# Patient Record
Sex: FEMALE | Race: BLACK OR AFRICAN AMERICAN | NOT HISPANIC OR LATINO | Employment: OTHER | RURAL
[De-identification: names, ages, dates, MRNs, and addresses within clinical notes are randomized per-mention and may not be internally consistent; named-entity substitution may affect disease eponyms.]

---

## 2018-07-12 ENCOUNTER — HISTORICAL (OUTPATIENT)
Dept: ADMINISTRATIVE | Facility: HOSPITAL | Age: 73
End: 2018-07-12

## 2018-07-16 LAB
LAB AP COMMENTS: NORMAL
LAB AP GENERAL CAT - HISTORICAL: NORMAL
LAB AP INTERPRETATION/RESULT - HISTORICAL: NEGATIVE
LAB AP SPECIMEN ADEQUACY - HISTORICAL: NORMAL
LAB AP SPECIMEN SUBMITTED - HISTORICAL: NORMAL

## 2018-12-04 ENCOUNTER — HISTORICAL (OUTPATIENT)
Dept: ADMINISTRATIVE | Facility: HOSPITAL | Age: 73
End: 2018-12-04

## 2018-12-04 LAB — TEST RESULTS: NORMAL

## 2018-12-05 LAB
LAB AP CLINICAL INFORMATION: NORMAL
LAB AP DIAGNOSIS - HISTORICAL: NORMAL
LAB AP GROSS PATHOLOGY - HISTORICAL: NORMAL
LAB AP SPECIMEN SUBMITTED - HISTORICAL: NORMAL

## 2018-12-25 ENCOUNTER — HISTORICAL (OUTPATIENT)
Dept: ADMINISTRATIVE | Facility: HOSPITAL | Age: 73
End: 2018-12-25

## 2018-12-28 LAB
LAB AP CLINICAL INFORMATION: NORMAL
LAB AP COMMENTS: NORMAL
LAB AP DIAGNOSIS - HISTORICAL: NORMAL
LAB AP GENERAL CAT - HISTORICAL: NEGATIVE
LAB AP PREPARATIONS - HISTORICAL: NORMAL
LAB AP SPECIMEN SUBMITTED - HISTORICAL: NORMAL

## 2020-06-24 ENCOUNTER — HISTORICAL (OUTPATIENT)
Dept: ADMINISTRATIVE | Facility: HOSPITAL | Age: 75
End: 2020-06-24

## 2020-11-16 ENCOUNTER — HISTORICAL (OUTPATIENT)
Dept: ADMINISTRATIVE | Facility: HOSPITAL | Age: 75
End: 2020-11-16

## 2020-12-14 ENCOUNTER — HISTORICAL (OUTPATIENT)
Dept: ADMINISTRATIVE | Facility: HOSPITAL | Age: 75
End: 2020-12-14

## 2021-04-05 VITALS — BODY MASS INDEX: 30.92 KG/M2 | HEIGHT: 70 IN | WEIGHT: 216 LBS

## 2021-04-05 RX ORDER — OMEPRAZOLE 20 MG/1
20 CAPSULE, DELAYED RELEASE ORAL DAILY
COMMUNITY
End: 2021-10-06 | Stop reason: SDUPTHER

## 2021-04-05 RX ORDER — HYDRALAZINE HYDROCHLORIDE 100 MG/1
100 TABLET, FILM COATED ORAL 2 TIMES DAILY
COMMUNITY
End: 2021-10-06 | Stop reason: SDUPTHER

## 2021-04-05 RX ORDER — COLCHICINE 0.6 MG/1
0.6 TABLET ORAL DAILY
COMMUNITY
End: 2021-10-06 | Stop reason: SDUPTHER

## 2021-04-05 RX ORDER — FUROSEMIDE 40 MG/1
40 TABLET ORAL DAILY
COMMUNITY
End: 2021-07-26

## 2021-04-05 RX ORDER — ALENDRONATE SODIUM 70 MG/75ML
70 SOLUTION ORAL
COMMUNITY
End: 2021-10-06 | Stop reason: SDUPTHER

## 2021-04-05 RX ORDER — METOPROLOL SUCCINATE 50 MG/1
50 TABLET, EXTENDED RELEASE ORAL DAILY
COMMUNITY
End: 2021-10-06 | Stop reason: SDUPTHER

## 2021-04-05 RX ORDER — AMLODIPINE BESYLATE 10 MG/1
10 TABLET ORAL DAILY
COMMUNITY
End: 2021-09-20

## 2021-04-05 RX ORDER — ISOSORBIDE MONONITRATE 30 MG/1
30 TABLET, EXTENDED RELEASE ORAL DAILY
COMMUNITY
End: 2021-10-06 | Stop reason: SDUPTHER

## 2021-04-05 RX ORDER — ACETAMINOPHEN 500 MG
1 TABLET ORAL DAILY
COMMUNITY

## 2021-04-05 RX ORDER — SEMAGLUTIDE 1.34 MG/ML
0.5 INJECTION, SOLUTION SUBCUTANEOUS
COMMUNITY
End: 2021-04-06 | Stop reason: SDUPTHER

## 2021-04-05 RX ORDER — FENOFIBRATE 145 MG/1
145 TABLET, FILM COATED ORAL DAILY
COMMUNITY
End: 2021-07-26

## 2021-04-05 RX ORDER — LOVASTATIN 10 MG/1
10 TABLET ORAL NIGHTLY
COMMUNITY
End: 2021-10-06 | Stop reason: SDUPTHER

## 2021-04-05 RX ORDER — SPIRONOLACTONE 25 MG/1
25 TABLET ORAL DAILY
COMMUNITY
End: 2021-07-26

## 2021-04-05 RX ORDER — GLIMEPIRIDE 4 MG/1
4 TABLET ORAL
COMMUNITY
End: 2021-10-06 | Stop reason: SDUPTHER

## 2021-04-05 RX ORDER — POTASSIUM CHLORIDE 750 MG/1
10 CAPSULE, EXTENDED RELEASE ORAL DAILY
COMMUNITY
End: 2021-10-06 | Stop reason: SDUPTHER

## 2021-04-05 RX ORDER — ASPIRIN 81 MG/1
81 TABLET ORAL DAILY
COMMUNITY

## 2021-04-06 ENCOUNTER — TELEPHONE (OUTPATIENT)
Dept: DIABETES SERVICES | Facility: CLINIC | Age: 76
End: 2021-04-06

## 2021-04-06 ENCOUNTER — OFFICE VISIT (OUTPATIENT)
Dept: PRIMARY CARE CLINIC | Facility: CLINIC | Age: 76
End: 2021-04-06
Payer: MEDICARE

## 2021-04-06 ENCOUNTER — OFFICE VISIT (OUTPATIENT)
Dept: DIABETES SERVICES | Facility: CLINIC | Age: 76
End: 2021-04-06
Payer: MEDICARE

## 2021-04-06 VITALS
HEIGHT: 70 IN | WEIGHT: 211 LBS | SYSTOLIC BLOOD PRESSURE: 118 MMHG | TEMPERATURE: 98 F | BODY MASS INDEX: 30.21 KG/M2 | RESPIRATION RATE: 16 BRPM | DIASTOLIC BLOOD PRESSURE: 60 MMHG | OXYGEN SATURATION: 96 % | HEART RATE: 94 BPM

## 2021-04-06 VITALS
SYSTOLIC BLOOD PRESSURE: 120 MMHG | HEART RATE: 90 BPM | HEIGHT: 70 IN | RESPIRATION RATE: 16 BRPM | BODY MASS INDEX: 30.21 KG/M2 | WEIGHT: 211 LBS | OXYGEN SATURATION: 99 % | DIASTOLIC BLOOD PRESSURE: 70 MMHG

## 2021-04-06 DIAGNOSIS — E11.9 TYPE 2 DIABETES MELLITUS WITHOUT COMPLICATION, WITHOUT LONG-TERM CURRENT USE OF INSULIN: Primary | ICD-10-CM

## 2021-04-06 DIAGNOSIS — Z86.73 HISTORY OF CVA (CEREBROVASCULAR ACCIDENT): ICD-10-CM

## 2021-04-06 DIAGNOSIS — I12.9 BENIGN HYPERTENSIVE CKD, STAGE 1-4 OR UNSPECIFIED CHRONIC KIDNEY DISEASE: ICD-10-CM

## 2021-04-06 DIAGNOSIS — I25.10 ATHEROSCLEROSIS OF CORONARY ARTERY, ANGINA PRESENCE UNSPECIFIED, UNSPECIFIED VESSEL OR LESION TYPE, UNSPECIFIED WHETHER NATIVE OR TRANSPLANTED HEART: ICD-10-CM

## 2021-04-06 DIAGNOSIS — E78.5 HYPERLIPIDEMIA, UNSPECIFIED HYPERLIPIDEMIA TYPE: ICD-10-CM

## 2021-04-06 DIAGNOSIS — Z78.0 OSTEOPENIA AFTER MENOPAUSE: Primary | ICD-10-CM

## 2021-04-06 DIAGNOSIS — I10 HYPERTENSION, UNSPECIFIED TYPE: ICD-10-CM

## 2021-04-06 DIAGNOSIS — M85.80 OSTEOPENIA, UNSPECIFIED LOCATION: Chronic | ICD-10-CM

## 2021-04-06 DIAGNOSIS — M85.80 OSTEOPENIA AFTER MENOPAUSE: Primary | ICD-10-CM

## 2021-04-06 LAB
GLUCOSE SERPL-MCNC: 126 MG/DL (ref 70–110)
HBA1C MFR BLD: 6.3 %

## 2021-04-06 PROCEDURE — 83036 HEMOGLOBIN GLYCOSYLATED A1C: CPT | Mod: PBBFAC | Performed by: NURSE PRACTITIONER

## 2021-04-06 PROCEDURE — 99214 OFFICE O/P EST MOD 30 MIN: CPT | Mod: S$PBB,,, | Performed by: NURSE PRACTITIONER

## 2021-04-06 PROCEDURE — 82962 GLUCOSE BLOOD TEST: CPT | Mod: PBBFAC | Performed by: NURSE PRACTITIONER

## 2021-04-06 PROCEDURE — 99213 OFFICE O/P EST LOW 20 MIN: CPT | Mod: ,,, | Performed by: NURSE PRACTITIONER

## 2021-04-06 PROCEDURE — 99214 PR OFFICE/OUTPT VISIT, EST, LEVL IV, 30-39 MIN: ICD-10-PCS | Mod: S$PBB,,, | Performed by: NURSE PRACTITIONER

## 2021-04-06 PROCEDURE — 99999 PR PBB SHADOW E&M-EST. PATIENT-LVL V: ICD-10-PCS | Mod: PBBFAC,,, | Performed by: NURSE PRACTITIONER

## 2021-04-06 PROCEDURE — 99215 OFFICE O/P EST HI 40 MIN: CPT | Mod: PBBFAC | Performed by: NURSE PRACTITIONER

## 2021-04-06 PROCEDURE — 99999 PR PBB SHADOW E&M-EST. PATIENT-LVL V: CPT | Mod: PBBFAC,,, | Performed by: NURSE PRACTITIONER

## 2021-04-06 PROCEDURE — 99213 PR OFFICE/OUTPT VISIT, EST, LEVL III, 20-29 MIN: ICD-10-PCS | Mod: ,,, | Performed by: NURSE PRACTITIONER

## 2021-04-06 RX ORDER — SEMAGLUTIDE 1.34 MG/ML
0.5 INJECTION, SOLUTION SUBCUTANEOUS
Qty: 9 ML | Refills: 1 | Status: SHIPPED | OUTPATIENT
Start: 2021-04-06 | End: 2021-10-06 | Stop reason: SDUPTHER

## 2021-06-30 ENCOUNTER — HOSPITAL ENCOUNTER (OUTPATIENT)
Dept: RADIOLOGY | Facility: HOSPITAL | Age: 76
Discharge: HOME | End: 2021-06-30
Attending: NURSE PRACTITIONER
Payer: MEDICARE

## 2021-06-30 VITALS — BODY MASS INDEX: 32.93 KG/M2 | WEIGHT: 230 LBS | HEIGHT: 70 IN

## 2021-06-30 DIAGNOSIS — Z12.31 BREAST CANCER SCREENING BY MAMMOGRAM: ICD-10-CM

## 2021-06-30 PROCEDURE — 77067 SCR MAMMO BI INCL CAD: CPT | Mod: TC

## 2021-09-20 RX ORDER — AMLODIPINE BESYLATE 10 MG/1
TABLET ORAL
Qty: 90 TABLET | Refills: 3 | Status: SHIPPED | OUTPATIENT
Start: 2021-09-20 | End: 2021-10-06 | Stop reason: SDUPTHER

## 2021-10-06 ENCOUNTER — OFFICE VISIT (OUTPATIENT)
Dept: PRIMARY CARE CLINIC | Facility: CLINIC | Age: 76
End: 2021-10-06
Payer: MEDICARE

## 2021-10-06 VITALS
SYSTOLIC BLOOD PRESSURE: 138 MMHG | HEIGHT: 70 IN | OXYGEN SATURATION: 98 % | BODY MASS INDEX: 29.78 KG/M2 | HEART RATE: 92 BPM | WEIGHT: 208 LBS | RESPIRATION RATE: 16 BRPM | TEMPERATURE: 98 F | DIASTOLIC BLOOD PRESSURE: 70 MMHG

## 2021-10-06 DIAGNOSIS — I25.10 ATHEROSCLEROSIS OF CORONARY ARTERY, UNSPECIFIED VESSEL OR LESION TYPE, UNSPECIFIED WHETHER ANGINA PRESENT, UNSPECIFIED WHETHER NATIVE OR TRANSPLANTED HEART: ICD-10-CM

## 2021-10-06 DIAGNOSIS — K21.00 GASTROESOPHAGEAL REFLUX DISEASE WITH ESOPHAGITIS WITHOUT HEMORRHAGE: ICD-10-CM

## 2021-10-06 DIAGNOSIS — Z86.73 HISTORY OF CVA (CEREBROVASCULAR ACCIDENT): ICD-10-CM

## 2021-10-06 DIAGNOSIS — M85.80 OSTEOPENIA, UNSPECIFIED LOCATION: ICD-10-CM

## 2021-10-06 DIAGNOSIS — Z23 IMMUNIZATION DUE: ICD-10-CM

## 2021-10-06 DIAGNOSIS — I12.9 BENIGN HYPERTENSIVE CKD, STAGE 1-4 OR UNSPECIFIED CHRONIC KIDNEY DISEASE: ICD-10-CM

## 2021-10-06 DIAGNOSIS — I10 HYPERTENSION, UNSPECIFIED TYPE: ICD-10-CM

## 2021-10-06 DIAGNOSIS — E11.9 DIABETES MELLITUS WITHOUT COMPLICATION: Primary | ICD-10-CM

## 2021-10-06 DIAGNOSIS — E78.5 HYPERLIPIDEMIA, UNSPECIFIED HYPERLIPIDEMIA TYPE: ICD-10-CM

## 2021-10-06 DIAGNOSIS — E87.6 HYPOKALEMIA: ICD-10-CM

## 2021-10-06 PROCEDURE — 90662 IIV NO PRSV INCREASED AG IM: CPT | Mod: ,,, | Performed by: NURSE PRACTITIONER

## 2021-10-06 PROCEDURE — 90662 FLU VACCINE - QUADRIVALENT - HIGH DOSE (65+) PRESERVATIVE FREE IM: ICD-10-PCS | Mod: ,,, | Performed by: NURSE PRACTITIONER

## 2021-10-06 PROCEDURE — 99214 PR OFFICE/OUTPT VISIT, EST, LEVL IV, 30-39 MIN: ICD-10-PCS | Mod: ,,, | Performed by: NURSE PRACTITIONER

## 2021-10-06 PROCEDURE — 99214 OFFICE O/P EST MOD 30 MIN: CPT | Mod: ,,, | Performed by: NURSE PRACTITIONER

## 2021-10-06 PROCEDURE — G0008 FLU VACCINE - QUADRIVALENT - HIGH DOSE (65+) PRESERVATIVE FREE IM: ICD-10-PCS | Mod: ,,, | Performed by: NURSE PRACTITIONER

## 2021-10-06 PROCEDURE — G0008 ADMIN INFLUENZA VIRUS VAC: HCPCS | Mod: ,,, | Performed by: NURSE PRACTITIONER

## 2021-10-06 RX ORDER — COLCHICINE 0.6 MG/1
0.6 TABLET ORAL DAILY
Qty: 90 TABLET | Refills: 3 | Status: SHIPPED | OUTPATIENT
Start: 2021-10-06 | End: 2023-03-02

## 2021-10-06 RX ORDER — ISOSORBIDE MONONITRATE 30 MG/1
30 TABLET, EXTENDED RELEASE ORAL DAILY
Qty: 90 TABLET | Refills: 3 | Status: SHIPPED | OUTPATIENT
Start: 2021-10-06 | End: 2023-07-05 | Stop reason: SDUPTHER

## 2021-10-06 RX ORDER — HYDRALAZINE HYDROCHLORIDE 100 MG/1
100 TABLET, FILM COATED ORAL 2 TIMES DAILY
Qty: 180 TABLET | Refills: 3 | Status: SHIPPED | OUTPATIENT
Start: 2021-10-06 | End: 2021-10-06 | Stop reason: DRUGHIGH

## 2021-10-06 RX ORDER — AMLODIPINE BESYLATE 10 MG/1
10 TABLET ORAL DAILY
Qty: 90 TABLET | Refills: 3 | Status: SHIPPED | OUTPATIENT
Start: 2021-10-06 | End: 2023-07-05 | Stop reason: SDUPTHER

## 2021-10-06 RX ORDER — SPIRONOLACTONE 25 MG/1
25 TABLET ORAL DAILY
Qty: 90 TABLET | Refills: 3 | Status: SHIPPED | OUTPATIENT
Start: 2021-10-06 | End: 2023-07-05 | Stop reason: SDUPTHER

## 2021-10-06 RX ORDER — ALENDRONATE SODIUM 70 MG/75ML
70 SOLUTION ORAL
Qty: 75 ML | Refills: 5 | Status: SHIPPED | OUTPATIENT
Start: 2021-10-06 | End: 2022-08-09

## 2021-10-06 RX ORDER — FENOFIBRATE 145 MG/1
145 TABLET, FILM COATED ORAL DAILY
Qty: 90 TABLET | Refills: 3 | Status: SHIPPED | OUTPATIENT
Start: 2021-10-06 | End: 2021-10-06

## 2021-10-06 RX ORDER — POTASSIUM CHLORIDE 750 MG/1
10 CAPSULE, EXTENDED RELEASE ORAL DAILY
Qty: 90 CAPSULE | Refills: 3 | Status: SHIPPED | OUTPATIENT
Start: 2021-10-06 | End: 2023-07-05 | Stop reason: SDUPTHER

## 2021-10-06 RX ORDER — METOPROLOL SUCCINATE 50 MG/1
50 TABLET, EXTENDED RELEASE ORAL DAILY
Qty: 90 TABLET | Refills: 3 | Status: SHIPPED | OUTPATIENT
Start: 2021-10-06 | End: 2021-10-06

## 2021-10-06 RX ORDER — SEMAGLUTIDE 1.34 MG/ML
0.5 INJECTION, SOLUTION SUBCUTANEOUS
Qty: 3 PEN | Refills: 3 | Status: SHIPPED | OUTPATIENT
Start: 2021-10-06 | End: 2023-03-23

## 2021-10-06 RX ORDER — GLIMEPIRIDE 4 MG/1
4 TABLET ORAL
Qty: 90 TABLET | Refills: 3 | Status: SHIPPED | OUTPATIENT
Start: 2021-10-06 | End: 2023-03-02

## 2021-10-06 RX ORDER — LOVASTATIN 10 MG/1
10 TABLET ORAL NIGHTLY
Qty: 90 TABLET | Refills: 3 | Status: SHIPPED | OUTPATIENT
Start: 2021-10-06 | End: 2022-06-14

## 2021-10-06 RX ORDER — FUROSEMIDE 40 MG/1
40 TABLET ORAL DAILY
Qty: 90 TABLET | Refills: 3 | Status: SHIPPED | OUTPATIENT
Start: 2021-10-06 | End: 2023-07-05 | Stop reason: SDUPTHER

## 2021-10-06 RX ORDER — OMEPRAZOLE 20 MG/1
20 CAPSULE, DELAYED RELEASE ORAL DAILY
Qty: 90 CAPSULE | Refills: 3 | Status: SHIPPED | OUTPATIENT
Start: 2021-10-06 | End: 2023-03-02

## 2021-10-11 RX ORDER — METOPROLOL TARTRATE 50 MG/1
50 TABLET ORAL 2 TIMES DAILY
Qty: 180 TABLET | Refills: 3 | Status: SHIPPED | OUTPATIENT
Start: 2021-10-11 | End: 2023-07-05 | Stop reason: SDUPTHER

## 2021-10-11 RX ORDER — HYDRALAZINE HYDROCHLORIDE 50 MG/1
50 TABLET, FILM COATED ORAL EVERY 12 HOURS
Qty: 180 TABLET | Refills: 3 | Status: SHIPPED | OUTPATIENT
Start: 2021-10-11 | End: 2023-07-05 | Stop reason: SDUPTHER

## 2021-10-19 ENCOUNTER — PATIENT OUTREACH (OUTPATIENT)
Dept: PRIMARY CARE CLINIC | Facility: CLINIC | Age: 76
End: 2021-10-19

## 2021-12-15 RX ORDER — LOVASTATIN 20 MG/1
TABLET ORAL
Qty: 90 TABLET | Refills: 3 | Status: SHIPPED | OUTPATIENT
Start: 2021-12-15 | End: 2023-07-05 | Stop reason: SDUPTHER

## 2022-03-11 DIAGNOSIS — Z71.89 COMPLEX CARE COORDINATION: ICD-10-CM

## 2022-04-20 LAB — THIN PREP: NORMAL

## 2022-04-30 ENCOUNTER — EXTERNAL CHRONIC CARE MANAGEMENT (OUTPATIENT)
Dept: FAMILY MEDICINE | Facility: CLINIC | Age: 77
End: 2022-04-30
Payer: MEDICARE

## 2022-04-30 PROCEDURE — G0511 CCM/BHI BY RHC/FQHC 20MIN MO: HCPCS | Mod: ,,, | Performed by: NURSE PRACTITIONER

## 2022-04-30 PROCEDURE — G0511 PR CHRONIC CARE MGMT, RHC OR FQHC ONLY, 20 MINS OR MORE: ICD-10-PCS | Mod: ,,, | Performed by: NURSE PRACTITIONER

## 2022-05-09 ENCOUNTER — OFFICE VISIT (OUTPATIENT)
Dept: OBSTETRICS AND GYNECOLOGY | Facility: CLINIC | Age: 77
End: 2022-05-09
Payer: MEDICARE

## 2022-05-09 VITALS
WEIGHT: 211.25 LBS | HEIGHT: 70 IN | BODY MASS INDEX: 30.24 KG/M2 | SYSTOLIC BLOOD PRESSURE: 122 MMHG | DIASTOLIC BLOOD PRESSURE: 89 MMHG

## 2022-05-09 DIAGNOSIS — Z01.419 WOMEN'S ANNUAL ROUTINE GYNECOLOGICAL EXAMINATION: Primary | ICD-10-CM

## 2022-05-09 DIAGNOSIS — Z12.4 ENCOUNTER FOR SCREENING FOR MALIGNANT NEOPLASM OF CERVIX: ICD-10-CM

## 2022-05-09 PROCEDURE — G0124 SCREEN C/V THIN LAYER BY MD: HCPCS | Mod: ,,, | Performed by: PATHOLOGY

## 2022-05-09 PROCEDURE — G0101 PR CA SCREEN;PELVIC/BREAST EXAM: ICD-10-PCS | Mod: S$PBB,,, | Performed by: OBSTETRICS & GYNECOLOGY

## 2022-05-09 PROCEDURE — G0124 THINPREP PAP TEST: ICD-10-PCS | Mod: ,,, | Performed by: PATHOLOGY

## 2022-05-09 PROCEDURE — G0101 CA SCREEN;PELVIC/BREAST EXAM: HCPCS | Mod: S$PBB,,, | Performed by: OBSTETRICS & GYNECOLOGY

## 2022-05-09 PROCEDURE — 99214 OFFICE O/P EST MOD 30 MIN: CPT | Mod: PBBFAC | Performed by: OBSTETRICS & GYNECOLOGY

## 2022-05-09 PROCEDURE — G0123 SCREEN CERV/VAG THIN LAYER: HCPCS | Mod: GCY | Performed by: OBSTETRICS & GYNECOLOGY

## 2022-05-10 NOTE — PROGRESS NOTES
Pam Ortega female  for   Chief Complaint   Patient presents with    Annual Exam     CU/Pap smear, no complaints.      OB History        5    Para        Term                AB   2    Living           SAB        IAB        Ectopic        Multiple        Live Births                      Past Medical History:   Diagnosis Date    Anemia of chronic disease     Atrial fibrillation     Benign hypertensive CKD, stage 1-4 or unspecified chronic kidney disease     Carotid artery occlusion     CKD (chronic kidney disease)     Coronary atherosclerosis     Diabetes mellitus, type 2     DJD (degenerative joint disease)     History of CVA (cerebrovascular accident)     HTN (hypertension)     Hyperlipidemia     Nonischemic dilated cardiomyopathy     Obesity     Osteopenia     Paroxysmal atrial fibrillation     Presence of cardiac pacemaker     PVD (peripheral vascular disease)     Vitamin D deficiency       Past Surgical History:   Procedure Laterality Date    BREAST BIOPSY      COLONOSCOPY  2018    repeat in 5 years      Review of patient's allergies indicates:   Allergen Reactions    Ace inhibitors Edema    Losartan Swelling             Physical exam:     General Appearance: healthy    Chest:chest clear, no wheezing, rales, normal symmetric air entry, Heart exam - S1, S2 normal, no murmur, no gallop, rate regular    Breast:  normal appearance, no masses or tenderness    Abdomen:Normal, benign.    Pelvic: Pelvic exam: normal external genitalia, vulva, vagina, cervix, uterus and adnexa, VULVA: normal appearing vulva with no masses, tenderness or lesions, CERVIX: normal appearing cervix without discharge or lesions, she does have uterine prolapse with the cervix almost to the introitus,, UTERUS: uterus is normal size, shape, consistency and nontender, ADNEXA: normal adnexa in size, nontender and no masses, RECTAL: normal rectal, no masses, guaiac negative stool obtained.      Extremity:normal    Skin: normal exam        Assessment:   Problem List Items Addressed This Visit    None     Visit Diagnoses     Women's annual routine gynecological examination    -  Primary    Encounter for screening for malignant neoplasm of cervix        Relevant Orders    ThinPrep Pap Test           Plan:  A mammogram has been scheduled for July.  She had a colonoscopy in 2018.  Pap smear was obtained today.

## 2022-05-13 LAB
GH SERPL-MCNC: ABNORMAL NG/ML
INSULIN SERPL-ACNC: ABNORMAL U[IU]/ML
LAB AP CLINICAL INFORMATION: ABNORMAL
LAB AP GYN INTERPRETATION: ABNORMAL
LAB AP PAP DISCLAIMER COMMENTS: ABNORMAL
RENIN PLAS-CCNC: ABNORMAL NG/ML/H

## 2022-05-17 DIAGNOSIS — R87.610 PAPANICOLAOU SMEAR OF CERVIX WITH ATYPICAL SQUAMOUS CELLS OF UNDETERMINED SIGNIFICANCE (ASC-US): Primary | ICD-10-CM

## 2022-05-17 RX ORDER — METRONIDAZOLE 7.5 MG/G
1 GEL VAGINAL NIGHTLY
Qty: 70 G | Refills: 0 | Status: SHIPPED | OUTPATIENT
Start: 2022-05-17 | End: 2022-05-22

## 2022-05-17 NOTE — TELEPHONE ENCOUNTER
Called patient this am to give results of pap smear, patient verbalized understanding. I told her that Dr. Villafuerte recommended that she use metrogel cream at night for 5 nights and repeat pap smear 2 weeks after finishing the medicine, patient verbalized understanding and asked for medicine to be called in at [facility]. She stated that she would call back and get appointment after using the medicine.

## 2022-05-31 ENCOUNTER — EXTERNAL CHRONIC CARE MANAGEMENT (OUTPATIENT)
Dept: PRIMARY CARE CLINIC | Facility: CLINIC | Age: 77
End: 2022-05-31
Payer: MEDICARE

## 2022-05-31 PROCEDURE — G0511 PR CHRONIC CARE MGMT, RHC OR FQHC ONLY, 20 MINS OR MORE: ICD-10-PCS | Mod: ,,, | Performed by: NURSE PRACTITIONER

## 2022-05-31 PROCEDURE — G0511 CCM/BHI BY RHC/FQHC 20MIN MO: HCPCS | Mod: ,,, | Performed by: NURSE PRACTITIONER

## 2022-06-14 ENCOUNTER — OFFICE VISIT (OUTPATIENT)
Dept: OBSTETRICS AND GYNECOLOGY | Facility: CLINIC | Age: 77
End: 2022-06-14
Payer: MEDICARE

## 2022-06-14 ENCOUNTER — OFFICE VISIT (OUTPATIENT)
Dept: PRIMARY CARE CLINIC | Facility: CLINIC | Age: 77
End: 2022-06-14
Payer: MEDICARE

## 2022-06-14 VITALS
RESPIRATION RATE: 18 BRPM | HEART RATE: 70 BPM | WEIGHT: 209 LBS | OXYGEN SATURATION: 98 % | TEMPERATURE: 98 F | BODY MASS INDEX: 29.92 KG/M2 | DIASTOLIC BLOOD PRESSURE: 74 MMHG | SYSTOLIC BLOOD PRESSURE: 136 MMHG | HEIGHT: 70 IN

## 2022-06-14 VITALS
WEIGHT: 206.38 LBS | HEIGHT: 70 IN | SYSTOLIC BLOOD PRESSURE: 123 MMHG | BODY MASS INDEX: 29.54 KG/M2 | DIASTOLIC BLOOD PRESSURE: 88 MMHG

## 2022-06-14 DIAGNOSIS — R87.610 PAPANICOLAOU SMEAR OF CERVIX WITH ATYPICAL SQUAMOUS CELLS OF UNDETERMINED SIGNIFICANCE (ASC-US): Primary | ICD-10-CM

## 2022-06-14 DIAGNOSIS — I10 HYPERTENSION, UNSPECIFIED TYPE: ICD-10-CM

## 2022-06-14 DIAGNOSIS — E11.9 DIABETES MELLITUS WITHOUT COMPLICATION: Primary | ICD-10-CM

## 2022-06-14 DIAGNOSIS — Z11.59 NEED FOR HEPATITIS C SCREENING TEST: ICD-10-CM

## 2022-06-14 DIAGNOSIS — I48.20 CHRONIC A-FIB: ICD-10-CM

## 2022-06-14 DIAGNOSIS — Z86.73 HISTORY OF CVA (CEREBROVASCULAR ACCIDENT): ICD-10-CM

## 2022-06-14 DIAGNOSIS — Z87.42 HISTORY OF ABNORMAL CERVICAL PAP SMEAR: ICD-10-CM

## 2022-06-14 DIAGNOSIS — E78.5 HYPERLIPIDEMIA, UNSPECIFIED HYPERLIPIDEMIA TYPE: ICD-10-CM

## 2022-06-14 PROBLEM — E66.9 OBESE: Status: ACTIVE | Noted: 2022-06-14

## 2022-06-14 PROBLEM — E11.69 TYPE 2 DIABETES MELLITUS WITH OTHER SPECIFIED COMPLICATION: Status: ACTIVE | Noted: 2022-06-14

## 2022-06-14 PROBLEM — G47.31 PRIMARY CENTRAL SLEEP APNEA: Status: ACTIVE | Noted: 2022-06-14

## 2022-06-14 PROBLEM — M10.9 POLYARTICULAR GOUT: Status: ACTIVE | Noted: 2022-06-14

## 2022-06-14 PROBLEM — G47.30 SLEEP APNEA: Status: ACTIVE | Noted: 2022-06-14

## 2022-06-14 PROBLEM — I42.9 CARDIOMYOPATHY: Status: ACTIVE | Noted: 2022-06-14

## 2022-06-14 PROCEDURE — 99203 OFFICE O/P NEW LOW 30 MIN: CPT | Mod: S$PBB,,, | Performed by: OBSTETRICS & GYNECOLOGY

## 2022-06-14 PROCEDURE — 99213 OFFICE O/P EST LOW 20 MIN: CPT | Mod: ,,, | Performed by: NURSE PRACTITIONER

## 2022-06-14 PROCEDURE — 99214 OFFICE O/P EST MOD 30 MIN: CPT | Mod: PBBFAC | Performed by: OBSTETRICS & GYNECOLOGY

## 2022-06-14 PROCEDURE — 99213 PR OFFICE/OUTPT VISIT, EST, LEVL III, 20-29 MIN: ICD-10-PCS | Mod: ,,, | Performed by: NURSE PRACTITIONER

## 2022-06-14 PROCEDURE — 99203 PR OFFICE/OUTPT VISIT, NEW, LEVL III, 30-44 MIN: ICD-10-PCS | Mod: S$PBB,,, | Performed by: OBSTETRICS & GYNECOLOGY

## 2022-06-14 PROCEDURE — G0123 SCREEN CERV/VAG THIN LAYER: HCPCS | Mod: GCY | Performed by: OBSTETRICS & GYNECOLOGY

## 2022-06-14 NOTE — PROGRESS NOTES
Subjective:       Patient ID: Pam Ortega is a 77 y.o. female.    Chief Complaint: 6 month routine check    Pt presents for a 6 month follow-up. Protective Sensation (w/ 10 gram monofilament):  Right: Intact  Left: Intact    Visual Inspection:  Normal -  Bilateral    Pedal Pulses:   Right: Present  Left: Present    Posterior tibialis:   Right:Present  Left: Present    Review of Systems   Constitutional: Negative for activity change, appetite change, chills, fatigue and fever.   HENT: Negative for nasal congestion, ear discharge, nosebleeds, postnasal drip, rhinorrhea, sinus pressure/congestion, sneezing, sore throat and tinnitus.    Eyes: Negative for pain, discharge, redness and itching.   Respiratory: Negative for cough, choking, chest tightness, shortness of breath and wheezing.    Cardiovascular: Negative for chest pain.   Gastrointestinal: Negative for abdominal distention, abdominal pain, blood in stool, change in bowel habit, constipation, diarrhea, nausea, vomiting and change in bowel habit.   Genitourinary: Negative for decreased urine volume, dysuria, flank pain and frequency.   Musculoskeletal: Negative for back pain and gait problem.   Integumentary:  Negative for wound, breast mass and breast discharge.   Allergic/Immunologic: Negative for immunocompromised state.   Neurological: Negative for dizziness, light-headedness and headaches.   Psychiatric/Behavioral: Negative for agitation, behavioral problems and hallucinations.   Breast: Negative for mass        Objective:      Physical Exam  Vitals and nursing note reviewed.   Constitutional:       Appearance: Normal appearance.   Cardiovascular:      Rate and Rhythm: Normal rate and regular rhythm.      Heart sounds: Normal heart sounds.   Pulmonary:      Effort: Pulmonary effort is normal.      Breath sounds: Normal breath sounds.   Musculoskeletal:         General: Normal range of motion.   Neurological:      Mental Status: She is alert and  oriented to person, place, and time.   Psychiatric:         Mood and Affect: Mood normal.         Behavior: Behavior normal.         Assessment:       Problem List Items Addressed This Visit        Neuro    History of CVA (cerebrovascular accident)       Cardiac/Vascular    Hypertension    Hyperlipidemia    Chronic a-fib       Endocrine    Diabetes mellitus without complication - Primary    Relevant Orders    CBC Auto Differential    Comprehensive Metabolic Panel    Lipid Panel    TSH    Hemoglobin A1C    Microalbumin/Creatinine Ratio, Urine      Other Visit Diagnoses     Need for hepatitis C screening test        Relevant Orders    Hepatitis C Antibody          Plan:       Will call pt with lab results. She will make an eye appointment.

## 2022-06-16 LAB
GH SERPL-MCNC: NORMAL NG/ML
INSULIN SERPL-ACNC: NORMAL U[IU]/ML
LAB AP CLINICAL INFORMATION: NORMAL
LAB AP GYN INTERPRETATION: NEGATIVE
LAB AP PAP DISCLAIMER COMMENTS: NORMAL
RENIN PLAS-CCNC: NORMAL NG/ML/H

## 2022-06-30 ENCOUNTER — EXTERNAL CHRONIC CARE MANAGEMENT (OUTPATIENT)
Dept: PRIMARY CARE CLINIC | Facility: CLINIC | Age: 77
End: 2022-06-30
Payer: MEDICARE

## 2022-06-30 PROCEDURE — G0511 PR CHRONIC CARE MGMT, RHC OR FQHC ONLY, 20 MINS OR MORE: ICD-10-PCS | Mod: ,,, | Performed by: NURSE PRACTITIONER

## 2022-06-30 PROCEDURE — G0511 CCM/BHI BY RHC/FQHC 20MIN MO: HCPCS | Mod: ,,, | Performed by: NURSE PRACTITIONER

## 2022-07-04 NOTE — PROGRESS NOTES
Pam Ortega female  for   Chief Complaint   Patient presents with    Abnormal Pap Smear     2 week repeat pap after using metrogel.      OB History        5    Para        Term                AB   2    Living           SAB        IAB        Ectopic        Multiple        Live Births                      Past Medical History:   Diagnosis Date    Anemia of chronic disease     Atrial fibrillation     Benign hypertensive CKD, stage 1-4 or unspecified chronic kidney disease     Carotid artery occlusion     CKD (chronic kidney disease)     Coronary atherosclerosis     Diabetes mellitus, type 2     DJD (degenerative joint disease)     History of CVA (cerebrovascular accident)     HTN (hypertension)     Hyperlipidemia     Nonischemic dilated cardiomyopathy     Obesity     Osteopenia     Paroxysmal atrial fibrillation     Presence of cardiac pacemaker     PVD (peripheral vascular disease)     Vitamin D deficiency       Past Surgical History:   Procedure Laterality Date    BREAST BIOPSY      COLONOSCOPY  2018    repeat in 5 years      Review of patient's allergies indicates:   Allergen Reactions    Ace inhibitors Edema    Losartan Swelling             Physical exam:     General Appearance: healthy     Abdomen:Normal, benign.    Pelvic: Pelvic exam: normal external genitalia, vulva, vagina, cervix, uterus and adnexa, VULVA: normal appearing vulva with no masses, tenderness or lesions, CERVIX: normal appearing cervix without discharge or lesions, UTERUS: uterus is normal size, shape, consistency and nontender, ADNEXA: normal adnexa in size, nontender and no masses, RECTAL: rectal exam not indicated.     Extremity:normal    Skin: normal exam        Assessment:   Problem List Items Addressed This Visit    None     Visit Diagnoses     Papanicolaou smear of cervix with atypical squamous cells of undetermined significance (ASC-US)    -  Primary    Relevant Orders    ThinPrep Pap  Test (Completed)    History of abnormal cervical Pap smear               Plan:  Her Pap smear was repeated today.  The patient was instructed to take Motrin for any pelvic discomfort.

## 2022-07-22 ENCOUNTER — HOSPITAL ENCOUNTER (OUTPATIENT)
Dept: RADIOLOGY | Facility: HOSPITAL | Age: 77
Discharge: HOME | End: 2022-07-22
Attending: NURSE PRACTITIONER
Payer: MEDICARE

## 2022-07-22 DIAGNOSIS — Z12.31 ENCOUNTER FOR SCREENING MAMMOGRAM FOR MALIGNANT NEOPLASM OF BREAST: ICD-10-CM

## 2022-07-22 PROCEDURE — 77067 SCR MAMMO BI INCL CAD: CPT | Mod: TC

## 2022-07-31 ENCOUNTER — EXTERNAL CHRONIC CARE MANAGEMENT (OUTPATIENT)
Dept: PRIMARY CARE CLINIC | Facility: CLINIC | Age: 77
End: 2022-07-31
Payer: MEDICARE

## 2022-07-31 PROCEDURE — G0511 PR CHRONIC CARE MGMT, RHC OR FQHC ONLY, 20 MINS OR MORE: ICD-10-PCS | Mod: ,,, | Performed by: NURSE PRACTITIONER

## 2022-07-31 PROCEDURE — G0511 CCM/BHI BY RHC/FQHC 20MIN MO: HCPCS | Mod: ,,, | Performed by: NURSE PRACTITIONER

## 2022-08-31 ENCOUNTER — EXTERNAL CHRONIC CARE MANAGEMENT (OUTPATIENT)
Dept: PRIMARY CARE CLINIC | Facility: CLINIC | Age: 77
End: 2022-08-31
Payer: MEDICARE

## 2022-08-31 PROCEDURE — G0511 CCM/BHI BY RHC/FQHC 20MIN MO: HCPCS | Mod: ,,, | Performed by: NURSE PRACTITIONER

## 2022-08-31 PROCEDURE — G0511 PR CHRONIC CARE MGMT, RHC OR FQHC ONLY, 20 MINS OR MORE: ICD-10-PCS | Mod: ,,, | Performed by: NURSE PRACTITIONER

## 2022-09-08 ENCOUNTER — OFFICE VISIT (OUTPATIENT)
Dept: PRIMARY CARE CLINIC | Facility: CLINIC | Age: 77
End: 2022-09-08
Payer: MEDICARE

## 2022-09-08 VITALS
TEMPERATURE: 100 F | HEIGHT: 70 IN | SYSTOLIC BLOOD PRESSURE: 136 MMHG | HEART RATE: 80 BPM | WEIGHT: 208 LBS | DIASTOLIC BLOOD PRESSURE: 78 MMHG | OXYGEN SATURATION: 97 % | BODY MASS INDEX: 29.78 KG/M2

## 2022-09-08 DIAGNOSIS — J32.9 SINUSITIS, UNSPECIFIED CHRONICITY, UNSPECIFIED LOCATION: Primary | ICD-10-CM

## 2022-09-08 PROBLEM — I48.20 CHRONIC ATRIAL FIBRILLATION: Status: ACTIVE | Noted: 2022-09-08

## 2022-09-08 PROCEDURE — 99213 OFFICE O/P EST LOW 20 MIN: CPT | Mod: ,,, | Performed by: NURSE PRACTITIONER

## 2022-09-08 PROCEDURE — 99213 PR OFFICE/OUTPT VISIT, EST, LEVL III, 20-29 MIN: ICD-10-PCS | Mod: ,,, | Performed by: NURSE PRACTITIONER

## 2022-09-08 RX ORDER — AMOXICILLIN AND CLAVULANATE POTASSIUM 875; 125 MG/1; MG/1
1 TABLET, FILM COATED ORAL 2 TIMES DAILY
Qty: 20 TABLET | Refills: 0 | Status: SHIPPED | OUTPATIENT
Start: 2022-09-08 | End: 2022-09-18

## 2022-09-08 NOTE — PROGRESS NOTES
Subjective:       Patient ID: Pam Ortega is a 77 y.o. female.    Chief Complaint: Cough, Nasal Congestion, Headache, and Sinus Problem (Negative Covid test/)    Pt presents with sinus symptoms x several days.     Review of Systems   Constitutional:  Negative for activity change, appetite change, chills, fatigue and fever.   HENT:  Positive for nasal congestion, postnasal drip and sinus pressure/congestion. Negative for ear discharge, nosebleeds, rhinorrhea, sneezing, sore throat and tinnitus.    Eyes:  Negative for pain, discharge, redness and itching.   Respiratory:  Negative for cough, choking, chest tightness, shortness of breath and wheezing.    Cardiovascular:  Negative for chest pain.   Gastrointestinal:  Negative for abdominal distention, abdominal pain, blood in stool, change in bowel habit, constipation, diarrhea, nausea, vomiting and change in bowel habit.   Genitourinary:  Negative for decreased urine volume, dysuria, flank pain and frequency.   Musculoskeletal:  Negative for back pain and gait problem.   Integumentary:  Negative for wound, breast mass and breast discharge.   Allergic/Immunologic: Negative for immunocompromised state.   Neurological:  Negative for dizziness, light-headedness and headaches.   Psychiatric/Behavioral:  Negative for agitation, behavioral problems and hallucinations.    Breast: Negative for mass      Objective:      Physical Exam  Vitals and nursing note reviewed.   Constitutional:       Appearance: Normal appearance.   HENT:      Head: Normocephalic.      Right Ear: Tympanic membrane normal.      Left Ear: Tympanic membrane normal.      Nose: Congestion present.      Mouth/Throat:      Mouth: Mucous membranes are moist.   Eyes:      Conjunctiva/sclera: Conjunctivae normal.      Pupils: Pupils are equal, round, and reactive to light.   Cardiovascular:      Rate and Rhythm: Normal rate and regular rhythm.      Heart sounds: Normal heart sounds.   Pulmonary:       Effort: Pulmonary effort is normal.      Breath sounds: Normal breath sounds.   Musculoskeletal:         General: Normal range of motion.   Neurological:      Mental Status: She is alert and oriented to person, place, and time.   Psychiatric:         Mood and Affect: Mood normal.         Behavior: Behavior normal.       Assessment:       Problem List Items Addressed This Visit    None  Visit Diagnoses       Sinusitis, unspecified chronicity, unspecified location    -  Primary    Relevant Medications    amoxicillin-clavulanate 875-125mg (AUGMENTIN) 875-125 mg per tablet            Plan:       Notify clinic if symptoms worsen or persist.

## 2022-09-30 ENCOUNTER — EXTERNAL CHRONIC CARE MANAGEMENT (OUTPATIENT)
Dept: PRIMARY CARE CLINIC | Facility: CLINIC | Age: 77
End: 2022-09-30
Payer: MEDICARE

## 2022-09-30 PROCEDURE — G0511 CCM/BHI BY RHC/FQHC 20MIN MO: HCPCS | Mod: ,,, | Performed by: NURSE PRACTITIONER

## 2022-09-30 PROCEDURE — G0511 PR CHRONIC CARE MGMT, RHC OR FQHC ONLY, 20 MINS OR MORE: ICD-10-PCS | Mod: ,,, | Performed by: NURSE PRACTITIONER

## 2022-10-12 DIAGNOSIS — E11.9 DIABETES MELLITUS WITHOUT COMPLICATION: Primary | ICD-10-CM

## 2022-10-12 RX ORDER — LANCETS
1 EACH MISCELLANEOUS DAILY
Qty: 200 EACH | Refills: 3 | Status: SHIPPED | OUTPATIENT
Start: 2022-10-12 | End: 2023-07-05

## 2022-10-12 RX ORDER — DEXTROSE 4 G
1 TABLET,CHEWABLE ORAL DAILY
Qty: 1 EACH | Refills: 0 | Status: SHIPPED | OUTPATIENT
Start: 2022-10-12 | End: 2023-07-05

## 2022-10-25 ENCOUNTER — OFFICE VISIT (OUTPATIENT)
Dept: OBSTETRICS AND GYNECOLOGY | Facility: CLINIC | Age: 77
End: 2022-10-25
Payer: MEDICARE

## 2022-10-25 VITALS
SYSTOLIC BLOOD PRESSURE: 149 MMHG | HEIGHT: 70 IN | WEIGHT: 204 LBS | BODY MASS INDEX: 29.2 KG/M2 | DIASTOLIC BLOOD PRESSURE: 83 MMHG

## 2022-10-25 DIAGNOSIS — Z87.42 HISTORY OF ABNORMAL CERVICAL PAP SMEAR: Primary | ICD-10-CM

## 2022-10-25 PROCEDURE — 99213 OFFICE O/P EST LOW 20 MIN: CPT | Mod: S$PBB,,, | Performed by: OBSTETRICS & GYNECOLOGY

## 2022-10-25 PROCEDURE — 99213 PR OFFICE/OUTPT VISIT, EST, LEVL III, 20-29 MIN: ICD-10-PCS | Mod: S$PBB,,, | Performed by: OBSTETRICS & GYNECOLOGY

## 2022-10-25 PROCEDURE — 99213 OFFICE O/P EST LOW 20 MIN: CPT | Mod: PBBFAC | Performed by: OBSTETRICS & GYNECOLOGY

## 2022-10-25 PROCEDURE — G0123 SCREEN CERV/VAG THIN LAYER: HCPCS | Mod: GCY | Performed by: OBSTETRICS & GYNECOLOGY

## 2022-10-26 NOTE — PROGRESS NOTES
Pam Ortega female  for   Chief Complaint   Patient presents with    Abnormal Pap Smear     4 MTH REPEAT PAP      OB History          7    Para   5    Term   5            AB   2    Living             SAB        IAB        Ectopic        Multiple        Live Births                      Past Medical History:   Diagnosis Date    Anemia of chronic disease     Atrial fibrillation     Benign hypertensive CKD, stage 1-4 or unspecified chronic kidney disease     Carotid artery occlusion     CKD (chronic kidney disease)     Coronary atherosclerosis     Diabetes mellitus, type 2     DJD (degenerative joint disease)     History of CVA (cerebrovascular accident)     HTN (hypertension)     Hyperlipidemia     Nonischemic dilated cardiomyopathy     Obesity     Osteopenia     Paroxysmal atrial fibrillation     Presence of cardiac pacemaker     PVD (peripheral vascular disease)     Vitamin D deficiency       Past Surgical History:   Procedure Laterality Date    BREAST BIOPSY      COLONOSCOPY  2018    repeat in 5 years      Review of patient's allergies indicates:   Allergen Reactions    Ace inhibitors Edema    Losartan Swelling             Physical exam:     General Appearance: healthy    Abdomen:Normal, benign.    Pelvic: Pelvic exam: normal external genitalia, vulva, vagina, cervix, uterus and adnexa, VULVA: normal appearing vulva with no masses, tenderness or lesions, CERVIX: normal appearing cervix without discharge or lesions, UTERUS: uterus is normal size, shape, consistency and nontender, ADNEXA: normal adnexa in size, nontender and no masses, RECTAL: rectal exam not indicated.     Extremity:normal    Skin: normal exam        Assessment:   Problem List Items Addressed This Visit    None  Visit Diagnoses       History of abnormal cervical Pap smear    -  Primary    Relevant Orders    ThinPrep Pap Test             Plan:  The patient's Pap smear was repeated today.  If it is normal, we will repeated  in 1 year.

## 2022-10-31 ENCOUNTER — EXTERNAL CHRONIC CARE MANAGEMENT (OUTPATIENT)
Dept: PRIMARY CARE CLINIC | Facility: CLINIC | Age: 77
End: 2022-10-31
Payer: MEDICARE

## 2022-10-31 PROCEDURE — G0511 CCM/BHI BY RHC/FQHC 20MIN MO: HCPCS | Mod: ,,, | Performed by: NURSE PRACTITIONER

## 2022-10-31 PROCEDURE — G0511 PR CHRONIC CARE MGMT, RHC OR FQHC ONLY, 20 MINS OR MORE: ICD-10-PCS | Mod: ,,, | Performed by: NURSE PRACTITIONER

## 2022-10-31 NOTE — PROGRESS NOTES
[facility]     PATIENT NAME: Pam Ortega   : 1945    AGE: 77 y.o. DATE: 2022   MRN: [id number]        Reason for Visit / Chief Complaint: Medicare AWV (Subsequent Medicare AWV  )        Pam Ortega presents for a Subsequent Medicare AWV today.     The following components were reviewed and updated:    Medical/Social/Family History:  Past Medical History:   Diagnosis Date    Anemia of chronic disease     Atrial fibrillation     Benign hypertensive CKD, stage 1-4 or unspecified chronic kidney disease     Carotid artery occlusion     CKD (chronic kidney disease)     Coronary atherosclerosis     Diabetes mellitus, type 2     DJD (degenerative joint disease)     History of CVA (cerebrovascular accident)     HTN (hypertension)     Hyperlipidemia     Nonischemic dilated cardiomyopathy     Obesity     Osteopenia     Paroxysmal atrial fibrillation     Presence of cardiac pacemaker     PVD (peripheral vascular disease)     Vitamin D deficiency         Family History   Problem Relation Age of Onset    Hypertension Mother     Alzheimer's disease Maternal Grandmother     Breast cancer Maternal Grandmother     Hypertension Maternal Grandmother     Breast cancer Daughter         Past Surgical History:   Procedure Laterality Date    BREAST BIOPSY      COLONOSCOPY  2018    repeat in 5 years       Social History     Tobacco Use   Smoking Status Never   Smokeless Tobacco Never       Social History     Substance and Sexual Activity   Alcohol Use Not Currently         Allergies and Current Medications     Review of patient's allergies indicates:   Allergen Reactions    Ace inhibitors Edema    Losartan Swelling       Current Outpatient Medications:     alendronate (FOSAMAX) 70 MG tablet, TAKE 1 TABLET BY MOUTH ONCE EVERY 7 DAYS, Disp: 4 tablet, Rfl: 5    amLODIPine (NORVASC) 10 MG tablet, Take 1 tablet (10 mg total) by mouth once daily., Disp: 90 tablet, Rfl: 3    aspirin  (ECOTRIN) 81 MG EC tablet, Take 81 mg by mouth once daily., Disp: , Rfl:     blood sugar diagnostic Strp, 1 each by Misc.(Non-Drug; Combo Route) route once daily., Disp: 200 each, Rfl: 3    blood-glucose meter Misc, 1 application by Misc.(Non-Drug; Combo Route) route once daily., Disp: 1 each, Rfl: 0    cholecalciferol, vitamin D3, (VITAMIN D3) 50 mcg (2,000 unit) Cap capsule, Take 1 capsule by mouth once daily., Disp: , Rfl:     colchicine (COLCRYS) 0.6 mg tablet, Take 1 tablet (0.6 mg total) by mouth once daily., Disp: 90 tablet, Rfl: 3    furosemide (LASIX) 40 MG tablet, Take 1 tablet (40 mg total) by mouth once daily., Disp: 90 tablet, Rfl: 3    glimepiride (AMARYL) 4 MG tablet, Take 1 tablet (4 mg total) by mouth before breakfast., Disp: 90 tablet, Rfl: 3    isosorbide mononitrate (IMDUR) 30 MG 24 hr tablet, Take 1 tablet (30 mg total) by mouth once daily., Disp: 90 tablet, Rfl: 3    lancets (ONETOUCH ULTRASOFT LANCETS) Misc, 1 application by Misc.(Non-Drug; Combo Route) route once daily., Disp: 200 each, Rfl: 3    lovastatin (MEVACOR) 20 MG tablet, TAKE 1 TABLET BY MOUTH EVERY NIGHT AT BEDTIME, Disp: 90 tablet, Rfl: 3    omeprazole (PRILOSEC) 20 MG capsule, Take 1 capsule (20 mg total) by mouth once daily., Disp: 90 capsule, Rfl: 3    potassium chloride (MICRO-K) 10 MEQ CpSR, Take 1 capsule (10 mEq total) by mouth once daily., Disp: 90 capsule, Rfl: 3    semaglutide (OZEMPIC) 0.25 mg or 0.5 mg(2 mg/1.5 mL) pen injector, Inject 0.5 mg into the skin every 7 days., Disp: 3 pen, Rfl: 3    spironolactone (ALDACTONE) 25 MG tablet, Take 1 tablet (25 mg total) by mouth once daily., Disp: 90 tablet, Rfl: 3    XARELTO 15 mg Tab, TAKE ONE (1) TABLET (15 MG TOTAL) BY MOUTH ONCE DAILY., Disp: 90 tablet, Rfl: 1    hydrALAZINE (APRESOLINE) 50 MG tablet, Take 1 tablet (50 mg total) by mouth every 12 (twelve) hours., Disp: 180 tablet, Rfl: 3    metoprolol tartrate (LOPRESSOR) 50 MG tablet, Take 1 tablet (50 mg total) by mouth  2 (two) times daily., Disp: 180 tablet, Rfl: 3      Health Risk Assessment   Fall Risk:  no   Obesity: BMI Body mass index is 29.56 kg/m².   Advance Directive:  Does not have an advanced directive.  Verbal information given and written information provided on today's AVS.   Depression: PHQ9- 0   HTN: DASH diet, exercise, weight management, med compliance, home BP monitoring, and follow-up discussed.   T2DM: diabetic diet, glucose monitoring, activity level, weight management, med compliance, and follow-up discussed.  STI: not at risk   Statin Use: yes      Health Maintenance   Last eye exam: 01/19/2022 with Dr. Trivedi   Last CV screen with lipids: 06/14/2022   Diabetes screening with fasting glucose or A1c: 06/14/2022   Last pap/pelvic: 06/14/2022   Last Mammogram: 07/22/2022  DEXA: 11/16/2020              Colonoscopy: 12/04/2018  Repeat in 5 years   Flu Vaccine: given at today's visit   Pneumonia vaccines: 13- 12/14/2015 23- 11/17/2016    COVID vaccine: 03/23/2021 04/20/2021 04/29/2022   Hep B vaccine: NA  AAA screening: NA   HIV Screening: not high risk  Hepatitis C Screen: 06/14/2022  Low Dose CT Scan: NA    Health Maintenance Topics with due status: Not Due       Topic Last Completion Date    TETANUS VACCINE 03/06/2013    Colorectal Cancer Screening 12/04/2018    DEXA Scan 11/16/2020    Eye Exam 01/19/2022    Diabetes Urine Screening 06/14/2022    Foot Exam 06/14/2022    Lipid Panel 06/14/2022    Hemoglobin A1c 06/14/2022    Pap Smear 10/25/2022     Health Maintenance Due   Topic Date Due    High Dose Statin  Never done    Shingles Vaccine (1 of 2) Never done         Lab results available in Epic or see dates from [facility] above:   Lab Results   Component Value Date    CHOL 113 06/14/2022    CHOL 96 10/06/2021     Lab Results   Component Value Date    HDL 25 (L) 06/14/2022    HDL 22 (L) 10/06/2021     Lab Results   Component Value Date    LDLCALC 66 06/14/2022    LDLCALC 53 10/06/2021     Lab Results   Component  Value Date    TRIG 111 06/14/2022    TRIG 103 10/06/2021     Lab Results   Component Value Date    CHOLHDL 4.5 06/14/2022    CHOLHDL 4.4 10/06/2021       Lab Results   Component Value Date    HGBA1C 6.7 (H) 06/14/2022       Sodium   Date Value Ref Range Status   06/14/2022 138 136 - 145 mmol/L Final     Potassium   Date Value Ref Range Status   06/14/2022 4.6 3.5 - 5.1 mmol/L Final     Chloride   Date Value Ref Range Status   06/14/2022 106 98 - 107 mmol/L Final     CO2   Date Value Ref Range Status   06/14/2022 28 21 - 32 mmol/L Final     Glucose   Date Value Ref Range Status   06/14/2022 135 (H) 74 - 106 mg/dL Final     BUN   Date Value Ref Range Status   06/14/2022 30 (H) 7 - 18 mg/dL Final     Creatinine   Date Value Ref Range Status   06/14/2022 1.70 (H) 0.55 - 1.02 mg/dL Final     Calcium   Date Value Ref Range Status   06/14/2022 9.4 8.5 - 10.1 mg/dL Final     Total Protein   Date Value Ref Range Status   06/14/2022 7.7 6.4 - 8.2 g/dL Final     Albumin   Date Value Ref Range Status   06/14/2022 3.7 3.5 - 5.0 g/dL Final     Bilirubin, Total   Date Value Ref Range Status   06/14/2022 1.1 0.0 - 1.2 mg/dL Final     Alk Phos   Date Value Ref Range Status   06/14/2022 98 55 - 142 U/L Final     AST   Date Value Ref Range Status   06/14/2022 26 15 - 37 U/L Final     ALT   Date Value Ref Range Status   06/14/2022 20 13 - 56 U/L Final     Anion Gap   Date Value Ref Range Status   06/14/2022 9 7 - 16 mmol/L Final     eGFR    Date Value Ref Range Status   10/06/2021 33 (L) >=60 mL/min/1.73m² Final     eGFR   Date Value Ref Range Status   06/14/2022 31 (L) >=60 mL/min/1.73m² Final         Incontinence  Bowel: no  Bladder: no      Care Team:  Dr. Loo  Cardiology        Dr. Trivedi  Ophthalmology            **See Completed Assessments for Annual Wellness visit within the encounter summary    The following assessments were completed & reviewed:  Depression Screening  Cognitive function Screening  Timed Get  "Up Test  Whisper Test  Vision Screen  Health Risk Assessment  Checklist of ADLs and IADLs  Opioid Risk Assessment        Objective  Vitals:    11/01/22 0940   BP: 122/70   Pulse: 67   Resp: 13   Temp: 97.9 °F (36.6 °C)   TempSrc: Oral   SpO2: 99%   Weight: 93.4 kg (206 lb)   Height: 5' 10" (1.778 m)   PainSc: 0-No pain      Body mass index is 29.56 kg/m².  Ideal body weight: 68.5 kg (151 lb 0.2 oz)       Physical Exam      Assessment:     1. Encounter for subsequent annual wellness visit (AWV) in Medicare patient    2. Diabetes mellitus without complication    3. Hyperlipidemia, unspecified hyperlipidemia type    4. Chronic a-fib    5. Essential hypertension    6. Need for vaccination  - Influenza (FLUAD) - Quadrivalent (Adjuvanted) *Preferred* (65+) (PF)    7. BMI 29.0-29.9,adult       Problem List Items Addressed This Visit          Cardiac/Vascular    Hyperlipidemia    Chronic a-fib    Essential hypertension       Endocrine    Diabetes mellitus without complication     Other Visit Diagnoses       Encounter for subsequent annual wellness visit (AWV) in Medicare patient    -  Primary    Need for vaccination        BMI 29.0-29.9,adult                  Plan:    Referrals:   none     Advised to call office if does not hear from anyone with referral appt within 2-3 weeks to check on status of referral. Voiced understanding.      Discussed and provided with a screening schedule and personal prevention plan in accordance with USPSTF age appropriate recommendations and Medicare screening guidelines.   Education, counseling, and referrals were provided as needed.  After Visit Summary printed and given to patient which includes written education and a list of any referrals if indicated.     Education including diet, exercise, falls and advanced directives discussed with patient and patient verbalized understanding.      F/u plan for yearly AWV.    Signature: CAROL Delgado      "

## 2022-11-01 ENCOUNTER — OFFICE VISIT (OUTPATIENT)
Dept: PRIMARY CARE CLINIC | Facility: CLINIC | Age: 77
End: 2022-11-01
Payer: MEDICARE

## 2022-11-01 VITALS
RESPIRATION RATE: 13 BRPM | HEIGHT: 70 IN | TEMPERATURE: 98 F | WEIGHT: 206 LBS | SYSTOLIC BLOOD PRESSURE: 122 MMHG | HEART RATE: 67 BPM | DIASTOLIC BLOOD PRESSURE: 70 MMHG | BODY MASS INDEX: 29.49 KG/M2 | OXYGEN SATURATION: 99 %

## 2022-11-01 DIAGNOSIS — Z23 NEED FOR VACCINATION: ICD-10-CM

## 2022-11-01 DIAGNOSIS — I10 ESSENTIAL HYPERTENSION: ICD-10-CM

## 2022-11-01 DIAGNOSIS — E78.5 HYPERLIPIDEMIA, UNSPECIFIED HYPERLIPIDEMIA TYPE: ICD-10-CM

## 2022-11-01 DIAGNOSIS — E11.9 DIABETES MELLITUS WITHOUT COMPLICATION: ICD-10-CM

## 2022-11-01 DIAGNOSIS — Z00.00 ENCOUNTER FOR SUBSEQUENT ANNUAL WELLNESS VISIT (AWV) IN MEDICARE PATIENT: Primary | ICD-10-CM

## 2022-11-01 DIAGNOSIS — I48.20 CHRONIC A-FIB: ICD-10-CM

## 2022-11-01 PROCEDURE — G0008 FLU VACCINE - QUADRIVALENT - ADJUVANTED: ICD-10-PCS | Mod: ,,, | Performed by: NURSE PRACTITIONER

## 2022-11-01 PROCEDURE — G0439 PR MEDICARE ANNUAL WELLNESS SUBSEQUENT VISIT: ICD-10-PCS | Mod: ,,, | Performed by: NURSE PRACTITIONER

## 2022-11-01 PROCEDURE — G0439 PPPS, SUBSEQ VISIT: HCPCS | Mod: ,,, | Performed by: NURSE PRACTITIONER

## 2022-11-01 PROCEDURE — 90694 VACC AIIV4 NO PRSRV 0.5ML IM: CPT | Mod: ,,, | Performed by: NURSE PRACTITIONER

## 2022-11-01 PROCEDURE — G0008 ADMIN INFLUENZA VIRUS VAC: HCPCS | Mod: ,,, | Performed by: NURSE PRACTITIONER

## 2022-11-01 PROCEDURE — 90694 FLU VACCINE - QUADRIVALENT - ADJUVANTED: ICD-10-PCS | Mod: ,,, | Performed by: NURSE PRACTITIONER

## 2022-11-01 NOTE — PATIENT INSTRUCTIONS
Counseling and Referral of Other Preventative  (Italic type indicates deductible and co-insurance are waived)    Patient Name: Pam Otrega  Today's Date: 11/1/2022    Health Maintenance         Date Due Completion Date    High Dose Statin Never done ---    Shingles Vaccine (1 of 2) Never done ---    Hemoglobin A1c 12/14/2022 6/14/2022    Eye Exam 01/19/2023 1/19/2022    TETANUS VACCINE 03/06/2023 3/6/2013    Diabetes Urine Screening 06/14/2023 6/14/2022    Foot Exam 06/14/2023 6/14/2022    Override on 6/14/2022: Done    Lipid Panel 06/14/2023 6/14/2022    DEXA Scan 11/16/2023 11/16/2020    Colorectal Cancer Screening 12/04/2023 12/4/2018    Pap Smear 10/25/2025 10/25/2022          Orders Placed This Encounter   Procedures    Influenza (FLUAD) - Quadrivalent (Adjuvanted) *Preferred* (65+) (PF)

## 2022-11-30 ENCOUNTER — EXTERNAL CHRONIC CARE MANAGEMENT (OUTPATIENT)
Dept: PRIMARY CARE CLINIC | Facility: CLINIC | Age: 77
End: 2022-11-30
Payer: MEDICARE

## 2022-11-30 PROCEDURE — G0511 PR CHRONIC CARE MGMT, RHC OR FQHC ONLY, 20 MINS OR MORE: ICD-10-PCS | Mod: ,,, | Performed by: NURSE PRACTITIONER

## 2022-11-30 PROCEDURE — G0511 CCM/BHI BY RHC/FQHC 20MIN MO: HCPCS | Mod: ,,, | Performed by: NURSE PRACTITIONER

## 2022-12-21 DIAGNOSIS — E11.9 DIABETES MELLITUS WITHOUT COMPLICATION: Primary | ICD-10-CM

## 2022-12-31 ENCOUNTER — EXTERNAL CHRONIC CARE MANAGEMENT (OUTPATIENT)
Dept: PRIMARY CARE CLINIC | Facility: CLINIC | Age: 77
End: 2022-12-31
Payer: MEDICARE

## 2022-12-31 PROCEDURE — G0511 PR CHRONIC CARE MGMT, RHC OR FQHC ONLY, 20 MINS OR MORE: ICD-10-PCS | Mod: ,,, | Performed by: NURSE PRACTITIONER

## 2022-12-31 PROCEDURE — G0511 CCM/BHI BY RHC/FQHC 20MIN MO: HCPCS | Mod: ,,, | Performed by: NURSE PRACTITIONER

## 2023-01-05 ENCOUNTER — OFFICE VISIT (OUTPATIENT)
Dept: PRIMARY CARE CLINIC | Facility: CLINIC | Age: 78
End: 2023-01-05
Payer: MEDICARE

## 2023-01-05 VITALS
RESPIRATION RATE: 18 BRPM | DIASTOLIC BLOOD PRESSURE: 72 MMHG | TEMPERATURE: 98 F | OXYGEN SATURATION: 97 % | BODY MASS INDEX: 29.2 KG/M2 | SYSTOLIC BLOOD PRESSURE: 126 MMHG | WEIGHT: 204 LBS | HEIGHT: 70 IN | HEART RATE: 64 BPM

## 2023-01-05 DIAGNOSIS — E11.9 DIABETES MELLITUS WITHOUT COMPLICATION: ICD-10-CM

## 2023-01-05 DIAGNOSIS — E78.5 HYPERLIPIDEMIA, UNSPECIFIED HYPERLIPIDEMIA TYPE: ICD-10-CM

## 2023-01-05 DIAGNOSIS — I10 ESSENTIAL HYPERTENSION: Primary | ICD-10-CM

## 2023-01-05 PROCEDURE — 99213 OFFICE O/P EST LOW 20 MIN: CPT | Mod: ,,, | Performed by: NURSE PRACTITIONER

## 2023-01-05 PROCEDURE — 99213 PR OFFICE/OUTPT VISIT, EST, LEVL III, 20-29 MIN: ICD-10-PCS | Mod: ,,, | Performed by: NURSE PRACTITIONER

## 2023-01-05 NOTE — PROGRESS NOTES
Subjective:       Patient ID: Pam Ortega is a 77 y.o. female.    Chief Complaint: 6 month check    Pt presents for a 6 month follow-up with labs.     Review of Systems   Constitutional:  Negative for activity change, appetite change, chills, fatigue and fever.   HENT:  Negative for nasal congestion, ear discharge, nosebleeds, postnasal drip, rhinorrhea, sinus pressure/congestion, sneezing, sore throat and tinnitus.    Eyes:  Negative for pain, discharge, redness and itching.   Respiratory:  Negative for cough, choking, chest tightness, shortness of breath and wheezing.    Cardiovascular:  Negative for chest pain.   Gastrointestinal:  Negative for abdominal distention, abdominal pain, blood in stool, change in bowel habit, constipation, diarrhea, nausea, vomiting and change in bowel habit.   Genitourinary:  Negative for decreased urine volume, dysuria, flank pain and frequency.   Musculoskeletal:  Negative for back pain and gait problem.   Integumentary:  Negative for wound, breast mass and breast discharge.   Allergic/Immunologic: Negative for immunocompromised state.   Neurological:  Negative for dizziness, light-headedness and headaches.   Psychiatric/Behavioral:  Negative for agitation, behavioral problems and hallucinations.    Breast: Negative for mass      Objective:      Physical Exam  Vitals and nursing note reviewed.   Constitutional:       Appearance: Normal appearance.   Cardiovascular:      Rate and Rhythm: Normal rate and regular rhythm.      Heart sounds: Normal heart sounds.   Pulmonary:      Effort: Pulmonary effort is normal.      Breath sounds: Normal breath sounds.   Musculoskeletal:         General: Normal range of motion.   Neurological:      Mental Status: She is alert and oriented to person, place, and time.   Psychiatric:         Mood and Affect: Mood normal.         Behavior: Behavior normal.       Assessment:       Problem List Items Addressed This Visit          Cardiac/Vascular     Hyperlipidemia    Essential hypertension - Primary    Relevant Orders    CBC Auto Differential    Comprehensive Metabolic Panel    Lipid Panel    TSH       Endocrine    Diabetes mellitus without complication     Stable, Yoselyn Benson Np follows         Relevant Orders    Hemoglobin A1C       Plan:       Will call pt with lab results. She will bring a copy of her last eye exam.

## 2023-01-31 ENCOUNTER — EXTERNAL CHRONIC CARE MANAGEMENT (OUTPATIENT)
Dept: PRIMARY CARE CLINIC | Facility: CLINIC | Age: 78
End: 2023-01-31
Payer: MEDICARE

## 2023-01-31 PROCEDURE — G0511 CCM/BHI BY RHC/FQHC 20MIN MO: HCPCS | Mod: ,,, | Performed by: NURSE PRACTITIONER

## 2023-01-31 PROCEDURE — G0511 PR CHRONIC CARE MGMT, RHC OR FQHC ONLY, 20 MINS OR MORE: ICD-10-PCS | Mod: ,,, | Performed by: NURSE PRACTITIONER

## 2023-02-28 ENCOUNTER — EXTERNAL CHRONIC CARE MANAGEMENT (OUTPATIENT)
Dept: PRIMARY CARE CLINIC | Facility: CLINIC | Age: 78
End: 2023-02-28
Payer: MEDICARE

## 2023-02-28 PROCEDURE — G0511 PR CHRONIC CARE MGMT, RHC OR FQHC ONLY, 20 MINS OR MORE: ICD-10-PCS | Mod: ,,, | Performed by: NURSE PRACTITIONER

## 2023-02-28 PROCEDURE — G0511 CCM/BHI BY RHC/FQHC 20MIN MO: HCPCS | Mod: ,,, | Performed by: NURSE PRACTITIONER

## 2023-03-31 ENCOUNTER — EXTERNAL CHRONIC CARE MANAGEMENT (OUTPATIENT)
Dept: PRIMARY CARE CLINIC | Facility: CLINIC | Age: 78
End: 2023-03-31
Payer: MEDICARE

## 2023-03-31 PROCEDURE — G0511 CCM/BHI BY RHC/FQHC 20MIN MO: HCPCS | Mod: ,,, | Performed by: NURSE PRACTITIONER

## 2023-03-31 PROCEDURE — G0511 PR CHRONIC CARE MGMT, RHC OR FQHC ONLY, 20 MINS OR MORE: ICD-10-PCS | Mod: ,,, | Performed by: NURSE PRACTITIONER

## 2023-04-30 ENCOUNTER — EXTERNAL CHRONIC CARE MANAGEMENT (OUTPATIENT)
Dept: PRIMARY CARE CLINIC | Facility: CLINIC | Age: 78
End: 2023-04-30
Payer: MEDICARE

## 2023-04-30 PROCEDURE — G0511 PR CHRONIC CARE MGMT, RHC OR FQHC ONLY, 20 MINS OR MORE: ICD-10-PCS | Mod: ,,, | Performed by: NURSE PRACTITIONER

## 2023-04-30 PROCEDURE — G0511 CCM/BHI BY RHC/FQHC 20MIN MO: HCPCS | Mod: ,,, | Performed by: NURSE PRACTITIONER

## 2023-05-31 ENCOUNTER — EXTERNAL CHRONIC CARE MANAGEMENT (OUTPATIENT)
Dept: PRIMARY CARE CLINIC | Facility: CLINIC | Age: 78
End: 2023-05-31
Payer: MEDICARE

## 2023-05-31 PROCEDURE — G0511 PR CHRONIC CARE MGMT, RHC OR FQHC ONLY, 20 MINS OR MORE: ICD-10-PCS | Mod: ,,, | Performed by: NURSE PRACTITIONER

## 2023-05-31 PROCEDURE — G0511 CCM/BHI BY RHC/FQHC 20MIN MO: HCPCS | Mod: ,,, | Performed by: NURSE PRACTITIONER

## 2023-06-30 ENCOUNTER — EXTERNAL CHRONIC CARE MANAGEMENT (OUTPATIENT)
Dept: PRIMARY CARE CLINIC | Facility: CLINIC | Age: 78
End: 2023-06-30
Payer: MEDICARE

## 2023-06-30 PROCEDURE — G0511 CCM/BHI BY RHC/FQHC 20MIN MO: HCPCS | Mod: ,,, | Performed by: NURSE PRACTITIONER

## 2023-06-30 PROCEDURE — G0511 PR CHRONIC CARE MGMT, RHC OR FQHC ONLY, 20 MINS OR MORE: ICD-10-PCS | Mod: ,,, | Performed by: NURSE PRACTITIONER

## 2023-07-05 ENCOUNTER — OFFICE VISIT (OUTPATIENT)
Dept: PRIMARY CARE CLINIC | Facility: CLINIC | Age: 78
End: 2023-07-05
Payer: MEDICARE

## 2023-07-05 VITALS
TEMPERATURE: 98 F | DIASTOLIC BLOOD PRESSURE: 80 MMHG | OXYGEN SATURATION: 97 % | HEIGHT: 70 IN | BODY MASS INDEX: 28.92 KG/M2 | WEIGHT: 202 LBS | HEART RATE: 69 BPM | SYSTOLIC BLOOD PRESSURE: 132 MMHG

## 2023-07-05 DIAGNOSIS — N18.32 STAGE 3B CHRONIC KIDNEY DISEASE: ICD-10-CM

## 2023-07-05 DIAGNOSIS — I42.9 CARDIOMYOPATHY, UNSPECIFIED TYPE: ICD-10-CM

## 2023-07-05 DIAGNOSIS — E78.5 HYPERLIPIDEMIA, UNSPECIFIED HYPERLIPIDEMIA TYPE: ICD-10-CM

## 2023-07-05 DIAGNOSIS — I12.9 BENIGN HYPERTENSIVE CKD, STAGE 1-4 OR UNSPECIFIED CHRONIC KIDNEY DISEASE: ICD-10-CM

## 2023-07-05 DIAGNOSIS — I10 ESSENTIAL HYPERTENSION: Primary | ICD-10-CM

## 2023-07-05 DIAGNOSIS — I10 HYPERTENSION, UNSPECIFIED TYPE: ICD-10-CM

## 2023-07-05 DIAGNOSIS — Z86.73 HISTORY OF CVA (CEREBROVASCULAR ACCIDENT): ICD-10-CM

## 2023-07-05 DIAGNOSIS — K21.00 GASTROESOPHAGEAL REFLUX DISEASE WITH ESOPHAGITIS WITHOUT HEMORRHAGE: ICD-10-CM

## 2023-07-05 DIAGNOSIS — E11.9 DIABETES MELLITUS WITHOUT COMPLICATION: ICD-10-CM

## 2023-07-05 DIAGNOSIS — E87.6 HYPOKALEMIA: ICD-10-CM

## 2023-07-05 DIAGNOSIS — M85.80 OSTEOPENIA, UNSPECIFIED LOCATION: ICD-10-CM

## 2023-07-05 PROCEDURE — 99214 OFFICE O/P EST MOD 30 MIN: CPT | Mod: ,,, | Performed by: NURSE PRACTITIONER

## 2023-07-05 PROCEDURE — 99214 PR OFFICE/OUTPT VISIT, EST, LEVL IV, 30-39 MIN: ICD-10-PCS | Mod: ,,, | Performed by: NURSE PRACTITIONER

## 2023-07-05 RX ORDER — AMLODIPINE BESYLATE 10 MG/1
10 TABLET ORAL DAILY
Qty: 90 TABLET | Refills: 3 | Status: SHIPPED | OUTPATIENT
Start: 2023-07-05

## 2023-07-05 RX ORDER — POTASSIUM CHLORIDE 750 MG/1
10 CAPSULE, EXTENDED RELEASE ORAL DAILY
Qty: 90 CAPSULE | Refills: 3 | Status: SHIPPED | OUTPATIENT
Start: 2023-07-05

## 2023-07-05 RX ORDER — SEMAGLUTIDE 1.34 MG/ML
0.5 INJECTION, SOLUTION SUBCUTANEOUS
Qty: 9 ML | Refills: 3 | Status: SHIPPED | OUTPATIENT
Start: 2023-07-05 | End: 2024-02-07 | Stop reason: SDUPTHER

## 2023-07-05 RX ORDER — FUROSEMIDE 40 MG/1
40 TABLET ORAL DAILY
Qty: 90 TABLET | Refills: 3 | Status: SHIPPED | OUTPATIENT
Start: 2023-07-05

## 2023-07-05 RX ORDER — SPIRONOLACTONE 25 MG/1
25 TABLET ORAL DAILY
Qty: 90 TABLET | Refills: 3 | Status: SHIPPED | OUTPATIENT
Start: 2023-07-05

## 2023-07-05 RX ORDER — COLCHICINE 0.6 MG/1
0.6 TABLET ORAL DAILY
Qty: 90 TABLET | Refills: 3 | Status: SHIPPED | OUTPATIENT
Start: 2023-07-05

## 2023-07-05 RX ORDER — ALENDRONATE SODIUM 70 MG/1
70 TABLET ORAL
Qty: 12 TABLET | Refills: 3 | Status: SHIPPED | OUTPATIENT
Start: 2023-07-05

## 2023-07-05 RX ORDER — HYDRALAZINE HYDROCHLORIDE 50 MG/1
50 TABLET, FILM COATED ORAL EVERY 12 HOURS
Qty: 180 TABLET | Refills: 3 | Status: SHIPPED | OUTPATIENT
Start: 2023-07-05

## 2023-07-05 RX ORDER — ISOSORBIDE MONONITRATE 30 MG/1
30 TABLET, EXTENDED RELEASE ORAL DAILY
Qty: 90 TABLET | Refills: 3 | Status: SHIPPED | OUTPATIENT
Start: 2023-07-05

## 2023-07-05 RX ORDER — OMEPRAZOLE 20 MG/1
20 CAPSULE, DELAYED RELEASE ORAL DAILY
Qty: 90 CAPSULE | Refills: 3 | Status: SHIPPED | OUTPATIENT
Start: 2023-07-05

## 2023-07-05 RX ORDER — METOPROLOL TARTRATE 50 MG/1
50 TABLET ORAL 2 TIMES DAILY
Qty: 180 TABLET | Refills: 3 | Status: SHIPPED | OUTPATIENT
Start: 2023-07-05

## 2023-07-05 RX ORDER — GLIMEPIRIDE 4 MG/1
4 TABLET ORAL
Qty: 90 TABLET | Refills: 3 | Status: SHIPPED | OUTPATIENT
Start: 2023-07-05

## 2023-07-05 RX ORDER — LOVASTATIN 20 MG/1
20 TABLET ORAL NIGHTLY
Qty: 90 TABLET | Refills: 3 | Status: SHIPPED | OUTPATIENT
Start: 2023-07-05

## 2023-07-05 NOTE — PROGRESS NOTES
Subjective     Patient ID: Pam Ortega is a 78 y.o. female.    Chief Complaint: 6th Month fu (6th Month fu for essential hypertension)    Pt presents for a 6 month follow-up for hypertension and labs.     Review of Systems   Constitutional:  Negative for activity change, appetite change, chills, fatigue and fever.   HENT:  Negative for nasal congestion, ear discharge, nosebleeds, postnasal drip, rhinorrhea, sinus pressure/congestion, sneezing, sore throat and tinnitus.    Eyes:  Negative for pain, discharge, redness and itching.   Respiratory:  Negative for cough, choking, chest tightness, shortness of breath and wheezing.    Cardiovascular:  Negative for chest pain.   Gastrointestinal:  Negative for abdominal distention, abdominal pain, blood in stool, change in bowel habit, constipation, diarrhea, nausea, vomiting and change in bowel habit.   Genitourinary:  Negative for decreased urine volume, dysuria, flank pain and frequency.   Musculoskeletal:  Negative for back pain and gait problem.   Integumentary:  Negative for wound, breast mass and breast discharge.   Allergic/Immunologic: Negative for immunocompromised state.   Neurological:  Negative for dizziness, light-headedness and headaches.   Psychiatric/Behavioral:  Negative for agitation, behavioral problems and hallucinations.    Breast: Negative for mass       Objective     Physical Exam  Vitals and nursing note reviewed.   Constitutional:       Appearance: Normal appearance.   Cardiovascular:      Rate and Rhythm: Normal rate and regular rhythm.      Heart sounds: Normal heart sounds.   Pulmonary:      Effort: Pulmonary effort is normal.      Breath sounds: Normal breath sounds.   Musculoskeletal:         General: Normal range of motion.   Neurological:      Mental Status: She is alert and oriented to person, place, and time.   Psychiatric:         Mood and Affect: Mood normal.         Behavior: Behavior normal.          Assessment and Plan     1.  Essential hypertension    2. Stage 3b chronic kidney disease    3. Cardiomyopathy, unspecified type    4. Diabetes mellitus without complication  -     Hemoglobin A1C; Future; Expected date: 07/05/2023  -     Microalbumin/Creatinine Ratio, Urine; Future; Expected date: 07/05/2023  -     CBC Auto Differential; Future; Expected date: 07/05/2023  -     Comprehensive Metabolic Panel; Future; Expected date: 07/05/2023  -     Lipid Panel; Future; Expected date: 07/05/2023  -     TSH; Future; Expected date: 07/05/2023  -     glimepiride (AMARYL) 4 MG tablet; Take 1 tablet (4 mg total) by mouth daily with breakfast.  Dispense: 90 tablet; Refill: 3  -     semaglutide (OZEMPIC) 0.25 mg or 0.5 mg(2 mg/1.5 mL) pen injector; Inject 0.5 mg into the skin every 7 days.  Dispense: 9 mL; Refill: 3    5. Hyperlipidemia, unspecified hyperlipidemia type  -     lovastatin (MEVACOR) 20 MG tablet; Take 1 tablet (20 mg total) by mouth every evening.  Dispense: 90 tablet; Refill: 3    6. Gastroesophageal reflux disease with esophagitis without hemorrhage  -     omeprazole (PRILOSEC) 20 MG capsule; Take 1 capsule (20 mg total) by mouth once daily.  Dispense: 90 capsule; Refill: 3    7. Osteopenia, unspecified location  Overview:  Currently taking vit d, c and fosamax started by Dr. Silverio previously.      Orders:  -     alendronate (FOSAMAX) 70 MG tablet; Take 1 tablet (70 mg total) by mouth every 7 days.  Dispense: 12 tablet; Refill: 3    8. Hypertension, unspecified type  Overview:  Controlled.  Patient has begun walking, lost 5 pds.    Orders:  -     amLODIPine (NORVASC) 10 MG tablet; Take 1 tablet (10 mg total) by mouth once daily.  Dispense: 90 tablet; Refill: 3  -     hydrALAZINE (APRESOLINE) 50 MG tablet; Take 1 tablet (50 mg total) by mouth every 12 (twelve) hours.  Dispense: 180 tablet; Refill: 3  -     metoprolol tartrate (LOPRESSOR) 50 MG tablet; Take 1 tablet (50 mg total) by mouth 2 (two) times daily.  Dispense: 180 tablet;  Refill: 3    9. Benign hypertensive CKD, stage 1-4 or unspecified chronic kidney disease  Overview:  Patient denies complaints today.  States she is tolerating her statin well.  Well-controlled last visit.    Orders:  -     furosemide (LASIX) 40 MG tablet; Take 1 tablet (40 mg total) by mouth once daily.  Dispense: 90 tablet; Refill: 3  -     isosorbide mononitrate (IMDUR) 30 MG 24 hr tablet; Take 1 tablet (30 mg total) by mouth once daily.  Dispense: 90 tablet; Refill: 3  -     spironolactone (ALDACTONE) 25 MG tablet; Take 1 tablet (25 mg total) by mouth once daily.  Dispense: 90 tablet; Refill: 3    10. Hypokalemia  -     potassium chloride (MICRO-K) 10 MEQ CpSR; Take 1 capsule (10 mEq total) by mouth once daily.  Dispense: 90 capsule; Refill: 3    11. History of CVA (cerebrovascular accident)  -     rivaroxaban (XARELTO) 15 mg Tab; Take 1 tablet (15 mg total) by mouth once daily.  Dispense: 90 tablet; Refill: 3    Other orders  -     colchicine (COLCRYS) 0.6 mg tablet; Take 1 tablet (0.6 mg total) by mouth once daily. FOR GOUT  Dispense: 90 tablet; Refill: 3        Will call pt with lab results.          Follow up in about 6 months (around 1/5/2024).

## 2023-07-26 ENCOUNTER — HOSPITAL ENCOUNTER (OUTPATIENT)
Dept: RADIOLOGY | Facility: HOSPITAL | Age: 78
Discharge: HOME | End: 2023-07-26
Attending: NURSE PRACTITIONER
Payer: MEDICARE

## 2023-07-26 VITALS — BODY MASS INDEX: 30.1 KG/M2 | WEIGHT: 215 LBS | HEIGHT: 71 IN

## 2023-07-26 DIAGNOSIS — Z12.31 OTHER SCREENING MAMMOGRAM: ICD-10-CM

## 2023-07-26 PROCEDURE — 77067 SCR MAMMO BI INCL CAD: CPT | Mod: TC

## 2023-07-31 ENCOUNTER — EXTERNAL CHRONIC CARE MANAGEMENT (OUTPATIENT)
Dept: PRIMARY CARE CLINIC | Facility: CLINIC | Age: 78
End: 2023-07-31
Payer: MEDICARE

## 2023-07-31 PROCEDURE — G0511 PR CHRONIC CARE MGMT, RHC OR FQHC ONLY, 20 MINS OR MORE: ICD-10-PCS | Mod: ,,, | Performed by: NURSE PRACTITIONER

## 2023-07-31 PROCEDURE — G0511 CCM/BHI BY RHC/FQHC 20MIN MO: HCPCS | Mod: ,,, | Performed by: NURSE PRACTITIONER

## 2023-08-31 ENCOUNTER — EXTERNAL CHRONIC CARE MANAGEMENT (OUTPATIENT)
Dept: PRIMARY CARE CLINIC | Facility: CLINIC | Age: 78
End: 2023-08-31
Payer: MEDICARE

## 2023-08-31 PROCEDURE — G0511 CCM/BHI BY RHC/FQHC 20MIN MO: HCPCS | Mod: ,,, | Performed by: NURSE PRACTITIONER

## 2023-08-31 PROCEDURE — G0511 PR CHRONIC CARE MGMT, RHC OR FQHC ONLY, 20 MINS OR MORE: ICD-10-PCS | Mod: ,,, | Performed by: NURSE PRACTITIONER

## 2023-09-30 ENCOUNTER — EXTERNAL CHRONIC CARE MANAGEMENT (OUTPATIENT)
Dept: PRIMARY CARE CLINIC | Facility: CLINIC | Age: 78
End: 2023-09-30
Payer: MEDICARE

## 2023-09-30 PROCEDURE — G0511 CCM/BHI BY RHC/FQHC 20MIN MO: HCPCS | Mod: ,,, | Performed by: NURSE PRACTITIONER

## 2023-09-30 PROCEDURE — G0511 PR CHRONIC CARE MGMT, RHC OR FQHC ONLY, 20 MINS OR MORE: ICD-10-PCS | Mod: ,,, | Performed by: NURSE PRACTITIONER

## 2023-10-31 ENCOUNTER — EXTERNAL CHRONIC CARE MANAGEMENT (OUTPATIENT)
Dept: PRIMARY CARE CLINIC | Facility: CLINIC | Age: 78
End: 2023-10-31
Payer: MEDICARE

## 2023-10-31 PROCEDURE — G0511 PR CHRONIC CARE MGMT, RHC OR FQHC ONLY, 20 MINS OR MORE: ICD-10-PCS | Mod: ,,, | Performed by: NURSE PRACTITIONER

## 2023-10-31 PROCEDURE — G0511 CCM/BHI BY RHC/FQHC 20MIN MO: HCPCS | Mod: ,,, | Performed by: NURSE PRACTITIONER

## 2023-11-30 ENCOUNTER — EXTERNAL CHRONIC CARE MANAGEMENT (OUTPATIENT)
Dept: PRIMARY CARE CLINIC | Facility: CLINIC | Age: 78
End: 2023-11-30
Payer: MEDICARE

## 2023-11-30 PROCEDURE — G0511 PR CHRONIC CARE MGMT, RHC OR FQHC ONLY, 20 MINS OR MORE: ICD-10-PCS | Mod: ,,, | Performed by: NURSE PRACTITIONER

## 2023-11-30 PROCEDURE — G0511 CCM/BHI BY RHC/FQHC 20MIN MO: HCPCS | Mod: ,,, | Performed by: NURSE PRACTITIONER

## 2023-12-04 ENCOUNTER — CLINICAL SUPPORT (OUTPATIENT)
Dept: PRIMARY CARE CLINIC | Facility: CLINIC | Age: 78
End: 2023-12-04
Payer: MEDICARE

## 2023-12-04 DIAGNOSIS — Z23 NEED FOR VACCINATION: Primary | ICD-10-CM

## 2023-12-04 PROCEDURE — G0008 ADMIN INFLUENZA VIRUS VAC: HCPCS | Mod: ,,, | Performed by: NURSE PRACTITIONER

## 2023-12-04 PROCEDURE — 90662 FLU VACCINE - HIGH DOSE (65+) PRESERVATIVE FREE IM: ICD-10-PCS | Mod: ,,, | Performed by: NURSE PRACTITIONER

## 2023-12-04 PROCEDURE — G0008 FLU VACCINE - HIGH DOSE (65+) PRESERVATIVE FREE IM: ICD-10-PCS | Mod: ,,, | Performed by: NURSE PRACTITIONER

## 2023-12-04 PROCEDURE — 90662 IIV NO PRSV INCREASED AG IM: CPT | Mod: ,,, | Performed by: NURSE PRACTITIONER

## 2023-12-22 ENCOUNTER — OFFICE VISIT (OUTPATIENT)
Dept: PRIMARY CARE CLINIC | Facility: CLINIC | Age: 78
End: 2023-12-22
Payer: MEDICARE

## 2023-12-22 ENCOUNTER — HOSPITAL ENCOUNTER (OUTPATIENT)
Dept: RADIOLOGY | Facility: HOSPITAL | Age: 78
Discharge: HOME | End: 2023-12-22
Attending: NURSE PRACTITIONER
Payer: MEDICARE

## 2023-12-22 DIAGNOSIS — J06.9 UPPER RESPIRATORY TRACT INFECTION, UNSPECIFIED TYPE: ICD-10-CM

## 2023-12-22 DIAGNOSIS — J40 BRONCHITIS: ICD-10-CM

## 2023-12-22 DIAGNOSIS — R05.1 ACUTE COUGH: Primary | ICD-10-CM

## 2023-12-22 DIAGNOSIS — R05.1 ACUTE COUGH: ICD-10-CM

## 2023-12-22 PROCEDURE — 71046 XR CHEST PA AND LATERAL: ICD-10-PCS | Mod: 26,,, | Performed by: RADIOLOGY

## 2023-12-22 PROCEDURE — 99213 OFFICE O/P EST LOW 20 MIN: CPT | Mod: 25,,, | Performed by: NURSE PRACTITIONER

## 2023-12-22 PROCEDURE — 99213 PR OFFICE/OUTPT VISIT, EST, LEVL III, 20-29 MIN: ICD-10-PCS | Mod: 25,,, | Performed by: NURSE PRACTITIONER

## 2023-12-22 PROCEDURE — 71046 X-RAY EXAM CHEST 2 VIEWS: CPT | Mod: 26,,, | Performed by: RADIOLOGY

## 2023-12-22 PROCEDURE — 96372 THER/PROPH/DIAG INJ SC/IM: CPT | Mod: ,,, | Performed by: NURSE PRACTITIONER

## 2023-12-22 PROCEDURE — 71046 X-RAY EXAM CHEST 2 VIEWS: CPT | Mod: TC

## 2023-12-22 PROCEDURE — 96372 PR INJECTION,THERAP/PROPH/DIAG2ST, IM OR SUBCUT: ICD-10-PCS | Mod: ,,, | Performed by: NURSE PRACTITIONER

## 2023-12-22 RX ORDER — DEXAMETHASONE SODIUM PHOSPHATE 4 MG/ML
4 INJECTION, SOLUTION INTRA-ARTICULAR; INTRALESIONAL; INTRAMUSCULAR; INTRAVENOUS; SOFT TISSUE
Status: COMPLETED | OUTPATIENT
Start: 2023-12-22 | End: 2023-12-22

## 2023-12-22 RX ORDER — LIDOCAINE HYDROCHLORIDE 10 MG/ML
1 INJECTION INFILTRATION; PERINEURAL ONCE
Status: COMPLETED | OUTPATIENT
Start: 2023-12-22 | End: 2023-12-22

## 2023-12-22 RX ORDER — BENZONATATE 100 MG/1
100 CAPSULE ORAL 3 TIMES DAILY PRN
Qty: 30 CAPSULE | Refills: 0 | Status: SHIPPED | OUTPATIENT
Start: 2023-12-22 | End: 2024-01-01

## 2023-12-22 RX ORDER — AZITHROMYCIN 250 MG/1
TABLET, FILM COATED ORAL
Qty: 6 TABLET | Refills: 0 | Status: SHIPPED | OUTPATIENT
Start: 2023-12-22 | End: 2023-12-27

## 2023-12-22 RX ORDER — METHYLPREDNISOLONE ACETATE 80 MG/ML
80 INJECTION, SUSPENSION INTRA-ARTICULAR; INTRALESIONAL; INTRAMUSCULAR; SOFT TISSUE
Status: COMPLETED | OUTPATIENT
Start: 2023-12-22 | End: 2023-12-22

## 2023-12-22 RX ORDER — PROMETHAZINE HYDROCHLORIDE AND DEXTROMETHORPHAN HYDROBROMIDE 6.25; 15 MG/5ML; MG/5ML
5 SYRUP ORAL NIGHTLY PRN
Qty: 120 ML | Refills: 0 | Status: SHIPPED | OUTPATIENT
Start: 2023-12-22 | End: 2024-01-03

## 2023-12-22 RX ORDER — CEFTRIAXONE 1 G/1
1 INJECTION, POWDER, FOR SOLUTION INTRAMUSCULAR; INTRAVENOUS ONCE
Status: COMPLETED | OUTPATIENT
Start: 2023-12-22 | End: 2023-12-22

## 2023-12-22 RX ADMIN — METHYLPREDNISOLONE ACETATE 400 MG: 80 INJECTION, SUSPENSION INTRA-ARTICULAR; INTRALESIONAL; INTRAMUSCULAR; SOFT TISSUE at 09:12

## 2023-12-22 RX ADMIN — CEFTRIAXONE 1 G: 1 INJECTION, POWDER, FOR SOLUTION INTRAMUSCULAR; INTRAVENOUS at 09:12

## 2023-12-22 RX ADMIN — DEXAMETHASONE SODIUM PHOSPHATE 4 MG: 4 INJECTION, SOLUTION INTRA-ARTICULAR; INTRALESIONAL; INTRAMUSCULAR; INTRAVENOUS; SOFT TISSUE at 09:12

## 2023-12-22 RX ADMIN — LIDOCAINE HYDROCHLORIDE 20 ML: 10 INJECTION INFILTRATION; PERINEURAL at 09:12

## 2023-12-22 NOTE — PROGRESS NOTES
Subjective     Patient ID: Pam Ortega is a 78 y.o. female.    Chief Complaint: Cough (Patient presents today c/o coughing  and nasal and chest congestion.)    77 y/o bf presents with complaints of cough and rattling in her chest. She denies sore throat, fever or ear pain. She has nasal congestion also.       Review of Systems   HENT:  Positive for postnasal drip.    Respiratory:  Positive for cough and wheezing.    All other systems reviewed and are negative.         Objective     Physical Exam  Vitals and nursing note reviewed.   Constitutional:       Appearance: Normal appearance.   HENT:      Head: Normocephalic.      Nose: Congestion present.      Mouth/Throat:      Pharynx: Posterior oropharyngeal erythema present.   Eyes:      Pupils: Pupils are equal, round, and reactive to light.   Cardiovascular:      Rate and Rhythm: Normal rate and regular rhythm.      Heart sounds: Normal heart sounds.   Pulmonary:      Effort: Pulmonary effort is normal.      Breath sounds: Wheezing and rhonchi present.   Musculoskeletal:         General: Normal range of motion.      Cervical back: Normal range of motion.   Skin:     General: Skin is warm and dry.   Neurological:      General: No focal deficit present.      Mental Status: She is alert and oriented to person, place, and time.   Psychiatric:         Attention and Perception: Attention and perception normal.         Mood and Affect: Mood normal.         Speech: Speech normal.         Behavior: Behavior normal. Behavior is cooperative.         Cognition and Memory: Cognition normal.         Judgment: Judgment normal.          Assessment and Plan     1. Acute cough  -     X-Ray Chest PA And Lateral; Future; Expected date: 12/22/2023    2. Upper respiratory tract infection, unspecified type  -     dexAMETHasone injection 4 mg  -     methylPREDNISolone acetate injection 80 mg  -     cefTRIAXone injection 1 g  -     LIDOcaine HCL 10 mg/ml (1%) injection 1 mL    3.  Bronchitis    Other orders  -     azithromycin (Z-KESHAV) 250 MG tablet; Take 2 tablets by mouth on day 1; Take 1 tablet by mouth on days 2-5  Dispense: 6 tablet; Refill: 0  -     benzonatate (TESSALON) 100 MG capsule; Take 1 capsule (100 mg total) by mouth 3 (three) times daily as needed for Cough.  Dispense: 30 capsule; Refill: 0  -     promethazine-dextromethorphan (PROMETHAZINE-DM) 6.25-15 mg/5 mL Syrp; Take 5 mLs by mouth nightly as needed (cough).  Dispense: 120 mL; Refill: 0        Take meds as prescribed. RTN to [facility].          No follow-ups on file.

## 2023-12-31 ENCOUNTER — EXTERNAL CHRONIC CARE MANAGEMENT (OUTPATIENT)
Dept: PRIMARY CARE CLINIC | Facility: CLINIC | Age: 78
End: 2023-12-31
Payer: MEDICARE

## 2023-12-31 PROCEDURE — G0511 CCM/BHI BY RHC/FQHC 20MIN MO: HCPCS | Mod: ,,, | Performed by: NURSE PRACTITIONER

## 2024-01-03 ENCOUNTER — OFFICE VISIT (OUTPATIENT)
Dept: PRIMARY CARE CLINIC | Facility: CLINIC | Age: 79
End: 2024-01-03
Payer: MEDICARE

## 2024-01-03 VITALS
DIASTOLIC BLOOD PRESSURE: 72 MMHG | WEIGHT: 211 LBS | HEART RATE: 70 BPM | BODY MASS INDEX: 29.54 KG/M2 | TEMPERATURE: 98 F | RESPIRATION RATE: 18 BRPM | OXYGEN SATURATION: 98 % | HEIGHT: 71 IN | SYSTOLIC BLOOD PRESSURE: 134 MMHG

## 2024-01-03 DIAGNOSIS — R20.9 BILATERAL COLD FEET: Primary | ICD-10-CM

## 2024-01-03 DIAGNOSIS — I99.9 CIRCULATION DISORDER OF LOWER EXTREMITY: ICD-10-CM

## 2024-01-03 DIAGNOSIS — I42.9 CARDIOMYOPATHY, UNSPECIFIED TYPE: ICD-10-CM

## 2024-01-03 DIAGNOSIS — M79.671 PAIN IN RIGHT FOOT: ICD-10-CM

## 2024-01-03 DIAGNOSIS — I48.20 CHRONIC ATRIAL FIBRILLATION: ICD-10-CM

## 2024-01-03 DIAGNOSIS — N18.32 STAGE 3B CHRONIC KIDNEY DISEASE: ICD-10-CM

## 2024-01-03 DIAGNOSIS — E11.69 TYPE 2 DIABETES MELLITUS WITH OTHER SPECIFIED COMPLICATION, WITHOUT LONG-TERM CURRENT USE OF INSULIN: ICD-10-CM

## 2024-01-03 PROCEDURE — 99213 OFFICE O/P EST LOW 20 MIN: CPT | Mod: ,,, | Performed by: NURSE PRACTITIONER

## 2024-01-03 NOTE — PROGRESS NOTES
Subjective     Patient ID: Pam Ortega is a 78 y.o. female.    Chief Complaint: Legs appear darker (Cramping in legs and numbess in feet) and Cough    Pt presents with BL cold feet, worse to left foot x several days. Darker skin noted to feet also. Pt is taking xarelto as directed.       Review of Systems   Constitutional:  Negative for activity change, appetite change, chills, fatigue and fever.   HENT:  Negative for nasal congestion, ear discharge, nosebleeds, postnasal drip, rhinorrhea, sinus pressure/congestion, sneezing, sore throat and tinnitus.    Eyes:  Negative for pain, discharge, redness and itching.   Respiratory:  Negative for cough, choking, chest tightness, shortness of breath and wheezing.    Cardiovascular:  Negative for chest pain.   Gastrointestinal:  Negative for abdominal distention, abdominal pain, blood in stool, change in bowel habit, constipation, diarrhea, nausea and vomiting.   Genitourinary:  Negative for decreased urine volume, dysuria, flank pain and frequency.   Musculoskeletal:  Negative for back pain and gait problem.        BL cold feet    Integumentary:  Negative for wound, breast mass and breast discharge.   Allergic/Immunologic: Negative for immunocompromised state.   Neurological:  Negative for dizziness, light-headedness and headaches.   Psychiatric/Behavioral:  Negative for agitation, behavioral problems and hallucinations.    Breast: Negative for mass         Objective     Physical Exam  Vitals and nursing note reviewed.   Constitutional:       Appearance: Normal appearance.   Cardiovascular:      Rate and Rhythm: Normal rate and regular rhythm.      Heart sounds: Normal heart sounds.   Pulmonary:      Effort: Pulmonary effort is normal.      Breath sounds: Normal breath sounds.   Musculoskeletal:         General: Normal range of motion.      Comments: Left foot noticeably colder than right foot, skin in tact, no erythema noted, no edema noted   Neurological:       Mental Status: She is alert and oriented to person, place, and time.   Psychiatric:         Mood and Affect: Mood normal.         Behavior: Behavior normal.            Assessment and Plan     1. Bilateral cold feet  -     US Lower Extremity Veins Bilateral; Future; Expected date: 01/03/2024  -     US Lower Extrem Arteries Bilat with REYMUNDO (xpd); Future; Expected date: 01/03/2024  -     Ambulatory referral/consult to [facility]; Future; Expected date: 01/10/2024    2. Type 2 diabetes mellitus with other specified complication, without long-term current use of insulin    3. Chronic atrial fibrillation    4. Stage 3b chronic kidney disease    5. Cardiomyopathy, unspecified type    6. Circulation disorder of lower extremity  -     US Lower Extremity Veins Bilateral; Future; Expected date: 01/03/2024  -     US Lower Extrem Arteries Bilat with REYMUNDO (xpd); Future; Expected date: 01/03/2024  -     Ambulatory referral/consult to [facility]; Future; Expected date: 01/10/2024    7. Pain in right foot  -     US Lower Extremity Veins Bilateral; Future; Expected date: 01/03/2024        Pt has an appointment Monday with Dr. Lyn. Gave pt ultrasound appointment. Go to the er with any worsening symptoms.          Follow up if symptoms worsen or fail to improve.

## 2024-01-11 ENCOUNTER — HOSPITAL ENCOUNTER (OUTPATIENT)
Dept: RADIOLOGY | Facility: HOSPITAL | Age: 79
Discharge: HOME | End: 2024-01-11
Attending: NURSE PRACTITIONER
Payer: MEDICARE

## 2024-01-11 DIAGNOSIS — R20.9 BILATERAL COLD FEET: ICD-10-CM

## 2024-01-11 DIAGNOSIS — I99.9 CIRCULATION DISORDER OF LOWER EXTREMITY: ICD-10-CM

## 2024-01-11 DIAGNOSIS — M79.671 PAIN IN RIGHT FOOT: ICD-10-CM

## 2024-01-11 PROCEDURE — 93970 EXTREMITY STUDY: CPT | Mod: 26,,, | Performed by: RADIOLOGY

## 2024-01-11 PROCEDURE — 93922 UPR/L XTREMITY ART 2 LEVELS: CPT | Mod: TC

## 2024-01-11 PROCEDURE — 93970 EXTREMITY STUDY: CPT | Mod: TC

## 2024-01-12 NOTE — PROGRESS NOTES
Please let her know follow-up with Dr. Lyn and call the referral team to check on the appointment with vascular surgery Ms Stone NP.

## 2024-01-17 ENCOUNTER — HOSPITAL ENCOUNTER (OUTPATIENT)
Dept: RADIOLOGY | Facility: HOSPITAL | Age: 79
Discharge: HOME | End: 2024-01-17
Attending: FAMILY MEDICINE
Payer: MEDICARE

## 2024-01-17 ENCOUNTER — OFFICE VISIT (OUTPATIENT)
Dept: VASCULAR SURGERY | Facility: CLINIC | Age: 79
End: 2024-01-17
Payer: MEDICARE

## 2024-01-17 VITALS
HEIGHT: 70 IN | RESPIRATION RATE: 16 BRPM | WEIGHT: 206.38 LBS | BODY MASS INDEX: 29.54 KG/M2 | DIASTOLIC BLOOD PRESSURE: 70 MMHG | HEART RATE: 84 BPM | SYSTOLIC BLOOD PRESSURE: 110 MMHG

## 2024-01-17 DIAGNOSIS — I87.2 VENOUS INSUFFICIENCY: Primary | ICD-10-CM

## 2024-01-17 DIAGNOSIS — I99.9 CIRCULATION DISORDER OF LOWER EXTREMITY: ICD-10-CM

## 2024-01-17 DIAGNOSIS — R60.0 EDEMA, LOWER EXTREMITY: ICD-10-CM

## 2024-01-17 DIAGNOSIS — M79.605 BILATERAL LEG PAIN: ICD-10-CM

## 2024-01-17 DIAGNOSIS — M79.604 BILATERAL LEG PAIN: ICD-10-CM

## 2024-01-17 DIAGNOSIS — L81.9 HYPERPIGMENTATION OF SKIN: ICD-10-CM

## 2024-01-17 PROBLEM — R20.9 BILATERAL COLD FEET: Status: ACTIVE | Noted: 2024-01-17

## 2024-01-17 PROCEDURE — 99204 OFFICE O/P NEW MOD 45 MIN: CPT | Mod: S$PBB,,, | Performed by: FAMILY MEDICINE

## 2024-01-17 PROCEDURE — 93970 EXTREMITY STUDY: CPT | Mod: TC

## 2024-01-17 PROCEDURE — 99215 OFFICE O/P EST HI 40 MIN: CPT | Mod: PBBFAC,25 | Performed by: FAMILY MEDICINE

## 2024-01-17 PROCEDURE — 93970 EXTREMITY STUDY: CPT | Mod: 26,,, | Performed by: FAMILY MEDICINE

## 2024-01-17 NOTE — PROGRESS NOTES
VEIN CENTER CLINIC NOTE    Patient ID: Pam Ortega is a 78 y.o. female.    I. HISTORY     Chief Complaint:   Chief Complaint   Patient presents with    Follow-up     NP referral CAROL Tubbs re: bilateral cold feet        HPI: Pam Ortega is a 78 y.o. female who is referred here today by Janice MEDINA for evaluation of bilateral lower extremity leg swelling, hyperpigmentation and pain.  Symptoms are progressive, worsening and began greater than 1 years ago.  Location is bilateral lower extremities below the knees. Symptoms are worse at the end of the day.  History of venous interventions includes none.  Unsure of family history of venous disease.  The patient also recently had arterial duplex with ABIs on 01/11/2024 which were abnormal and her PCP referred to vascular surgery.  Patient also denies history of DVT or cellulitis.    The patient underwent a bilateral superficial venous reflux study today, 01/17/2024, and the results were discussed with the patient.  This study shows dilation and axial reflux of the bilateral great and left small saphenous veins.  Pulsatile flow also noted bilaterally which can be consistent with CHF.    Doppler study bilateral lower extremities on 01/11/2024 shows no evidence of DVT bilaterally.    Venous Disease Medical Necessity Documentation Initial Visit Date:  01/17/2024 Return Check Date:    Have you ever had a rupture or bleed from a varicose vein in your leg(s)?              [x] Yes  [] No   [] Yes   [] No   Have you ever been diagnosed with phlebitis, cellulitis, or inflammation in the area of the varicose veins of  your leg(s)?  [] Yes  [x] No    [] Yes   [] No   Do you have darkened or inflamed skin on your legs?   [x] Yes   [] No   [] Yes   [] No   Do you have leg swelling?     [x] Yes   [] No   [] Yes   [] No   Do you have leg pain?   [x] Yes   [] No   [] Yes   [] No   If yes, describe the type of pain?    [x]   Stabbing []  Radiating [x]   Aching   [x]  Tightness [x]  Throbbing               [x]  Burning [x]  Cramping              Do you have leg discomfort?   [] Yes   [x] No   [] Yes   [] No   If yes, describe the type of discomfort?    []  Heaviness []  Fullness   []  Restlessness [] Tired/Fatigued [] Itching              Have you ever worn compression hose?   [] Yes   [x] No   [] Yes   [] No   If yes, how long?           Do you elevate your legs while sitting?   [x] Yes   [] No   [] Yes   [] No   Does venous disease (varicose veins, ulcers, skin changes, leg pain/swelling) interfere with your daily life?  If yes, check activities you are limited or unable to do.    []  Shower  [x]   Walk  []  Exercise  [] Play with children/grandchildren  [x]  Shop [] Work [x] Stand for any period of time [x] Sleep                               [] Sitting for an extended period of time.           [x] Yes   [] No   [] Yes   [] No   Do you exercise/have you tried to exercise (i.e.  Walk our participate in a regular exercise routine)?  [] Yes  [x] No   [] Yes   [] No   BMI 29.6             Past Medical History:   Diagnosis Date    Anemia of chronic disease     Atrial fibrillation     Benign hypertensive CKD, stage 1-4 or unspecified chronic kidney disease     Carotid artery occlusion     CKD (chronic kidney disease)     Coronary atherosclerosis     Diabetes mellitus, type 2     DJD (degenerative joint disease)     History of CVA (cerebrovascular accident)     HTN (hypertension)     Hyperlipidemia     Nonischemic dilated cardiomyopathy     Obesity     Osteopenia     Paroxysmal atrial fibrillation     Presence of cardiac pacemaker     PVD (peripheral vascular disease)     Vitamin D deficiency         Past Surgical History:   Procedure Laterality Date    BREAST BIOPSY      COLONOSCOPY  12/04/2018    repeat in 5 years    pace maker      VAGINAL DELIVERY      x 3       Social History     Tobacco Use   Smoking Status Never   Smokeless Tobacco Never         Current  Outpatient Medications:     alendronate (FOSAMAX) 70 MG tablet, Take 1 tablet (70 mg total) by mouth every 7 days., Disp: 12 tablet, Rfl: 3    amLODIPine (NORVASC) 10 MG tablet, Take 1 tablet (10 mg total) by mouth once daily., Disp: 90 tablet, Rfl: 3    aspirin (ECOTRIN) 81 MG EC tablet, Take 81 mg by mouth once daily., Disp: , Rfl:     cholecalciferol, vitamin D3, (VITAMIN D3) 50 mcg (2,000 unit) Cap capsule, Take 1 capsule by mouth once daily., Disp: , Rfl:     colchicine (COLCRYS) 0.6 mg tablet, Take 1 tablet (0.6 mg total) by mouth once daily. FOR GOUT, Disp: 90 tablet, Rfl: 3    furosemide (LASIX) 40 MG tablet, Take 1 tablet (40 mg total) by mouth once daily., Disp: 90 tablet, Rfl: 3    glimepiride (AMARYL) 4 MG tablet, Take 1 tablet (4 mg total) by mouth daily with breakfast., Disp: 90 tablet, Rfl: 3    hydrALAZINE (APRESOLINE) 50 MG tablet, Take 1 tablet (50 mg total) by mouth every 12 (twelve) hours., Disp: 180 tablet, Rfl: 3    isosorbide mononitrate (IMDUR) 30 MG 24 hr tablet, Take 1 tablet (30 mg total) by mouth once daily., Disp: 90 tablet, Rfl: 3    lovastatin (MEVACOR) 20 MG tablet, Take 1 tablet (20 mg total) by mouth every evening., Disp: 90 tablet, Rfl: 3    metoprolol tartrate (LOPRESSOR) 50 MG tablet, Take 1 tablet (50 mg total) by mouth 2 (two) times daily., Disp: 180 tablet, Rfl: 3    omeprazole (PRILOSEC) 20 MG capsule, Take 1 capsule (20 mg total) by mouth once daily., Disp: 90 capsule, Rfl: 3    potassium chloride (MICRO-K) 10 MEQ CpSR, Take 1 capsule (10 mEq total) by mouth once daily., Disp: 90 capsule, Rfl: 3    rivaroxaban (XARELTO) 15 mg Tab, Take 1 tablet (15 mg total) by mouth once daily., Disp: 90 tablet, Rfl: 3    semaglutide (OZEMPIC) 0.25 mg or 0.5 mg(2 mg/1.5 mL) pen injector, Inject 0.5 mg into the skin every 7 days., Disp: 9 mL, Rfl: 3    spironolactone (ALDACTONE) 25 MG tablet, Take 1 tablet (25 mg total) by mouth once daily., Disp: 90 tablet, Rfl: 3    zinc 50 mg Tab, Take 50  mg by mouth once daily., Disp: , Rfl:     Review of Systems   Constitutional:  Negative for activity change, chills, diaphoresis, fatigue and fever.   Respiratory:  Negative for cough and shortness of breath.    Cardiovascular:  Positive for leg swelling and claudication. Negative for chest pain.        Hyperpigmentation LE   Gastrointestinal:  Negative for nausea and vomiting.   Musculoskeletal:  Positive for leg pain. Negative for joint swelling.   Integumentary:  Negative for rash and wound.   Neurological:  Negative for weakness and numbness.        II. PHYSICAL EXAM     Physical Exam  Constitutional:       General: She is awake. She is not in acute distress.     Appearance: Normal appearance. She is overweight. She is not ill-appearing or toxic-appearing.   HENT:      Head: Normocephalic and atraumatic.   Eyes:      Extraocular Movements: Extraocular movements intact.      Conjunctiva/sclera: Conjunctivae normal.      Pupils: Pupils are equal, round, and reactive to light.   Neck:      Vascular: No carotid bruit or JVD.   Cardiovascular:      Rate and Rhythm: Normal rate and regular rhythm.      Pulses:           Dorsalis pedis pulses are detected w/ Doppler on the right side and detected w/ Doppler on the left side.        Posterior tibial pulses are detected w/ Doppler on the right side and detected w/ Doppler on the left side.      Heart sounds: No murmur heard.  Pulmonary:      Effort: Pulmonary effort is normal. No respiratory distress.      Breath sounds: No stridor. No wheezing, rhonchi or rales.   Musculoskeletal:         General: No swelling, tenderness or deformity.      Right lower le+ Edema present.      Left lower le+ Edema present.      Comments: Hyperpigmentation of the bilateral lower extremities noted without evidence of active cellulitis or open ulcerations.   Feet:      Comments: Triphasic hand-held dopplerable pulses of the bilateral dorsalis pedis and posterior tibial  arteries.  Skin:     General: Skin is warm.      Capillary Refill: Capillary refill takes less than 2 seconds.      Coloration: Skin is not ashen.      Findings: No bruising, erythema, lesion, rash or wound.   Neurological:      Mental Status: She is alert and oriented to person, place, and time.      Motor: No weakness.   Psychiatric:         Speech: Speech normal.         Behavior: Behavior normal. Behavior is cooperative.         Reticular/Spider veins noted:  RLE: none  LLE: none    Varicose veins noted:  RLE: none  LLE:  posterior calf and lateral calf    CEAP Classification  Clinical Signs: Class 4 - Skin changes ascribed to venous disease  Etiologic Classification: Primary  Anatomic distribution: Superficial  Pathophysiologic dysfunction: Reflux                       Venous Clinical Severity Score  Pain:2=Daily, moderate activity limitation, occasional analgesics  Varicose Veins: 1=Few, scattered. Branch varicose veins  Venous Edema: 2=Afternoon edema, above ankle  Pigmentation: 1=Diffuse, but limited in area and old (brown)  Inflammation: 0=None  Induration: 0=None  Number of Active Ulcers: 0=0  Active Ulceration, Duration: 0=None  Active Ulcer Size: 0=None  Compressive Therapy: 0=Not used or not compliant  Total Score: 6       III. ASSESSMENT & PLAN (MEDICAL DECISION MAKING)     1. Venous insufficiency    2. Bilateral leg pain    3. Hyperpigmentation of skin    4. Edema, lower extremity        Assessment/Diagnosis and Plan:  Ultrasound of lower extremities reveals positive evidence of venous insufficiency in the bilateral great saphenous and left small saphenous veins.  Plan for conservative medical treatment at this time. The patient may benefit from endovenous ablation in the future.     - Start compression with 15-20 mmHg Rx stockings  - Therapeutic leg elevation  - Calf pumping exercises  - RTC 3 months for further evaluation    Orders Placed This Encounter    US Venous Reflux Study Bilateral         Thomas Lyn, DO

## 2024-01-31 ENCOUNTER — EXTERNAL CHRONIC CARE MANAGEMENT (OUTPATIENT)
Dept: PRIMARY CARE CLINIC | Facility: CLINIC | Age: 79
End: 2024-01-31
Payer: MEDICARE

## 2024-01-31 PROCEDURE — G0511 CCM/BHI BY RHC/FQHC 20MIN MO: HCPCS | Mod: ,,, | Performed by: NURSE PRACTITIONER

## 2024-02-07 DIAGNOSIS — E11.9 DIABETES MELLITUS WITHOUT COMPLICATION: ICD-10-CM

## 2024-02-07 RX ORDER — SEMAGLUTIDE 1.34 MG/ML
0.5 INJECTION, SOLUTION SUBCUTANEOUS
Qty: 9 ML | Refills: 3 | Status: SHIPPED | OUTPATIENT
Start: 2024-02-07 | End: 2024-04-05 | Stop reason: DRUGHIGH

## 2024-02-29 ENCOUNTER — EXTERNAL CHRONIC CARE MANAGEMENT (OUTPATIENT)
Dept: PRIMARY CARE CLINIC | Facility: CLINIC | Age: 79
End: 2024-02-29
Payer: MEDICARE

## 2024-02-29 PROCEDURE — G0511 CCM/BHI BY RHC/FQHC 20MIN MO: HCPCS | Mod: ,,, | Performed by: NURSE PRACTITIONER

## 2024-03-20 ENCOUNTER — OFFICE VISIT (OUTPATIENT)
Dept: PRIMARY CARE CLINIC | Facility: CLINIC | Age: 79
End: 2024-03-20
Payer: MEDICARE

## 2024-03-20 ENCOUNTER — HOSPITAL ENCOUNTER (OUTPATIENT)
Dept: RADIOLOGY | Facility: HOSPITAL | Age: 79
Discharge: HOME | End: 2024-03-20
Attending: NURSE PRACTITIONER
Payer: MEDICARE

## 2024-03-20 VITALS
OXYGEN SATURATION: 96 % | WEIGHT: 206.38 LBS | HEART RATE: 78 BPM | BODY MASS INDEX: 29.54 KG/M2 | TEMPERATURE: 99 F | HEIGHT: 70 IN | RESPIRATION RATE: 20 BRPM | DIASTOLIC BLOOD PRESSURE: 74 MMHG | SYSTOLIC BLOOD PRESSURE: 159 MMHG

## 2024-03-20 DIAGNOSIS — R05.9 COUGH, UNSPECIFIED TYPE: ICD-10-CM

## 2024-03-20 DIAGNOSIS — J06.9 UPPER RESPIRATORY TRACT INFECTION, UNSPECIFIED TYPE: ICD-10-CM

## 2024-03-20 DIAGNOSIS — Z20.822 COVID-19 RULED OUT: Primary | ICD-10-CM

## 2024-03-20 LAB
INFLUENZA A MOLECULAR (OHS): NEGATIVE
INFLUENZA B MOLECULAR (OHS): NEGATIVE
SARS-COV-2 RDRP RESP QL NAA+PROBE: NEGATIVE

## 2024-03-20 PROCEDURE — 99214 OFFICE O/P EST MOD 30 MIN: CPT | Mod: 25,,, | Performed by: NURSE PRACTITIONER

## 2024-03-20 PROCEDURE — 71046 X-RAY EXAM CHEST 2 VIEWS: CPT | Mod: 26,,, | Performed by: RADIOLOGY

## 2024-03-20 PROCEDURE — 96372 THER/PROPH/DIAG INJ SC/IM: CPT | Mod: ,,, | Performed by: NURSE PRACTITIONER

## 2024-03-20 PROCEDURE — 71046 X-RAY EXAM CHEST 2 VIEWS: CPT | Mod: TC

## 2024-03-20 RX ORDER — AMOXICILLIN 500 MG/1
500 CAPSULE ORAL 3 TIMES DAILY
Qty: 21 CAPSULE | Refills: 0 | Status: SHIPPED | OUTPATIENT
Start: 2024-03-20 | End: 2024-03-27

## 2024-03-20 RX ORDER — DEXAMETHASONE SODIUM PHOSPHATE 4 MG/ML
4 INJECTION, SOLUTION INTRA-ARTICULAR; INTRALESIONAL; INTRAMUSCULAR; INTRAVENOUS; SOFT TISSUE
Status: COMPLETED | OUTPATIENT
Start: 2024-03-20 | End: 2024-03-20

## 2024-03-20 RX ORDER — CEFTRIAXONE 1 G/1
1 INJECTION, POWDER, FOR SOLUTION INTRAMUSCULAR; INTRAVENOUS ONCE
Status: COMPLETED | OUTPATIENT
Start: 2024-03-20 | End: 2024-03-20

## 2024-03-20 RX ORDER — LIDOCAINE HYDROCHLORIDE 10 MG/ML
1 INJECTION INFILTRATION; PERINEURAL ONCE
Status: COMPLETED | OUTPATIENT
Start: 2024-03-20 | End: 2024-03-20

## 2024-03-20 RX ORDER — METHYLPREDNISOLONE ACETATE 80 MG/ML
80 INJECTION, SUSPENSION INTRA-ARTICULAR; INTRALESIONAL; INTRAMUSCULAR; SOFT TISSUE
Status: COMPLETED | OUTPATIENT
Start: 2024-03-20 | End: 2024-03-20

## 2024-03-20 RX ORDER — BENZONATATE 100 MG/1
100 CAPSULE ORAL 3 TIMES DAILY PRN
Qty: 30 CAPSULE | Refills: 0 | Status: SHIPPED | OUTPATIENT
Start: 2024-03-20 | End: 2024-03-30

## 2024-03-20 RX ADMIN — METHYLPREDNISOLONE ACETATE 400 MG: 80 INJECTION, SUSPENSION INTRA-ARTICULAR; INTRALESIONAL; INTRAMUSCULAR; SOFT TISSUE at 12:03

## 2024-03-20 RX ADMIN — CEFTRIAXONE 1 G: 1 INJECTION, POWDER, FOR SOLUTION INTRAMUSCULAR; INTRAVENOUS at 11:03

## 2024-03-20 RX ADMIN — LIDOCAINE HYDROCHLORIDE 5 ML: 10 INJECTION INFILTRATION; PERINEURAL at 11:03

## 2024-03-20 RX ADMIN — DEXAMETHASONE SODIUM PHOSPHATE 4 MG: 4 INJECTION, SOLUTION INTRA-ARTICULAR; INTRALESIONAL; INTRAMUSCULAR; INTRAVENOUS; SOFT TISSUE at 12:03

## 2024-03-20 NOTE — PROGRESS NOTES
Subjective     Patient ID: Pam Ortega is a 78 y.o. female.    Chief Complaint: cough, chest congestion (Presents with c/o cough and chest congestion and nasal drainage)    77 y/o bf presents with cough, congestion and general not feeling well. She denies fever. She denies sore throat.       Review of Systems   HENT:  Positive for nasal congestion and postnasal drip. Negative for sore throat.    Respiratory:  Positive for cough and wheezing.    All other systems reviewed and are negative.         Objective     Physical Exam  Vitals and nursing note reviewed.   Constitutional:       Appearance: Normal appearance.   HENT:      Head: Normocephalic.      Nose: Congestion present.      Mouth/Throat:      Pharynx: No posterior oropharyngeal erythema.   Eyes:      Pupils: Pupils are equal, round, and reactive to light.   Cardiovascular:      Rate and Rhythm: Normal rate and regular rhythm.      Heart sounds: Normal heart sounds.   Pulmonary:      Effort: Pulmonary effort is normal.      Breath sounds: Wheezing and rhonchi present.   Musculoskeletal:         General: Normal range of motion.      Cervical back: Normal range of motion.   Skin:     General: Skin is warm and dry.   Neurological:      General: No focal deficit present.      Mental Status: She is alert and oriented to person, place, and time.   Psychiatric:         Attention and Perception: Attention and perception normal.         Mood and Affect: Mood normal.         Speech: Speech normal.         Behavior: Behavior normal. Behavior is cooperative.         Cognition and Memory: Cognition normal.         Judgment: Judgment normal.         Imaging: X-Ray Chest PA And Lateral    Result Date: 3/20/2024  EXAMINATION: XR CHEST PA AND LATERAL CLINICAL HISTORY: Cough, unspecified COMPARISON: Chest x-ray December 7, 2023 TECHNIQUE: Frontal and lateral views of the chest. FINDINGS: Continued cardiomegaly.  Cardiac conduction device again demonstrated.  Chronic  change of the lungs without focal consolidation, pleural effusion, or pneumothorax.  Visualized osseous and surrounding soft tissue structures appear grossly unchanged.     Continued cardiomegaly without esperanza pulmonary edema or focal consolidating pneumonia. Point of Service: [facility] Electronically signed by: Jigar Villafuerte Date:    03/20/2024 Time:    11:28         Assessment and Plan     1. COVID-19 ruled out  -     Influenza A & B by Molecular  -     COVID-19 Rapid Screening    2. Cough, unspecified type  -     X-Ray Chest PA And Lateral; Future; Expected date: 03/20/2024    3. Upper respiratory tract infection, unspecified type    Other orders  -     cefTRIAXone injection 1 g  -     LIDOcaine HCL 10 mg/ml (1%) injection 1 mL  -     dexAMETHasone injection 4 mg  -     methylPREDNISolone acetate injection 80 mg  -     amoxicillin (AMOXIL) 500 MG capsule; Take 1 capsule (500 mg total) by mouth 3 (three) times daily. for 7 days  Dispense: 21 capsule; Refill: 0  -     benzonatate (TESSALON PERLES) 100 MG capsule; Take 1 capsule (100 mg total) by mouth 3 (three) times daily as needed for Cough.  Dispense: 30 capsule; Refill: 0        Take meds as prescribed. RTN to [facility]         No follow-ups on file.

## 2024-03-31 ENCOUNTER — EXTERNAL CHRONIC CARE MANAGEMENT (OUTPATIENT)
Dept: PRIMARY CARE CLINIC | Facility: CLINIC | Age: 79
End: 2024-03-31
Payer: MEDICARE

## 2024-03-31 PROCEDURE — G0511 CCM/BHI BY RHC/FQHC 20MIN MO: HCPCS | Mod: ,,, | Performed by: NURSE PRACTITIONER

## 2024-04-05 ENCOUNTER — OFFICE VISIT (OUTPATIENT)
Dept: PRIMARY CARE CLINIC | Facility: CLINIC | Age: 79
End: 2024-04-05
Payer: MEDICARE

## 2024-04-05 VITALS
WEIGHT: 206 LBS | HEART RATE: 76 BPM | TEMPERATURE: 98 F | BODY MASS INDEX: 29.49 KG/M2 | OXYGEN SATURATION: 95 % | DIASTOLIC BLOOD PRESSURE: 78 MMHG | HEIGHT: 70 IN | SYSTOLIC BLOOD PRESSURE: 136 MMHG

## 2024-04-05 DIAGNOSIS — B37.9 CANDIDIASIS: ICD-10-CM

## 2024-04-05 DIAGNOSIS — J06.9 ACUTE UPPER RESPIRATORY INFECTION: ICD-10-CM

## 2024-04-05 DIAGNOSIS — N18.32 STAGE 3B CHRONIC KIDNEY DISEASE: ICD-10-CM

## 2024-04-05 DIAGNOSIS — E11.69 TYPE 2 DIABETES MELLITUS WITH OTHER SPECIFIED COMPLICATION, WITHOUT LONG-TERM CURRENT USE OF INSULIN: Primary | ICD-10-CM

## 2024-04-05 DIAGNOSIS — I48.20 CHRONIC ATRIAL FIBRILLATION: ICD-10-CM

## 2024-04-05 DIAGNOSIS — I10 HYPERTENSION, UNSPECIFIED TYPE: ICD-10-CM

## 2024-04-05 DIAGNOSIS — Z78.0 POST-MENOPAUSAL: ICD-10-CM

## 2024-04-05 PROBLEM — R05.9 COUGH: Status: RESOLVED | Noted: 2023-12-22 | Resolved: 2024-04-05

## 2024-04-05 PROBLEM — J40 BRONCHITIS: Status: RESOLVED | Noted: 2023-12-22 | Resolved: 2024-04-05

## 2024-04-05 PROCEDURE — 99214 OFFICE O/P EST MOD 30 MIN: CPT | Mod: ,,, | Performed by: NURSE PRACTITIONER

## 2024-04-05 RX ORDER — AZITHROMYCIN 250 MG/1
TABLET, FILM COATED ORAL
Qty: 6 TABLET | Refills: 0 | Status: SHIPPED | OUTPATIENT
Start: 2024-04-05

## 2024-04-05 RX ORDER — FLUCONAZOLE 150 MG/1
150 TABLET ORAL WEEKLY
Qty: 2 TABLET | Refills: 0 | Status: SHIPPED | OUTPATIENT
Start: 2024-04-05

## 2024-04-05 RX ORDER — SEMAGLUTIDE 1.34 MG/ML
1 INJECTION, SOLUTION SUBCUTANEOUS
Qty: 9 ML | Refills: 3 | Status: SHIPPED | OUTPATIENT
Start: 2024-04-05

## 2024-04-05 NOTE — PROGRESS NOTES
Subjective     Patient ID: Pam Ortega is a 78 y.o. female.    Chief Complaint: 3 Month fu for Type 2 diabetes (Pt Complains Cough, SOB and vaginal itching.)    Pt presents for a 3 month follow-up for diabetes. Cough x 3 weeks and vaginal itching.       Review of Systems   Constitutional:  Negative for activity change, appetite change, chills, fatigue and fever.   HENT:  Positive for nasal congestion and sinus pressure/congestion. Negative for ear discharge, nosebleeds, postnasal drip, rhinorrhea, sneezing, sore throat and tinnitus.    Eyes:  Negative for pain, discharge, redness and itching.   Respiratory:  Positive for cough. Negative for choking, chest tightness, shortness of breath and wheezing.    Cardiovascular:  Negative for chest pain.   Gastrointestinal:  Negative for abdominal distention, abdominal pain, blood in stool, change in bowel habit, constipation, diarrhea, nausea and vomiting.   Genitourinary:  Negative for decreased urine volume, dysuria, flank pain and frequency.   Musculoskeletal:  Negative for back pain and gait problem.   Integumentary:  Negative for wound, breast mass and breast discharge.   Allergic/Immunologic: Negative for immunocompromised state.   Neurological:  Negative for dizziness, light-headedness and headaches.   Psychiatric/Behavioral:  Negative for agitation, behavioral problems and hallucinations.    Breast: Negative for mass         Objective     Physical Exam  Vitals and nursing note reviewed.   Constitutional:       Appearance: Normal appearance.   Cardiovascular:      Rate and Rhythm: Normal rate and regular rhythm.      Heart sounds: Normal heart sounds.   Pulmonary:      Effort: Pulmonary effort is normal.      Breath sounds: Normal breath sounds.   Musculoskeletal:         General: Normal range of motion.   Neurological:      Mental Status: She is alert and oriented to person, place, and time.   Psychiatric:         Mood and Affect: Mood normal.          Behavior: Behavior normal.            Assessment and Plan     1. Type 2 diabetes mellitus with other specified complication, without long-term current use of insulin  -     Hemoglobin A1C; Future; Expected date: 04/05/2024  -     CBC Auto Differential; Future; Expected date: 04/05/2024  -     Comprehensive Metabolic Panel; Future; Expected date: 04/05/2024  -     Lipid Panel; Future; Expected date: 04/05/2024  -     TSH; Future; Expected date: 04/05/2024    2. Candidiasis  -     fluconazole (DIFLUCAN) 150 MG Tab; Take 1 tablet (150 mg total) by mouth once a week.  Dispense: 2 tablet; Refill: 0    3. Acute upper respiratory infection  -     azithromycin (Z-KESHAV) 250 MG tablet; 2 tablets on day 1 and 1 tablet on days 2-5  Dispense: 6 tablet; Refill: 0    4. Post-menopausal  -     DXA Bone Density Axial Skeleton 1 or more sites; Future; Expected date: 04/05/2024    5. Hypertension, unspecified type  Overview:  Controlled.  Patient has begun walking, lost 5 pds.      6. Chronic atrial fibrillation    7. Stage 3b chronic kidney disease        Will call pt with lab results and dxa appointment. Notify clinic if symptoms worsen or persist.          Follow up in about 3 months (around 7/5/2024).

## 2024-04-08 DIAGNOSIS — N18.32 STAGE 3B CHRONIC KIDNEY DISEASE: Primary | ICD-10-CM

## 2024-04-17 ENCOUNTER — OFFICE VISIT (OUTPATIENT)
Dept: VASCULAR SURGERY | Facility: CLINIC | Age: 79
End: 2024-04-17
Payer: MEDICARE

## 2024-04-17 ENCOUNTER — HOSPITAL ENCOUNTER (OUTPATIENT)
Dept: RADIOLOGY | Facility: HOSPITAL | Age: 79
Discharge: HOME | End: 2024-04-17
Attending: NURSE PRACTITIONER
Payer: MEDICARE

## 2024-04-17 VITALS
HEART RATE: 89 BPM | DIASTOLIC BLOOD PRESSURE: 92 MMHG | BODY MASS INDEX: 29.44 KG/M2 | WEIGHT: 205.63 LBS | HEIGHT: 70 IN | RESPIRATION RATE: 20 BRPM | SYSTOLIC BLOOD PRESSURE: 144 MMHG

## 2024-04-17 DIAGNOSIS — Z78.0 POST-MENOPAUSAL: ICD-10-CM

## 2024-04-17 DIAGNOSIS — I87.2 VENOUS INSUFFICIENCY: Primary | ICD-10-CM

## 2024-04-17 DIAGNOSIS — R60.0 EDEMA, LOWER EXTREMITY: ICD-10-CM

## 2024-04-17 DIAGNOSIS — L81.9 HYPERPIGMENTATION OF SKIN: ICD-10-CM

## 2024-04-17 PROCEDURE — 99213 OFFICE O/P EST LOW 20 MIN: CPT | Mod: PBBFAC | Performed by: FAMILY MEDICINE

## 2024-04-17 PROCEDURE — 99213 OFFICE O/P EST LOW 20 MIN: CPT | Mod: S$PBB,,, | Performed by: FAMILY MEDICINE

## 2024-04-17 PROCEDURE — 77080 DXA BONE DENSITY AXIAL: CPT | Mod: TC

## 2024-04-17 PROCEDURE — 77080 DXA BONE DENSITY AXIAL: CPT | Mod: 26,,, | Performed by: RADIOLOGY

## 2024-04-17 NOTE — PROGRESS NOTES
VEIN CENTER CLINIC NOTE    Patient ID: Pam Ortega is a 78 y.o. female.    I. HISTORY     Chief Complaint:   Chief Complaint   Patient presents with    Follow-up     3M COMP        HPI: Pam Ortega is a 78 y.o. female who presents today after 3 months of conservative therapy for chronic venous insufficiency consisting of 20-30 millimeter of mercury compression stockings, calf pumping exercises and therapeutic leg elevation.  The patient states that these measures have helped to alleviate the majority of her symptoms she was having previously.  She denies any life altering symptoms at this time would like to continue with conservative therapy.    The patient initially presented on 01/17/2024 by referral from [facility] for evaluation of bilateral lower extremity leg swelling, hyperpigmentation and pain.  Symptoms are progressive, worsening and began greater than 1 years ago.  Location is bilateral lower extremities below the knees. Symptoms are worse at the end of the day.  History of venous interventions includes none.  Unsure of family history of venous disease.  The patient also recently had arterial duplex with ABIs on 01/11/2024 which were abnormal and her PCP referred to vascular surgery.  Patient also denies history of DVT or cellulitis.    The patient underwent a bilateral superficial venous reflux study today, 01/17/2024, and the results were discussed with the patient.  This study shows dilation and axial reflux of the bilateral great and left small saphenous veins.  Pulsatile flow also noted bilaterally which can be consistent with CHF.    Doppler study bilateral lower extremities on 01/11/2024 shows no evidence of DVT bilaterally.    Venous Disease Medical Necessity Documentation Initial Visit Date:  01/17/2024 Return Check Date:   04/17/2024   Have you ever had a rupture or bleed from a varicose vein in your leg(s)?              [x] Yes  [] No   [x] Yes   [] No   Have you ever  been diagnosed with phlebitis, cellulitis, or inflammation in the area of the varicose veins of  your leg(s)?  [] Yes  [x] No    [x] Yes   [] No   Do you have darkened or inflamed skin on your legs?   [x] Yes   [] No   [x] Yes   [] No   Do you have leg swelling?     [x] Yes   [] No   [x] Yes   [] No   Do you have leg pain?   [x] Yes   [] No   [] Yes   [x] No   If yes, describe the type of pain?    [x]   Stabbing []  Radiating [x]  Aching   [x]  Tightness [x]  Throbbing               [x]  Burning [x]  Cramping              Do you have leg discomfort?   [] Yes   [x] No   [] Yes   [x] No   If yes, describe the type of discomfort?    []  Heaviness []  Fullness   []  Restlessness [] Tired/Fatigued [] Itching              Have you ever worn compression hose?   [] Yes   [x] No   [x] Yes   [] No   If yes, how long?      3 MONTHS     Do you elevate your legs while sitting?   [x] Yes   [] No   [x] Yes   [] No   Does venous disease (varicose veins, ulcers, skin changes, leg pain/swelling) interfere with your daily life?  If yes, check activities you are limited or unable to do.    []  Shower  [x]   Walk  []  Exercise  [] Play with children/grandchildren  [x]  Shop [] Work [x] Stand for any period of time [x] Sleep                               [] Sitting for an extended period of time.           [x] Yes   [] No   [] Yes   [x] No   Do you exercise/have you tried to exercise (i.e.  Walk our participate in a regular exercise routine)?  [] Yes  [x] No   [x] Yes   [] No   BMI 29.6   29.50          Past Medical History:   Diagnosis Date    Anemia of chronic disease     Atrial fibrillation     Benign hypertensive CKD, stage 1-4 or unspecified chronic kidney disease     Bronchitis 12/22/2023    Carotid artery occlusion     CKD (chronic kidney disease)     Coronary atherosclerosis     Cough 12/22/2023    Diabetes mellitus, type 2     DJD (degenerative joint disease)     History of CVA (cerebrovascular accident)     HTN (hypertension)      Hyperlipidemia     Nonischemic dilated cardiomyopathy     Obesity     Osteopenia     Paroxysmal atrial fibrillation     Presence of cardiac pacemaker     PVD (peripheral vascular disease)     Upper respiratory tract infection 12/22/2023    Vitamin D deficiency         Past Surgical History:   Procedure Laterality Date    BREAST BIOPSY      COLONOSCOPY  12/04/2018    repeat in 5 years    pace maker      VAGINAL DELIVERY      x 3       Social History     Tobacco Use   Smoking Status Never   Smokeless Tobacco Never         Current Outpatient Medications:     alendronate (FOSAMAX) 70 MG tablet, Take 1 tablet (70 mg total) by mouth every 7 days., Disp: 12 tablet, Rfl: 3    amLODIPine (NORVASC) 10 MG tablet, Take 1 tablet (10 mg total) by mouth once daily., Disp: 90 tablet, Rfl: 3    aspirin (ECOTRIN) 81 MG EC tablet, Take 81 mg by mouth once daily., Disp: , Rfl:     azithromycin (Z-KESHAV) 250 MG tablet, 2 tablets on day 1 and 1 tablet on days 2-5, Disp: 6 tablet, Rfl: 0    cholecalciferol, vitamin D3, (VITAMIN D3) 50 mcg (2,000 unit) Cap capsule, Take 1 capsule by mouth once daily., Disp: , Rfl:     colchicine (COLCRYS) 0.6 mg tablet, Take 1 tablet (0.6 mg total) by mouth once daily. FOR GOUT, Disp: 90 tablet, Rfl: 3    fluconazole (DIFLUCAN) 150 MG Tab, Take 1 tablet (150 mg total) by mouth once a week., Disp: 2 tablet, Rfl: 0    furosemide (LASIX) 40 MG tablet, Take 1 tablet (40 mg total) by mouth once daily., Disp: 90 tablet, Rfl: 3    glimepiride (AMARYL) 4 MG tablet, Take 1 tablet (4 mg total) by mouth daily with breakfast., Disp: 90 tablet, Rfl: 3    hydrALAZINE (APRESOLINE) 50 MG tablet, Take 1 tablet (50 mg total) by mouth every 12 (twelve) hours., Disp: 180 tablet, Rfl: 3    isosorbide mononitrate (IMDUR) 30 MG 24 hr tablet, Take 1 tablet (30 mg total) by mouth once daily., Disp: 90 tablet, Rfl: 3    lovastatin (MEVACOR) 20 MG tablet, Take 1 tablet (20 mg total) by mouth every evening., Disp: 90 tablet, Rfl:  3    metoprolol tartrate (LOPRESSOR) 50 MG tablet, Take 1 tablet (50 mg total) by mouth 2 (two) times daily., Disp: 180 tablet, Rfl: 3    omeprazole (PRILOSEC) 20 MG capsule, Take 1 capsule (20 mg total) by mouth once daily., Disp: 90 capsule, Rfl: 3    potassium chloride (MICRO-K) 10 MEQ CpSR, Take 1 capsule (10 mEq total) by mouth once daily., Disp: 90 capsule, Rfl: 3    rivaroxaban (XARELTO) 15 mg Tab, Take 1 tablet (15 mg total) by mouth once daily., Disp: 90 tablet, Rfl: 3    semaglutide (OZEMPIC) 1 mg/dose (4 mg/3 mL), Inject 1 mg into the skin every 7 days., Disp: 9 mL, Rfl: 3    spironolactone (ALDACTONE) 25 MG tablet, Take 1 tablet (25 mg total) by mouth once daily., Disp: 90 tablet, Rfl: 3    zinc 50 mg Tab, Take 50 mg by mouth once daily., Disp: , Rfl:     Review of Systems   Constitutional:  Negative for activity change, chills, diaphoresis, fatigue and fever.   Respiratory:  Negative for cough and shortness of breath.    Cardiovascular:  Positive for leg swelling and claudication. Negative for chest pain.        Hyperpigmentation LE   Gastrointestinal:  Negative for nausea and vomiting.   Musculoskeletal:  Positive for leg pain. Negative for joint swelling.   Integumentary:  Negative for rash and wound.   Neurological:  Negative for weakness and numbness.        II. PHYSICAL EXAM     Physical Exam  Constitutional:       General: She is awake. She is not in acute distress.     Appearance: Normal appearance. She is overweight. She is not ill-appearing or toxic-appearing.   HENT:      Head: Normocephalic and atraumatic.   Eyes:      Extraocular Movements: Extraocular movements intact.      Conjunctiva/sclera: Conjunctivae normal.      Pupils: Pupils are equal, round, and reactive to light.   Neck:      Vascular: No carotid bruit or JVD.   Cardiovascular:      Rate and Rhythm: Normal rate and regular rhythm.      Pulses:           Dorsalis pedis pulses are detected w/ Doppler on the right side and detected  w/ Doppler on the left side.        Posterior tibial pulses are detected w/ Doppler on the right side and detected w/ Doppler on the left side.      Heart sounds: No murmur heard.  Pulmonary:      Effort: Pulmonary effort is normal. No respiratory distress.      Breath sounds: No stridor. No wheezing, rhonchi or rales.   Musculoskeletal:         General: No swelling, tenderness or deformity.      Right lower le+ Edema present.      Left lower le+ Edema present.      Comments: Hyperpigmentation of the bilateral lower extremities noted without evidence of active cellulitis or open ulcerations.   Feet:      Comments: Triphasic hand-held dopplerable pulses of the bilateral dorsalis pedis and posterior tibial arteries.  Skin:     General: Skin is warm.      Capillary Refill: Capillary refill takes less than 2 seconds.      Coloration: Skin is not ashen.      Findings: No bruising, erythema, lesion, rash or wound.   Neurological:      Mental Status: She is alert and oriented to person, place, and time.      Motor: No weakness.   Psychiatric:         Speech: Speech normal.         Behavior: Behavior normal. Behavior is cooperative.         Reticular/Spider veins noted:  RLE: none  LLE: none    Varicose veins noted:  RLE: none  LLE:  posterior calf and lateral calf    CEAP Classification  Clinical Signs: Class 4 - Skin changes ascribed to venous disease  Etiologic Classification: Primary  Anatomic distribution: Superficial  Pathophysiologic dysfunction: Reflux                       Venous Clinical Severity Score  Pain:2=Daily, moderate activity limitation, occasional analgesics  Varicose Veins: 1=Few, scattered. Branch varicose veins  Venous Edema: 2=Afternoon edema, above ankle  Pigmentation: 1=Diffuse, but limited in area and old (brown)  Inflammation: 0=None  Induration: 0=None  Number of Active Ulcers: 0=0  Active Ulceration, Duration: 0=None  Active Ulcer Size: 0=None  Compressive Therapy: 3=Full compliance,  stockings + elevation  Total Score: 9       III. ASSESSMENT & PLAN (MEDICAL DECISION MAKING)     1. Venous insufficiency    2. Hyperpigmentation of skin    3. Edema, lower extremity        Assessment/Diagnosis and Plan:  Previous Ultrasound of lower extremities reveals positive evidence of venous insufficiency in the bilateral great saphenous and left small saphenous veins.  Plan for conservative medical treatment at this time. The patient may benefit from endovenous ablation in the future.     - continue compression with 15-20 mmHg Rx stockings  - Therapeutic leg elevation  - Calf pumping exercises  - RTC 6 months for further evaluation          Thomas Lyn DO

## 2024-04-30 ENCOUNTER — EXTERNAL CHRONIC CARE MANAGEMENT (OUTPATIENT)
Dept: PRIMARY CARE CLINIC | Facility: CLINIC | Age: 79
End: 2024-04-30
Payer: MEDICARE

## 2024-04-30 PROCEDURE — G0511 CCM/BHI BY RHC/FQHC 20MIN MO: HCPCS | Mod: ,,, | Performed by: NURSE PRACTITIONER

## 2024-05-30 DIAGNOSIS — Z12.31 ENCOUNTER FOR SCREENING MAMMOGRAM FOR MALIGNANT NEOPLASM OF BREAST: Primary | ICD-10-CM

## 2024-05-31 ENCOUNTER — EXTERNAL CHRONIC CARE MANAGEMENT (OUTPATIENT)
Dept: PRIMARY CARE CLINIC | Facility: CLINIC | Age: 79
End: 2024-05-31
Payer: MEDICARE

## 2024-05-31 PROCEDURE — G0511 CCM/BHI BY RHC/FQHC 20MIN MO: HCPCS | Mod: ,,, | Performed by: NURSE PRACTITIONER

## 2024-08-23 ENCOUNTER — HOSPITAL ENCOUNTER (OUTPATIENT)
Dept: RADIOLOGY | Facility: HOSPITAL | Age: 79
Discharge: HOME | End: 2024-08-23
Attending: NURSE PRACTITIONER
Payer: MEDICARE

## 2024-08-23 ENCOUNTER — OFFICE VISIT (OUTPATIENT)
Dept: PRIMARY CARE CLINIC | Facility: CLINIC | Age: 79
End: 2024-08-23
Payer: MEDICARE

## 2024-08-23 VITALS
OXYGEN SATURATION: 99 % | HEART RATE: 83 BPM | BODY MASS INDEX: 30.78 KG/M2 | DIASTOLIC BLOOD PRESSURE: 86 MMHG | HEIGHT: 70 IN | HEIGHT: 70 IN | WEIGHT: 204 LBS | BODY MASS INDEX: 29.2 KG/M2 | RESPIRATION RATE: 20 BRPM | WEIGHT: 215 LBS | SYSTOLIC BLOOD PRESSURE: 137 MMHG | TEMPERATURE: 98 F

## 2024-08-23 DIAGNOSIS — I10 HYPERTENSION, UNSPECIFIED TYPE: Primary | ICD-10-CM

## 2024-08-23 DIAGNOSIS — E11.69 TYPE 2 DIABETES MELLITUS WITH OTHER SPECIFIED COMPLICATION, WITHOUT LONG-TERM CURRENT USE OF INSULIN: ICD-10-CM

## 2024-08-23 DIAGNOSIS — K21.00 GASTROESOPHAGEAL REFLUX DISEASE WITH ESOPHAGITIS WITHOUT HEMORRHAGE: ICD-10-CM

## 2024-08-23 DIAGNOSIS — E78.5 HYPERLIPIDEMIA, UNSPECIFIED HYPERLIPIDEMIA TYPE: ICD-10-CM

## 2024-08-23 DIAGNOSIS — E11.9 DIABETES MELLITUS WITHOUT COMPLICATION: ICD-10-CM

## 2024-08-23 DIAGNOSIS — N18.4 CHRONIC KIDNEY DISEASE (CKD), STAGE IV (SEVERE): ICD-10-CM

## 2024-08-23 DIAGNOSIS — Z12.31 OTHER SCREENING MAMMOGRAM: ICD-10-CM

## 2024-08-23 DIAGNOSIS — M85.80 OSTEOPENIA, UNSPECIFIED LOCATION: ICD-10-CM

## 2024-08-23 PROBLEM — Z20.822 COVID-19 RULED OUT: Status: RESOLVED | Noted: 2024-03-20 | Resolved: 2024-08-23

## 2024-08-23 PROCEDURE — 99214 OFFICE O/P EST MOD 30 MIN: CPT | Mod: ,,, | Performed by: NURSE PRACTITIONER

## 2024-08-23 PROCEDURE — 77067 SCR MAMMO BI INCL CAD: CPT | Mod: TC

## 2024-08-23 RX ORDER — SEMAGLUTIDE 1.34 MG/ML
1 INJECTION, SOLUTION SUBCUTANEOUS
Qty: 9 ML | Refills: 3 | Status: SHIPPED | OUTPATIENT
Start: 2024-08-23

## 2024-08-23 RX ORDER — ALENDRONATE SODIUM 70 MG/1
70 TABLET ORAL
Qty: 12 TABLET | Refills: 3 | Status: SHIPPED | OUTPATIENT
Start: 2024-08-23

## 2024-08-23 RX ORDER — ISOSORBIDE MONONITRATE 30 MG/1
30 TABLET, EXTENDED RELEASE ORAL DAILY
Qty: 90 TABLET | Refills: 3 | Status: SHIPPED | OUTPATIENT
Start: 2024-08-23

## 2024-08-23 RX ORDER — COLCHICINE 0.6 MG/1
0.6 TABLET ORAL DAILY
Qty: 90 TABLET | Refills: 3 | Status: SHIPPED | OUTPATIENT
Start: 2024-08-23

## 2024-08-23 RX ORDER — POTASSIUM CHLORIDE 750 MG/1
10 CAPSULE, EXTENDED RELEASE ORAL DAILY
Qty: 90 CAPSULE | Refills: 3 | Status: SHIPPED | OUTPATIENT
Start: 2024-08-23

## 2024-08-23 RX ORDER — GLIMEPIRIDE 4 MG/1
4 TABLET ORAL
Qty: 90 TABLET | Refills: 3 | Status: SHIPPED | OUTPATIENT
Start: 2024-08-23

## 2024-08-23 RX ORDER — SPIRONOLACTONE 25 MG/1
25 TABLET ORAL DAILY
Qty: 90 TABLET | Refills: 3 | Status: SHIPPED | OUTPATIENT
Start: 2024-08-23

## 2024-08-23 RX ORDER — OMEPRAZOLE 20 MG/1
20 CAPSULE, DELAYED RELEASE ORAL DAILY
Qty: 90 CAPSULE | Refills: 3 | Status: SHIPPED | OUTPATIENT
Start: 2024-08-23

## 2024-08-23 RX ORDER — LOVASTATIN 20 MG/1
20 TABLET ORAL
Qty: 90 TABLET | Refills: 3 | Status: SHIPPED | OUTPATIENT
Start: 2024-08-23

## 2024-08-23 RX ORDER — METOPROLOL TARTRATE 50 MG/1
50 TABLET ORAL 2 TIMES DAILY
Qty: 180 TABLET | Refills: 3 | Status: SHIPPED | OUTPATIENT
Start: 2024-08-23

## 2024-08-23 RX ORDER — FUROSEMIDE 40 MG/1
40 TABLET ORAL DAILY
Qty: 90 TABLET | Refills: 3 | Status: SHIPPED | OUTPATIENT
Start: 2024-08-23

## 2024-08-23 RX ORDER — AMLODIPINE BESYLATE 10 MG/1
10 TABLET ORAL DAILY
Qty: 90 TABLET | Refills: 3 | Status: SHIPPED | OUTPATIENT
Start: 2024-08-23

## 2024-08-23 RX ORDER — HYDRALAZINE HYDROCHLORIDE 50 MG/1
50 TABLET, FILM COATED ORAL 2 TIMES DAILY
Qty: 180 TABLET | Refills: 3 | Status: SHIPPED | OUTPATIENT
Start: 2024-08-23

## 2024-08-23 NOTE — PROGRESS NOTES
Subjective     Patient ID: Pam Ortega is a 79 y.o. female.    Chief Complaint: Diabetic check-up    Pt presents for a diabetes follow-up.       Review of Systems   Constitutional:  Negative for activity change, appetite change, chills, fatigue and fever.   HENT:  Negative for nasal congestion, ear discharge, nosebleeds, postnasal drip, rhinorrhea, sinus pressure/congestion, sneezing, sore throat and tinnitus.    Eyes:  Negative for pain, discharge, redness and itching.   Respiratory:  Negative for cough, choking, chest tightness, shortness of breath and wheezing.    Cardiovascular:  Negative for chest pain.   Gastrointestinal:  Negative for abdominal distention, abdominal pain, blood in stool, change in bowel habit, constipation, diarrhea, nausea and vomiting.   Genitourinary:  Negative for decreased urine volume, dysuria, flank pain and frequency.   Musculoskeletal:  Negative for back pain and gait problem.   Integumentary:  Negative for wound, breast mass and breast discharge.   Allergic/Immunologic: Negative for immunocompromised state.   Neurological:  Negative for dizziness, light-headedness and headaches.   Psychiatric/Behavioral:  Negative for agitation, behavioral problems and hallucinations.    Breast: Negative for mass         Objective     Physical Exam  Vitals and nursing note reviewed.   Constitutional:       Appearance: Normal appearance.   Cardiovascular:      Rate and Rhythm: Normal rate and regular rhythm.      Heart sounds: Normal heart sounds.   Pulmonary:      Effort: Pulmonary effort is normal.      Breath sounds: Normal breath sounds.   Musculoskeletal:         General: Normal range of motion.   Neurological:      Mental Status: She is alert and oriented to person, place, and time.   Psychiatric:         Mood and Affect: Mood normal.         Behavior: Behavior normal.            Assessment and Plan     1. Hypertension, unspecified type  Controlled at 137/86  Low sodium diet    2.  Chronic kidney disease (CKD), stage IV (severe)  Keep appts with nephrology  Avoid nsaids    3. Osteopenia, unspecified location  OOrders:  -     alendronate (FOSAMAX) 70 MG tablet; Take 1 tablet (70 mg total) by mouth every 7 days.  Dispense: 12 tablet; Refill: 3    4. Diabetes mellitus without complication  -     glimepiride (AMARYL) 4 MG tablet; Take 1 tablet (4 mg total) by mouth daily with breakfast.  Dispense: 90 tablet; Refill: 3  -     Hemoglobin A1C; Future; Expected date: 08/23/2024  -     CBC Auto Differential; Future; Expected date: 08/23/2024  -     Comprehensive Metabolic Panel; Future; Expected date: 08/23/2024  -     Lipid Panel; Future; Expected date: 08/23/2024  -     TSH; Future; Expected date: 08/23/2024  -     Microalbumin/Creatinine Ratio, Urine; Future; Expected date: 08/23/2024    5. Gastroesophageal reflux disease with esophagitis without hemorrhage  -     omeprazole (PRILOSEC) 20 MG capsule; Take 1 capsule (20 mg total) by mouth once daily.  Dispense: 90 capsule; Refill: 3    6. Type 2 diabetes mellitus with other specified complication, without long-term current use of insulin    7. Hyperlipidemia, unspecified hyperlipidemia type    Other orders  -     amLODIPine (NORVASC) 10 MG tablet; Take 1 tablet (10 mg total) by mouth once daily.  Dispense: 90 tablet; Refill: 3  -     colchicine (COLCRYS) 0.6 mg tablet; Take 1 tablet (0.6 mg total) by mouth once daily. for gout  Dispense: 90 tablet; Refill: 3  -     furosemide (LASIX) 40 MG tablet; Take 1 tablet (40 mg total) by mouth once daily.  Dispense: 90 tablet; Refill: 3  -     hydrALAZINE (APRESOLINE) 50 MG tablet; Take 1 tablet (50 mg total) by mouth 2 (two) times a day.  Dispense: 180 tablet; Refill: 3  -     isosorbide mononitrate (IMDUR) 30 MG 24 hr tablet; Take 1 tablet (30 mg total) by mouth once daily.  Dispense: 90 tablet; Refill: 3  -     lovastatin (MEVACOR) 20 MG tablet; Take 1 tablet (20 mg total) by mouth every night  Dispense: 90  tablet; Refill: 3  -     metoprolol tartrate (LOPRESSOR) 50 MG tablet; Take 1 tablet (50 mg total) by mouth 2 (two) times a day.  Dispense: 180 tablet; Refill: 3  -     potassium chloride (MICRO-K) 10 MEQ CpSR; Take 1 capsule (10 mEq total) by mouth once daily.  Dispense: 90 capsule; Refill: 3  -     semaglutide (OZEMPIC) 1 mg/dose (4 mg/3 mL); Inject 1 mg into the skin every 7 days.  Dispense: 9 mL; Refill: 3  -     spironolactone (ALDACTONE) 25 MG tablet; Take 1 tablet (25 mg total) by mouth once daily.  Dispense: 90 tablet; Refill: 3        Will call pt with lab results. Will obtain last eye exam from Dr. Trivedi.          Follow up in about 6 months (around 2/23/2025).

## 2024-08-23 NOTE — LETTER
AUTHORIZATION FOR RELEASE OF   CONFIDENTIAL INFORMATION    Dear Dr. Trivedi,    We are seeing Pam Ortega, date of birth 1945, in the clinic at [facility]. Janice Velez FNP is the patient's PCP. Pam Ortega has an outstanding lab/procedure at the time we reviewed her chart. In order to help keep her health information updated, she has authorized us to request the following medical record(s):        (  )  MAMMOGRAM                                      (  )  COLONOSCOPY      (  )  PAP SMEAR                                          (  )  OUTSIDE LAB RESULTS     (  )  DEXA SCAN                                          (x  )  EYE EXAM            (  )  FOOT EXAM                                          (  )  ENTIRE RECORD     (  )  OUTSIDE IMMUNIZATIONS                 (  )  _______________         Please fax records to Janice Velez FNP, [phone]     If you have any questions, please contact Ty at [phone].           Patient Name: Pam Ortega  : 1945  Patient Phone #: [phone]

## 2024-09-30 ENCOUNTER — EXTERNAL CHRONIC CARE MANAGEMENT (OUTPATIENT)
Dept: PRIMARY CARE CLINIC | Facility: CLINIC | Age: 79
End: 2024-09-30
Payer: MEDICARE

## 2024-09-30 PROCEDURE — G0511 CCM/BHI BY RHC/FQHC 20MIN MO: HCPCS | Mod: ,,, | Performed by: NURSE PRACTITIONER

## 2024-10-14 ENCOUNTER — OFFICE VISIT (OUTPATIENT)
Dept: FAMILY MEDICINE | Facility: CLINIC | Age: 79
End: 2024-10-14
Payer: MEDICARE

## 2024-10-14 ENCOUNTER — OFFICE VISIT (OUTPATIENT)
Dept: VASCULAR SURGERY | Facility: CLINIC | Age: 79
End: 2024-10-14
Payer: MEDICARE

## 2024-10-14 VITALS
DIASTOLIC BLOOD PRESSURE: 82 MMHG | HEIGHT: 70 IN | BODY MASS INDEX: 30.49 KG/M2 | WEIGHT: 213 LBS | RESPIRATION RATE: 16 BRPM | SYSTOLIC BLOOD PRESSURE: 139 MMHG | HEART RATE: 88 BPM

## 2024-10-14 VITALS
WEIGHT: 213.38 LBS | SYSTOLIC BLOOD PRESSURE: 138 MMHG | TEMPERATURE: 98 F | HEIGHT: 70 IN | OXYGEN SATURATION: 98 % | RESPIRATION RATE: 18 BRPM | BODY MASS INDEX: 30.55 KG/M2 | HEART RATE: 88 BPM | DIASTOLIC BLOOD PRESSURE: 80 MMHG

## 2024-10-14 DIAGNOSIS — I10 HYPERTENSION, UNSPECIFIED TYPE: ICD-10-CM

## 2024-10-14 DIAGNOSIS — E78.5 HYPERLIPIDEMIA, UNSPECIFIED HYPERLIPIDEMIA TYPE: ICD-10-CM

## 2024-10-14 DIAGNOSIS — L02.219 CUTANEOUS ABSCESS OF TRUNK, UNSPECIFIED SITE OF TRUNK: Primary | ICD-10-CM

## 2024-10-14 DIAGNOSIS — R60.0 EDEMA, LOWER EXTREMITY: ICD-10-CM

## 2024-10-14 DIAGNOSIS — L81.9 HYPERPIGMENTATION OF SKIN: Primary | ICD-10-CM

## 2024-10-14 DIAGNOSIS — M79.605 BILATERAL LEG PAIN: ICD-10-CM

## 2024-10-14 DIAGNOSIS — I87.2 VENOUS INSUFFICIENCY: ICD-10-CM

## 2024-10-14 DIAGNOSIS — M79.604 BILATERAL LEG PAIN: ICD-10-CM

## 2024-10-14 DIAGNOSIS — Z23 NEED FOR VACCINATION: ICD-10-CM

## 2024-10-14 PROCEDURE — 90653 IIV ADJUVANT VACCINE IM: CPT | Mod: ,,, | Performed by: NURSE PRACTITIONER

## 2024-10-14 PROCEDURE — 99213 OFFICE O/P EST LOW 20 MIN: CPT | Mod: ,,, | Performed by: NURSE PRACTITIONER

## 2024-10-14 PROCEDURE — G0008 ADMIN INFLUENZA VIRUS VAC: HCPCS | Mod: ,,, | Performed by: NURSE PRACTITIONER

## 2024-10-14 PROCEDURE — 99999 PR PBB SHADOW E&M-EST. PATIENT-LVL V: CPT | Mod: PBBFAC,,, | Performed by: FAMILY MEDICINE

## 2024-10-14 PROCEDURE — 99213 OFFICE O/P EST LOW 20 MIN: CPT | Mod: S$PBB,25,, | Performed by: FAMILY MEDICINE

## 2024-10-14 PROCEDURE — 99215 OFFICE O/P EST HI 40 MIN: CPT | Mod: PBBFAC | Performed by: FAMILY MEDICINE

## 2024-10-14 RX ORDER — SULFAMETHOXAZOLE AND TRIMETHOPRIM 800; 160 MG/1; MG/1
1 TABLET ORAL 2 TIMES DAILY
Qty: 20 TABLET | Refills: 0 | Status: SHIPPED | OUTPATIENT
Start: 2024-10-14 | End: 2024-10-24

## 2024-10-14 RX ORDER — MUPIROCIN 20 MG/G
OINTMENT TOPICAL 3 TIMES DAILY
Qty: 22 G | Refills: 0 | Status: SHIPPED | OUTPATIENT
Start: 2024-10-14

## 2024-10-14 RX ORDER — TEMAZEPAM 15 MG/1
15 CAPSULE ORAL NIGHTLY PRN
COMMUNITY
Start: 2024-10-02

## 2024-10-14 NOTE — PROGRESS NOTES
VEIN CENTER CLINIC NOTE    Patient ID: Pam Ortega is a 79 y.o. female.    I. HISTORY     Chief Complaint:   Chief Complaint   Patient presents with    Follow-up     Exam room 4.  6M Venous        HPI: Pam Ortega is a 79 y.o. female who presents today for a 6 month f/u, following 9 months of conservative therapy for chronic venous insufficiency consisting of 20-30 millimeter of mercury compression stockings, calf pumping exercises and therapeutic leg elevation.  The patient states that these measures have helped to alleviate the majority of her symptoms she was having previously.  She denies any life altering symptoms at this time would like to continue with conservative therapy.    Clinical Summary:  The patient initially presented on 01/17/2024 by referral from [facility] for evaluation of bilateral lower extremity leg swelling, hyperpigmentation and pain.  Symptoms are progressive, worsening and began greater than 1 years ago.  Location is bilateral lower extremities below the knees. Symptoms are worse at the end of the day.  History of venous interventions includes none.  Unsure of family history of venous disease.  The patient also recently had arterial duplex with ABIs on 01/11/2024 which were abnormal and her PCP referred to vascular surgery.  Patient also denies history of DVT or cellulitis.    The patient underwent a bilateral superficial venous reflux study today, 01/17/2024, and the results were discussed with the patient.  This study shows dilation and axial reflux of the bilateral great and left small saphenous veins.  Pulsatile flow also noted bilaterally which can be consistent with CHF.    Doppler study bilateral lower extremities on 01/11/2024 shows no evidence of DVT bilaterally.    Venous Disease Medical Necessity Documentation Initial Visit Date:  01/17/2024 Return Check Date:   04/17/2024   Have you ever had a rupture or bleed from a varicose vein in your leg(s)?               [x] Yes  [] No   [x] Yes   [] No   Have you ever been diagnosed with phlebitis, cellulitis, or inflammation in the area of the varicose veins of  your leg(s)?  [] Yes  [x] No    [x] Yes   [] No   Do you have darkened or inflamed skin on your legs?   [x] Yes   [] No   [x] Yes   [] No   Do you have leg swelling?     [x] Yes   [] No   [x] Yes   [] No   Do you have leg pain?   [x] Yes   [] No   [] Yes   [x] No   If yes, describe the type of pain?    [x]   Stabbing []  Radiating [x]  Aching   [x]  Tightness [x]  Throbbing               [x]  Burning [x]  Cramping              Do you have leg discomfort?   [] Yes   [x] No   [] Yes   [x] No   If yes, describe the type of discomfort?    []  Heaviness []  Fullness   []  Restlessness [] Tired/Fatigued [] Itching              Have you ever worn compression hose?   [] Yes   [x] No   [x] Yes   [] No   If yes, how long?      3 MONTHS     Do you elevate your legs while sitting?   [x] Yes   [] No   [x] Yes   [] No   Does venous disease (varicose veins, ulcers, skin changes, leg pain/swelling) interfere with your daily life?  If yes, check activities you are limited or unable to do.    []  Shower  [x]   Walk  []  Exercise  [] Play with children/grandchildren  [x]  Shop [] Work [x] Stand for any period of time [x] Sleep                               [] Sitting for an extended period of time.           [x] Yes   [] No   [] Yes   [x] No   Do you exercise/have you tried to exercise (i.e.  Walk our participate in a regular exercise routine)?  [] Yes  [x] No   [x] Yes   [] No   BMI 29.6   29.50          Past Medical History:   Diagnosis Date    Anemia of chronic disease     Atrial fibrillation     Benign hypertensive CKD, stage 1-4 or unspecified chronic kidney disease     Bronchitis 12/22/2023    Carotid artery occlusion     CKD (chronic kidney disease)     Coronary atherosclerosis     Cough 12/22/2023    COVID-19 ruled out 03/20/2024    Diabetes mellitus, type 2     DJD  (degenerative joint disease)     History of CVA (cerebrovascular accident)     HTN (hypertension)     Hyperlipidemia     Nonischemic dilated cardiomyopathy     Obesity     Osteopenia     Paroxysmal atrial fibrillation     Presence of cardiac pacemaker     PVD (peripheral vascular disease)     Upper respiratory tract infection 12/22/2023    Vitamin D deficiency         Past Surgical History:   Procedure Laterality Date    BREAST BIOPSY      COLONOSCOPY  12/04/2018    repeat in 5 years    pace maker      VAGINAL DELIVERY      x 3       Social History     Tobacco Use   Smoking Status Never   Smokeless Tobacco Never         Current Outpatient Medications:     alendronate (FOSAMAX) 70 MG tablet, Take 1 tablet (70 mg total) by mouth every 7 days., Disp: 12 tablet, Rfl: 3    amLODIPine (NORVASC) 10 MG tablet, Take 1 tablet (10 mg total) by mouth once daily., Disp: 90 tablet, Rfl: 3    aspirin (ECOTRIN) 81 MG EC tablet, Take 81 mg by mouth once daily., Disp: , Rfl:     cholecalciferol, vitamin D3, (VITAMIN D3) 50 mcg (2,000 unit) Cap capsule, Take 1 capsule by mouth once daily., Disp: , Rfl:     colchicine (COLCRYS) 0.6 mg tablet, Take 1 tablet (0.6 mg total) by mouth once daily. for gout, Disp: 90 tablet, Rfl: 3    furosemide (LASIX) 40 MG tablet, Take 1 tablet (40 mg total) by mouth once daily., Disp: 90 tablet, Rfl: 3    glimepiride (AMARYL) 4 MG tablet, Take 1 tablet (4 mg total) by mouth daily with breakfast., Disp: 90 tablet, Rfl: 3    hydrALAZINE (APRESOLINE) 50 MG tablet, Take 1 tablet (50 mg total) by mouth 2 (two) times a day., Disp: 180 tablet, Rfl: 3    isosorbide mononitrate (IMDUR) 30 MG 24 hr tablet, Take 1 tablet (30 mg total) by mouth once daily., Disp: 90 tablet, Rfl: 3    lovastatin (MEVACOR) 20 MG tablet, Take 1 tablet (20 mg total) by mouth every night, Disp: 90 tablet, Rfl: 3    metoprolol tartrate (LOPRESSOR) 50 MG tablet, Take 1 tablet (50 mg total) by mouth 2 (two) times a day., Disp: 180 tablet,  Rfl: 3    omeprazole (PRILOSEC) 20 MG capsule, Take 1 capsule (20 mg total) by mouth once daily., Disp: 90 capsule, Rfl: 3    potassium chloride (MICRO-K) 10 MEQ CpSR, Take 1 capsule (10 mEq total) by mouth once daily., Disp: 90 capsule, Rfl: 3    rivaroxaban (XARELTO) 15 mg Tab, Take 1 tablet (15 mg total) by mouth once daily., Disp: 90 tablet, Rfl: 2    semaglutide (OZEMPIC) 1 mg/dose (4 mg/3 mL), Inject 1 mg into the skin every 7 days., Disp: 9 mL, Rfl: 3    spironolactone (ALDACTONE) 25 MG tablet, Take 1 tablet (25 mg total) by mouth once daily., Disp: 90 tablet, Rfl: 3    temazepam (RESTORIL) 15 mg Cap, Take 15 mg by mouth nightly as needed., Disp: , Rfl:     zinc 50 mg Tab, Take 50 mg by mouth once daily., Disp: , Rfl:     Review of Systems   Constitutional:  Negative for activity change, chills, diaphoresis, fatigue and fever.   Respiratory:  Negative for cough and shortness of breath.    Cardiovascular:  Positive for leg swelling and claudication. Negative for chest pain.        Hyperpigmentation LE   Gastrointestinal:  Negative for nausea and vomiting.   Musculoskeletal:  Positive for leg pain. Negative for joint swelling.   Integumentary:  Negative for rash and wound.   Neurological:  Negative for weakness and numbness.        II. PHYSICAL EXAM     Physical Exam  Constitutional:       General: She is awake. She is not in acute distress.     Appearance: Normal appearance. She is overweight. She is not ill-appearing or toxic-appearing.   HENT:      Head: Normocephalic and atraumatic.   Eyes:      Extraocular Movements: Extraocular movements intact.      Conjunctiva/sclera: Conjunctivae normal.      Pupils: Pupils are equal, round, and reactive to light.   Neck:      Vascular: No carotid bruit or JVD.   Cardiovascular:      Rate and Rhythm: Normal rate and regular rhythm.      Pulses:           Dorsalis pedis pulses are detected w/ Doppler on the right side and detected w/ Doppler on the left side.         Posterior tibial pulses are detected w/ Doppler on the right side and detected w/ Doppler on the left side.      Heart sounds: No murmur heard.  Pulmonary:      Effort: Pulmonary effort is normal. No respiratory distress.      Breath sounds: No stridor. No wheezing, rhonchi or rales.   Musculoskeletal:         General: No swelling, tenderness or deformity.      Right lower le+ Edema present.      Left lower le+ Edema present.      Comments: Hyperpigmentation of the bilateral lower extremities noted without evidence of active cellulitis or open ulcerations.   Feet:      Comments: Triphasic hand-held dopplerable pulses of the bilateral dorsalis pedis and posterior tibial arteries.  Skin:     General: Skin is warm.      Capillary Refill: Capillary refill takes less than 2 seconds.      Coloration: Skin is not ashen.      Findings: No bruising, erythema, lesion, rash or wound.   Neurological:      Mental Status: She is alert and oriented to person, place, and time.      Motor: No weakness.   Psychiatric:         Speech: Speech normal.         Behavior: Behavior normal. Behavior is cooperative.         Reticular/Spider veins noted:  RLE: none  LLE: none    Varicose veins noted:  RLE: none  LLE:  posterior calf and lateral calf    CEAP Classification  Clinical Signs: Class 4 - Skin changes ascribed to venous disease  Etiologic Classification: Primary  Anatomic distribution: Superficial  Pathophysiologic dysfunction: Reflux                       Venous Clinical Severity Score  Pain:2=Daily, moderate activity limitation, occasional analgesics  Varicose Veins: 1=Few, scattered. Branch varicose veins  Venous Edema: 2=Afternoon edema, above ankle  Pigmentation: 1=Diffuse, but limited in area and old (brown)  Inflammation: 0=None  Induration: 0=None  Number of Active Ulcers: 0=0  Active Ulceration, Duration: 0=None  Active Ulcer Size: 0=None  Compressive Therapy: 3=Full compliance, stockings + elevation  Total Score:  9       III. ASSESSMENT & PLAN (MEDICAL DECISION MAKING)     1. Hyperpigmentation of skin    2. Edema, lower extremity    3. Venous insufficiency    4. Bilateral leg pain        Assessment/Diagnosis and Plan:  Previous Ultrasound of lower extremities reveals positive evidence of venous insufficiency in the bilateral great saphenous and left small saphenous veins.  Plan for conservative medical treatment at this time. The patient may benefit from endovenous ablation in the future.     - continue compression with 15-20 mmHg Rx stockings  - Therapeutic leg elevation  - Calf pumping exercises  - RTC 12 months for further evaluation        Thomas Lyn DO

## 2024-10-14 NOTE — PROGRESS NOTES
Subjective     Patient ID: Pam Ortega is a 79 y.o. female.    Chief Complaint: large blister on left breast. (Hi-dose flu vaccine given in left deltoid.)    Pt presents with a blister to left breast x several days.       Review of Systems   Constitutional:  Negative for activity change, appetite change, chills, fatigue and fever.   HENT:  Negative for nasal congestion, ear discharge, nosebleeds, postnasal drip, rhinorrhea, sinus pressure/congestion, sneezing, sore throat and tinnitus.    Eyes:  Negative for pain, discharge, redness and itching.   Respiratory:  Negative for cough, choking, chest tightness, shortness of breath and wheezing.    Cardiovascular:  Negative for chest pain.   Gastrointestinal:  Negative for abdominal distention, abdominal pain, blood in stool, change in bowel habit, constipation, diarrhea, nausea and vomiting.   Genitourinary:  Negative for decreased urine volume, dysuria, flank pain and frequency.   Musculoskeletal:  Negative for back pain and gait problem.   Integumentary:  Negative for wound, breast mass and breast discharge.        Blister to left breast    Allergic/Immunologic: Negative for immunocompromised state.   Neurological:  Negative for dizziness, light-headedness and headaches.   Psychiatric/Behavioral:  Negative for agitation, behavioral problems and hallucinations.    Breast: Negative for mass         Objective     Physical Exam  Vitals and nursing note reviewed.   Constitutional:       Appearance: Normal appearance.   Cardiovascular:      Rate and Rhythm: Normal rate and regular rhythm.      Heart sounds: Normal heart sounds.   Pulmonary:      Effort: Pulmonary effort is normal.      Breath sounds: Normal breath sounds.   Chest:          Comments: Intact blister noted to left breast   Musculoskeletal:         General: Normal range of motion.   Neurological:      Mental Status: She is alert and oriented to person, place, and time.   Psychiatric:         Mood and  Affect: Mood normal.         Behavior: Behavior normal.            Assessment and Plan     1. Cutaneous abscess of trunk, unspecified site of trunk  -     mupirocin (BACTROBAN) 2 % ointment; Apply topically 3 (three) times daily to breast  Dispense: 22 g; Refill: 0  -     sulfamethoxazole-trimethoprim 800-160mg (BACTRIM DS) 800-160 mg Tab; Take 1 tablet by mouth 2 (two) times daily. for 10 days  Dispense: 20 tablet; Refill: 0    2. Need for vaccination  -     influenza (adjuvanted) (Fluad) 45 mcg/0.5 mL IM vaccine (> or = 66 yo) 0.5 mL    3. Hypertension, unspecified type  Overview:  Controlled.  Patient has begun walking, lost 5 pds.      4. Hyperlipidemia, unspecified hyperlipidemia type        Clean left breasts with dial soap and water twice daily. Notify clinic if symptoms worsen or persist.          Follow up if symptoms worsen or fail to improve.

## 2024-10-31 ENCOUNTER — EXTERNAL CHRONIC CARE MANAGEMENT (OUTPATIENT)
Dept: PRIMARY CARE CLINIC | Facility: CLINIC | Age: 79
End: 2024-10-31
Payer: MEDICARE

## 2024-10-31 PROCEDURE — G0511 CCM/BHI BY RHC/FQHC 20MIN MO: HCPCS | Mod: ,,, | Performed by: NURSE PRACTITIONER

## 2024-11-17 ENCOUNTER — ANESTHESIA (OUTPATIENT)
Dept: SURGERY | Facility: HOSPITAL | Age: 79
End: 2024-11-17
Payer: MEDICARE

## 2024-11-17 ENCOUNTER — HOSPITAL ENCOUNTER (EMERGENCY)
Facility: HOSPITAL | Age: 79
Discharge: ANOTHER HOSPITAL | End: 2024-11-17
Payer: MEDICARE

## 2024-11-17 ENCOUNTER — HOSPITAL ENCOUNTER (INPATIENT)
Facility: HOSPITAL | Age: 79
LOS: 2 days | Discharge: HOME | DRG: 271 | End: 2024-11-19
Attending: EMERGENCY MEDICINE | Admitting: SURGERY
Payer: MEDICARE

## 2024-11-17 ENCOUNTER — ANESTHESIA EVENT (OUTPATIENT)
Dept: SURGERY | Facility: HOSPITAL | Age: 79
End: 2024-11-17
Payer: MEDICARE

## 2024-11-17 VITALS
TEMPERATURE: 98 F | SYSTOLIC BLOOD PRESSURE: 205 MMHG | DIASTOLIC BLOOD PRESSURE: 89 MMHG | WEIGHT: 225 LBS | OXYGEN SATURATION: 92 % | RESPIRATION RATE: 21 BRPM | HEIGHT: 70 IN | BODY MASS INDEX: 32.21 KG/M2 | HEART RATE: 66 BPM

## 2024-11-17 DIAGNOSIS — I73.9 PVD (PERIPHERAL VASCULAR DISEASE): ICD-10-CM

## 2024-11-17 DIAGNOSIS — I73.9 CLAUDICATION OF RIGHT LOWER EXTREMITY: ICD-10-CM

## 2024-11-17 DIAGNOSIS — M79.604 RIGHT LEG PAIN: ICD-10-CM

## 2024-11-17 DIAGNOSIS — Z01.818 PRE-OP EVALUATION: ICD-10-CM

## 2024-11-17 DIAGNOSIS — R07.9 CHEST PAIN: ICD-10-CM

## 2024-11-17 DIAGNOSIS — R11.2 NAUSEA AND VOMITING, UNSPECIFIED VOMITING TYPE: Primary | ICD-10-CM

## 2024-11-17 DIAGNOSIS — I70.90 ARTERIAL OCCLUSION: Primary | ICD-10-CM

## 2024-11-17 LAB
ALBUMIN SERPL BCP-MCNC: 3.5 G/DL (ref 3.4–4.8)
ALBUMIN/GLOB SERPL: 0.9 {RATIO}
ALP SERPL-CCNC: 78 U/L (ref 40–150)
ALT SERPL W P-5'-P-CCNC: 14 U/L (ref 0–55)
ANION GAP SERPL CALCULATED.3IONS-SCNC: 14 MMOL/L (ref 7–16)
APTT PPP: 178.4 SECONDS (ref 25.2–37.3)
APTT PPP: 31.5 SECONDS (ref 25.2–37.3)
APTT PPP: >200 SECONDS (ref 25.2–37.3)
AST SERPL W P-5'-P-CCNC: 41 U/L (ref 5–34)
BASOPHILS # BLD AUTO: 0.03 K/UL (ref 0–0.2)
BASOPHILS NFR BLD AUTO: 0.3 % (ref 0–1)
BILIRUB SERPL-MCNC: 1.9 MG/DL
BUN SERPL-MCNC: 22 MG/DL (ref 10–20)
BUN/CREAT SERPL: 10 (ref 6–20)
CALCIUM SERPL-MCNC: 9.4 MG/DL (ref 8.4–10.2)
CHLORIDE SERPL-SCNC: 108 MMOL/L (ref 98–107)
CO2 SERPL-SCNC: 18 MMOL/L (ref 23–31)
CREAT SERPL-MCNC: 2.14 MG/DL (ref 0.55–1.02)
DIFFERENTIAL METHOD BLD: ABNORMAL
EGFR (NO RACE VARIABLE) (RUSH/TITUS): 23 ML/MIN/1.73M2
EOSINOPHIL # BLD AUTO: 0.08 K/UL (ref 0–0.5)
EOSINOPHIL NFR BLD AUTO: 0.8 % (ref 1–4)
EOSINOPHIL NFR BLD MANUAL: 1 % (ref 1–4)
ERYTHROCYTE [DISTWIDTH] IN BLOOD BY AUTOMATED COUNT: 16.2 % (ref 11.5–14.5)
GLOBULIN SER-MCNC: 3.7 G/DL (ref 2–4)
GLUCOSE SERPL-MCNC: 178 MG/DL (ref 70–105)
GLUCOSE SERPL-MCNC: 178 MG/DL (ref 82–115)
HCT VFR BLD AUTO: 38.3 % (ref 38–47)
HGB BLD-MCNC: 12.5 G/DL (ref 12–16)
INDIRECT COOMBS: NORMAL
INR BLD: 2.26
LYMPHOCYTES # BLD AUTO: 5 K/UL (ref 1–4.8)
LYMPHOCYTES NFR BLD AUTO: 48.7 % (ref 27–41)
LYMPHOCYTES NFR BLD MANUAL: 47 % (ref 27–41)
MCH RBC QN AUTO: 29.3 PG (ref 27–31)
MCHC RBC AUTO-ENTMCNC: 32.6 G/DL (ref 32–36)
MCV RBC AUTO: 89.9 FL (ref 80–96)
MONOCYTES # BLD AUTO: 1.23 K/UL (ref 0–0.8)
MONOCYTES NFR BLD AUTO: 12 % (ref 2–6)
MONOCYTES NFR BLD MANUAL: 11 % (ref 2–6)
MPC BLD CALC-MCNC: 10.5 FL (ref 9.4–12.4)
NEUTROPHILS # BLD AUTO: 3.92 K/UL (ref 1.8–7.7)
NEUTROPHILS NFR BLD AUTO: 38.2 % (ref 53–65)
NEUTS BAND NFR BLD MANUAL: 1 % (ref 1–5)
NEUTS SEG NFR BLD MANUAL: 40 % (ref 50–62)
NRBC BLD MANUAL-RTO: ABNORMAL %
PLATELET # BLD AUTO: 207 K/UL (ref 150–400)
PLATELET MORPHOLOGY: NORMAL
POTASSIUM SERPL-SCNC: 4.1 MMOL/L (ref 3.5–5.1)
PROT SERPL-MCNC: 7.2 G/DL (ref 5.8–7.6)
PROTHROMBIN TIME: 25.4 SECONDS (ref 11.7–14.7)
RBC # BLD AUTO: 4.26 M/UL (ref 4.2–5.4)
RBC MORPH BLD: NORMAL
RH BLD: NORMAL
SODIUM SERPL-SCNC: 136 MMOL/L (ref 136–145)
SPECIMEN OUTDATE: NORMAL
WBC # BLD AUTO: 10.26 K/UL (ref 4.5–11)

## 2024-11-17 PROCEDURE — 63600175 PHARM REV CODE 636 W HCPCS: Performed by: EMERGENCY MEDICINE

## 2024-11-17 PROCEDURE — 37000008 HC ANESTHESIA 1ST 15 MINUTES: Performed by: SURGERY

## 2024-11-17 PROCEDURE — 96374 THER/PROPH/DIAG INJ IV PUSH: CPT

## 2024-11-17 PROCEDURE — 63600175 PHARM REV CODE 636 W HCPCS: Performed by: NURSE ANESTHETIST, CERTIFIED REGISTERED

## 2024-11-17 PROCEDURE — 25000003 PHARM REV CODE 250: Performed by: NURSE ANESTHETIST, CERTIFIED REGISTERED

## 2024-11-17 PROCEDURE — 63600175 PHARM REV CODE 636 W HCPCS: Performed by: SURGERY

## 2024-11-17 PROCEDURE — 04CK3ZZ EXTIRPATION OF MATTER FROM RIGHT FEMORAL ARTERY, PERCUTANEOUS APPROACH: ICD-10-PCS | Performed by: SURGERY

## 2024-11-17 PROCEDURE — 85610 PROTHROMBIN TIME: CPT | Performed by: FAMILY MEDICINE

## 2024-11-17 PROCEDURE — 99285 EMERGENCY DEPT VISIT HI MDM: CPT | Mod: 25

## 2024-11-17 PROCEDURE — D9220A PRA ANESTHESIA: Mod: CRNA,,, | Performed by: NURSE ANESTHETIST, CERTIFIED REGISTERED

## 2024-11-17 PROCEDURE — 96360 HYDRATION IV INFUSION INIT: CPT

## 2024-11-17 PROCEDURE — 20000000 HC ICU ROOM

## 2024-11-17 PROCEDURE — 36415 COLL VENOUS BLD VENIPUNCTURE: CPT | Performed by: FAMILY MEDICINE

## 2024-11-17 PROCEDURE — 85730 THROMBOPLASTIN TIME PARTIAL: CPT | Performed by: SURGERY

## 2024-11-17 PROCEDURE — 99285 EMERGENCY DEPT VISIT HI MDM: CPT | Mod: EDII,,, | Performed by: FAMILY MEDICINE

## 2024-11-17 PROCEDURE — C1729 CATH, DRAINAGE: HCPCS | Performed by: SURGERY

## 2024-11-17 PROCEDURE — 80053 COMPREHEN METABOLIC PANEL: CPT | Performed by: FAMILY MEDICINE

## 2024-11-17 PROCEDURE — D9220A PRA ANESTHESIA: Mod: ANES,,, | Performed by: ANESTHESIOLOGY

## 2024-11-17 PROCEDURE — 36000707: Performed by: SURGERY

## 2024-11-17 PROCEDURE — 63600175 PHARM REV CODE 636 W HCPCS: Performed by: FAMILY MEDICINE

## 2024-11-17 PROCEDURE — 25000003 PHARM REV CODE 250: Performed by: SURGERY

## 2024-11-17 PROCEDURE — 88304 TISSUE EXAM BY PATHOLOGIST: CPT | Mod: 26,,, | Performed by: PATHOLOGY

## 2024-11-17 PROCEDURE — 86850 RBC ANTIBODY SCREEN: CPT | Performed by: SURGERY

## 2024-11-17 PROCEDURE — 37000009 HC ANESTHESIA EA ADD 15 MINS: Performed by: SURGERY

## 2024-11-17 PROCEDURE — 36000706: Performed by: SURGERY

## 2024-11-17 PROCEDURE — 88304 TISSUE EXAM BY PATHOLOGIST: CPT | Mod: TC,SUR | Performed by: SURGERY

## 2024-11-17 PROCEDURE — C1757 CATH, THROMBECTOMY/EMBOLECT: HCPCS | Performed by: SURGERY

## 2024-11-17 PROCEDURE — 85730 THROMBOPLASTIN TIME PARTIAL: CPT | Performed by: EMERGENCY MEDICINE

## 2024-11-17 PROCEDURE — 25500020 PHARM REV CODE 255: Performed by: SURGERY

## 2024-11-17 PROCEDURE — 82962 GLUCOSE BLOOD TEST: CPT

## 2024-11-17 PROCEDURE — 36415 COLL VENOUS BLD VENIPUNCTURE: CPT | Performed by: SURGERY

## 2024-11-17 PROCEDURE — 96375 TX/PRO/DX INJ NEW DRUG ADDON: CPT

## 2024-11-17 PROCEDURE — 85025 COMPLETE CBC W/AUTO DIFF WBC: CPT | Performed by: FAMILY MEDICINE

## 2024-11-17 PROCEDURE — 93005 ELECTROCARDIOGRAM TRACING: CPT

## 2024-11-17 RX ORDER — ISOSORBIDE MONONITRATE 30 MG/1
30 TABLET, EXTENDED RELEASE ORAL DAILY
Status: DISCONTINUED | OUTPATIENT
Start: 2024-11-18 | End: 2024-11-19 | Stop reason: HOSPADM

## 2024-11-17 RX ORDER — INSULIN ASPART 100 [IU]/ML
0-10 INJECTION, SOLUTION INTRAVENOUS; SUBCUTANEOUS
Status: DISCONTINUED | OUTPATIENT
Start: 2024-11-17 | End: 2024-11-19 | Stop reason: HOSPADM

## 2024-11-17 RX ORDER — IPRATROPIUM BROMIDE AND ALBUTEROL SULFATE 2.5; .5 MG/3ML; MG/3ML
3 SOLUTION RESPIRATORY (INHALATION) ONCE
Status: COMPLETED | OUTPATIENT
Start: 2024-11-17 | End: 2024-11-18

## 2024-11-17 RX ORDER — GLUCAGON 1 MG
1 KIT INJECTION
Status: DISCONTINUED | OUTPATIENT
Start: 2024-11-17 | End: 2024-11-19 | Stop reason: HOSPADM

## 2024-11-17 RX ORDER — HEPARIN SODIUM 5000 [USP'U]/ML
INJECTION, SOLUTION INTRAVENOUS; SUBCUTANEOUS
Status: DISCONTINUED | OUTPATIENT
Start: 2024-11-17 | End: 2024-11-17 | Stop reason: HOSPADM

## 2024-11-17 RX ORDER — PANTOPRAZOLE SODIUM 40 MG/1
40 TABLET, DELAYED RELEASE ORAL DAILY
Status: DISCONTINUED | OUTPATIENT
Start: 2024-11-18 | End: 2024-11-19 | Stop reason: HOSPADM

## 2024-11-17 RX ORDER — DIPHENHYDRAMINE HYDROCHLORIDE 50 MG/ML
25 INJECTION INTRAMUSCULAR; INTRAVENOUS EVERY 6 HOURS PRN
Status: DISCONTINUED | OUTPATIENT
Start: 2024-11-17 | End: 2024-11-19 | Stop reason: HOSPADM

## 2024-11-17 RX ORDER — HEPARIN SODIUM,PORCINE/D5W 25000/250
0-40 INTRAVENOUS SOLUTION INTRAVENOUS CONTINUOUS
Status: DISCONTINUED | OUTPATIENT
Start: 2024-11-17 | End: 2024-11-17 | Stop reason: HOSPADM

## 2024-11-17 RX ORDER — MORPHINE SULFATE 10 MG/ML
4 INJECTION INTRAMUSCULAR; INTRAVENOUS; SUBCUTANEOUS EVERY 5 MIN PRN
Status: DISCONTINUED | OUTPATIENT
Start: 2024-11-17 | End: 2024-11-17

## 2024-11-17 RX ORDER — FENTANYL CITRATE 50 UG/ML
INJECTION, SOLUTION INTRAMUSCULAR; INTRAVENOUS
Status: DISCONTINUED | OUTPATIENT
Start: 2024-11-17 | End: 2024-11-17

## 2024-11-17 RX ORDER — HYDRALAZINE HYDROCHLORIDE 50 MG/1
50 TABLET, FILM COATED ORAL 2 TIMES DAILY
Status: DISCONTINUED | OUTPATIENT
Start: 2024-11-17 | End: 2024-11-19 | Stop reason: HOSPADM

## 2024-11-17 RX ORDER — NICARDIPINE HYDROCHLORIDE 0.2 MG/ML
0-15 INJECTION INTRAVENOUS CONTINUOUS
Status: DISCONTINUED | OUTPATIENT
Start: 2024-11-17 | End: 2024-11-19 | Stop reason: HOSPADM

## 2024-11-17 RX ORDER — HYDROMORPHONE HYDROCHLORIDE 2 MG/ML
0.5 INJECTION, SOLUTION INTRAMUSCULAR; INTRAVENOUS; SUBCUTANEOUS EVERY 5 MIN PRN
Status: DISCONTINUED | OUTPATIENT
Start: 2024-11-17 | End: 2024-11-19 | Stop reason: HOSPADM

## 2024-11-17 RX ORDER — MUPIROCIN 20 MG/G
OINTMENT TOPICAL 2 TIMES DAILY
Status: DISCONTINUED | OUTPATIENT
Start: 2024-11-18 | End: 2024-11-19 | Stop reason: HOSPADM

## 2024-11-17 RX ORDER — MORPHINE SULFATE 4 MG/ML
4 INJECTION, SOLUTION INTRAMUSCULAR; INTRAVENOUS EVERY 4 HOURS PRN
Status: DISCONTINUED | OUTPATIENT
Start: 2024-11-17 | End: 2024-11-19 | Stop reason: HOSPADM

## 2024-11-17 RX ORDER — ROCURONIUM BROMIDE 10 MG/ML
INJECTION, SOLUTION INTRAVENOUS
Status: DISCONTINUED | OUTPATIENT
Start: 2024-11-17 | End: 2024-11-17

## 2024-11-17 RX ORDER — IOPAMIDOL 612 MG/ML
INJECTION, SOLUTION INTRAVASCULAR
Status: DISCONTINUED | OUTPATIENT
Start: 2024-11-17 | End: 2024-11-17 | Stop reason: HOSPADM

## 2024-11-17 RX ORDER — METOPROLOL TARTRATE 50 MG/1
50 TABLET ORAL 2 TIMES DAILY
Status: DISCONTINUED | OUTPATIENT
Start: 2024-11-17 | End: 2024-11-19 | Stop reason: HOSPADM

## 2024-11-17 RX ORDER — ONDANSETRON HYDROCHLORIDE 2 MG/ML
4 INJECTION, SOLUTION INTRAVENOUS
Status: COMPLETED | OUTPATIENT
Start: 2024-11-17 | End: 2024-11-17

## 2024-11-17 RX ORDER — SPIRONOLACTONE 25 MG/1
25 TABLET ORAL DAILY
Status: DISCONTINUED | OUTPATIENT
Start: 2024-11-18 | End: 2024-11-19 | Stop reason: HOSPADM

## 2024-11-17 RX ORDER — POTASSIUM CHLORIDE 750 MG/1
10 TABLET, EXTENDED RELEASE ORAL DAILY
Status: DISCONTINUED | OUTPATIENT
Start: 2024-11-18 | End: 2024-11-19 | Stop reason: HOSPADM

## 2024-11-17 RX ORDER — HYDROCODONE BITARTRATE AND ACETAMINOPHEN 7.5; 325 MG/1; MG/1
1 TABLET ORAL EVERY 6 HOURS PRN
Status: DISCONTINUED | OUTPATIENT
Start: 2024-11-17 | End: 2024-11-19 | Stop reason: HOSPADM

## 2024-11-17 RX ORDER — MORPHINE SULFATE 4 MG/ML
4 INJECTION, SOLUTION INTRAMUSCULAR; INTRAVENOUS
Status: COMPLETED | OUTPATIENT
Start: 2024-11-17 | End: 2024-11-17

## 2024-11-17 RX ORDER — HEPARIN SODIUM,PORCINE/D5W 25000/250
0-40 INTRAVENOUS SOLUTION INTRAVENOUS CONTINUOUS
Status: DISCONTINUED | OUTPATIENT
Start: 2024-11-17 | End: 2024-11-18

## 2024-11-17 RX ORDER — COLCHICINE 0.6 MG/1
0.6 TABLET, FILM COATED ORAL DAILY
Status: DISCONTINUED | OUTPATIENT
Start: 2024-11-18 | End: 2024-11-19 | Stop reason: HOSPADM

## 2024-11-17 RX ORDER — ASPIRIN 325 MG
325 TABLET, DELAYED RELEASE (ENTERIC COATED) ORAL DAILY
COMMUNITY

## 2024-11-17 RX ORDER — IBUPROFEN 200 MG
24 TABLET ORAL
Status: DISCONTINUED | OUTPATIENT
Start: 2024-11-17 | End: 2024-11-19 | Stop reason: HOSPADM

## 2024-11-17 RX ORDER — LIDOCAINE HYDROCHLORIDE 10 MG/ML
INJECTION, SOLUTION EPIDURAL; INFILTRATION; INTRACAUDAL; PERINEURAL
Status: DISCONTINUED | OUTPATIENT
Start: 2024-11-17 | End: 2024-11-17 | Stop reason: HOSPADM

## 2024-11-17 RX ORDER — IBUPROFEN 200 MG
16 TABLET ORAL
Status: DISCONTINUED | OUTPATIENT
Start: 2024-11-17 | End: 2024-11-19 | Stop reason: HOSPADM

## 2024-11-17 RX ORDER — ONDANSETRON HYDROCHLORIDE 2 MG/ML
INJECTION, SOLUTION INTRAVENOUS
Status: DISCONTINUED | OUTPATIENT
Start: 2024-11-17 | End: 2024-11-17

## 2024-11-17 RX ORDER — ONDANSETRON HYDROCHLORIDE 2 MG/ML
4 INJECTION, SOLUTION INTRAVENOUS DAILY PRN
Status: DISCONTINUED | OUTPATIENT
Start: 2024-11-17 | End: 2024-11-19 | Stop reason: HOSPADM

## 2024-11-17 RX ORDER — CHOLECALCIFEROL (VITAMIN D3) 25 MCG
2000 TABLET ORAL DAILY
Status: DISCONTINUED | OUTPATIENT
Start: 2024-11-18 | End: 2024-11-19 | Stop reason: HOSPADM

## 2024-11-17 RX ORDER — CEFAZOLIN SODIUM 1 G/3ML
INJECTION, POWDER, FOR SOLUTION INTRAMUSCULAR; INTRAVENOUS
Status: DISCONTINUED | OUTPATIENT
Start: 2024-11-17 | End: 2024-11-17

## 2024-11-17 RX ORDER — ZINC SULFATE 50(220)MG
220 CAPSULE ORAL DAILY
Status: DISCONTINUED | OUTPATIENT
Start: 2024-11-18 | End: 2024-11-19 | Stop reason: HOSPADM

## 2024-11-17 RX ORDER — FUROSEMIDE 40 MG/1
40 TABLET ORAL DAILY
Status: DISCONTINUED | OUTPATIENT
Start: 2024-11-18 | End: 2024-11-19 | Stop reason: HOSPADM

## 2024-11-17 RX ORDER — AMLODIPINE BESYLATE 10 MG/1
10 TABLET ORAL DAILY
Status: DISCONTINUED | OUTPATIENT
Start: 2024-11-18 | End: 2024-11-19 | Stop reason: HOSPADM

## 2024-11-17 RX ORDER — ATORVASTATIN CALCIUM 20 MG/1
20 TABLET, FILM COATED ORAL DAILY
Status: DISCONTINUED | OUTPATIENT
Start: 2024-11-18 | End: 2024-11-19 | Stop reason: HOSPADM

## 2024-11-17 RX ORDER — MEPERIDINE HYDROCHLORIDE 25 MG/ML
25 INJECTION INTRAMUSCULAR; INTRAVENOUS; SUBCUTANEOUS EVERY 10 MIN PRN
Status: ACTIVE | OUTPATIENT
Start: 2024-11-17 | End: 2024-11-17

## 2024-11-17 RX ORDER — ASPIRIN 325 MG
325 TABLET, DELAYED RELEASE (ENTERIC COATED) ORAL DAILY
Status: DISCONTINUED | OUTPATIENT
Start: 2024-11-18 | End: 2024-11-19 | Stop reason: HOSPADM

## 2024-11-17 RX ADMIN — ROCURONIUM BROMIDE 50 MG: 10 INJECTION, SOLUTION INTRAVENOUS at 07:11

## 2024-11-17 RX ADMIN — METOPROLOL TARTRATE 50 MG: 50 TABLET, FILM COATED ORAL at 09:11

## 2024-11-17 RX ADMIN — NICARDIPINE HYDROCHLORIDE 5 MG/HR: 0.2 INJECTION, SOLUTION INTRAVENOUS at 08:11

## 2024-11-17 RX ADMIN — SODIUM CHLORIDE: 9 INJECTION, SOLUTION INTRAVENOUS at 07:11

## 2024-11-17 RX ADMIN — INSULIN ASPART 1 UNITS: 100 INJECTION, SOLUTION INTRAVENOUS; SUBCUTANEOUS at 11:11

## 2024-11-17 RX ADMIN — FENTANYL CITRATE 100 MCG: 50 INJECTION, SOLUTION INTRAMUSCULAR; INTRAVENOUS at 07:11

## 2024-11-17 RX ADMIN — HEPARIN SODIUM 18 UNITS/KG/HR: 10000 INJECTION, SOLUTION INTRAVENOUS at 05:11

## 2024-11-17 RX ADMIN — HEPARIN SODIUM 18 UNITS/KG/HR: 10000 INJECTION, SOLUTION INTRAVENOUS at 03:11

## 2024-11-17 RX ADMIN — SUGAMMADEX 200 MG: 100 INJECTION, SOLUTION INTRAVENOUS at 08:11

## 2024-11-17 RX ADMIN — ONDANSETRON 4 MG: 2 INJECTION INTRAMUSCULAR; INTRAVENOUS at 07:11

## 2024-11-17 RX ADMIN — CEFAZOLIN 2 G: 1 INJECTION, POWDER, FOR SOLUTION INTRAMUSCULAR; INTRAVENOUS; PARENTERAL at 07:11

## 2024-11-17 RX ADMIN — HYDRALAZINE HYDROCHLORIDE 50 MG: 50 TABLET ORAL at 09:11

## 2024-11-17 RX ADMIN — MORPHINE SULFATE 4 MG: 4 INJECTION INTRAVENOUS at 02:11

## 2024-11-17 RX ADMIN — ONDANSETRON 4 MG: 2 INJECTION INTRAMUSCULAR; INTRAVENOUS at 02:11

## 2024-11-17 NOTE — ED PROVIDER NOTES
Encounter Date: 11/17/2024       History     Chief Complaint   Patient presents with    Vomiting    Nausea     Right leg numbness and pain onset at approx. 1 pm     PATIENT COMES IN WITH A HISTORY OF HAVING A PACEMAKER.  SHE IS ON JUST ASPIRIN SHE TAKES OTHER MEDICATIONS UNDER THE CARE OF HER CARDIOLOGIST WHICH INCLUDES PAZ DAVEY.  PATIENT STARTED HAVING SEVERE PAIN IN HER RIGHT LEG THAT EXTENDS ALL THE WAY DOWN TO HER FOOT.--THE PAIN IS BEEN ONGOING FOR 2 HOURS.        Review of patient's allergies indicates:   Allergen Reactions    Ace inhibitors Edema    Losartan Swelling     Past Medical History:   Diagnosis Date    Anemia of chronic disease     Atrial fibrillation     Benign hypertensive CKD, stage 1-4 or unspecified chronic kidney disease     Bronchitis 12/22/2023    Carotid artery occlusion     CKD (chronic kidney disease)     Coronary atherosclerosis     Cough 12/22/2023    COVID-19 ruled out 03/20/2024    Diabetes mellitus, type 2     DJD (degenerative joint disease)     History of CVA (cerebrovascular accident)     HTN (hypertension)     Hyperlipidemia     Nonischemic dilated cardiomyopathy     Obesity     Osteopenia     Paroxysmal atrial fibrillation     Presence of cardiac pacemaker     PVD (peripheral vascular disease)     Upper respiratory tract infection 12/22/2023    Vitamin D deficiency      Past Surgical History:   Procedure Laterality Date    BREAST BIOPSY      COLONOSCOPY  12/04/2018    repeat in 5 years    pace maker      VAGINAL DELIVERY      x 3     Family History   Problem Relation Name Age of Onset    Hypertension Mother      Hypertension Father      Hypertension Sister      Stroke Sister      Cancer Brother      Stroke Brother      Hypertension Brother      Alzheimer's disease Maternal Grandmother      Breast cancer Maternal Grandmother      Hypertension Maternal Grandmother      Hyperlipidemia Daughter      Breast cancer Daughter      Diabetes Daughter      Hypertension Daughter       Diabetes Daughter      Hypertension Daughter      Hypertension Daughter      Clotting disorder Daughter       Social History     Tobacco Use    Smoking status: Never     Passive exposure: Never    Smokeless tobacco: Never   Substance Use Topics    Alcohol use: Not Currently    Drug use: Never     Review of Systems   Constitutional: Negative.  Negative for fever.   HENT: Negative.  Negative for sore throat.    Eyes: Negative.    Respiratory: Negative.  Negative for shortness of breath.    Cardiovascular: Negative.  Negative for chest pain.   Gastrointestinal: Negative.  Negative for nausea.   Endocrine: Negative.    Genitourinary: Negative.  Negative for dysuria.   Musculoskeletal: Negative.  Negative for back pain.        PATIENT WITH PAIN RIGHT LOWER EXTREMITY HER DISTAL TOES ARE COLD.  AND UNABLE TO PALPATE A PULSE IN THE RIGHT FOOT.  THERE IS NO SWELLING OF THE LOWER EXTREMITY AS IF SHE HAS GOT A DVT   Skin: Negative.  Negative for rash.   Allergic/Immunologic: Negative.    Neurological: Negative.  Negative for weakness.   Hematological: Negative.  Does not bruise/bleed easily.   Psychiatric/Behavioral: Negative.     All other systems reviewed and are negative.      Physical Exam     Initial Vitals [11/17/24 1429]   BP Pulse Resp Temp SpO2   (!) 183/130 68 20 97.9 °F (36.6 °C) 99 %      MAP       --         Physical Exam    Constitutional: She appears well-developed and well-nourished.   HENT:   Head: Normocephalic and atraumatic.   Right Ear: External ear normal.   Left Ear: External ear normal.   Nose: Nose normal. Mouth/Throat: Oropharynx is clear and moist.   Eyes: Conjunctivae and EOM are normal. Pupils are equal, round, and reactive to light.   Neck: Neck supple.   Normal range of motion.  Cardiovascular:  Normal rate, regular rhythm, normal heart sounds and intact distal pulses.           Pulmonary/Chest: Breath sounds normal.   Abdominal: Abdomen is soft. Bowel sounds are normal.   Genitourinary:     Vagina and uterus normal.     Musculoskeletal:         General: Normal range of motion.      Cervical back: Normal range of motion and neck supple.      Comments: GOOD PALPABLE PULSE IN THE LEFT FOOT.  CAN NOT PALPATE OR DOPPLER A PULSE ON THE RIGHT FOOT.     Neurological: She is alert and oriented to person, place, and time. She has normal strength and normal reflexes.   Skin: Skin is warm. Capillary refill takes less than 2 seconds.   Psychiatric: She has a normal mood and affect. Her behavior is normal. Judgment and thought content normal.         Medical Screening Exam   See Full Note    ED Course   Procedures  Labs Reviewed   COMPREHENSIVE METABOLIC PANEL   PT AND PTT   APTT   PROTIME-INR   CBC W/ AUTO DIFFERENTIAL    Narrative:     The following orders were created for panel order CBC auto differential.  Procedure                               Abnormality         Status                     ---------                               -----------         ------                     CBC with Differential[[id number]]                           In process                   Please view results for these tests on the individual orders.   CBC WITH DIFFERENTIAL          Imaging Results    None          Medications   heparin 25,000 units in dextrose 5% (100 units/ml) IV bolus from bag HIGH INTENSITY nomogram - [facility] (has no administration in time range)   heparin 25,000 units in dextrose 5% 250 mL (100 units/mL) infusion HIGH INTENSITY nomogram - [facility] (has no administration in time range)   heparin 25,000 units in dextrose 5% (100 units/ml) IV bolus from bag HIGH INTENSITY nomogram - [facility] (has no administration in time range)   heparin 25,000 units in dextrose 5% (100 units/ml) IV bolus from bag HIGH INTENSITY nomogram - [facility] (has no administration in time range)   morphine injection 4 mg (4 mg Intravenous Given 11/17/24 1107)   ondansetron injection 4 mg (4 mg Intravenous Given 11/17/24 7134)     Medical Decision Making                         Medical Decision Making:   Initial Assessment:   PATIENT COMES IN WITH A HISTORY OF HAVING A PACEMAKER.  SHE IS ON JUST ASPIRIN SHE TAKES OTHER MEDICATIONS UNDER THE CARE OF HER CARDIOLOGIST WHICH INCLUDES PAZ DAVEY.  PATIENT STARTED HAVING SEVERE PAIN IN HER RIGHT LEG THAT EXTENDS ALL THE WAY DOWN TO HER FOOT.--THE PAIN IS BEEN ONGOING FOR 2 HOURS.        Differential Diagnosis:   PATIENT WITH SUDDEN ON SET OF PAIN TO HER LEG AND NO PULSE TO THE LOWER EXTREMITY --  I HAVE NO DOPPLER PULSE--PATIENT WILL NEED A CTA RUNOFF LOWER EXTREMITY AND TRANSFERRED TO A FACILITY THAT CAN HANDLE THIS SITUATION.  DISCUSSED THE CASE WITH  DR DWAIN SULLIVAN AND WE WILL START HEPARIN  ED Management:  WILL TRANSFER VIA AMBULANCE TO [facility].             Clinical Impression:   Final diagnoses:  [R07.9] Chest pain  [R11.2] Nausea and vomiting, unspecified vomiting type (Primary)  [M79.604] Right leg pain  [I73.9] Claudication of right lower extremity        ED Disposition Condition    Transfer to Another Facility Stable                Ganesh Cardoso, DO  11/17/24 [id number]

## 2024-11-17 NOTE — ED PROVIDER NOTES
Encounter Date: 11/17/2024    SCRIBE #1 NOTE: I, Marycarmen Dewitt, am scribing for, and in the presence of,  Azar Jimenez MD. I have scribed the entire note.     History   No chief complaint on file.    The pt is a 80 y/o female coming into the ED via EMS from [facility] with complaints of a nonpalpable pulse in her right foot. Pt states sometime after Mu-ism this afternoon, her foot felt numb. She also reports severe pain in the right leg extending down to her right foot. Pt also reports an episode of nausea and vomiting. She reports having a pacemaker. There are no other complaints at this time.     The history is provided by the patient. No  was used.     Review of patient's allergies indicates:   Allergen Reactions    Ace inhibitors Edema    Losartan Swelling     Past Medical History:   Diagnosis Date    Anemia of chronic disease     Atrial fibrillation     Benign hypertensive CKD, stage 1-4 or unspecified chronic kidney disease     Bronchitis 12/22/2023    Carotid artery occlusion     CKD (chronic kidney disease)     Coronary atherosclerosis     Cough 12/22/2023    COVID-19 ruled out 03/20/2024    Diabetes mellitus, type 2     DJD (degenerative joint disease)     History of CVA (cerebrovascular accident)     HTN (hypertension)     Hyperlipidemia     Nonischemic dilated cardiomyopathy     Obesity     Osteopenia     Paroxysmal atrial fibrillation     Presence of cardiac pacemaker     PVD (peripheral vascular disease)     Upper respiratory tract infection 12/22/2023    Vitamin D deficiency      Past Surgical History:   Procedure Laterality Date    BREAST BIOPSY      COLONOSCOPY  12/04/2018    repeat in 5 years    pace maker      VAGINAL DELIVERY      x 3     Family History   Problem Relation Name Age of Onset    Hypertension Mother      Hypertension Father      Hypertension Sister      Stroke Sister      Cancer Brother      Stroke Brother      Hypertension  Brother      Alzheimer's disease Maternal Grandmother      Breast cancer Maternal Grandmother      Hypertension Maternal Grandmother      Hyperlipidemia Daughter      Breast cancer Daughter      Diabetes Daughter      Hypertension Daughter      Diabetes Daughter      Hypertension Daughter      Hypertension Daughter      Clotting disorder Daughter       Social History     Tobacco Use    Smoking status: Never     Passive exposure: Never    Smokeless tobacco: Never   Substance Use Topics    Alcohol use: Not Currently    Drug use: Never     Review of Systems   Gastrointestinal:  Positive for nausea and vomiting.   Musculoskeletal:         Positive for right leg pain   Neurological:  Positive for numbness.   All other systems reviewed and are negative.      Physical Exam     Initial Vitals   BP Pulse Resp Temp SpO2   -- -- -- -- --      MAP       --         Physical Exam    Nursing note and vitals reviewed.  Constitutional: She appears well-developed and well-nourished.   HENT:   Head: Normocephalic and atraumatic.   Right Ear: External ear normal.   Left Ear: External ear normal.   Nose: Nose normal. Mouth/Throat: Oropharynx is clear and moist.   Eyes: Conjunctivae and EOM are normal. Pupils are equal, round, and reactive to light. No scleral icterus.   Neck: Neck supple. No JVD present.   Normal range of motion.  Cardiovascular:  Normal rate, regular rhythm, normal heart sounds and intact distal pulses.     Exam reveals no gallop and no friction rub.       No murmur heard.  Pulses:       Dorsalis pedis pulses are 0 on the right side.        Posterior tibial pulses are 0 on the right side.   Pulmonary/Chest: Breath sounds normal. No stridor. No respiratory distress. She has no wheezes. She exhibits no tenderness.   Abdominal: Abdomen is soft. Bowel sounds are normal. She exhibits no distension and no mass. There is no abdominal tenderness. There is no rebound and no guarding.   Musculoskeletal:          General: No tenderness or edema. Normal range of motion.      Cervical back: Normal range of motion and neck supple.      Comments: Back is nontender to palpation.      Neurological: She is alert and oriented to person, place, and time. She has normal strength. No cranial nerve deficit.   Skin: Skin is warm and dry. Capillary refill takes less than 2 seconds. No rash noted. No pallor.   Skin temperature of the right foot is cooler than the left foot   Psychiatric: She has a normal mood and affect. Thought content normal.       ED Course   Procedures  Labs Reviewed   APTT          Imaging Results              US Lower Extremity Arteries Right (In process)                      Medications   heparin 25,000 units in dextrose 5% 250 mL (100 units/mL) infusion HIGH INTENSITY nomogram - [facility] (has no administration in time range)   heparin 25,000 units in dextrose 5% (100 units/ml) IV bolus from bag HIGH INTENSITY nomogram - [facility] (has no administration in time range)   heparin 25,000 units in dextrose 5% (100 units/ml) IV bolus from bag HIGH INTENSITY nomogram - [facility] (has no administration in time range)     Medical Decision Making            Attending Attestation:           Physician Attestation for Scribe:  Physician Attestation Statement for Scribe #1: I, Azar Jimenez MD, reviewed documentation, as scribed by Marycarmen Dewitt in my presence, and it is both accurate and complete.                                  Clinical Impression:  Final diagnoses:  [I73.9] PVD (peripheral vascular disease)

## 2024-11-17 NOTE — ED TRIAGE NOTES
Pt presents to ED via EMS from [facility] as a transfer for right leg pain that had unpalatable pulse in right foot. Heparin continued from [facility] ED at 18u/kg/hr.

## 2024-11-18 PROBLEM — R09.02 HYPOXIA: Status: ACTIVE | Noted: 2024-11-18

## 2024-11-18 PROBLEM — I48.19 PERSISTENT ATRIAL FIBRILLATION: Status: ACTIVE | Noted: 2024-11-18

## 2024-11-18 PROBLEM — G47.33 OBSTRUCTIVE SLEEP APNEA: Status: ACTIVE | Noted: 2022-06-14

## 2024-11-18 LAB
AORTIC ROOT ANNULUS: 2.33 CM
AORTIC VALVE CUSP SEPERATION: 1.99 CM
APICAL FOUR CHAMBER EJECTION FRACTION: 39 %
APTT PPP: 49.4 SECONDS (ref 25.2–37.3)
APTT PPP: 68.2 SECONDS (ref 25.2–37.3)
AV INDEX (PROSTH): 0.54
AV MEAN GRADIENT: 2.3 MMHG
AV PEAK GRADIENT: 3.2 MMHG
AV VALVE AREA BY VELOCITY RATIO: 2.4 CM²
AV VALVE AREA: 1.7 CM²
AV VELOCITY RATIO: 0.78
BSA FOR ECHO PROCEDURE: 2.2 M2
CV ECHO LV RWT: 0.83 CM
DOP CALC AO PEAK VEL: 0.9 M/S
DOP CALC AO VTI: 22.5 CM
DOP CALC LVOT AREA: 3.1 CM2
DOP CALC LVOT DIAMETER: 2 CM
DOP CALC LVOT PEAK VEL: 0.7 M/S
DOP CALC LVOT STROKE VOLUME: 38 CM3
DOP CALCLVOT PEAK VEL VTI: 12.1 CM
E WAVE DECELERATION TIME: 111.38 MSEC
E/A RATIO: 3.53
E/E' RATIO: 16.75 M/S
ECHO LV POSTERIOR WALL: 1.9 CM (ref 0.6–1.1)
EJECTION FRACTION: 35 %
FRACTIONAL SHORTENING: 10.9 % (ref 28–44)
GLUCOSE SERPL-MCNC: 139 MG/DL (ref 70–105)
GLUCOSE SERPL-MCNC: 140 MG/DL (ref 70–105)
GLUCOSE SERPL-MCNC: 152 MG/DL (ref 70–105)
GLUCOSE SERPL-MCNC: 171 MG/DL (ref 70–105)
HCO3 UR-SCNC: 22.8 MMOL/L (ref 21–28)
INTERVENTRICULAR SEPTUM: 1.3 CM (ref 0.6–1.1)
IVC DIAMETER: 2.3 CM
LEFT ATRIUM AREA SYSTOLIC (APICAL 4 CHAMBER): 26.93 CM2
LEFT ATRIUM SIZE: 4.09 CM
LEFT INTERNAL DIMENSION IN SYSTOLE: 4.1 CM (ref 2.1–4)
LEFT VENTRICLE DIASTOLIC VOLUME INDEX: 45.35 ML/M2
LEFT VENTRICLE DIASTOLIC VOLUME: 97.95 ML
LEFT VENTRICLE END DIASTOLIC VOLUME APICAL 4 CHAMBER: 42 ML
LEFT VENTRICLE END SYSTOLIC VOLUME APICAL 4 CHAMBER: 78.2 ML
LEFT VENTRICLE MASS INDEX: 145.6 G/M2
LEFT VENTRICLE SYSTOLIC VOLUME INDEX: 33.9 ML/M2
LEFT VENTRICLE SYSTOLIC VOLUME: 73.3 ML
LEFT VENTRICULAR INTERNAL DIMENSION IN DIASTOLE: 4.6 CM (ref 3.5–6)
LEFT VENTRICULAR MASS: 314.4 G
LV LATERAL E/E' RATIO: 22.33 M/S
LV SEPTAL E/E' RATIO: 13.4 M/S
LVED V (TEICH): 97.95 ML
LVES V (TEICH): 73.3 ML
LVOT MG: 1.05 MMHG
LVOT MV: 0.48 CM/S
MV PEAK A VEL: 0.19 M/S
MV PEAK E VEL: 0.67 M/S
MV STENOSIS PRESSURE HALF TIME: 32.3 MS
MV VALVE AREA P 1/2 METHOD: 6.81 CM2
OHS CV RV/LV RATIO: 1.11 CM
OHS QRS DURATION: 166 MS
OHS QTC CALCULATION: 473 MS
PCO2 BLDA: 36 MMHG (ref 35–48)
PH SMN: 7.41 [PH] (ref 7.35–7.45)
PISA MRMAX VEL: 4.3 M/S
PISA TR MAX VEL: 3.1 M/S
PO2 BLDA: 61 MMHG (ref 83–108)
POC BASE EXCESS: -1.4 MMOL/L (ref -2–3)
POC SATURATED O2: 91 % (ref 95–98)
PV PEAK GRADIENT: 2 MMHG
PV PEAK VELOCITY: 0.68 M/S
RA MAJOR: 5.75 CM
RA PRESSURE ESTIMATED: 15 MMHG
RIGHT VENTRICLE DIASTOLIC BASEL DIMENSION: 5.1 CM
RIGHT VENTRICLE DIASTOLIC LENGTH: 6.3 CM
RIGHT VENTRICLE DIASTOLIC MID DIMENSION: 4.5 CM
RIGHT VENTRICULAR LENGTH IN DIASTOLE (APICAL 4-CHAMBER VIEW): 6.27 CM
RV MID DIAMA: 4.48 CM
RV TB RVSP: 18 MMHG
TDI LATERAL: 0.03 M/S
TDI SEPTAL: 0.05 M/S
TDI: 0.04 M/S
TR MAX PG: 38 MMHG
TRICUSPID ANNULAR PLANE SYSTOLIC EXCURSION: 1.35 CM
TV REST PULMONARY ARTERY PRESSURE: 53 MMHG
Z-SCORE OF LEFT VENTRICULAR DIMENSION IN END DIASTOLE: -4.31
Z-SCORE OF LEFT VENTRICULAR DIMENSION IN END SYSTOLE: -0.4

## 2024-11-18 PROCEDURE — 36415 COLL VENOUS BLD VENIPUNCTURE: CPT | Performed by: SURGERY

## 2024-11-18 PROCEDURE — 82962 GLUCOSE BLOOD TEST: CPT

## 2024-11-18 PROCEDURE — 27000221 HC OXYGEN, UP TO 24 HOURS

## 2024-11-18 PROCEDURE — 85730 THROMBOPLASTIN TIME PARTIAL: CPT | Performed by: SURGERY

## 2024-11-18 PROCEDURE — 36600 WITHDRAWAL OF ARTERIAL BLOOD: CPT

## 2024-11-18 PROCEDURE — 97161 PT EVAL LOW COMPLEX 20 MIN: CPT

## 2024-11-18 PROCEDURE — 25000003 PHARM REV CODE 250: Performed by: SURGERY

## 2024-11-18 PROCEDURE — 25000003 PHARM REV CODE 250: Performed by: NURSE PRACTITIONER

## 2024-11-18 PROCEDURE — 99223 1ST HOSP IP/OBS HIGH 75: CPT | Mod: ,,, | Performed by: INTERNAL MEDICINE

## 2024-11-18 PROCEDURE — 63600175 PHARM REV CODE 636 W HCPCS: Performed by: SURGERY

## 2024-11-18 PROCEDURE — 94761 N-INVAS EAR/PLS OXIMETRY MLT: CPT

## 2024-11-18 PROCEDURE — 25000242 PHARM REV CODE 250 ALT 637 W/ HCPCS: Performed by: INTERNAL MEDICINE

## 2024-11-18 PROCEDURE — 99900035 HC TECH TIME PER 15 MIN (STAT)

## 2024-11-18 PROCEDURE — 82803 BLOOD GASES ANY COMBINATION: CPT

## 2024-11-18 PROCEDURE — 25000242 PHARM REV CODE 250 ALT 637 W/ HCPCS: Performed by: ANESTHESIOLOGY

## 2024-11-18 PROCEDURE — 20000000 HC ICU ROOM

## 2024-11-18 PROCEDURE — 99232 SBSQ HOSP IP/OBS MODERATE 35: CPT | Mod: ,,, | Performed by: NURSE PRACTITIONER

## 2024-11-18 PROCEDURE — 94640 AIRWAY INHALATION TREATMENT: CPT

## 2024-11-18 PROCEDURE — 25000003 PHARM REV CODE 250: Performed by: HOSPITALIST

## 2024-11-18 RX ORDER — IPRATROPIUM BROMIDE AND ALBUTEROL SULFATE 2.5; .5 MG/3ML; MG/3ML
3 SOLUTION RESPIRATORY (INHALATION) EVERY 8 HOURS
Status: DISCONTINUED | OUTPATIENT
Start: 2024-11-18 | End: 2024-11-19 | Stop reason: HOSPADM

## 2024-11-18 RX ADMIN — COLCHICINE 0.6 MG: 0.6 TABLET ORAL at 08:11

## 2024-11-18 RX ADMIN — AMLODIPINE BESYLATE 10 MG: 10 TABLET ORAL at 08:11

## 2024-11-18 RX ADMIN — ASPIRIN 325 MG: 325 TABLET, COATED ORAL at 08:11

## 2024-11-18 RX ADMIN — MUPIROCIN: 20 OINTMENT TOPICAL at 08:11

## 2024-11-18 RX ADMIN — ISOSORBIDE MONONITRATE 30 MG: 30 TABLET, EXTENDED RELEASE ORAL at 08:11

## 2024-11-18 RX ADMIN — HYDRALAZINE HYDROCHLORIDE 50 MG: 50 TABLET ORAL at 08:11

## 2024-11-18 RX ADMIN — SPIRONOLACTONE 25 MG: 25 TABLET ORAL at 08:11

## 2024-11-18 RX ADMIN — INSULIN ASPART 1 UNITS: 100 INJECTION, SOLUTION INTRAVENOUS; SUBCUTANEOUS at 09:11

## 2024-11-18 RX ADMIN — IPRATROPIUM BROMIDE AND ALBUTEROL SULFATE 3 ML: .5; 3 SOLUTION RESPIRATORY (INHALATION) at 11:11

## 2024-11-18 RX ADMIN — INSULIN ASPART 1 UNITS: 100 INJECTION, SOLUTION INTRAVENOUS; SUBCUTANEOUS at 06:11

## 2024-11-18 RX ADMIN — IPRATROPIUM BROMIDE AND ALBUTEROL SULFATE 3 ML: .5; 3 SOLUTION RESPIRATORY (INHALATION) at 07:11

## 2024-11-18 RX ADMIN — SODIUM CHLORIDE 500 ML: 9 INJECTION, SOLUTION INTRAVENOUS at 01:11

## 2024-11-18 RX ADMIN — ATORVASTATIN CALCIUM 20 MG: 20 TABLET, FILM COATED ORAL at 08:11

## 2024-11-18 RX ADMIN — IPRATROPIUM BROMIDE AND ALBUTEROL SULFATE 3 ML: .5; 3 SOLUTION RESPIRATORY (INHALATION) at 03:11

## 2024-11-18 RX ADMIN — Medication 220 MG: at 08:11

## 2024-11-18 RX ADMIN — HYDROCODONE BITARTRATE AND ACETAMINOPHEN 1 TABLET: 7.5; 325 TABLET ORAL at 11:11

## 2024-11-18 RX ADMIN — Medication 2000 UNITS: at 08:11

## 2024-11-18 RX ADMIN — IPRATROPIUM BROMIDE AND ALBUTEROL SULFATE 3 ML: .5; 3 SOLUTION RESPIRATORY (INHALATION) at 12:11

## 2024-11-18 RX ADMIN — METOPROLOL TARTRATE 50 MG: 50 TABLET, FILM COATED ORAL at 08:11

## 2024-11-18 RX ADMIN — FUROSEMIDE 40 MG: 40 TABLET ORAL at 08:11

## 2024-11-18 RX ADMIN — RIVAROXABAN 15 MG: 15 TABLET, FILM COATED ORAL at 05:11

## 2024-11-18 RX ADMIN — PANTOPRAZOLE SODIUM 40 MG: 40 TABLET, DELAYED RELEASE ORAL at 08:11

## 2024-11-18 NOTE — CONSULTS
[facility]  Critical Care Medicine  Consult Note    Patient Name: Pam Ortega  MRN: [id number]  Admission Date: 11/17/2024  Hospital Length of Stay: 1 days  Code Status: No Order  Attending Physician: No att. providers found   Primary Care Provider: Janice Velez FNP   Principal Problem: Arterial occlusion    Consults  Subjective:     HPI:  Patient was admitted to the hospital with right leg pain was found to have a thrombus in his underwent thrombectomy in his currently postop doing well.  Biggest issue we have she is hypoxic.  But she was reasonable asymptomatic with it.  She was history of AFib with rapid ventricular response she has a history of obstructive sleep apnea uses BiPAP at home..  She was hypertension osteoporosis, hypertension, diabetes,    Hospital/ICU Course:  No notes on file    Past Medical History:   Diagnosis Date    Anemia of chronic disease     Atrial fibrillation     Benign hypertensive CKD, stage 1-4 or unspecified chronic kidney disease     Bronchitis 12/22/2023    Carotid artery occlusion     CKD (chronic kidney disease)     Coronary atherosclerosis     Cough 12/22/2023    COVID-19 ruled out 03/20/2024    Diabetes mellitus, type 2     DJD (degenerative joint disease)     History of CVA (cerebrovascular accident)     HTN (hypertension)     Hyperlipidemia     Nonischemic dilated cardiomyopathy     Obesity     Osteopenia     Paroxysmal atrial fibrillation     Presence of cardiac pacemaker     PVD (peripheral vascular disease)     Upper respiratory tract infection 12/22/2023    Vitamin D deficiency        Past Surgical History:   Procedure Laterality Date    BREAST BIOPSY      COLONOSCOPY  12/04/2018    repeat in 5 years    pace maker      VAGINAL DELIVERY      x 3       Review of patient's allergies indicates:   Allergen Reactions    Ace inhibitors Edema    Losartan Swelling       Family History       Problem Relation (Age of Onset)    Alzheimer's disease  Maternal Grandmother    Breast cancer Maternal Grandmother, Daughter    Cancer Brother    Clotting disorder Daughter    Diabetes Daughter, Daughter    Hyperlipidemia Daughter    Hypertension Mother, Father, Sister, Brother, Maternal Grandmother, Daughter, Daughter, Daughter    Stroke Sister, Brother          Tobacco Use    Smoking status: Never     Passive exposure: Never    Smokeless tobacco: Never   Substance and Sexual Activity    Alcohol use: Not Currently    Drug use: Never    Sexual activity: Not Currently      Review of Systems  Objective:     Vital Signs (Most Recent):  Temp: 98.2 °F (36.8 °C) (11/18/24 0301)  Pulse: 62 (11/18/24 0515)  Resp: (!) 21 (11/18/24 0515)  BP: (!) 144/80 (11/18/24 0515)  SpO2: 96 % (11/18/24 0515) Vital Signs (24h Range):  Temp:  [97.9 °F (36.6 °C)-98.3 °F (36.8 °C)] 98.2 °F (36.8 °C)  Pulse:  [58-82] 62  Resp:  [14-33] 21  SpO2:  [89 %-100 %] 96 %  BP: (121-205)/() 144/80   Weight: 98.6 kg (217 lb 6 oz)  Body mass index is 31.19 kg/m².      Intake/Output Summary (Last 24 hours) at 11/18/2024 0626  Last data filed at 11/18/2024 0621  Gross per 24 hour   Intake 328.73 ml   Output 615 ml   Net -286.27 ml          Physical Exam  Vitals reviewed.   Constitutional:       Appearance: Normal appearance.      Interventions: She is not intubated.  HENT:      Head: Normocephalic and atraumatic.      Nose: Nose normal.      Mouth/Throat:      Mouth: Mucous membranes are dry.      Pharynx: Oropharynx is clear.   Eyes:      Extraocular Movements: Extraocular movements intact.      Conjunctiva/sclera: Conjunctivae normal.      Pupils: Pupils are equal, round, and reactive to light.   Cardiovascular:      Rate and Rhythm: Normal rate.      Heart sounds: Normal heart sounds. No murmur heard.  Pulmonary:      Effort: Pulmonary effort is normal. She is not intubated.      Breath sounds: Normal breath sounds.   Abdominal:      General: Abdomen is flat. Bowel sounds are normal.      Palpations:  "Abdomen is soft.   Musculoskeletal:         General: Normal range of motion.      Cervical back: Normal range of motion and neck supple.      Right lower leg: No edema.      Left lower leg: No edema.   Skin:     General: Skin is warm and dry.      Capillary Refill: Capillary refill takes less than 2 seconds.   Neurological:      General: No focal deficit present.      Mental Status: She is alert and oriented to person, place, and time.   Psychiatric:         Mood and Affect: Mood normal.         Behavior: Behavior normal.            Vents:  Oxygen Concentration (%): 50 (11/18/24 0009)  Lines/Drains/Airways       Drain  Duration                  Closed/Suction Drain 11/17/24 1956 Tube - 1 Right Leg Bulb 10 Fr. <1 day         Urethral Catheter 11/17/24 1925 Silicone 16 Fr. <1 day              Peripheral Intravenous Line  Duration                  Peripheral IV - Single Lumen 20 G Right Forearm -- days         Peripheral IV - Single Lumen 11/17/24 20 G Right Antecubital 1 day                  Significant Labs:    CBC/Anemia Profile:  Recent Labs   Lab 11/17/24  1453   WBC 10.26   HGB 12.5   HCT 38.3      MCV 89.9   RDW 16.2*        Chemistries:  Recent Labs   Lab 11/17/24  1453      K 4.1   *   CO2 18*   BUN 22*   CREATININE 2.14*   CALCIUM 9.4   ALBUMIN 3.5   PROT 7.2   BILITOT 1.9*   ALKPHOS 78   ALT 14   AST 41*       Recent Lab Results  (Last 5 results in the past 24 hours)        11/18/24  0535   11/18/24  0108   11/17/24  2250   11/17/24  2212   11/17/24  1841        PTT   49.4     178.4         Group & Rh         O NEG       INDIRECT JOHN         NEG       POC Glucose 171     178           Specimen Outdate         11/20/2024 23:59                              Significant Imaging: I have reviewed all pertinent imaging results/findings within the past 24 hours.    ABG  No results for input(s): "PH", "PO2", "PCO2", "HCO3", "BE" in the last 168 hours.  Assessment/Plan: "     Pulmonary  Hypoxia  She was asymptomatic but dropped her O2 sats when she gets off facemask oxygen wonder if this is secondary to her obstructive sleep apnea she does not smoke    Cardiac/Vascular  * Arterial occlusion  Status post surgery well    Persistent atrial fibrillation  Continue home meds    Renal/  Benign hypertensive CKD, stage 1-4 or unspecified chronic kidney disease  Chronic renal insufficiency will watch she was on diuretic    Other  Obstructive sleep apnea  Obtain her CPAP machine from home             Thank you for your consult. I will follow-up with patient. Please contact us if you have any additional questions.     Yonny Mckee MD  Critical Care Medicine  [facility]

## 2024-11-18 NOTE — ANESTHESIA PREPROCEDURE EVALUATION
11/17/2024  Pam Ortega is a 79 y.o., female.      Pre-op Assessment    I have reviewed the Patient Summary Reports.     I have reviewed the Nursing Notes. I have reviewed the NPO Status.   I have reviewed the Medications.     Review of Systems  Anesthesia Hx:             Denies Family Hx of Anesthesia complications.    Denies Personal Hx of Anesthesia complications.                    Social:  Non-Smoker, No Alcohol Use       Cardiovascular:     Hypertension   CAD    Dysrhythmias atrial fibrillation     PVD          Coronary Artery Disease:                 Chronic Venous Insufficiency  Peripheral Arterial Disease             Hypertension     Atrial Fibrillation     Pulmonary:        Sleep Apnea     Obstructive Sleep Apnea (RENEE).           Renal/:  Chronic Renal Disease, CKD        Kidney Function/Disease             Musculoskeletal:  Arthritis        Arthritis          Neurological:   CVA            Arthritis                           Endocrine:  Diabetes    Diabetes                          Physical Exam  General: Well nourished, Cooperative, Alert and Oriented    Airway:  Mallampati: II / II  Mouth Opening: Normal  TM Distance: Normal  Neck ROM: Normal ROM    Dental:  Intact    Chest/Lungs:  Clear to auscultation    Heart:  Rate: Normal  Rhythm: Regular Rhythm  Sounds: Normal        Chemistry        Component Value Date/Time     11/17/2024 1453    K 4.1 11/17/2024 1453     (H) 11/17/2024 1453    CO2 18 (L) 11/17/2024 1453    BUN 22 (H) 11/17/2024 1453    CREATININE 2.14 (H) 11/17/2024 1453     (H) 11/17/2024 1453        Component Value Date/Time    CALCIUM 9.4 11/17/2024 1453    ALKPHOS 78 11/17/2024 1453    AST 41 (H) 11/17/2024 1453    ALT 14 11/17/2024 1453    BILITOT 1.9 (H) 11/17/2024 1453    ESTGFRAFRICA 33 (L) 10/06/2021 1022    EGFRNONAA 31 (L) 06/14/2022 0910         Lab Results   Component Value Date    WBC 10.26 11/17/2024    HGB 12.5 11/17/2024    HCT 38.3 11/17/2024     11/17/2024     No results found for this or any previous visit.      Anesthesia Plan  Type of Anesthesia, risks & benefits discussed:    Anesthesia Type: Gen ETT  Intra-op Monitoring Plan: Standard ASA Monitors  Post Op Pain Control Plan: multimodal analgesia  Induction:  IV and rapid sequence  Airway Plan: Direct  Informed Consent: Informed consent signed with the Patient and all parties understand the risks and agree with anesthesia plan.  All questions answered.   ASA Score: 4 Emergent  Day of Surgery Review of History & Physical: H&P Update referred to the surgeon/provider.I have interviewed and examined the patient. I have reviewed the patient's H&P dated: There are no significant changes.   Anesthesia Plan Notes: NPO at 1330    Ready For Surgery From Anesthesia Perspective.     .

## 2024-11-18 NOTE — PLAN OF CARE
Problem: Infection  Goal: Absence of Infection Signs and Symptoms  Outcome: Progressing  Intervention: Prevent or Manage Infection  Flowsheets (Taken 11/18/2024 1752)  Fever Reduction/Comfort Measures:   lightweight bedding   lightweight clothing  Infection Management: aseptic technique maintained  Isolation Precautions: precautions maintained     Problem: Fall Injury Risk  Goal: Absence of Fall and Fall-Related Injury  Outcome: Progressing  Intervention: Identify and Manage Contributors  Flowsheets (Taken 11/18/2024 1752)  Self-Care Promotion: independence encouraged  Medication Review/Management: medications reviewed  Intervention: Promote Injury-Free Environment  Flowsheets (Taken 11/18/2024 1752)  Safety Promotion/Fall Prevention:   assistive device/personal item within reach   pulse ox   side rails raised x 2   room near unit station

## 2024-11-18 NOTE — PROGRESS NOTES
Patient was identified at risk by screening criteria with MST2. She endorsed being unsure of weight loss and denied poor PO intakes. Patient is 98.6kg with a BMI of 31.19 and is obese. Chart review reveals no significant weight changes over the past six months. Patient does not meet criteria for malnutrition per ASPEN guidelines at this time.

## 2024-11-18 NOTE — OP NOTE
[facility]  Surgery Department  Operative Note    SUMMARY     Date of Procedure: 11/17/2024     Procedure: Procedure(s) (LRB):  EMBOLECTOMY OR THROMBECTOMY, ARTERY, AORTOILIAC, FEMORAL, OR POPLITEAL (Right)   Ladaz presents the [facility] with acute right lower extremity ischemia duplex suggests right femoral artery occlusion       Surgeons and Role:     * Jordana Guillermo MD - Primary    Assisting Surgeon: None    Pre-Operative Diagnosis: Arterial occlusion [I70.90]    Post-Operative Diagnosis: Post-Op Diagnosis Codes:     * Arterial occlusion [I70.90]    Anesthesia: General    Operative Findings (including complications, if any):  Operative findings includes acute thrombus involving the common femoral profunda femoris in the orifice of the SFA    Description of Technical Procedures:  Taken operative suite appropriate anesthesia monitors placed abdomen prepped draped Ioban drape applied 2 g Ancef were given time-out performed oblique inguinal incision of the right obtain control of the common femoral profunda femoris SFA patient had been systemically heparinized the heparin drip we clamped the common femoral the inguinal ligament performed transverse arteriotomy overlying the orifice of the profunda femoris acute thrombus was visualized this was removed with pickups.  We ran a Rain distally to 70 cm for turned dark blood but no thrombus that we made 2- passes.  We next thrombectomized profunda femoris with green Rain return small amount of thrombus this was removed good backbleeding.  Next performed proximal thrombectomy with red Rain good pulsatile inflow closed the arteriotomy with running 5 0 Prolene continuous fashion restored flow the profunda femoris after several cardiac cycles restored flow of the SFA.  Next on-table performed Doppler in the groin with good signals we Doppler of the posterior tib on the right felt we had maximize this patient placed 10 flat GABE drain closed  in 2 layers of 2-0 Vicryl followed by skin staples drain was sutured in place    Significant Surgical Tasks Conducted by the Assistant(s), if Applicable:  None    Estimated Blood Loss (EBL): * No values recorded between 11/17/2024  7:30 PM and 11/17/2024  8:17 PM *50cc           Implants: * No implants in log *    Specimens:   Specimen (24h ago, onward)       Start     Ordered    11/17/24 2003  Surgical Pathology  RELEASE UPON ORDERING         11/17/24 2003                            Condition: Good    Disposition: PACU - hemodynamically stable.    Attestation: Op Note Attestation: I was physically present and scrubbed for the entire procedure.

## 2024-11-18 NOTE — PLAN OF CARE
Problem: Physical Therapy  Goal: Physical Therapy Goal  Description: Short term goals:  1. Sit to stand transfer with [place]  2. Bed to chair transfer with [place] using No Assistive Device  3. Gait  x 300 feet with [place] using No Assistive Device.     Long term goals:  [facility]    Outcome: Progressing

## 2024-11-18 NOTE — SUBJECTIVE & OBJECTIVE
Interval History: thrombectomy right leg last night  Palpable right DP pulse, right groin dressing fine, susan intact    Medications:  Continuous Infusions:   nicardipine  0-15 mg/hr Intravenous Continuous   Stopped at 11/18/24 0146     Scheduled Meds:   albuterol-ipratropium  3 mL Nebulization Q8H    amLODIPine  10 mg Oral Daily    aspirin  325 mg Oral Daily    atorvastatin  20 mg Oral Daily    colchicine  0.6 mg Oral Daily    furosemide  40 mg Oral Daily    hydrALAZINE  50 mg Oral BID    isosorbide mononitrate  30 mg Oral Daily    metoprolol tartrate  50 mg Oral BID    mupirocin   Nasal BID    pantoprazole  40 mg Oral Daily    potassium chloride  10 mEq Oral Daily    rivaroxaban  15 mg Oral Daily with dinner    spironolactone  25 mg Oral Daily    vitamin D  2,000 Units Oral Daily    zinc sulfate  220 mg Oral Daily     PRN Meds:  Current Facility-Administered Medications:     dextrose 10%, 12.5 g, Intravenous, PRN    dextrose 10%, 25 g, Intravenous, PRN    diphenhydrAMINE, 25 mg, Intravenous, Q6H PRN    glucagon (human recombinant), 1 mg, Intramuscular, PRN    glucose, 16 g, Oral, PRN    glucose, 24 g, Oral, PRN    HYDROcodone-acetaminophen, 1 tablet, Oral, Q6H PRN    HYDROmorphone, 0.5 mg, Intravenous, Q5 Min PRN    insulin aspart U-100, 0-10 Units, Subcutaneous, QID (AC + HS) PRN    morphine, 4 mg, Intravenous, Q4H PRN    ondansetron, 4 mg, Intravenous, Daily PRN     Review of patient's allergies indicates:   Allergen Reactions    Ace inhibitors Edema    Losartan Swelling     Objective:     Vital Signs (Most Recent):  Temp: 96.8 °F (36 °C) (11/18/24 0705)  Pulse: 62 (11/18/24 0730)  Resp: 17 (11/18/24 0730)  BP: (!) 143/85 (11/18/24 0730)  SpO2: (!) 89 % (11/18/24 0730) Vital Signs (24h Range):  Temp:  [96.8 °F (36 °C)-98.3 °F (36.8 °C)] 96.8 °F (36 °C)  Pulse:  [58-82] 62  Resp:  [14-33] 17  SpO2:  [89 %-100 %] 89 %  BP: (121-205)/() 143/85     Weight: 98.6 kg (217 lb 6 oz)  Body mass index is 31.19  kg/m².    Intake/Output - Last 3 Shifts         11/16 0700  11/17 0659 11/17 0700  11/18 0659 11/18 0700 11/19 0659    I.V. (mL/kg)  172.3 (1.7)     IV Piggyback  495.7     Total Intake(mL/kg)  668 (6.8)     Urine (mL/kg/hr)  295     Drains  120     Blood  200     Total Output  615     Net  +53                     Physical Exam  Vitals and nursing note reviewed. Exam conducted with a chaperone present.   HENT:      Head: Normocephalic.      Mouth/Throat:      Mouth: Mucous membranes are moist.   Cardiovascular:      Rate and Rhythm: Normal rate. Rhythm irregular.      Comments: Palpable right DP pulse  Abdominal:      Palpations: Abdomen is soft.   Skin:     General: Skin is warm and dry.      Comments: No bleeding no hematoma right groin dressing clean dry and intact small amount of serosanguineous drainage GABE   Neurological:      Mental Status: She is alert.   Psychiatric:         Mood and Affect: Mood normal.          Significant Labs:  I have reviewed all pertinent lab results within the past 24 hours.  CBC:   Recent Labs   Lab 11/17/24  1453   WBC 10.26   RBC 4.26   HGB 12.5   HCT 38.3      MCV 89.9   MCH 29.3   MCHC 32.6     BMP:   Recent Labs   Lab 11/17/24  1453   *      K 4.1   *   CO2 18*   BUN 22*   CREATININE 2.14*   CALCIUM 9.4     CMP:   Recent Labs   Lab 11/17/24  1453   *   CALCIUM 9.4   ALBUMIN 3.5   PROT 7.2      K 4.1   CO2 18*   *   BUN 22*   CREATININE 2.14*   ALKPHOS 78   ALT 14   AST 41*   BILITOT 1.9*     LFTs:   Recent Labs   Lab 11/17/24  1453   ALT 14   AST 41*   ALKPHOS 78   BILITOT 1.9*   PROT 7.2   ALBUMIN 3.5       Significant Diagnostics:  I have reviewed all pertinent imaging results/findings within the past 24 hours.

## 2024-11-18 NOTE — PROGRESS NOTES
[facility]  General Surgery  Progress Note    Subjective:     History of Present Illness:  Female onset of right leg pain today at around [place] time     Was seen and evaluated in [facility] and transferred to [facility]     Duplex shows intraluminal abnormality right common femoral artery    Exam right foot is cool she was paresthesias decreased motor and sensory function    Left lower extremity is normal 2+ left femoral pulse 1 +right femoral pulse palpable posterior tibial on the left    Patient has a history of atrial fibrillation no previous ischemic event she has had a stroke in the past she is on oral anticoagulation    Post-Op Info:  Procedure(s) (LRB):  EMBOLECTOMY OR THROMBECTOMY, ARTERY, AORTOILIAC, FEMORAL, OR POPLITEAL (Right)   1 Day Post-Op     Interval History: thrombectomy right leg last night  Palpable right DP pulse, right groin dressing fine, susan intact    Medications:  Continuous Infusions:   nicardipine  0-15 mg/hr Intravenous Continuous   Stopped at 11/18/24 0146     Scheduled Meds:   albuterol-ipratropium  3 mL Nebulization Q8H    amLODIPine  10 mg Oral Daily    aspirin  325 mg Oral Daily    atorvastatin  20 mg Oral Daily    colchicine  0.6 mg Oral Daily    furosemide  40 mg Oral Daily    hydrALAZINE  50 mg Oral BID    isosorbide mononitrate  30 mg Oral Daily    metoprolol tartrate  50 mg Oral BID    mupirocin   Nasal BID    pantoprazole  40 mg Oral Daily    potassium chloride  10 mEq Oral Daily    rivaroxaban  15 mg Oral Daily with dinner    spironolactone  25 mg Oral Daily    vitamin D  2,000 Units Oral Daily    zinc sulfate  220 mg Oral Daily     PRN Meds:  Current Facility-Administered Medications:     dextrose 10%, 12.5 g, Intravenous, PRN    dextrose 10%, 25 g, Intravenous, PRN    diphenhydrAMINE, 25 mg, Intravenous, Q6H PRN    glucagon (human recombinant), 1 mg, Intramuscular, PRN    glucose, 16 g, Oral, PRN    glucose, 24 g, Oral, PRN    HYDROcodone-acetaminophen, 1  tablet, Oral, Q6H PRN    HYDROmorphone, 0.5 mg, Intravenous, Q5 Min PRN    insulin aspart U-100, 0-10 Units, Subcutaneous, QID (AC + HS) PRN    morphine, 4 mg, Intravenous, Q4H PRN    ondansetron, 4 mg, Intravenous, Daily PRN     Review of patient's allergies indicates:   Allergen Reactions    Ace inhibitors Edema    Losartan Swelling     Objective:     Vital Signs (Most Recent):  Temp: 96.8 °F (36 °C) (11/18/24 0705)  Pulse: 62 (11/18/24 0730)  Resp: 17 (11/18/24 0730)  BP: (!) 143/85 (11/18/24 0730)  SpO2: (!) 89 % (11/18/24 0730) Vital Signs (24h Range):  Temp:  [96.8 °F (36 °C)-98.3 °F (36.8 °C)] 96.8 °F (36 °C)  Pulse:  [58-82] 62  Resp:  [14-33] 17  SpO2:  [89 %-100 %] 89 %  BP: (121-205)/() 143/85     Weight: 98.6 kg (217 lb 6 oz)  Body mass index is 31.19 kg/m².    Intake/Output - Last 3 Shifts         11/16 0700 11/17 0659 11/17 0700 11/18 0659 11/18 0700 11/19 0659    I.V. (mL/kg)  172.3 (1.7)     IV Piggyback  495.7     Total Intake(mL/kg)  668 (6.8)     Urine (mL/kg/hr)  295     Drains  120     Blood  200     Total Output  615     Net  +53                     Physical Exam  Vitals and nursing note reviewed. Exam conducted with a chaperone present.   HENT:      Head: Normocephalic.      Mouth/Throat:      Mouth: Mucous membranes are moist.   Cardiovascular:      Rate and Rhythm: Normal rate. Rhythm irregular.      Comments: Palpable right DP pulse  Abdominal:      Palpations: Abdomen is soft.   Skin:     General: Skin is warm and dry.      Comments: No bleeding no hematoma right groin dressing clean dry and intact small amount of serosanguineous drainage GABE   Neurological:      Mental Status: She is alert.   Psychiatric:         Mood and Affect: Mood normal.          Significant Labs:  I have reviewed all pertinent lab results within the past 24 hours.  CBC:   Recent Labs   Lab 11/17/24  1453   WBC 10.26   RBC 4.26   HGB 12.5   HCT 38.3      MCV 89.9   MCH 29.3   MCHC 32.6     BMP:    Recent Labs   Lab 11/17/24  1453   *      K 4.1   *   CO2 18*   BUN 22*   CREATININE 2.14*   CALCIUM 9.4     CMP:   Recent Labs   Lab 11/17/24  1453   *   CALCIUM 9.4   ALBUMIN 3.5   PROT 7.2      K 4.1   CO2 18*   *   BUN 22*   CREATININE 2.14*   ALKPHOS 78   ALT 14   AST 41*   BILITOT 1.9*     LFTs:   Recent Labs   Lab 11/17/24  1453   ALT 14   AST 41*   ALKPHOS 78   BILITOT 1.9*   PROT 7.2   ALBUMIN 3.5       Significant Diagnostics:  I have reviewed all pertinent imaging results/findings within the past 24 hours.  Assessment/Plan:     * Arterial occlusion  Acute right lower sterilely ischemic event most likely embolus from her atrial fibrillation     He is currently heparinized plans to go to the ER or the OR explore right groin embolectomy angiogram as needed    Pros and cons addressed with the family of the patient all agreeable to proceed    11/18/2024  history of AFib denies recent COVID or COVID vaccine right DP palpable pulse status post urgent thrombectomy last night by Dr. Guillermo we will resume Xarelto renal dose  consult critical care for medical management  DC Colindres  consult PT possibly can discharge home in a.m. if doing well    Persistent atrial fibrillation  11/18/2024  followed by Dr. Loo rate controlled resume Xarelto renal dose, we will get echo ro evlauate for cardiac embolus        Sarah Ritter, ACNP  General Surgery  [facility]

## 2024-11-18 NOTE — SUBJECTIVE & OBJECTIVE
Past Medical History:   Diagnosis Date    Anemia of chronic disease     Atrial fibrillation     Benign hypertensive CKD, stage 1-4 or unspecified chronic kidney disease     Bronchitis 12/22/2023    Carotid artery occlusion     CKD (chronic kidney disease)     Coronary atherosclerosis     Cough 12/22/2023    COVID-19 ruled out 03/20/2024    Diabetes mellitus, type 2     DJD (degenerative joint disease)     History of CVA (cerebrovascular accident)     HTN (hypertension)     Hyperlipidemia     Nonischemic dilated cardiomyopathy     Obesity     Osteopenia     Paroxysmal atrial fibrillation     Presence of cardiac pacemaker     PVD (peripheral vascular disease)     Upper respiratory tract infection 12/22/2023    Vitamin D deficiency        Past Surgical History:   Procedure Laterality Date    BREAST BIOPSY      COLONOSCOPY  12/04/2018    repeat in 5 years    pace maker      VAGINAL DELIVERY      x 3       Review of patient's allergies indicates:   Allergen Reactions    Ace inhibitors Edema    Losartan Swelling       Family History       Problem Relation (Age of Onset)    Alzheimer's disease Maternal Grandmother    Breast cancer Maternal Grandmother, Daughter    Cancer Brother    Clotting disorder Daughter    Diabetes Daughter, Daughter    Hyperlipidemia Daughter    Hypertension Mother, Father, Sister, Brother, Maternal Grandmother, Daughter, Daughter, Daughter    Stroke Sister, Brother          Tobacco Use    Smoking status: Never     Passive exposure: Never    Smokeless tobacco: Never   Substance and Sexual Activity    Alcohol use: Not Currently    Drug use: Never    Sexual activity: Not Currently      Review of Systems  Objective:     Vital Signs (Most Recent):  Temp: 98.2 °F (36.8 °C) (11/18/24 0301)  Pulse: 62 (11/18/24 0515)  Resp: (!) 21 (11/18/24 0515)  BP: (!) 144/80 (11/18/24 0515)  SpO2: 96 % (11/18/24 0515) Vital Signs (24h Range):  Temp:  [97.9 °F (36.6 °C)-98.3 °F (36.8 °C)] 98.2 °F (36.8 °C)  Pulse:   [58-82] 62  Resp:  [14-33] 21  SpO2:  [89 %-100 %] 96 %  BP: (121-205)/() 144/80   Weight: 98.6 kg (217 lb 6 oz)  Body mass index is 31.19 kg/m².      Intake/Output Summary (Last 24 hours) at 11/18/2024 0626  Last data filed at 11/18/2024 0621  Gross per 24 hour   Intake 328.73 ml   Output 615 ml   Net -286.27 ml          Physical Exam  Vitals reviewed.   Constitutional:       Appearance: Normal appearance.      Interventions: She is not intubated.  HENT:      Head: Normocephalic and atraumatic.      Nose: Nose normal.      Mouth/Throat:      Mouth: Mucous membranes are dry.      Pharynx: Oropharynx is clear.   Eyes:      Extraocular Movements: Extraocular movements intact.      Conjunctiva/sclera: Conjunctivae normal.      Pupils: Pupils are equal, round, and reactive to light.   Cardiovascular:      Rate and Rhythm: Normal rate.      Heart sounds: Normal heart sounds. No murmur heard.  Pulmonary:      Effort: Pulmonary effort is normal. She is not intubated.      Breath sounds: Normal breath sounds.   Abdominal:      General: Abdomen is flat. Bowel sounds are normal.      Palpations: Abdomen is soft.   Musculoskeletal:         General: Normal range of motion.      Cervical back: Normal range of motion and neck supple.      Right lower leg: No edema.      Left lower leg: No edema.   Skin:     General: Skin is warm and dry.      Capillary Refill: Capillary refill takes less than 2 seconds.   Neurological:      General: No focal deficit present.      Mental Status: She is alert and oriented to person, place, and time.   Psychiatric:         Mood and Affect: Mood normal.         Behavior: Behavior normal.            Vents:  Oxygen Concentration (%): 50 (11/18/24 0009)  Lines/Drains/Airways       Drain  Duration                  Closed/Suction Drain 11/17/24 1956 Tube - 1 Right Leg Bulb 10 Fr. <1 day         Urethral Catheter 11/17/24 1925 Silicone 16 Fr. <1 day              Peripheral Intravenous Line  Duration                   Peripheral IV - Single Lumen 20 G Right Forearm -- days         Peripheral IV - Single Lumen 11/17/24 20 G Right Antecubital 1 day                  Significant Labs:    CBC/Anemia Profile:  Recent Labs   Lab 11/17/24  1453   WBC 10.26   HGB 12.5   HCT 38.3      MCV 89.9   RDW 16.2*        Chemistries:  Recent Labs   Lab 11/17/24  1453      K 4.1   *   CO2 18*   BUN 22*   CREATININE 2.14*   CALCIUM 9.4   ALBUMIN 3.5   PROT 7.2   BILITOT 1.9*   ALKPHOS 78   ALT 14   AST 41*       Recent Lab Results  (Last 5 results in the past 24 hours)        11/18/24  0535   11/18/24  0108   11/17/24  2250   11/17/24  2212   11/17/24  1841        PTT   49.4     178.4         Group & Rh         O NEG       INDIRECT JOHN         NEG       POC Glucose 171     178           Specimen Outdate         11/20/2024 23:59                              Significant Imaging: I have reviewed all pertinent imaging results/findings within the past 24 hours.

## 2024-11-18 NOTE — PT/OT/SLP EVAL
Physical Therapy Evaluation     Patient Name: Pam Ortega   MRN: [id number]  Recent Surgery: Procedure(s) (LRB):  EMBOLECTOMY OR THROMBECTOMY, ARTERY, AORTOILIAC, FEMORAL, OR POPLITEAL (Right) 1 Day Post-Op    Recommendations:     Discharge Recommendations: No Therapy Indicated   Discharge Equipment Recommendations: none   Barriers to discharge: None    Assessment:     Pam Ortega is a 79 y.o. female admitted with a medical diagnosis of Arterial occlusion. She presents with the following impairments/functional limitations: impaired functional mobility. Pt has good pain control s/p thrombectomy. She ambulated well with no AD despite discomfort from gunter catheter. No shortness of breath noted with ambulation. Would anticipate discharge home when medically ready with no rehab needs    Rehab Prognosis: Good; patient would benefit from acute PT services to address these deficits and reach maximum level of function.    Plan:     During this hospitalization, patient to be seen 5 x/week to address the above listed problems via therapeutic activities, therapeutic exercises, gait training    Plan of Care Expires: 12/18/24    Subjective     Chief Complaint: right groin incision  Patient Comments/Goals: Pt is agreeable to PT. Pt reports episode of right foot numbness beginning yesterday but has improved since surgery  Pain/Comfort:  Pain Rating 1: 2/10  Location - Side 1: Right  Location 1: groin  Pain Addressed 1: Reposition  Pain Rating Post-Intervention 1: 2/10    Social History:  Living Environment: Patient lives alone in a single story home with number of outside stair(s): 0  Prior Level of Function: Prior to admission, patient was independent  Equipment Used at Home: none  DME owned (not currently used): standard walker  Assistance Upon Discharge: family    Objective:     Communicated with MARIUM Wu RN prior to session. Patient found HOB elevated with peripheral IV, oxygen, telemetry, blood pressure cuff,  pulse ox (continuous), gunter catheter, GABE drain, SCD upon PT entry to room.    General Precautions: Standard, fall   Orthopedic Precautions: N/A   Braces: N/A    Respiratory Status: Nasal cannula, flow 2 L/min    Exams:  Cognition: Patient is oriented to Person, Place, Time, Situation  RLE ROM: WFL  RLE Strength: WFL  LLE ROM: WFL  LLE Strength: WFL  Gross Motor Coordination: WFL    Functional Mobility:  Gait belt applied - No  Bed Mobility  Scooting: stand by assistance  Supine to Sit: contact guard assistance for LE management  Transfers  Sit to Stand: contact guard assistance with hand-held assist  Bed to Chair: contact guard assistance with no AD using Step Transfer  Gait  Patient ambulated 80 ft with no AD and contact guard assistance. Patient demonstrates wide base of support and decreased dom. Discomfort from gunter catheter. All lines remained intact throughout ambulation trail.  Balance  Sitting: independence  Standing: supervision      Therapeutic Activities and Exercises:   Patient educated on role of acute care PT and PT POC, safety while in hospital including calling nurse for mobility, and call light usage  Patient educated about importance of OOB mobility and remaining up in chair most of the day.      AM-PAC 6 CLICK MOBILITY  Total Score:22    Patient left up in chair with all lines intact and call button in reach.    GOALS:   Multidisciplinary Problems       Physical Therapy Goals          Problem: Physical Therapy    Goal Priority Disciplines Outcome Interventions   Physical Therapy Goal     PT, PT/OT Progressing    Description: Short term goals:  1. Sit to stand transfer with [place]  2. Bed to chair transfer with [place] using No Assistive Device  3. Gait  x 300 feet with [place] using No Assistive Device.     Long term goals:  Pt will return home to [facility] with all mobility                         History:     Past Medical History:   Diagnosis Date    Anemia of chronic disease      Atrial fibrillation     Benign hypertensive CKD, stage 1-4 or unspecified chronic kidney disease     Bronchitis 12/22/2023    Carotid artery occlusion     CKD (chronic kidney disease)     Coronary atherosclerosis     Cough 12/22/2023    COVID-19 ruled out 03/20/2024    Diabetes mellitus, type 2     DJD (degenerative joint disease)     History of CVA (cerebrovascular accident)     HTN (hypertension)     Hyperlipidemia     Nonischemic dilated cardiomyopathy     Obesity     Osteopenia     Paroxysmal atrial fibrillation     Presence of cardiac pacemaker     PVD (peripheral vascular disease)     Upper respiratory tract infection 12/22/2023    Vitamin D deficiency        Past Surgical History:   Procedure Laterality Date    BREAST BIOPSY      COLONOSCOPY  12/04/2018    repeat in 5 years    EMBOLECTOMY OR THROMBECTOMY, ARTERY, AORTOILIAC, FEMORAL, OR POPLITEAL Right 11/17/2024    Procedure: EMBOLECTOMY OR THROMBECTOMY, ARTERY, AORTOILIAC, FEMORAL, OR POPLITEAL;  Surgeon: Jordana Guillermo MD;  Location: [facility];  Service: Vascular;  Laterality: Right;    pace maker      VAGINAL DELIVERY      x 3       Time Tracking:     PT Received On: 11/18/24  PT Start Time: 1113  PT Stop Time: 1128  PT Total Time (min): 15 min     Billable Minutes: Evaluation low complexity    11/18/2024

## 2024-11-18 NOTE — PLAN OF CARE
Problem: Diabetes Comorbidity  Goal: Blood Glucose Level Within Targeted Range  Outcome: Progressing  Intervention: Monitor and Manage Glycemia  Flowsheets (Taken 11/18/2024 0301)  Glycemic Management: blood glucose monitored     Problem: Fall Injury Risk  Goal: Absence of Fall and Fall-Related Injury  Outcome: Progressing  Intervention: Identify and Manage Contributors  Flowsheets (Taken 11/18/2024 0301)  Self-Care Promotion: independence encouraged  Medication Review/Management: medications reviewed  Intervention: Promote Injury-Free Environment  Flowsheets (Taken 11/18/2024 0301)  Safety Promotion/Fall Prevention:   assistive device/personal item within reach   bed alarm set   pulse ox

## 2024-11-18 NOTE — TRANSFER OF CARE
"Anesthesia Transfer of Care Note    Patient: Pam Ortega    Procedure(s) Performed: Procedure(s) (LRB):  EMBOLECTOMY OR THROMBECTOMY, ARTERY, AORTOILIAC, FEMORAL, OR POPLITEAL (Right)    Patient location: ICU    Anesthesia Type: general    Transport from OR: Transported from OR on 6-10 L/min O2 by face mask with adequate spontaneous ventilation    Post pain: adequate analgesia    Post assessment: no apparent anesthetic complications    Post vital signs: stable    Level of consciousness: lethargic    Nausea/Vomiting: no nausea/vomiting    Complications: none    Transfer of care protocol was followed      Last vitals: Visit Vitals  BP (!) 170/84   Pulse 64   Temp 36.8 °C (98.3 °F)   Resp (!) 24   Ht 5' 10" (1.778 m)   Wt 102.1 kg (225 lb)   SpO2 95%   BMI 32.28 kg/m²     "

## 2024-11-18 NOTE — SUBJECTIVE & OBJECTIVE
Current Facility-Administered Medications on File Prior to Encounter   Medication    [COMPLETED] heparin 25,000 units in dextrose 5% (100 units/ml) IV bolus from bag HIGH INTENSITY nomogram - [facility]    [COMPLETED] morphine injection 4 mg    [COMPLETED] ondansetron injection 4 mg    [DISCONTINUED] heparin 25,000 units in dextrose 5% (100 units/ml) IV bolus from bag HIGH INTENSITY nomogram - [facility]    [DISCONTINUED] heparin 25,000 units in dextrose 5% (100 units/ml) IV bolus from bag HIGH INTENSITY nomogram - [facility]    [DISCONTINUED] heparin 25,000 units in dextrose 5% 250 mL (100 units/mL) infusion HIGH INTENSITY nomogram - [place]     Current Outpatient Medications on File Prior to Encounter   Medication Sig    alendronate (FOSAMAX) 70 MG tablet Take 1 tablet (70 mg total) by mouth every 7 days.    amLODIPine (NORVASC) 10 MG tablet Take 1 tablet (10 mg total) by mouth once daily.    aspirin (ECOTRIN) 325 MG EC tablet Take 325 mg by mouth once daily.    aspirin (ECOTRIN) 81 MG EC tablet Take 81 mg by mouth once daily.    cholecalciferol, vitamin D3, (VITAMIN D3) 50 mcg (2,000 unit) Cap capsule Take 1 capsule by mouth once daily.    colchicine (COLCRYS) 0.6 mg tablet Take 1 tablet (0.6 mg total) by mouth once daily. for gout    furosemide (LASIX) 40 MG tablet Take 1 tablet (40 mg total) by mouth once daily.    glimepiride (AMARYL) 4 MG tablet Take 1 tablet (4 mg total) by mouth daily with breakfast.    hydrALAZINE (APRESOLINE) 50 MG tablet Take 1 tablet (50 mg total) by mouth 2 (two) times a day.    isosorbide mononitrate (IMDUR) 30 MG 24 hr tablet Take 1 tablet (30 mg total) by mouth once daily.    lovastatin (MEVACOR) 20 MG tablet Take 1 tablet (20 mg total) by mouth every night    metoprolol tartrate (LOPRESSOR) 50 MG tablet Take 1 tablet (50 mg total) by mouth 2 (two) times a day.    mupirocin (BACTROBAN) 2 % ointment Apply topically 3 (three) times daily to breast    omeprazole (PRILOSEC) 20 MG capsule Take 1 capsule (20  mg total) by mouth once daily.    potassium chloride (MICRO-K) 10 MEQ CpSR Take 1 capsule (10 mEq total) by mouth once daily.    rivaroxaban (XARELTO) 15 mg Tab Take 1 tablet (15 mg total) by mouth once daily.    semaglutide (OZEMPIC) 1 mg/dose (4 mg/3 mL) Inject 1 mg into the skin every 7 days.    spironolactone (ALDACTONE) 25 MG tablet Take 1 tablet (25 mg total) by mouth once daily.    temazepam (RESTORIL) 15 mg Cap Take 15 mg by mouth nightly as needed.    zinc 50 mg Tab Take 50 mg by mouth once daily.       Review of patient's allergies indicates:   Allergen Reactions    Ace inhibitors Edema    Losartan Swelling       Past Medical History:   Diagnosis Date    Anemia of chronic disease     Atrial fibrillation     Benign hypertensive CKD, stage 1-4 or unspecified chronic kidney disease     Bronchitis 12/22/2023    Carotid artery occlusion     CKD (chronic kidney disease)     Coronary atherosclerosis     Cough 12/22/2023    COVID-19 ruled out 03/20/2024    Diabetes mellitus, type 2     DJD (degenerative joint disease)     History of CVA (cerebrovascular accident)     HTN (hypertension)     Hyperlipidemia     Nonischemic dilated cardiomyopathy     Obesity     Osteopenia     Paroxysmal atrial fibrillation     Presence of cardiac pacemaker     PVD (peripheral vascular disease)     Upper respiratory tract infection 12/22/2023    Vitamin D deficiency      Past Surgical History:   Procedure Laterality Date    BREAST BIOPSY      COLONOSCOPY  12/04/2018    repeat in 5 years    pace maker      VAGINAL DELIVERY      x 3     Family History       Problem Relation (Age of Onset)    Alzheimer's disease Maternal Grandmother    Breast cancer Maternal Grandmother, Daughter    Cancer Brother    Clotting disorder Daughter    Diabetes Daughter, Daughter    Hyperlipidemia Daughter    Hypertension Mother, Father, Sister, Brother, Maternal Grandmother, Daughter, Daughter, Daughter    Stroke Sister, Brother          Tobacco Use     Smoking status: Never     Passive exposure: Never    Smokeless tobacco: Never   Substance and Sexual Activity    Alcohol use: Not Currently    Drug use: Never    Sexual activity: Not Currently     Review of Systems  Objective:     Vital Signs (Most Recent):  Temp: 98.3 °F (36.8 °C) (11/17/24 1707)  Pulse: 64 (11/17/24 1707)  Resp: (!) 21 (11/17/24 1707)  BP: (!) 185/83 (11/17/24 1707)  SpO2: (!) 92 % (11/17/24 1707) Vital Signs (24h Range):  Temp:  [97.9 °F (36.6 °C)-98.3 °F (36.8 °C)] 98.3 °F (36.8 °C)  Pulse:  [58-68] 64  Resp:  [18-22] 21  SpO2:  [92 %-100 %] 92 %  BP: (173-205)/() 185/83     Weight: 102.1 kg (225 lb)  Body mass index is 32.28 kg/m².     Physical Exam  Constitutional:       Appearance: Normal appearance.   HENT:      Head: Normocephalic and atraumatic.   Cardiovascular:      Rate and Rhythm: Normal rate.   Pulmonary:      Effort: Pulmonary effort is normal.   Abdominal:      General: Abdomen is flat.      Palpations: Abdomen is soft.   Skin:     General: Skin is dry.   Neurological:      Mental Status: She is alert and oriented to person, place, and time.      Comments: Right foot is cool   Decreased motor function   Decreased sensation   Psychiatric:         Mood and Affect: Mood normal.         Behavior: Behavior normal.         Thought Content: Thought content normal.         Judgment: Judgment normal.            I have reviewed all pertinent lab results within the past 24 hours.  CBC:   Recent Labs   Lab 11/17/24  1453   WBC 10.26   RBC 4.26   HGB 12.5   HCT 38.3      MCV 89.9   MCH 29.3   MCHC 32.6     BMP:   Recent Labs   Lab 11/17/24  1453   *      K 4.1   *   CO2 18*   BUN 22*   CREATININE 2.14*   CALCIUM 9.4       Significant Diagnostics:  I have reviewed all pertinent imaging results/findings within the past 24 hours.

## 2024-11-18 NOTE — HPI
Female onset of right leg pain today at around [employer] time     Was seen and evaluated in [facility] and transferred to [facility]     Duplex shows intraluminal abnormality right common femoral artery    Exam right foot is cool she was paresthesias decreased motor and sensory function    Left lower extremity is normal 2+ left femoral pulse 1 +right femoral pulse palpable posterior tibial on the left    Patient has a history of atrial fibrillation no previous ischemic event she has had a stroke in the past she is on oral anticoagulation

## 2024-11-18 NOTE — ASSESSMENT & PLAN NOTE
Acute right lower sterilely ischemic event most likely embolus from her atrial fibrillation     He is currently heparinized plans to go to the ER or the OR explore right groin embolectomy angiogram as needed    Pros and cons addressed with the family of the patient all agreeable to proceed    11/18/2024  history of AFib denies recent COVID or COVID vaccine right DP palpable pulse status post urgent thrombectomy last night by Dr. Guillermo we will resume Xarelto renal dose  consult critical care for medical management  DC Colindres  consult PT possibly can discharge home in a.m. if doing well

## 2024-11-18 NOTE — ASSESSMENT & PLAN NOTE
Acute right lower sterilely ischemic event most likely embolus from her atrial fibrillation     He is currently heparinized plans to go to the ER or the OR explore right groin embolectomy angiogram as needed    Pros and cons addressed with the family of the patient all agreeable to proceed

## 2024-11-18 NOTE — HPI
Patient was admitted to the hospital with right leg pain was found to have a thrombus in his underwent thrombectomy in his currently postop doing well.  Biggest issue we have she is hypoxic.  But she was reasonable asymptomatic with it.  She was history of AFib with rapid ventricular response she has a history of obstructive sleep apnea uses BiPAP at home..  She was hypertension osteoporosis, hypertension, diabetes,

## 2024-11-18 NOTE — H&P
[facility] - Emergency Department  General Surgery  History & Physical    Patient Name: Pam Ortega  MRN: [id number]  Admission Date: 11/17/2024  Attending Physician: Jordana Guillermo MD  Primary Care Provider: Janice Velez FNP    Patient information was obtained from patient, relative(s), and ER records.     Subjective:     Chief Complaint/Reason for Admission:  Right leg pain    History of Present Illness: Female onset of right leg pain today at around [place] time     [facility]     Duplex shows intraluminal abnormality right common femoral artery    Exam right foot is cool she was paresthesias decreased motor and sensory function    Left lower extremity is normal 2+ left femoral pulse 1 +right femoral pulse palpable posterior tibial on the left    Patient has a history of atrial fibrillation no previous ischemic event she has had a stroke in the past she is on oral anticoagulation    Current Facility-Administered Medications on File Prior to Encounter   Medication    [COMPLETED] heparin 25,000 units in dextrose 5% (100 units/ml) IV bolus from bag HIGH INTENSITY nomogram - [facility]    [COMPLETED] morphine injection 4 mg    [COMPLETED] ondansetron injection 4 mg    [DISCONTINUED] heparin 25,000 units in dextrose 5% (100 units/ml) IV bolus from bag HIGH INTENSITY nomogram - [facility]    [DISCONTINUED] heparin 25,000 units in dextrose 5% (100 units/ml) IV bolus from bag HIGH INTENSITY nomogram - [facility]    [DISCONTINUED] heparin 25,000 units in dextrose 5% 250 mL (100 units/mL) infusion HIGH INTENSITY nomogram - [facility]     Current Outpatient Medications on File Prior to Encounter   Medication Sig    alendronate (FOSAMAX) 70 MG tablet Take 1 tablet (70 mg total) by mouth every 7 days.    amLODIPine (NORVASC) 10 MG tablet Take 1 tablet (10 mg total) by mouth once daily.    aspirin (ECOTRIN) 325 MG EC tablet Take 325 mg by mouth once daily.    aspirin (ECOTRIN) 81  MG EC tablet Take 81 mg by mouth once daily.    cholecalciferol, vitamin D3, (VITAMIN D3) 50 mcg (2,000 unit) Cap capsule Take 1 capsule by mouth once daily.    colchicine (COLCRYS) 0.6 mg tablet Take 1 tablet (0.6 mg total) by mouth once daily. for gout    furosemide (LASIX) 40 MG tablet Take 1 tablet (40 mg total) by mouth once daily.    glimepiride (AMARYL) 4 MG tablet Take 1 tablet (4 mg total) by mouth daily with breakfast.    hydrALAZINE (APRESOLINE) 50 MG tablet Take 1 tablet (50 mg total) by mouth 2 (two) times a day.    isosorbide mononitrate (IMDUR) 30 MG 24 hr tablet Take 1 tablet (30 mg total) by mouth once daily.    lovastatin (MEVACOR) 20 MG tablet Take 1 tablet (20 mg total) by mouth every night    metoprolol tartrate (LOPRESSOR) 50 MG tablet Take 1 tablet (50 mg total) by mouth 2 (two) times a day.    mupirocin (BACTROBAN) 2 % ointment Apply topically 3 (three) times daily to breast    omeprazole (PRILOSEC) 20 MG capsule Take 1 capsule (20 mg total) by mouth once daily.    potassium chloride (MICRO-K) 10 MEQ CpSR Take 1 capsule (10 mEq total) by mouth once daily.    rivaroxaban (XARELTO) 15 mg Tab Take 1 tablet (15 mg total) by mouth once daily.    semaglutide (OZEMPIC) 1 mg/dose (4 mg/3 mL) Inject 1 mg into the skin every 7 days.    spironolactone (ALDACTONE) 25 MG tablet Take 1 tablet (25 mg total) by mouth once daily.    temazepam (RESTORIL) 15 mg Cap Take 15 mg by mouth nightly as needed.    zinc 50 mg Tab Take 50 mg by mouth once daily.       Review of patient's allergies indicates:   Allergen Reactions    Ace inhibitors Edema    Losartan Swelling       Past Medical History:   Diagnosis Date    Anemia of chronic disease     Atrial fibrillation     Benign hypertensive CKD, stage 1-4 or unspecified chronic kidney disease     Bronchitis 12/22/2023    Carotid artery occlusion     CKD (chronic kidney disease)     Coronary atherosclerosis     Cough 12/22/2023    COVID-19 ruled out 03/20/2024     Diabetes mellitus, type 2     DJD (degenerative joint disease)     History of CVA (cerebrovascular accident)     HTN (hypertension)     Hyperlipidemia     Nonischemic dilated cardiomyopathy     Obesity     Osteopenia     Paroxysmal atrial fibrillation     Presence of cardiac pacemaker     PVD (peripheral vascular disease)     Upper respiratory tract infection 12/22/2023    Vitamin D deficiency      Past Surgical History:   Procedure Laterality Date    BREAST BIOPSY      COLONOSCOPY  12/04/2018    repeat in 5 years    pace maker      VAGINAL DELIVERY      x 3     Family History       Problem Relation (Age of Onset)    Alzheimer's disease Maternal Grandmother    Breast cancer Maternal Grandmother, Daughter    Cancer Brother    Clotting disorder Daughter    Diabetes Daughter, Daughter    Hyperlipidemia Daughter    Hypertension Mother, Father, Sister, Brother, Maternal Grandmother, Daughter, Daughter, Daughter    Stroke Sister, Brother          Tobacco Use    Smoking status: Never     Passive exposure: Never    Smokeless tobacco: Never   Substance and Sexual Activity    Alcohol use: Not Currently    Drug use: Never    Sexual activity: Not Currently     Review of Systems  Objective:     Vital Signs (Most Recent):  Temp: 98.3 °F (36.8 °C) (11/17/24 1707)  Pulse: 64 (11/17/24 1707)  Resp: (!) 21 (11/17/24 1707)  BP: (!) 185/83 (11/17/24 1707)  SpO2: (!) 92 % (11/17/24 1707) Vital Signs (24h Range):  Temp:  [97.9 °F (36.6 °C)-98.3 °F (36.8 °C)] 98.3 °F (36.8 °C)  Pulse:  [58-68] 64  Resp:  [18-22] 21  SpO2:  [92 %-100 %] 92 %  BP: (173-205)/() 185/83     Weight: 102.1 kg (225 lb)  Body mass index is 32.28 kg/m².     Physical Exam  Constitutional:       Appearance: Normal appearance.   HENT:      Head: Normocephalic and atraumatic.   Cardiovascular:      Rate and Rhythm: Normal rate.   Pulmonary:      Effort: Pulmonary effort is normal.   Abdominal:      General: Abdomen is flat.      Palpations: Abdomen is soft.    Skin:     General: Skin is dry.   Neurological:      Mental Status: She is alert and oriented to person, place, and time.      Comments: Right foot is cool   Decreased motor function   Decreased sensation   Psychiatric:         Mood and Affect: Mood normal.         Behavior: Behavior normal.         Thought Content: Thought content normal.         Judgment: Judgment normal.            I have reviewed all pertinent lab results within the past 24 hours.  CBC:   Recent Labs   Lab 11/17/24  1453   WBC 10.26   RBC 4.26   HGB 12.5   HCT 38.3      MCV 89.9   MCH 29.3   MCHC 32.6     BMP:   Recent Labs   Lab 11/17/24  1453   *      K 4.1   *   CO2 18*   BUN 22*   CREATININE 2.14*   CALCIUM 9.4       Significant Diagnostics:  I have reviewed all pertinent imaging results/findings within the past 24 hours.    Assessment/Plan:     * Arterial occlusion  Acute right lower sterilely ischemic event most likely embolus from her atrial fibrillation     He is currently heparinized plans to go to the ER or the OR explore right groin embolectomy angiogram as needed    Pros and cons addressed with the family of the patient all agreeable to proceed      VTE Risk Mitigation (From admission, onward)           Ordered     heparin 25,000 units in dextrose 5% 250 mL (100 units/mL) infusion HIGH INTENSITY nomogram - [facility]  Continuous        Question:  Begin at (units/kg/hr)  Answer:  18    11/17/24 1702     heparin 25,000 units in dextrose 5% (100 units/ml) IV bolus from bag HIGH INTENSITY nomogram - [facility]  As needed (PRN)        Question:  Heparin Infusion Adjustment (DO NOT MODIFY ANSWER)  Answer:  \\[email or web address]\epic\Images\Pharmacy\HeparinInfusions\heparin HIGH INTENSITY nomogram for [facility] [id number].pdf    11/17/24 1702     heparin 25,000 units in dextrose 5% (100 units/ml) IV bolus from bag HIGH INTENSITY nomogram - [facility]  As needed (PRN)        Question:  Heparin Infusion Adjustment (DO NOT MODIFY ANSWER)  Answer:   [facility]\epic\Images\Pharmacy\HeparinInfusions\heparin HIGH INTENSITY nomogram for [place] [id number].pdf    11/17/24 1703                    Jordana Guillermo MD  General Surgery  [facility] - Emergency Department

## 2024-11-18 NOTE — PLAN OF CARE
[facility]  Initial Discharge Assessment       Primary Care Provider: Janice Velez FNP    Admission Diagnosis: PVD (peripheral vascular disease) [I73.9]  Arterial occlusion [I70.90]    Admission Date: 11/17/2024  Expected Discharge Date:     Transition of Care Barriers: None    Payor: MEDICARE / Plan: MEDICARE PART A & B / Product Type: Government /     Extended Emergency Contact Information  Primary Emergency Contact: Madhavi Ortega  Mobile Phone: [phone]  Relation: Daughter  Preferred language: English   needed? No  Secondary Emergency Contact: JAYESH SENIOR  Mobile Phone: [phone]  Relation: Grandchild  Preferred language: English   needed? No    Discharge Plan A: Home  Discharge Plan B: Home Health      [facility]  [address] [place] [id number]  Phone: [phone] Fax: [phone]      Initial Assessment (most recent)       Adult Discharge Assessment - 11/18/24 1600          Discharge Assessment    Assessment Type Discharge Planning Assessment     Confirmed/corrected address, phone number and insurance Yes     Confirmed Demographics Correct on Facesheet     Source of Information patient     Communicated HERBRET with patient/caregiver Date not available/Unable to determine     People in Home child(supriya), adult     Do you expect to return to your current living situation? Yes     Do you have help at home or someone to help you manage your care at home? No     Prior to hospitilization cognitive status: Unable to Assess     Current cognitive status: Alert/Oriented     Equipment Currently Used at Home CPAP     Readmission within 30 days? No     Patient currently being followed by outpatient case management? No     Do you currently have service(s) that help you manage your care at home? No     Do you take prescription medications? Yes     Do you have prescription coverage? Yes     Coverage medicare a b     Do you have any  problems affording any of your prescribed medications? No     Is the patient taking medications as prescribed? yes     How do you get to doctors appointments? family or friend will provide     Are you on dialysis? No     Do you take coumadin? No     Discharge Plan A Home     Discharge Plan B Home Health     DME Needed Upon Discharge  none     Discharge Plan discussed with: Patient;Adult children     Transition of Care Barriers None                      Spoke with pt and granddaughter in room. Pt lives home with daughter, plans to return at DC. 0 HH or HD Reviewed chart, 0 dc needs noted. Will follow consults.

## 2024-11-18 NOTE — ANESTHESIA POSTPROCEDURE EVALUATION
Anesthesia Post Evaluation    Patient: Pam Ortega    Procedure(s) Performed: Procedure(s) (LRB):  EMBOLECTOMY OR THROMBECTOMY, ARTERY, AORTOILIAC, FEMORAL, OR POPLITEAL (Right)    Final Anesthesia Type: general      Patient location during evaluation: ICU  Patient participation: Yes- Able to Participate  Level of consciousness: awake and alert  Post-procedure vital signs: reviewed and stable  Pain management: adequate  Airway patency: patent  RENEE mitigation strategies: Multimodal analgesia  PONV status at discharge: No PONV  Anesthetic complications: no      Cardiovascular status: blood pressure returned to baseline  Respiratory status: unassisted  Hydration status: euvolemic  Follow-up not needed.              Vitals Value Taken Time   /77 11/18/24 0750   Temp 36 °C (96.8 °F) 11/18/24 0705   Pulse 60 11/18/24 0751   Resp 21 11/18/24 0751   SpO2 94 % 11/18/24 0751   Vitals shown include unfiled device data.      No case tracking events are documented in the log.      Pain/Ochoa Score: Presence of Pain: complains of pain/discomfort (11/17/2024  4:05 PM)  Pain Rating Prior to Med Admin: 10 (11/17/2024  2:59 PM)  Pain Rating Post Med Admin: 6 (11/17/2024  3:19 PM)  Ochoa Score: 9 (11/17/2024  9:18 PM)

## 2024-11-18 NOTE — ASSESSMENT & PLAN NOTE
She was asymptomatic but dropped her O2 sats when she gets off facemask oxygen wonder if this is secondary to her obstructive sleep apnea she does not smoke

## 2024-11-18 NOTE — ASSESSMENT & PLAN NOTE
11/18/2024  followed by Dr. Loo rate controlled resume Xarelto renal dose, we will get echo ro evlauate for cardiac embolus

## 2024-11-19 VITALS
SYSTOLIC BLOOD PRESSURE: 127 MMHG | RESPIRATION RATE: 17 BRPM | WEIGHT: 224.44 LBS | TEMPERATURE: 98 F | HEIGHT: 70 IN | OXYGEN SATURATION: 90 % | DIASTOLIC BLOOD PRESSURE: 66 MMHG | HEART RATE: 60 BPM | BODY MASS INDEX: 32.13 KG/M2

## 2024-11-19 PROBLEM — I51.7 CARDIOMEGALY: Status: ACTIVE | Noted: 2024-11-19

## 2024-11-19 LAB
ANION GAP SERPL CALCULATED.3IONS-SCNC: 11 MMOL/L (ref 7–16)
BASOPHILS # BLD AUTO: 0.04 K/UL (ref 0–0.2)
BASOPHILS NFR BLD AUTO: 0.4 % (ref 0–1)
BUN SERPL-MCNC: 24 MG/DL (ref 10–20)
BUN/CREAT SERPL: 15 (ref 6–20)
CALCIUM SERPL-MCNC: 8.4 MG/DL (ref 8.4–10.2)
CHLORIDE SERPL-SCNC: 103 MMOL/L (ref 98–107)
CO2 SERPL-SCNC: 22 MMOL/L (ref 23–31)
CREAT SERPL-MCNC: 1.65 MG/DL (ref 0.55–1.02)
DIFFERENTIAL METHOD BLD: ABNORMAL
EGFR (NO RACE VARIABLE) (RUSH/TITUS): 32 ML/MIN/1.73M2
EOSINOPHIL # BLD AUTO: 0.04 K/UL (ref 0–0.5)
EOSINOPHIL NFR BLD AUTO: 0.4 % (ref 1–4)
ERYTHROCYTE [DISTWIDTH] IN BLOOD BY AUTOMATED COUNT: 15.8 % (ref 11.5–14.5)
ESTROGEN SERPL-MCNC: NORMAL PG/ML
GLUCOSE SERPL-MCNC: 104 MG/DL (ref 82–115)
GLUCOSE SERPL-MCNC: 106 MG/DL (ref 70–105)
GLUCOSE SERPL-MCNC: 109 MG/DL (ref 70–105)
HCT VFR BLD AUTO: 32.9 % (ref 38–47)
HGB BLD-MCNC: 10.5 G/DL (ref 12–16)
IMM GRANULOCYTES # BLD AUTO: 0.07 K/UL (ref 0–0.04)
IMM GRANULOCYTES NFR BLD: 0.7 % (ref 0–0.4)
INSULIN SERPL-ACNC: NORMAL U[IU]/ML
LAB AP GROSS DESCRIPTION: NORMAL
LAB AP LABORATORY NOTES: NORMAL
LYMPHOCYTES # BLD AUTO: 2.36 K/UL (ref 1–4.8)
LYMPHOCYTES NFR BLD AUTO: 22.4 % (ref 27–41)
MCH RBC QN AUTO: 28.8 PG (ref 27–31)
MCHC RBC AUTO-ENTMCNC: 31.9 G/DL (ref 32–36)
MCV RBC AUTO: 90.1 FL (ref 80–96)
MONOCYTES # BLD AUTO: 1.12 K/UL (ref 0–0.8)
MONOCYTES NFR BLD AUTO: 10.6 % (ref 2–6)
MPC BLD CALC-MCNC: 10.6 FL (ref 9.4–12.4)
NEUTROPHILS # BLD AUTO: 6.92 K/UL (ref 1.8–7.7)
NEUTROPHILS NFR BLD AUTO: 65.5 % (ref 53–65)
NRBC # BLD AUTO: 0.03 X10E3/UL
NRBC, AUTO (.00): 0.3 %
PLATELET # BLD AUTO: 167 K/UL (ref 150–400)
POTASSIUM SERPL-SCNC: 4 MMOL/L (ref 3.5–5.1)
RBC # BLD AUTO: 3.65 M/UL (ref 4.2–5.4)
SODIUM SERPL-SCNC: 132 MMOL/L (ref 136–145)
T3RU NFR SERPL: NORMAL %
WBC # BLD AUTO: 10.55 K/UL (ref 4.5–11)

## 2024-11-19 PROCEDURE — 97110 THERAPEUTIC EXERCISES: CPT | Mod: CQ

## 2024-11-19 PROCEDURE — 99900035 HC TECH TIME PER 15 MIN (STAT)

## 2024-11-19 PROCEDURE — 36415 COLL VENOUS BLD VENIPUNCTURE: CPT | Performed by: NURSE PRACTITIONER

## 2024-11-19 PROCEDURE — 25000003 PHARM REV CODE 250: Performed by: SURGERY

## 2024-11-19 PROCEDURE — 97116 GAIT TRAINING THERAPY: CPT | Mod: CQ

## 2024-11-19 PROCEDURE — 94640 AIRWAY INHALATION TREATMENT: CPT

## 2024-11-19 PROCEDURE — 99233 SBSQ HOSP IP/OBS HIGH 50: CPT | Mod: ,,, | Performed by: INTERNAL MEDICINE

## 2024-11-19 PROCEDURE — 82962 GLUCOSE BLOOD TEST: CPT

## 2024-11-19 PROCEDURE — 25000003 PHARM REV CODE 250: Performed by: HOSPITALIST

## 2024-11-19 PROCEDURE — 80048 BASIC METABOLIC PNL TOTAL CA: CPT | Performed by: NURSE PRACTITIONER

## 2024-11-19 PROCEDURE — 99239 HOSP IP/OBS DSCHRG MGMT >30: CPT | Mod: ,,, | Performed by: NURSE PRACTITIONER

## 2024-11-19 PROCEDURE — 25000242 PHARM REV CODE 250 ALT 637 W/ HCPCS: Performed by: INTERNAL MEDICINE

## 2024-11-19 PROCEDURE — 85025 COMPLETE CBC W/AUTO DIFF WBC: CPT | Performed by: NURSE PRACTITIONER

## 2024-11-19 PROCEDURE — 27000221 HC OXYGEN, UP TO 24 HOURS

## 2024-11-19 RX ORDER — HYDROCODONE BITARTRATE AND ACETAMINOPHEN 7.5; 325 MG/1; MG/1
1 TABLET ORAL EVERY 6 HOURS PRN
Qty: 20 TABLET | Refills: 0 | Status: SHIPPED | OUTPATIENT
Start: 2024-11-19

## 2024-11-19 RX ADMIN — FUROSEMIDE 40 MG: 40 TABLET ORAL at 08:11

## 2024-11-19 RX ADMIN — Medication 220 MG: at 08:11

## 2024-11-19 RX ADMIN — ASPIRIN 325 MG: 325 TABLET, COATED ORAL at 08:11

## 2024-11-19 RX ADMIN — PANTOPRAZOLE SODIUM 40 MG: 40 TABLET, DELAYED RELEASE ORAL at 08:11

## 2024-11-19 RX ADMIN — HYDRALAZINE HYDROCHLORIDE 50 MG: 50 TABLET ORAL at 08:11

## 2024-11-19 RX ADMIN — METOPROLOL TARTRATE 50 MG: 50 TABLET, FILM COATED ORAL at 08:11

## 2024-11-19 RX ADMIN — Medication 2000 UNITS: at 08:11

## 2024-11-19 RX ADMIN — POTASSIUM CHLORIDE 10 MEQ: 750 TABLET, EXTENDED RELEASE ORAL at 10:11

## 2024-11-19 RX ADMIN — IPRATROPIUM BROMIDE AND ALBUTEROL SULFATE 3 ML: .5; 3 SOLUTION RESPIRATORY (INHALATION) at 08:11

## 2024-11-19 RX ADMIN — COLCHICINE 0.6 MG: 0.6 TABLET ORAL at 08:11

## 2024-11-19 RX ADMIN — ATORVASTATIN CALCIUM 20 MG: 20 TABLET, FILM COATED ORAL at 08:11

## 2024-11-19 RX ADMIN — AMLODIPINE BESYLATE 10 MG: 10 TABLET ORAL at 08:11

## 2024-11-19 RX ADMIN — MUPIROCIN: 20 OINTMENT TOPICAL at 08:11

## 2024-11-19 RX ADMIN — SPIRONOLACTONE 25 MG: 25 TABLET ORAL at 08:11

## 2024-11-19 RX ADMIN — ISOSORBIDE MONONITRATE 30 MG: 30 TABLET, EXTENDED RELEASE ORAL at 08:11

## 2024-11-19 NOTE — DISCHARGE SUMMARY
[facility]  General Surgery  Discharge Summary      Patient Name: Pam Ortega  MRN: [id number]  Admission Date: 11/17/2024  Hospital Length of Stay: 2 days  Discharge Date and Time: No discharge date for patient encounter.  Attending Physician: No att. providers found   Discharging Provider: TOMMY Kee  Primary Care Provider: Janice Velez FNP    HPI:   Female onset of right leg pain today at around [place] time     Was seen and evaluated in [facility] and transferred [facility]     Duplex shows intraluminal abnormality right common femoral artery    Exam right foot is cool she was paresthesias decreased motor and sensory function    Left lower extremity is normal 2+ left femoral pulse 1 +right femoral pulse palpable posterior tibial on the left    Patient has a history of atrial fibrillation no previous ischemic event she has had a stroke in the past she is on oral anticoagulation    Procedure(s) (LRB):  EMBOLECTOMY OR THROMBECTOMY, ARTERY, AORTOILIAC, FEMORAL, OR POPLITEAL (Right)      Indwelling Lines/Drains at time of discharge:   Lines/Drains/Airways       None                 Hospital Course: No notes on file  Admitted for emergent embolectomy right leg, post op ICU observation did well with palpable right dp pre op leg symptoms resolved, GABE drain discontinued this am incision right groin looks good, voiding renal function has improved from 2.142 baseline 1.6 with IV hydration Echo without thrombus She is mobilizing out of bed pain controlled DC home today home medications resumed continue Xarelto Norco E scribed  Goals of Care Treatment Preferences:         Consults:   Consults (From admission, onward)          Status Ordering Provider     Inpatient consult to Critical Care Medicine  Once        Provider:  (Not yet assigned)    Acknowledged JESSE HUGGINS            Significant Diagnostic Studies: Labs: BMP:   Recent Labs   Lab 11/17/24  1453 11/19/24  0411   GLU  178* 104    132*   K 4.1 4.0   * 103   CO2 18* 22*   BUN 22* 24*   CREATININE 2.14* 1.65*   CALCIUM 9.4 8.4   , CMP   Recent Labs   Lab 11/17/24  1453 11/19/24  0411    132*   K 4.1 4.0   * 103   CO2 18* 22*   * 104   BUN 22* 24*   CREATININE 2.14* 1.65*   CALCIUM 9.4 8.4   PROT 7.2  --    ALBUMIN 3.5  --    BILITOT 1.9*  --    ALKPHOS 78  --    AST 41*  --    ALT 14  --    ANIONGAP 14 11   , CBC   Recent Labs   Lab 11/17/24  1453 11/19/24  0411   WBC 10.26 10.55   HGB 12.5 10.5*   HCT 38.3 32.9*    167   , and INR   Lab Results   Component Value Date    INR 2.26 11/17/2024       Pending Diagnostic Studies:       None          Final Active Diagnoses:    Diagnosis Date Noted POA    PRINCIPAL PROBLEM:  Arterial occlusion [I70.90] 11/17/2024 Yes    Cardiomegaly [I51.7] 11/19/2024 Unknown    Persistent atrial fibrillation [I48.19] 11/18/2024 Yes    Hypoxia [R09.02] 11/18/2024 Yes    Obstructive sleep apnea [G47.33] 06/14/2022 Yes    Benign hypertensive CKD, stage 1-4 or unspecified chronic kidney disease [I12.9]  Yes     Chronic    Diabetes mellitus without complication [E11.9]  Yes      Problems Resolved During this Admission:      Discharged Condition: good    Disposition: Home or Self Care    Follow Up:   Follow-up Information       Janice Velez FNP. Schedule an appointment as soon as possible for a visit in 1 week(s).    Specialties: Family Medicine, Emergency Medicine  Why: hospital discharge  Contact information:  [address]  [facility] [address]  [phone]               Sarah Ritter ACNP. Schedule an appointment as soon as possible for a visit in 2 week(s).    Specialty: Vascular Surgery  Why: post op f/u  Contact information:  [address]  [facility] [address]  [phone]                           Patient Instructions:      Diet Adult Regular     Lifting restrictions     No driving until:    Order Comments: Follow up in clinic or while requiring to take pain medication     Change dressing (specify)   Order Comments: Dressing change: tomorrow, clean with antibacterial soap/water daily, ok to shower, redress with bandage daily     Notify your health care provider if you experience any of the following:  temperature >100.4     Notify your health care provider if you experience any of the following:  redness, tenderness, or signs of infection (pain, swelling, redness, odor or green/yellow discharge around incision site)     Activity as tolerated     Shower on day dressing removed (No bath)     Medications:  Reconciled Home Medications:      Medication List        START taking these medications      HYDROcodone-acetaminophen 7.5-325 mg per tablet  Commonly known as: NORCO  Take 1 tablet by mouth every 6 (six) hours as needed for pain.....May cause drowsiness.            CONTINUE taking these medications      alendronate 70 MG tablet  Commonly known as: FOSAMAX  Take 1 tablet (70 mg total) by mouth every 7 days.     amLODIPine 10 MG tablet  Commonly known as: NORVASC  Take 1 tablet (10 mg total) by mouth once daily.     * aspirin 325 MG EC tablet  Commonly known as: ECOTRIN  Take 325 mg by mouth once daily.     * aspirin 81 MG EC tablet  Commonly known as: ECOTRIN  Take 81 mg by mouth once daily.     cholecalciferol (vitamin D3) 50 mcg (2,000 unit) Cap capsule  Commonly known as: VITAMIN D3  Take 1 capsule by mouth once daily.     colchicine 0.6 mg tablet  Commonly known as: COLCRYS  Take 1 tablet (0.6 mg total) by mouth once daily. for gout     furosemide 40 MG tablet  Commonly known as: LASIX  Take 1 tablet (40 mg total) by mouth once daily.     glimepiride 4 MG tablet  Commonly known as: AMARYL  Take 1 tablet (4 mg total) by mouth daily with breakfast.     hydrALAZINE 50 MG tablet  Commonly known as: APRESOLINE  Take 1 tablet (50 mg total) by mouth 2 (two) times a day.     isosorbide mononitrate 30 MG 24  hr tablet  Commonly known as: IMDUR  Take 1 tablet (30 mg total) by mouth once daily.     lovastatin 20 MG tablet  Commonly known as: MEVACOR  Take 1 tablet (20 mg total) by mouth every night     metoprolol tartrate 50 MG tablet  Commonly known as: LOPRESSOR  Take 1 tablet (50 mg total) by mouth 2 (two) times a day.     mupirocin 2 % ointment  Commonly known as: BACTROBAN  Apply topically 3 (three) times daily to breast     omeprazole 20 MG capsule  Commonly known as: PRILOSEC  Take 1 capsule (20 mg total) by mouth once daily.     OZEMPIC 1 mg/dose (4 mg/3 mL)  Generic drug: semaglutide  Inject 1 mg into the skin every 7 days.     potassium chloride 10 MEQ Cpsr  Commonly known as: MICRO-K  Take 1 capsule (10 mEq total) by mouth once daily.     spironolactone 25 MG tablet  Commonly known as: ALDACTONE  Take 1 tablet (25 mg total) by mouth once daily.     temazepam 15 mg Cap  Commonly known as: RESTORIL  Take 15 mg by mouth nightly as needed.     XARELTO 15 mg Tab  Generic drug: rivaroxaban  Take 1 tablet (15 mg total) by mouth once daily.     zinc 50 mg Tab  Take 50 mg by mouth once daily.           * This list has 2 medication(s) that are the same as other medications prescribed for you. Read the directions carefully, and ask your doctor or other care provider to review them with you.                Time spent on the discharge of patient: 35 minutes    TOMMY Kee  General Surgery  [facility]

## 2024-11-19 NOTE — PLAN OF CARE
[facility] ICU  Discharge Final Note    Primary Care Provider: Janice Velez FNP    Expected Discharge Date:     Final Discharge Note (most recent)       Final Note - 11/19/24 1038          Final Note    Assessment Type Final Discharge Note     Anticipated Discharge Disposition Home or Self Care        Post-Acute Status    Discharge Delays None known at this time                     Important Message from Medicare  Important Message from Medicare regarding Discharge Appeal Rights: Given to patient/caregiver, Explained to patient/caregiver     Date IMM was signed: 11/18/24  Time IMM was signed: 1000      Pt to dc home this day. 0 dc needs.

## 2024-11-19 NOTE — SUBJECTIVE & OBJECTIVE
Interval History/Significant Events:  Patient without complaints    Review of Systems  Objective:     Vital Signs (Most Recent):  Temp: 97.5 °F (36.4 °C) (11/19/24 0315)  Pulse: (!) 57 (11/19/24 0645)  Resp: 18 (11/19/24 0645)  BP: 133/71 (11/19/24 0630)  SpO2: 98 % (11/19/24 0645) Vital Signs (24h Range):  Temp:  [97.5 °F (36.4 °C)-99.5 °F (37.5 °C)] 97.5 °F (36.4 °C)  Pulse:  [56-85] 57  Resp:  [12-32] 18  SpO2:  [83 %-100 %] 98 %  BP: (118-180)/() 133/71   Weight: 101.8 kg (224 lb 6.9 oz)  Body mass index is 32.2 kg/m².      Intake/Output Summary (Last 24 hours) at 11/19/2024 0709  Last data filed at 11/19/2024 0429  Gross per 24 hour   Intake --   Output 990 ml   Net -990 ml          Physical Exam  Vitals reviewed.   Constitutional:       Appearance: Normal appearance.      Interventions: She is not intubated.  HENT:      Head: Normocephalic and atraumatic.      Nose: Nose normal.      Mouth/Throat:      Mouth: Mucous membranes are dry.      Pharynx: Oropharynx is clear.   Eyes:      Extraocular Movements: Extraocular movements intact.      Conjunctiva/sclera: Conjunctivae normal.      Pupils: Pupils are equal, round, and reactive to light.   Cardiovascular:      Rate and Rhythm: Normal rate.      Heart sounds: Normal heart sounds. No murmur heard.  Pulmonary:      Effort: Pulmonary effort is normal. She is not intubated.      Breath sounds: Normal breath sounds.   Abdominal:      General: Abdomen is flat. Bowel sounds are normal.      Palpations: Abdomen is soft.   Musculoskeletal:         General: Normal range of motion.      Cervical back: Normal range of motion and neck supple.      Right lower leg: No edema.      Left lower leg: No edema.   Skin:     General: Skin is warm and dry.      Capillary Refill: Capillary refill takes less than 2 seconds.   Neurological:      General: No focal deficit present.      Mental Status: She is alert and oriented to person, place, and time.   Psychiatric:         Mood  and Affect: Mood normal.         Behavior: Behavior normal.            Vents:  Oxygen Concentration (%): 32 (11/18/24 2338)  Lines/Drains/Airways       Drain  Duration                  Closed/Suction Drain 11/17/24 1956 Tube - 1 Right Leg Bulb 10 Fr. 1 day              Peripheral Intravenous Line  Duration                  Peripheral IV - Single Lumen 20 G Right Forearm -- days         Peripheral IV - Single Lumen 11/17/24 20 G Right Antecubital 2 days                  Significant Labs:    CBC/Anemia Profile:  Recent Labs   Lab 11/17/24  1453 11/19/24  0411   WBC 10.26 10.55   HGB 12.5 10.5*   HCT 38.3 32.9*    167   MCV 89.9 90.1   RDW 16.2* 15.8*        Chemistries:  Recent Labs   Lab 11/17/24  1453 11/19/24 0411    132*   K 4.1 4.0   * 103   CO2 18* 22*   BUN 22* 24*   CREATININE 2.14* 1.65*   CALCIUM 9.4 8.4   ALBUMIN 3.5  --    PROT 7.2  --    BILITOT 1.9*  --    ALKPHOS 78  --    ALT 14  --    AST 41*  --        Recent Lab Results  (Last 5 results in the past 24 hours)        11/19/24  0525   11/19/24  0411   11/18/24 2002 11/18/24  1658   11/18/24  1123        Anion Gap   11             Baso #   0.04             Basophil %   0.4             BUN   24             BUN/CREAT RATIO   15             Calcium   8.4             Chloride   103             CO2   22             Creatinine   1.65             Differential Method   Auto             eGFR   32             Eos #   0.04             Eos %   0.4             Glucose   104             Hematocrit   32.9             Hemoglobin   10.5             Immature Grans (Abs)   0.07             Immature Granulocytes   0.7             Lymph #   2.36             Lymph %   22.4             MCH   28.8             MCHC   31.9             MCV   90.1             Mono #   1.12             Mono %   10.6             MPV   10.6             Neutrophils, Abs   6.92             Neutrophils Relative   65.5             nRBC   0.3             NUCLEATED RBC ABSOLUTE    0.03             Platelet Count   167             POC Glucose 109     152   139   140       Potassium   4.0             RBC   3.65             RDW   15.8             Sodium   132             WBC   10.55                                    Significant Imaging:  I have reviewed all pertinent imaging results/findings within the past 24 hours.

## 2024-11-19 NOTE — PLAN OF CARE
Problem: Adult Inpatient Plan of Care  Goal: Plan of Care Review  Outcome: Progressing  Flowsheets (Taken 11/19/2024 0252)  Plan of Care Reviewed With: patient  Goal: Patient-Specific Goal (Individualized)  Outcome: Progressing  Goal: Absence of Hospital-Acquired Illness or Injury  Outcome: Progressing  Intervention: Identify and Manage Fall Risk  Flowsheets (Taken 11/19/2024 0252)  Safety Promotion/Fall Prevention:   assistive device/personal item within reach   pulse ox   side rails raised x 2   room near unit station  Goal: Optimal Comfort and Wellbeing  Outcome: Progressing  Goal: Readiness for Transition of Care  Outcome: Progressing     Problem: Diabetes Comorbidity  Goal: Blood Glucose Level Within Targeted Range  Outcome: Progressing     Problem: Wound  Goal: Optimal Coping  Outcome: Progressing  Goal: Optimal Functional Ability  Outcome: Progressing  Goal: Absence of Infection Signs and Symptoms  Outcome: Progressing  Goal: Improved Oral Intake  Outcome: Progressing  Goal: Optimal Pain Control and Function  Outcome: Progressing  Goal: Skin Health and Integrity  Outcome: Progressing  Goal: Optimal Wound Healing  Outcome: Progressing     Problem: Infection  Goal: Absence of Infection Signs and Symptoms  Outcome: Progressing  Intervention: Prevent or Manage Infection  Flowsheets (Taken 11/19/2024 0252)  Infection Management: aseptic technique maintained  Isolation Precautions: precautions initiated     Problem: Fall Injury Risk  Goal: Absence of Fall and Fall-Related Injury  Outcome: Progressing  Intervention: Identify and Manage Contributors  Flowsheets (Taken 11/19/2024 0252)  Self-Care Promotion: independence encouraged  Medication Review/Management: medications reviewed     Problem: Gas Exchange Impaired  Goal: Optimal Gas Exchange  Outcome: Progressing     Problem: Skin Injury Risk Increased  Goal: Skin Health and Integrity  Outcome: Progressing

## 2024-11-19 NOTE — ASSESSMENT & PLAN NOTE
During a workup for hypoxemia she had no Dopplers so no clots but was noted to have enlarged heart on chest x-ray echocardiogram shows a dilated left ventricle and in large right ventricle with elevated pulmonary artery pressures will need evaluation as an outpatient.

## 2024-11-19 NOTE — PROGRESS NOTES
[facility]  Critical Care Medicine  Progress Note    Patient Name: Pam Ortega  MRN: [id number]  Admission Date: 11/17/2024  Hospital Length of Stay: 2 days  Code Status: No Order  Attending Provider: No att. providers found  Primary Care Provider: Janice Velez FNP   Principal Problem: Arterial occlusion    Subjective:     HPI:  Patient was admitted to the hospital with right leg pain was found to have a thrombus in his underwent thrombectomy in his currently postop doing well.  Biggest issue we have she is hypoxic.  But she was reasonable asymptomatic with it.  She was history of AFib with rapid ventricular response she has a history of obstructive sleep apnea uses BiPAP at home..  She was hypertension osteoporosis, hypertension, diabetes,    Hospital/ICU Course:  No notes on file    Interval History/Significant Events:  Patient without complaints    Review of Systems  Objective:     Vital Signs (Most Recent):  Temp: 97.5 °F (36.4 °C) (11/19/24 0315)  Pulse: (!) 57 (11/19/24 0645)  Resp: 18 (11/19/24 0645)  BP: 133/71 (11/19/24 0630)  SpO2: 98 % (11/19/24 0645) Vital Signs (24h Range):  Temp:  [97.5 °F (36.4 °C)-99.5 °F (37.5 °C)] 97.5 °F (36.4 °C)  Pulse:  [56-85] 57  Resp:  [12-32] 18  SpO2:  [83 %-100 %] 98 %  BP: (118-180)/() 133/71   Weight: 101.8 kg (224 lb 6.9 oz)  Body mass index is 32.2 kg/m².      Intake/Output Summary (Last 24 hours) at 11/19/2024 0709  Last data filed at 11/19/2024 0429  Gross per 24 hour   Intake --   Output 990 ml   Net -990 ml          Physical Exam  Vitals reviewed.   Constitutional:       Appearance: Normal appearance.      Interventions: She is not intubated.  HENT:      Head: Normocephalic and atraumatic.      Nose: Nose normal.      Mouth/Throat:      Mouth: Mucous membranes are dry.      Pharynx: Oropharynx is clear.   Eyes:      Extraocular Movements: Extraocular movements intact.      Conjunctiva/sclera: Conjunctivae normal.       Pupils: Pupils are equal, round, and reactive to light.   Cardiovascular:      Rate and Rhythm: Normal rate.      Heart sounds: Normal heart sounds. No murmur heard.  Pulmonary:      Effort: Pulmonary effort is normal. She is not intubated.      Breath sounds: Normal breath sounds.   Abdominal:      General: Abdomen is flat. Bowel sounds are normal.      Palpations: Abdomen is soft.   Musculoskeletal:         General: Normal range of motion.      Cervical back: Normal range of motion and neck supple.      Right lower leg: No edema.      Left lower leg: No edema.   Skin:     General: Skin is warm and dry.      Capillary Refill: Capillary refill takes less than 2 seconds.   Neurological:      General: No focal deficit present.      Mental Status: She is alert and oriented to person, place, and time.   Psychiatric:         Mood and Affect: Mood normal.         Behavior: Behavior normal.            Vents:  Oxygen Concentration (%): 32 (11/18/24 2338)  Lines/Drains/Airways       Drain  Duration                  Closed/Suction Drain 11/17/24 1956 Tube - 1 Right Leg Bulb 10 Fr. 1 day              Peripheral Intravenous Line  Duration                  Peripheral IV - Single Lumen 20 G Right Forearm -- days         Peripheral IV - Single Lumen 11/17/24 20 G Right Antecubital 2 days                  Significant Labs:    CBC/Anemia Profile:  Recent Labs   Lab 11/17/24  1453 11/19/24  0411   WBC 10.26 10.55   HGB 12.5 10.5*   HCT 38.3 32.9*    167   MCV 89.9 90.1   RDW 16.2* 15.8*        Chemistries:  Recent Labs   Lab 11/17/24  1453 11/19/24  0411    132*   K 4.1 4.0   * 103   CO2 18* 22*   BUN 22* 24*   CREATININE 2.14* 1.65*   CALCIUM 9.4 8.4   ALBUMIN 3.5  --    PROT 7.2  --    BILITOT 1.9*  --    ALKPHOS 78  --    ALT 14  --    AST 41*  --        Recent Lab Results  (Last 5 results in the past 24 hours)        11/19/24  0525   11/19/24  0411   11/18/24 2002 11/18/24  1658   11/18/24  1123        Anion  Gap   11             Baso #   0.04             Basophil %   0.4             BUN   24             BUN/CREAT RATIO   15             Calcium   8.4             Chloride   103             CO2   22             Creatinine   1.65             Differential Method   Auto             eGFR   32             Eos #   0.04             Eos %   0.4             Glucose   104             Hematocrit   32.9             Hemoglobin   10.5             Immature Grans (Abs)   0.07             Immature Granulocytes   0.7             Lymph #   2.36             Lymph %   22.4             MCH   28.8             MCHC   31.9             MCV   90.1             Mono #   1.12             Mono %   10.6             MPV   10.6             Neutrophils, Abs   6.92             Neutrophils Relative   65.5             nRBC   0.3             NUCLEATED RBC ABSOLUTE   0.03             Platelet Count   167             POC Glucose 109     152   139   140       Potassium   4.0             RBC   3.65             RDW   15.8             Sodium   132             WBC   10.55                                    Significant Imaging:  I have reviewed all pertinent imaging results/findings within the past 24 hours.    ABG  Recent Labs   Lab 11/18/24  0652   PH 7.41   PO2 61*   PCO2 36   HCO3 22.8     Assessment/Plan:     Pulmonary  Hypoxia  Hypoxemia is graded leave improved over the last 24 hours wean O2 as tolerated    Cardiac/Vascular  * Arterial occlusion  Status post surgery doing well    Cardiomegaly  During a workup for hypoxemia she had no Dopplers so no clots but was noted to have enlarged heart on chest x-ray echocardiogram shows a dilated left ventricle and in large right ventricle with elevated pulmonary artery pressures will need evaluation as an outpatient.    Persistent atrial fibrillation  Continue home meds    Renal/  Benign hypertensive CKD, stage 1-4 or unspecified chronic kidney disease  Creatinine better today    Endocrine  Diabetes mellitus without  complication  At goal    Other  Obstructive sleep apnea  Obtain her CPAP machine from home             Yonny Mckee MD  Critical Care Medicine  [facility]

## 2024-11-19 NOTE — CARE UPDATE
Done by RT student Clara Atwood       11/19/24 0805   Patient Assessment/Suction   Level of Consciousness (AVPU) alert   Respiratory Effort Normal   Expansion/Accessory Muscles/Retractions no use of accessory muscles   All Lung Fields Breath Sounds Anterior:;clear   Cough Frequency infrequent   Cough Type nonproductive   Skin Integrity   $ Wound Care Tech Time 15 min   Area Observed Left;Right;Behind ear   Skin Appearance without discoloration   Barrier used? Other (see comments)  (softcannula)   PRE-TX-O2   Device (Oxygen Therapy) high flow nasal cannula w/device   $ Is the patient on Low Flow Oxygen? Yes   Flow (L/min) (Oxygen Therapy) 2   Oxygen Concentration (%) 28   SpO2 100 %   Pulse Oximetry Type Continuous   Pulse 62   Resp (!) 25   Aerosol Therapy   $ Aerosol Therapy Charges Aerosol Treatment   Daily Review of Necessity (SVN) completed   Respiratory Treatment Status (SVN) given   Treatment Route (SVN) mask   Patient Position HOB elevated   Post Treatment Assessment (SVN) breath sounds unchanged   Signs of Intolerance (SVN) none   Breath Sounds Post-Respiratory Treatment   Throughout All Fields Post-Treatment All Fields   Throughout All Fields Post-Treatment no change   Post-treatment Heart Rate (beats/min) 67   Post-treatment Resp Rate (breaths/min) 18   Ready to Wean/Extubation Screen   FIO2<=50 (chart decimal) 0.28

## 2024-11-19 NOTE — PT/OT/SLP PROGRESS
Physical Therapy Treatment    Patient Name:  Pam Ortega   MRN:  [id number]    Recommendations:     Discharge Recommendations: Moderate Intensity Therapy  Discharge Equipment Recommendations: none  Barriers to discharge: None    Assessment:     Pam Ortega is a 79 y.o. female admitted with a medical diagnosis of Arterial occlusion.  She presents with the following impairments/functional limitations: weakness, impaired endurance, impaired balance, decreased lower extremity function .    Pt demo good effort today with no c/o pain. Performed all exercises fairly well with increased time to complete. No LOB or dizziness reported during standing or ambulating using no AD. She hopes to d/c home today with support from others if able. Will follow-up.     Rehab Prognosis: Good; patient would benefit from acute skilled PT services to address these deficits and reach maximum level of function.    Recent Surgery: Procedure(s) (LRB):  EMBOLECTOMY OR THROMBECTOMY, ARTERY, AORTOILIAC, FEMORAL, OR POPLITEAL (Right) 2 Days Post-Op    Plan:     During this hospitalization, patient to be seen 5 x/week to address the identified rehab impairments via gait training, therapeutic exercises and progress toward the following goals:    Plan of Care Expires:  12/18/24    Subjective     Chief Complaint: none voiced this AM.  Patient/Family Comments/goals: d/c home today if able  Pain/Comfort:  Pain Rating 1: 0/10  Pain Addressed 1: Reposition, Distraction      Objective:     Received POC from with Matthew Santos PT, communicated with patient, and grandson prior to session.  Patient found up in chair with peripheral IV, oxygen, pulse ox (continuous), telemetry, blood pressure cuff upon PT entry to room.     General Precautions: Standard, fall  Orthopedic Precautions: N/A  Braces: N/A  Respiratory Status: Nasal cannula, flow 2 L/min     Functional Mobility:  Transfers:     Sit to Stand:  stand by assistance with no AD  Gait:  100ft no AD CGA/SBA for safety/balance, good dom  Balance: F      AM-PAC 6 CLICK MOBILITY  Turning over in bed (including adjusting bedclothes, sheets and blankets)?: 4  Sitting down on and standing up from a chair with arms (e.g., wheelchair, bedside commode, etc.): 4  Moving from lying on back to sitting on the side of the bed?: 4  Moving to and from a bed to a chair (including a wheelchair)?: 4  Need to walk in hospital room?: 3  Climbing 3-5 steps with a railing?: 2  Basic Mobility Total Score: 21       Treatment & Education: increased time to complete    Mobility and Gait were performed today.    Long sitting Quad Sets, Ankle pumps, Straight leg raises 2 x 10, seated Long Arc Quad and marches x 20     Patient left up in chair with all lines intact, call button in reach, and grandson present.    GOALS:   Multidisciplinary Problems       Physical Therapy Goals          Problem: Physical Therapy    Goal Priority Disciplines Outcome Interventions   Physical Therapy Goal     PT, PT/OT Progressing    Description: Short term goals:  1. Sit to stand transfer with [place]  2. Bed to chair transfer with [place] using No Assistive Device  3. Gait  x 300 feet with [place] using No Assistive Device.     Long term goals:  Pt will return home to [facility] with all mobility                         Time Tracking:     PT Received On: 11/19/24  PT Start Time: 1050     PT Stop Time: 1115  PT Total Time (min): 25 min     Billable Minutes: Gait Training 11 and Therapeutic Exercise 10    Treatment Type: Treatment  PT/PTA: PTA     Number of PTA visits since last PT visit: 1 11/19/2024

## 2024-11-19 NOTE — PROGRESS NOTES
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 unspecified chronic kidney disease     Bronchitis 12/22/2023    Carotid artery occlusion     CKD (chronic kidney disease)     Coronary atherosclerosis     Cough 12/22/2023    COVID-19 ruled out 03/20/2024    Diabetes mellitus, type 2     DJD (degenerative joint disease)     History of CVA (cerebrovascular accident)     HTN (hypertension)     Hyperlipidemia     Nonischemic dilated cardiomyopathy     Obesity     Osteopenia     Paroxysmal atrial fibrillation     Presence of cardiac pacemaker     PVD (peripheral vascular disease)     Upper respiratory tract infection 12/22/2023    Vitamin D deficiency        Social History     Socioeconomic History    Marital status:    Tobacco Use    Smoking status: Never     Passive exposure: Never    Smokeless tobacco: Never   Substance and Sexual Activity    Alcohol use: Not Currently    Drug use: Never    Sexual activity: Not Currently     Social Drivers of Health     Financial Resource Strain: Low Risk  (11/17/2024)    Overall Financial Resource Strain (CARDIA)     Difficulty of Paying Living Expenses: Not hard at all   Food Insecurity: No Food Insecurity (11/17/2024)    Hunger Vital Sign     Worried About Running Out of Food in the Last Year: Never true     Ran Out of Food in the Last Year: Never true   Transportation Needs: No Transportation Needs (11/17/2024)    TRANSPORTATION NEEDS     Transportation : No   Physical Activity: Insufficiently Active (8/23/2024)    Exercise Vital Sign     Days of Exercise per Week: 3 days     Minutes of Exercise per Session: 30 min   Stress: No Stress Concern Present (11/17/2024)    [employer] [place] - Occupational Stress  Questionnaire     Feeling of Stress : Not at all   Housing Stability: Low Risk  (11/17/2024)    Housing Stability Vital Sign     Unable to Pay for Housing in the Last Year: No     Homeless in the Last Year: No       Precautions:     Allergies as of 11/17/2024 - Reviewed 11/17/2024   Allergen Reaction Noted    Ace inhibitors Edema 04/05/2021    Losartan Swelling 04/05/2021       WOC Assessment Details/Treatment       Narrative: Seen patient for initiation of preventative skin care measures    Patient in bed, Alert. Family present. On low air loss mattress. Has oxygen in use, no redness noted. States sacral and heels are ok  Bret score 20    Consult wound care for any skin issues         11/19/2024

## 2024-11-20 ENCOUNTER — PATIENT OUTREACH (OUTPATIENT)
Dept: ADMINISTRATIVE | Facility: CLINIC | Age: 79
End: 2024-11-20

## 2024-11-20 NOTE — PROGRESS NOTES
C3 nurse spoke with daughter Madhavi Ortega for a TCC post hospital discharge follow up call. The patient does not have a scheduled HOSFU appointment with Janice Velez FNP  within 5-7 days post hospital discharge date 11/19/24. C3 nurse was unable to schedule HOSFU appointment in [facility].    Message sent to PCP staff requesting they contact patient and schedule follow up appointment.

## 2024-11-30 ENCOUNTER — EXTERNAL CHRONIC CARE MANAGEMENT (OUTPATIENT)
Dept: PRIMARY CARE CLINIC | Facility: CLINIC | Age: 79
End: 2024-11-30
Payer: MEDICARE

## 2024-11-30 PROCEDURE — G0511 CCM/BHI BY RHC/FQHC 20MIN MO: HCPCS | Mod: ,,, | Performed by: NURSE PRACTITIONER

## 2024-12-03 ENCOUNTER — OFFICE VISIT (OUTPATIENT)
Dept: FAMILY MEDICINE | Facility: CLINIC | Age: 79
End: 2024-12-03
Payer: MEDICARE

## 2024-12-03 ENCOUNTER — OFFICE VISIT (OUTPATIENT)
Dept: VASCULAR SURGERY | Facility: CLINIC | Age: 79
End: 2024-12-03
Payer: MEDICARE

## 2024-12-03 VITALS
SYSTOLIC BLOOD PRESSURE: 124 MMHG | BODY MASS INDEX: 30.52 KG/M2 | RESPIRATION RATE: 18 BRPM | TEMPERATURE: 98 F | HEIGHT: 70 IN | HEART RATE: 76 BPM | DIASTOLIC BLOOD PRESSURE: 70 MMHG | WEIGHT: 213.19 LBS | OXYGEN SATURATION: 99 %

## 2024-12-03 VITALS — WEIGHT: 211.63 LBS | HEIGHT: 70 IN | BODY MASS INDEX: 30.3 KG/M2

## 2024-12-03 DIAGNOSIS — Z09 POSTOP CHECK: Primary | ICD-10-CM

## 2024-12-03 DIAGNOSIS — L03.90 CELLULITIS, UNSPECIFIED CELLULITIS SITE: ICD-10-CM

## 2024-12-03 DIAGNOSIS — I70.90 ARTERIAL OCCLUSION: ICD-10-CM

## 2024-12-03 DIAGNOSIS — I50.9 CONGESTIVE HEART FAILURE, UNSPECIFIED HF CHRONICITY, UNSPECIFIED HEART FAILURE TYPE: ICD-10-CM

## 2024-12-03 DIAGNOSIS — I10 HYPERTENSION, UNSPECIFIED TYPE: Primary | ICD-10-CM

## 2024-12-03 DIAGNOSIS — E78.5 HYPERLIPIDEMIA, UNSPECIFIED HYPERLIPIDEMIA TYPE: ICD-10-CM

## 2024-12-03 PROCEDURE — 99214 OFFICE O/P EST MOD 30 MIN: CPT | Mod: PBBFAC | Performed by: NURSE PRACTITIONER

## 2024-12-03 PROCEDURE — 99212 OFFICE O/P EST SF 10 MIN: CPT | Mod: ,,, | Performed by: NURSE PRACTITIONER

## 2024-12-03 PROCEDURE — 99999 PR PBB SHADOW E&M-EST. PATIENT-LVL IV: CPT | Mod: PBBFAC,,, | Performed by: NURSE PRACTITIONER

## 2024-12-03 PROCEDURE — 99024 POSTOP FOLLOW-UP VISIT: CPT | Mod: ,,, | Performed by: NURSE PRACTITIONER

## 2024-12-03 RX ORDER — SEMAGLUTIDE 1.34 MG/ML
1 INJECTION, SOLUTION SUBCUTANEOUS
Qty: 9 ML | Refills: 3 | Status: CANCELLED | OUTPATIENT
Start: 2024-12-03

## 2024-12-03 NOTE — PROGRESS NOTES
Subjective     Patient ID: Pam Ortega is a 79 y.o. female.    Chief Complaint: Transitional Care (H/d f/u.check place on left breast.)    Pt presents for a TCC visit. Persisting cellulitis to left breast x several weeks.       Review of Systems   Constitutional:  Negative for activity change, appetite change, chills, fatigue and fever.   HENT:  Negative for nasal congestion, ear discharge, nosebleeds, postnasal drip, rhinorrhea, sinus pressure/congestion, sneezing, sore throat and tinnitus.    Eyes:  Negative for pain, discharge, redness and itching.   Respiratory:  Negative for cough, choking, chest tightness, shortness of breath and wheezing.    Cardiovascular:  Negative for chest pain.   Gastrointestinal:  Negative for abdominal distention, abdominal pain, blood in stool, change in bowel habit, constipation, diarrhea, nausea and vomiting.   Genitourinary:  Negative for decreased urine volume, dysuria, flank pain and frequency.   Musculoskeletal:  Negative for back pain and gait problem.   Integumentary:  Positive for wound. Negative for breast mass and breast discharge.   Allergic/Immunologic: Negative for immunocompromised state.   Neurological:  Negative for dizziness, light-headedness and headaches.   Psychiatric/Behavioral:  Negative for agitation, behavioral problems and hallucinations.    Breast: Negative for mass         Objective     Physical Exam  Vitals and nursing note reviewed.   Constitutional:       Appearance: Normal appearance.   Cardiovascular:      Rate and Rhythm: Normal rate and regular rhythm.      Heart sounds: Normal heart sounds.   Pulmonary:      Effort: Pulmonary effort is normal.      Breath sounds: Normal breath sounds.   Chest:          Comments: Necrotic tissue with erythema surrounding the area   Musculoskeletal:         General: Normal range of motion.   Neurological:      Mental Status: She is alert and oriented to person, place, and time.   Psychiatric:         Mood and  Affect: Mood normal.         Behavior: Behavior normal.            Assessment and Plan     1. Hypertension, unspecified type  Overview:  Controlled.  Patient has begun walking, lost 5 pds.      2. Congestive heart failure, unspecified HF chronicity, unspecified heart failure type    3. Cellulitis, unspecified cellulitis site  -     Ambulatory referral/consult to Wound Clinic; Future; Expected date: 12/10/2024    4. Hyperlipidemia, unspecified hyperlipidemia type    5. Arterial occlusion        Wound care referral for cellulitis to left breast.         Follow up in about 3 months (around 3/3/2025).

## 2024-12-03 NOTE — PROGRESS NOTES
Subjective:       Patient ID: Pam Ortega is a 79 y.o. female.    Chief Complaint: Post-op Evaluation  Two week postop thrombectomy of acute thrombus including common femoral profunda in the orifice of the SFA patient with history of AFib on Xarelto  She is doing well from surgery standpoint  family history includes Alzheimer's disease in her maternal grandmother; Breast cancer in her daughter and maternal grandmother; Cancer in her brother; Clotting disorder in her daughter; Diabetes in her daughter and daughter; Hyperlipidemia in her daughter; Hypertension in her brother, daughter, daughter, daughter, father, maternal grandmother, mother, and sister; Stroke in her brother and sister.  Past Medical History:   Diagnosis Date    Anemia of chronic disease     Atrial fibrillation     Benign hypertensive CKD, stage 1-4 or unspecified chronic kidney disease     Bronchitis 12/22/2023    Carotid artery occlusion     CKD (chronic kidney disease)     Coronary atherosclerosis     Cough 12/22/2023    COVID-19 ruled out 03/20/2024    Diabetes mellitus, type 2     DJD (degenerative joint disease)     History of CVA (cerebrovascular accident)     HTN (hypertension)     Hyperlipidemia     Nonischemic dilated cardiomyopathy     Obesity     Osteopenia     Paroxysmal atrial fibrillation     Presence of cardiac pacemaker     PVD (peripheral vascular disease)     Upper respiratory tract infection 12/22/2023    Vitamin D deficiency       Past Surgical History:   Procedure Laterality Date    BREAST BIOPSY      COLONOSCOPY  12/04/2018    repeat in 5 years    EMBOLECTOMY OR THROMBECTOMY, ARTERY, AORTOILIAC, FEMORAL, OR POPLITEAL Right 11/17/2024    Procedure: EMBOLECTOMY OR THROMBECTOMY, ARTERY, AORTOILIAC, FEMORAL, OR POPLITEAL;  Surgeon: Jordana Guillermo MD;  Location: [facility];  Service: Vascular;  Laterality: Right;    pace maker      VAGINAL DELIVERY      x 3       reports that she has never smoked. She has never been  "exposed to tobacco smoke. She has never used smokeless tobacco. She reports that she does not currently use alcohol. She reports that she does not use drugs.   HPI  Review of Systems   Cardiovascular:  Negative for claudication.         Objective:      Ht 5' 10" (1.778 m)   Wt 96 kg (211 lb 10.3 oz)   BMI 30.37 kg/m²    Physical Exam  Vitals and nursing note reviewed.   Constitutional:       Appearance: Normal appearance.   HENT:      Head: Normocephalic.      Mouth/Throat:      Mouth: Mucous membranes are moist.   Eyes:      Conjunctiva/sclera: Conjunctivae normal.   Cardiovascular:      Rate and Rhythm: Normal rate and regular rhythm.      Comments: Palpable Right DP pulse  Pulmonary:      Effort: Pulmonary effort is normal.   Abdominal:      Palpations: Abdomen is soft.   Musculoskeletal:      Right lower leg: Edema present.      Left lower leg: Edema present.      Comments: Right greater than Left   Skin:     General: Skin is warm and dry.      Comments: Right groin incision with skin staples well approximated, no signs of infection   Neurological:      Mental Status: She is alert and oriented to person, place, and time.   Psychiatric:         Mood and Affect: Mood normal.           Assessment:       1. Postop check        Plan:     DC staples applied Steri-Strips right groin incision    Continue aspirin 81 mg daily Xarelto  Compression socks  Follow-up in 6 months return sooner p.r.n.    "

## 2024-12-09 ENCOUNTER — OFFICE VISIT (OUTPATIENT)
Dept: WOUND CARE | Facility: HOSPITAL | Age: 79
End: 2024-12-09
Attending: FAMILY MEDICINE
Payer: MEDICARE

## 2024-12-09 VITALS
RESPIRATION RATE: 18 BRPM | SYSTOLIC BLOOD PRESSURE: 143 MMHG | DIASTOLIC BLOOD PRESSURE: 85 MMHG | HEART RATE: 84 BPM | TEMPERATURE: 98 F

## 2024-12-09 DIAGNOSIS — E11.69 TYPE 2 DIABETES MELLITUS WITH OTHER SPECIFIED COMPLICATION, WITHOUT LONG-TERM CURRENT USE OF INSULIN: ICD-10-CM

## 2024-12-09 DIAGNOSIS — L81.9 HYPERPIGMENTATION OF SKIN: Primary | ICD-10-CM

## 2024-12-09 DIAGNOSIS — L03.90 CELLULITIS, UNSPECIFIED CELLULITIS SITE: ICD-10-CM

## 2024-12-09 DIAGNOSIS — E11.9 DIABETES MELLITUS WITHOUT COMPLICATION: ICD-10-CM

## 2024-12-09 DIAGNOSIS — I87.2 VENOUS INSUFFICIENCY: ICD-10-CM

## 2024-12-09 DIAGNOSIS — I50.9 CONGESTIVE HEART FAILURE, UNSPECIFIED HF CHRONICITY, UNSPECIFIED HEART FAILURE TYPE: ICD-10-CM

## 2024-12-09 DIAGNOSIS — I48.20 CHRONIC ATRIAL FIBRILLATION: ICD-10-CM

## 2024-12-09 PROCEDURE — 99203 OFFICE O/P NEW LOW 30 MIN: CPT | Mod: ,,, | Performed by: FAMILY MEDICINE

## 2024-12-09 NOTE — PATIENT INSTRUCTIONS
Debridement Performed for Assessment: Wound# 1  Performed By: Provider: Quang Allred III, D.O.  Assistant:    Debridement: Surgical    Photo taken post procedure:    Time-Out Taken: Yes  Level: Skin/Subcutaneous Tissue/ Muscle  Post Debridement Measurements  Length: (cm)   Width: (cm)   Depth: (cm)       Area: (cm²)   Percent Debrided: 100%  Total Area Debrided: (sq cm)     Tissue and other material debrided:  Adipose, Dermis, Epidermis, Subcutaneous, Muscle  Devitalized Tissue Debrided:Biofilm, Slough, Eschar  Instrument: Curette  Bleeding: Moderate  Hemostasis Achieved: Pressure  Procedural Pain: Insensate  Post Procedural Pain: Insensate  Response to Treatment: Procedure was tolerated well    Devitalized materials/tissue Removed  the following was removed during debridement  subcutaneous, muscle      Post Debridement Diagnosis  Post debridement diagnosis  Same as Pre-operative debridement diagnosis, No Complications noted.      Grafts or implants applied  Was a graft or implant applied?  No      Procedure assistant  Procedure assisted by:Viviana Cooper RN  Assistant is the same as nurse listed above      Complications related to procedure  Did any complication occure during procedure?  No complications noted during or after procedure.      Specimen  Specimen collect during procedure?  No specimen collected      Anaesthesia:  Anesthesia used  None      Blood Loss:None  less than 5 cc   Wash hands before and after wound care.  Clean with baby shampoo and water pat dry.  Apply muprirocin cream to wound bed and cover with 4X4's and secure with paper tape.  Change dressing daily and as needed for drainage or soilage.

## 2024-12-09 NOTE — PROGRESS NOTES
Subjective:      Patient ID: Pam Ortega is a 79 y.o. female.    Chief Complaint: Non-healing Wound Follow Up    Pam Ortega a 79 y.o. female presents for follow up on all regular problems which are reviewed and discussed.   Alarmed that place on breast not well.  Daughter from [employer] here with pt.  Looks like old, hard, fixed scab  Problem List Items Addressed This Visit          Derm    Hyperpigmentation of skin - Primary       Cardiac/Vascular    Chronic atrial fibrillation    Venous insufficiency    Congestive heart failure, unspecified HF chronicity, unspecified heart failure type       ID    Cellulitis       Endocrine    Diabetes mellitus without complication    Type 2 diabetes mellitus with other specified complication       Past Medical History:  Past Medical History:   Diagnosis Date    Anemia of chronic disease     Atrial fibrillation     Benign hypertensive CKD, stage 1-4 or unspecified chronic kidney disease     Bronchitis 12/22/2023    Carotid artery occlusion     CKD (chronic kidney disease)     Coronary atherosclerosis     Cough 12/22/2023    COVID-19 ruled out 03/20/2024    Diabetes mellitus, type 2     DJD (degenerative joint disease)     History of CVA (cerebrovascular accident)     HTN (hypertension)     Hyperlipidemia     Nonischemic dilated cardiomyopathy     Obesity     Osteopenia     Paroxysmal atrial fibrillation     Presence of cardiac pacemaker     PVD (peripheral vascular disease)     Upper respiratory tract infection 12/22/2023    Vitamin D deficiency      Past Surgical History:   Procedure Laterality Date    BREAST BIOPSY      COLONOSCOPY  12/04/2018    repeat in 5 years    EMBOLECTOMY OR THROMBECTOMY, ARTERY, AORTOILIAC, FEMORAL, OR POPLITEAL Right 11/17/2024    Procedure: EMBOLECTOMY OR THROMBECTOMY, ARTERY, AORTOILIAC, FEMORAL, OR POPLITEAL;  Surgeon: Jordana Guillermo MD;  Location: [facility];  Service: Vascular;  Laterality: Right;    pace maker       VAGINAL DELIVERY      x 3     Review of patient's allergies indicates:   Allergen Reactions    Ace inhibitors Edema    Losartan Swelling     Current Outpatient Medications on File Prior to Visit   Medication Sig Dispense Refill    alendronate (FOSAMAX) 70 MG tablet Take 1 tablet (70 mg total) by mouth every 7 days. 12 tablet 3    amLODIPine (NORVASC) 10 MG tablet Take 1 tablet (10 mg total) by mouth once daily. 90 tablet 3    aspirin (ECOTRIN) 325 MG EC tablet Take 325 mg by mouth once daily.      aspirin (ECOTRIN) 81 MG EC tablet Take 81 mg by mouth once daily.      cholecalciferol, vitamin D3, (VITAMIN D3) 50 mcg (2,000 unit) Cap capsule Take 1 capsule by mouth once daily.      colchicine (COLCRYS) 0.6 mg tablet Take 1 tablet (0.6 mg total) by mouth once daily. for gout 90 tablet 3    furosemide (LASIX) 40 MG tablet Take 1 tablet (40 mg total) by mouth once daily. 90 tablet 3    glimepiride (AMARYL) 4 MG tablet Take 1 tablet (4 mg total) by mouth daily with breakfast. 90 tablet 3    hydrALAZINE (APRESOLINE) 50 MG tablet Take 1 tablet (50 mg total) by mouth 2 (two) times a day. 180 tablet 3    isosorbide mononitrate (IMDUR) 30 MG 24 hr tablet Take 1 tablet (30 mg total) by mouth once daily. 90 tablet 3    lovastatin (MEVACOR) 20 MG tablet Take 1 tablet (20 mg total) by mouth every night 90 tablet 3    metoprolol tartrate (LOPRESSOR) 50 MG tablet Take 1 tablet (50 mg total) by mouth 2 (two) times a day. 180 tablet 3    mupirocin (BACTROBAN) 2 % ointment Apply topically 3 (three) times daily to breast 22 g 0    omeprazole (PRILOSEC) 20 MG capsule Take 1 capsule (20 mg total) by mouth once daily. 90 capsule 3    potassium chloride (MICRO-K) 10 MEQ CpSR Take 1 capsule (10 mEq total) by mouth once daily. 90 capsule 3    rivaroxaban (XARELTO) 15 mg Tab Take 1 tablet (15 mg total) by mouth once daily. 90 tablet 2    semaglutide (OZEMPIC) 1 mg/dose (4 mg/3 mL) Inject 1 mg into the skin every 7 days. 9 mL 3     spironolactone (ALDACTONE) 25 MG tablet Take 1 tablet (25 mg total) by mouth once daily. 90 tablet 3    zinc 50 mg Tab Take 50 mg by mouth once daily.       No current facility-administered medications on file prior to visit.     Social History     Socioeconomic History    Marital status:    Tobacco Use    Smoking status: Never     Passive exposure: Never    Smokeless tobacco: Never   Substance and Sexual Activity    Alcohol use: Not Currently    Drug use: Never    Sexual activity: Not Currently     Social Drivers of Health     Financial Resource Strain: Low Risk  (11/17/2024)    Overall Financial Resource Strain (CARDIA)     Difficulty of Paying Living Expenses: Not hard at all   Food Insecurity: No Food Insecurity (11/17/2024)    Hunger Vital Sign     Worried About Running Out of Food in the Last Year: Never true     Ran Out of Food in the Last Year: Never true   Transportation Needs: No Transportation Needs (11/17/2024)    TRANSPORTATION NEEDS     Transportation : No   Physical Activity: Insufficiently Active (8/23/2024)    Exercise Vital Sign     Days of Exercise per Week: 3 days     Minutes of Exercise per Session: 30 min   Stress: No Stress Concern Present (11/17/2024)    [employer] - Occupational Stress Questionnaire     Feeling of Stress : Not at all   Housing Stability: Low Risk  (11/17/2024)    Housing Stability Vital Sign     Unable to Pay for Housing in the Last Year: No     Homeless in the Last Year: No     Family History   Problem Relation Name Age of Onset    Hypertension Mother      Hypertension Father      Hypertension Sister      Stroke Sister      Cancer Brother      Stroke Brother      Hypertension Brother      Alzheimer's disease Maternal Grandmother      Breast cancer Maternal Grandmother      Hypertension Maternal Grandmother      Hyperlipidemia Daughter      Breast cancer Daughter      Diabetes Daughter      Hypertension Daughter      Diabetes Daughter       Hypertension Daughter      Hypertension Daughter      Clotting disorder Daughter         Review of Systems   Constitutional: Negative.  Negative for activity change, appetite change, chills and diaphoresis.   HENT: Negative.  Negative for congestion, ear pain, hearing loss and postnasal drip.    Eyes:  Negative for itching.   Respiratory:  Negative for chest tightness and shortness of breath.    Cardiovascular:  Negative for chest pain.   Gastrointestinal:  Negative for abdominal pain.   Endocrine: Negative for polydipsia.   Genitourinary:  Negative for frequency.   Musculoskeletal:  Negative for back pain.   Skin:  Positive for wound. Negative for color change, pallor and rash.   Neurological:  Negative for headaches.       Objective:     BP (!) 143/85 (BP Location: Right arm)   Pulse 84   Temp 97.9 °F (36.6 °C) (Oral)   Resp 18     Physical Exam  Constitutional:       Appearance: Normal appearance. She is obese.   HENT:      Head: Normocephalic and atraumatic.      Right Ear: External ear normal.      Left Ear: External ear normal.      Nose: Nose normal.      Mouth/Throat:      Mouth: Mucous membranes are moist.      Pharynx: Oropharynx is clear.   Eyes:      Pupils: Pupils are equal, round, and reactive to light.   Cardiovascular:      Rate and Rhythm: Normal rate and regular rhythm.      Heart sounds: Normal heart sounds.   Pulmonary:      Effort: Pulmonary effort is normal.      Breath sounds: Normal breath sounds.   Abdominal:      Palpations: Abdomen is soft.   Musculoskeletal:         General: No tenderness.      Cervical back: Normal range of motion and neck supple.   Skin:     General: Skin is warm and dry.      Coloration: Skin is not jaundiced or pale.      Findings: Lesion present. No bruising, erythema or rash.   Neurological:      General: No focal deficit present.      Mental Status: She is alert and oriented to person, place, and time. Mental status is at baseline.   Psychiatric:         Mood  and Affect: Mood normal.         Behavior: Behavior normal.         Thought Content: Thought content normal.         Judgment: Judgment normal.         1. Hyperpigmentation of skin    2. Cellulitis, unspecified cellulitis site    3. Chronic atrial fibrillation    4. Venous insufficiency    5. Congestive heart failure, unspecified HF chronicity, unspecified heart failure type    6. Type 2 diabetes mellitus with other specified complication, without long-term current use of insulin    7. Diabetes mellitus without complication        Plan:     Problem List Items Addressed This Visit          Derm    Hyperpigmentation of skin - Primary       Cardiac/Vascular    Chronic atrial fibrillation    Venous insufficiency    Congestive heart failure, unspecified HF chronicity, unspecified heart failure type       ID    Cellulitis       Endocrine    Diabetes mellitus without complication    Type 2 diabetes mellitus with other specified complication     No follow-ups on file.  Has mupericin, use it. See us in 1-2 wks.    I am having Pam Ortega maintain her aspirin, cholecalciferol (vitamin D3), zinc, XARELTO, alendronate, amLODIPine, colchicine, furosemide, glimepiride, hydrALAZINE, isosorbide mononitrate, lovastatin, metoprolol tartrate, omeprazole, potassium chloride, OZEMPIC, spironolactone, mupirocin, and aspirin.    Pam was seen today for non-healing wound follow up.    Diagnoses and all orders for this visit:    Hyperpigmentation of skin    Cellulitis, unspecified cellulitis site  -     Ambulatory referral/consult to Wound Clinic    Chronic atrial fibrillation    Venous insufficiency    Congestive heart failure, unspecified HF chronicity, unspecified heart failure type    Type 2 diabetes mellitus with other specified complication, without long-term current use of insulin    Diabetes mellitus without complication         [unfilled]  No orders of the defined types were placed in this encounter.

## 2024-12-30 ENCOUNTER — OFFICE VISIT (OUTPATIENT)
Dept: WOUND CARE | Facility: HOSPITAL | Age: 79
End: 2024-12-30
Attending: FAMILY MEDICINE
Payer: MEDICARE

## 2024-12-30 VITALS
TEMPERATURE: 98 F | SYSTOLIC BLOOD PRESSURE: 158 MMHG | HEART RATE: 82 BPM | RESPIRATION RATE: 18 BRPM | DIASTOLIC BLOOD PRESSURE: 85 MMHG

## 2024-12-30 DIAGNOSIS — L98.499 SKIN ULCER OF FEMALE BREAST: ICD-10-CM

## 2024-12-30 DIAGNOSIS — L03.90 CELLULITIS, UNSPECIFIED CELLULITIS SITE: Primary | ICD-10-CM

## 2024-12-30 PROCEDURE — 99213 OFFICE O/P EST LOW 20 MIN: CPT | Mod: ,,, | Performed by: FAMILY MEDICINE

## 2024-12-30 NOTE — PROGRESS NOTES
Subjective:      Patient ID: Pam Ortega is a 79 y.o. female.    Chief Complaint: Non-healing Wound Follow Up    Pam Ortega a 79 y.o. female presents for follow up on all regular problems which are reviewed and discussed.   healed  Problem List Items Addressed This Visit          Renal/    Skin ulcer of female breast       ID    Cellulitis - Primary       Past Medical History:  Past Medical History:   Diagnosis Date    Anemia of chronic disease     Atrial fibrillation     Benign hypertensive CKD, stage 1-4 or unspecified chronic kidney disease     Bronchitis 12/22/2023    Carotid artery occlusion     CKD (chronic kidney disease)     Coronary atherosclerosis     Cough 12/22/2023    COVID-19 ruled out 03/20/2024    Diabetes mellitus, type 2     DJD (degenerative joint disease)     History of CVA (cerebrovascular accident)     HTN (hypertension)     Hyperlipidemia     Nonischemic dilated cardiomyopathy     Obesity     Osteopenia     Paroxysmal atrial fibrillation     Presence of cardiac pacemaker     PVD (peripheral vascular disease)     Upper respiratory tract infection 12/22/2023    Vitamin D deficiency      Past Surgical History:   Procedure Laterality Date    BREAST BIOPSY      COLONOSCOPY  12/04/2018    repeat in 5 years    EMBOLECTOMY OR THROMBECTOMY, ARTERY, AORTOILIAC, FEMORAL, OR POPLITEAL Right 11/17/2024    Procedure: EMBOLECTOMY OR THROMBECTOMY, ARTERY, AORTOILIAC, FEMORAL, OR POPLITEAL;  Surgeon: Jordana Guillermo MD;  Location: [facility];  Service: Vascular;  Laterality: Right;    pace maker      VAGINAL DELIVERY      x 3     Review of patient's allergies indicates:   Allergen Reactions    Ace inhibitors Edema    Losartan Swelling     Current Outpatient Medications on File Prior to Visit   Medication Sig Dispense Refill    alendronate (FOSAMAX) 70 MG tablet Take 1 tablet (70 mg total) by mouth every 7 days. 12 tablet 3    amLODIPine (NORVASC) 10 MG tablet Take 1 tablet (10 mg total)  by mouth once daily. 90 tablet 3    aspirin (ECOTRIN) 325 MG EC tablet Take 325 mg by mouth once daily.      aspirin (ECOTRIN) 81 MG EC tablet Take 81 mg by mouth once daily.      cholecalciferol, vitamin D3, (VITAMIN D3) 50 mcg (2,000 unit) Cap capsule Take 1 capsule by mouth once daily.      colchicine (COLCRYS) 0.6 mg tablet Take 1 tablet (0.6 mg total) by mouth once daily. for gout 90 tablet 3    furosemide (LASIX) 40 MG tablet Take 1 tablet (40 mg total) by mouth once daily. 90 tablet 3    glimepiride (AMARYL) 4 MG tablet Take 1 tablet (4 mg total) by mouth daily with breakfast. 90 tablet 3    hydrALAZINE (APRESOLINE) 50 MG tablet Take 1 tablet (50 mg total) by mouth 2 (two) times a day. 180 tablet 3    isosorbide mononitrate (IMDUR) 30 MG 24 hr tablet Take 1 tablet (30 mg total) by mouth once daily. 90 tablet 3    lovastatin (MEVACOR) 20 MG tablet Take 1 tablet (20 mg total) by mouth every night 90 tablet 3    metoprolol tartrate (LOPRESSOR) 50 MG tablet Take 1 tablet (50 mg total) by mouth 2 (two) times a day. 180 tablet 3    mupirocin (BACTROBAN) 2 % ointment Apply topically 3 (three) times daily to breast 22 g 0    omeprazole (PRILOSEC) 20 MG capsule Take 1 capsule (20 mg total) by mouth once daily. 90 capsule 3    potassium chloride (MICRO-K) 10 MEQ CpSR Take 1 capsule (10 mEq total) by mouth once daily. 90 capsule 3    rivaroxaban (XARELTO) 15 mg Tab Take 1 tablet (15 mg total) by mouth once daily. 90 tablet 2    semaglutide (OZEMPIC) 1 mg/dose (4 mg/3 mL) Inject 1 mg into the skin every 7 days. 9 mL 3    spironolactone (ALDACTONE) 25 MG tablet Take 1 tablet (25 mg total) by mouth once daily. 90 tablet 3    zinc 50 mg Tab Take 50 mg by mouth once daily.       No current facility-administered medications on file prior to visit.     Social History     Socioeconomic History    Marital status:    Tobacco Use    Smoking status: Never     Passive exposure: Never    Smokeless tobacco: Never   Substance  and Sexual Activity    Alcohol use: Not Currently    Drug use: Never    Sexual activity: Not Currently     Social Drivers of Health     Financial Resource Strain: Low Risk  (11/17/2024)    Overall Financial Resource Strain (CARDIA)     Difficulty of Paying Living Expenses: Not hard at all   Food Insecurity: No Food Insecurity (11/17/2024)    Hunger Vital Sign     Worried About Running Out of Food in the Last Year: Never true     Ran Out of Food in the Last Year: Never true   Transportation Needs: No Transportation Needs (11/17/2024)    TRANSPORTATION NEEDS     Transportation : No   Physical Activity: Insufficiently Active (8/23/2024)    Exercise Vital Sign     Days of Exercise per Week: 3 days     Minutes of Exercise per Session: 30 min   Stress: No Stress Concern Present (11/17/2024)    [employer] - Occupational Stress Questionnaire     Feeling of Stress : Not at all   Housing Stability: Low Risk  (11/17/2024)    Housing Stability Vital Sign     Unable to Pay for Housing in the Last Year: No     Homeless in the Last Year: No     Family History   Problem Relation Name Age of Onset    Hypertension Mother      Hypertension Father      Hypertension Sister      Stroke Sister      Cancer Brother      Stroke Brother      Hypertension Brother      Alzheimer's disease Maternal Grandmother      Breast cancer Maternal Grandmother      Hypertension Maternal Grandmother      Hyperlipidemia Daughter      Breast cancer Daughter      Diabetes Daughter      Hypertension Daughter      Diabetes Daughter      Hypertension Daughter      Hypertension Daughter      Clotting disorder Daughter         Review of Systems   Skin:  Positive for pallor. Negative for color change, rash and wound.     Objective:     BP (!) 158/85   Pulse 82   Temp 98.3 °F (36.8 °C) (Oral)   Resp 18     Physical Exam  Constitutional:       Appearance: Normal appearance. She is obese.   HENT:      Head: Normocephalic and atraumatic.       Right Ear: External ear normal.      Left Ear: External ear normal.      Nose: Nose normal.      Mouth/Throat:      Mouth: Mucous membranes are moist.      Pharynx: Oropharynx is clear.   Eyes:      Pupils: Pupils are equal, round, and reactive to light.   Cardiovascular:      Rate and Rhythm: Normal rate and regular rhythm.      Heart sounds: Normal heart sounds.   Pulmonary:      Effort: Pulmonary effort is normal.      Breath sounds: Normal breath sounds.   Abdominal:      Palpations: Abdomen is soft.   Musculoskeletal:      Cervical back: Normal range of motion and neck supple.   Skin:     General: Skin is warm and dry.      Coloration: Skin is pale. Skin is not jaundiced.      Findings: No bruising, erythema, lesion or rash.   Neurological:      General: No focal deficit present.      Mental Status: She is alert and oriented to person, place, and time. Mental status is at baseline.   Psychiatric:         Mood and Affect: Mood normal.         Behavior: Behavior normal.         Thought Content: Thought content normal.         Judgment: Judgment normal.   Assessment:     1. Cellulitis, unspecified cellulitis site    2. Skin ulcer of female breast        Plan:     Problem List Items Addressed This Visit          Renal/    Skin ulcer of female breast       ID    Cellulitis - Primary     No follow-ups on file.  Dc  Fu prn    I am having Pam Ortega maintain her aspirin, cholecalciferol (vitamin D3), zinc, XARELTO, alendronate, amLODIPine, colchicine, furosemide, glimepiride, hydrALAZINE, isosorbide mononitrate, lovastatin, metoprolol tartrate, omeprazole, potassium chloride, OZEMPIC, spironolactone, mupirocin, and aspirin.    Pam was seen today for non-healing wound follow up.    Diagnoses and all orders for this visit:    Cellulitis, unspecified cellulitis site    Skin ulcer of female breast         [unfilled]  No orders of the defined types were placed in this encounter.

## 2024-12-30 NOTE — PATIENT INSTRUCTIONS
Wash hands before and after wound care.  Clean wound with baby shampoo and water and pat dry.  Apply Mupiricin cream to wound daily.

## 2024-12-31 ENCOUNTER — EXTERNAL CHRONIC CARE MANAGEMENT (OUTPATIENT)
Dept: PRIMARY CARE CLINIC | Facility: CLINIC | Age: 79
End: 2024-12-31
Payer: MEDICARE

## 2024-12-31 PROCEDURE — G0511 CCM/BHI BY RHC/FQHC 20MIN MO: HCPCS | Mod: ,,, | Performed by: NURSE PRACTITIONER

## 2025-01-03 ENCOUNTER — PATIENT MESSAGE (OUTPATIENT)
Dept: FAMILY MEDICINE | Facility: CLINIC | Age: 80
End: 2025-01-03
Payer: MEDICARE

## 2025-01-03 ENCOUNTER — APPOINTMENT (OUTPATIENT)
Dept: RADIOLOGY | Facility: CLINIC | Age: 80
End: 2025-01-03
Attending: NURSE PRACTITIONER
Payer: MEDICARE

## 2025-01-03 ENCOUNTER — OFFICE VISIT (OUTPATIENT)
Dept: FAMILY MEDICINE | Facility: CLINIC | Age: 80
End: 2025-01-03
Payer: MEDICARE

## 2025-01-03 VITALS
HEIGHT: 70 IN | SYSTOLIC BLOOD PRESSURE: 134 MMHG | TEMPERATURE: 98 F | BODY MASS INDEX: 31.07 KG/M2 | WEIGHT: 217 LBS | OXYGEN SATURATION: 99 % | HEART RATE: 86 BPM | RESPIRATION RATE: 18 BRPM | DIASTOLIC BLOOD PRESSURE: 76 MMHG

## 2025-01-03 DIAGNOSIS — E11.69 TYPE 2 DIABETES MELLITUS WITH OTHER SPECIFIED COMPLICATION, WITHOUT LONG-TERM CURRENT USE OF INSULIN: ICD-10-CM

## 2025-01-03 DIAGNOSIS — L98.499 SKIN ULCER OF FEMALE BREAST: ICD-10-CM

## 2025-01-03 DIAGNOSIS — N18.4 CHRONIC KIDNEY DISEASE (CKD), STAGE IV (SEVERE): ICD-10-CM

## 2025-01-03 DIAGNOSIS — R06.02 SHORTNESS OF BREATH: ICD-10-CM

## 2025-01-03 DIAGNOSIS — I50.9 CONGESTIVE HEART FAILURE, UNSPECIFIED HF CHRONICITY, UNSPECIFIED HEART FAILURE TYPE: ICD-10-CM

## 2025-01-03 DIAGNOSIS — I48.19 PERSISTENT ATRIAL FIBRILLATION: ICD-10-CM

## 2025-01-03 DIAGNOSIS — R06.02 SHORTNESS OF BREATH: Primary | ICD-10-CM

## 2025-01-03 PROCEDURE — 71046 X-RAY EXAM CHEST 2 VIEWS: CPT | Mod: 26,,, | Performed by: RADIOLOGY

## 2025-01-03 PROCEDURE — 71046 X-RAY EXAM CHEST 2 VIEWS: CPT | Mod: TC,RHCUB | Performed by: NURSE PRACTITIONER

## 2025-01-03 NOTE — PROGRESS NOTES
Subjective     Patient ID: Pam Ortega is a 79 y.o. female.    Chief Complaint: Shortness of Breath and Edema (Shortness of breath x 2 weeks.)    Pt presents with mild shortness of breath off and on x several weeks when ambulating.       Review of Systems   Constitutional:  Negative for activity change, appetite change, chills, fatigue and fever.   HENT:  Negative for nasal congestion, ear discharge, nosebleeds, postnasal drip, rhinorrhea, sinus pressure/congestion, sneezing, sore throat and tinnitus.    Eyes:  Negative for pain, discharge, redness and itching.   Respiratory:  Positive for shortness of breath. Negative for cough, choking, chest tightness and wheezing.    Cardiovascular:  Negative for chest pain.   Gastrointestinal:  Negative for abdominal distention, abdominal pain, blood in stool, change in bowel habit, constipation, diarrhea, nausea and vomiting.   Genitourinary:  Negative for decreased urine volume, dysuria, flank pain and frequency.   Musculoskeletal:  Negative for back pain and gait problem.   Integumentary:  Negative for wound, breast mass and breast discharge.   Allergic/Immunologic: Negative for immunocompromised state.   Neurological:  Negative for dizziness, light-headedness and headaches.   Psychiatric/Behavioral:  Negative for agitation, behavioral problems and hallucinations.    Breast: Negative for mass         Objective     Physical Exam  Vitals and nursing note reviewed.   Constitutional:       Appearance: Normal appearance.   Cardiovascular:      Rate and Rhythm: Normal rate and regular rhythm.      Heart sounds: Normal heart sounds.   Pulmonary:      Effort: Pulmonary effort is normal.      Breath sounds: Normal breath sounds.   Musculoskeletal:         General: Normal range of motion.      Right lower leg: No edema.      Left lower leg: No edema.   Neurological:      Mental Status: She is alert and oriented to person, place, and time.   Psychiatric:         Mood and  Affect: Mood normal.         Behavior: Behavior normal.            Assessment and Plan     1. Shortness of breath  -     X-Ray Chest PA And Lateral; Future; Expected date: 01/03/2025    2. Type 2 diabetes mellitus with other specified complication, without long-term current use of insulin    3. Congestive heart failure, unspecified HF chronicity, unspecified heart failure type    4. Persistent atrial fibrillation    5. Chronic kidney disease (CKD), stage IV (severe)    6. Skin ulcer of female breast        Will call pt with xray results. Follow-up with all specialist as directed-cardiology, pulmonology and cardiology. Go to the er with any worsening symptoms.          Follow up if symptoms worsen or fail to improve.

## 2025-01-13 ENCOUNTER — OFFICE VISIT (OUTPATIENT)
Dept: PULMONOLOGY | Facility: CLINIC | Age: 80
End: 2025-01-13
Payer: MEDICARE

## 2025-01-13 VITALS
OXYGEN SATURATION: 96 % | HEIGHT: 70 IN | DIASTOLIC BLOOD PRESSURE: 80 MMHG | HEART RATE: 69 BPM | BODY MASS INDEX: 31.06 KG/M2 | WEIGHT: 216.94 LBS | SYSTOLIC BLOOD PRESSURE: 124 MMHG | RESPIRATION RATE: 16 BRPM

## 2025-01-13 DIAGNOSIS — R06.02 SHORTNESS OF BREATH: Primary | ICD-10-CM

## 2025-01-13 PROCEDURE — 99215 OFFICE O/P EST HI 40 MIN: CPT | Mod: PBBFAC | Performed by: INTERNAL MEDICINE

## 2025-01-13 PROCEDURE — 99999 PR PBB SHADOW E&M-EST. PATIENT-LVL V: CPT | Mod: PBBFAC,,, | Performed by: INTERNAL MEDICINE

## 2025-01-13 PROCEDURE — 99214 OFFICE O/P EST MOD 30 MIN: CPT | Mod: S$PBB,,, | Performed by: INTERNAL MEDICINE

## 2025-01-13 RX ORDER — FLUTICASONE PROPIONATE AND SALMETEROL 250; 50 UG/1; UG/1
1 POWDER RESPIRATORY (INHALATION) 2 TIMES DAILY
Qty: 60 EACH | Refills: 5 | Status: SHIPPED | OUTPATIENT
Start: 2025-01-13 | End: 2026-01-13

## 2025-01-13 RX ORDER — ALBUTEROL SULFATE 90 UG/1
2 INHALANT RESPIRATORY (INHALATION) EVERY 6 HOURS PRN
Qty: 18 G | Refills: 5 | Status: SHIPPED | OUTPATIENT
Start: 2025-01-13

## 2025-01-13 NOTE — PROGRESS NOTES
Subjective:       Patient ID: Pam Ortega is a 79 y.o. female.    Chief Complaint: Pleural Effusion (New patient referred by Janice Velez for pleural effusion. Ongoing for about one week and c/o of SOB. Had CXR last week. Denies any CP.)    Pleural Effusion  This is a chronic problem. The current episode started more than 1 month ago. The problem has been unchanged. Pertinent negatives include no abdominal pain, arthralgias, chest pain, chills, congestion, headaches or rash. The symptoms are aggravated by exertion.     Past Medical History:   Diagnosis Date    Anemia of chronic disease     Atrial fibrillation     Benign hypertensive CKD, stage 1-4 or unspecified chronic kidney disease     Bronchitis 12/22/2023    Carotid artery occlusion     CKD (chronic kidney disease)     Coronary atherosclerosis     Cough 12/22/2023    COVID-19 ruled out 03/20/2024    Diabetes mellitus, type 2     DJD (degenerative joint disease)     History of CVA (cerebrovascular accident)     HTN (hypertension)     Hyperlipidemia     Nonischemic dilated cardiomyopathy     Obesity     Osteopenia     Paroxysmal atrial fibrillation     Presence of cardiac pacemaker     PVD (peripheral vascular disease)     Upper respiratory tract infection 12/22/2023    Vitamin D deficiency      Past Surgical History:   Procedure Laterality Date    BREAST BIOPSY      COLONOSCOPY  12/04/2018    repeat in 5 years    EMBOLECTOMY OR THROMBECTOMY, ARTERY, AORTOILIAC, FEMORAL, OR POPLITEAL Right 11/17/2024    Procedure: EMBOLECTOMY OR THROMBECTOMY, ARTERY, AORTOILIAC, FEMORAL, OR POPLITEAL;  Surgeon: Jordana Guillermo MD;  Location: [facility];  Service: Vascular;  Laterality: Right;    pace maker      VAGINAL DELIVERY      x 3     Family History   Problem Relation Name Age of Onset    Hypertension Mother      Hypertension Father      Hypertension Sister      Stroke Sister      Cancer Brother      Stroke Brother      Hypertension Brother      Alzheimer's  disease Maternal Grandmother      Breast cancer Maternal Grandmother      Hypertension Maternal Grandmother      Hyperlipidemia Daughter      Breast cancer Daughter      Diabetes Daughter      Hypertension Daughter      Diabetes Daughter      Hypertension Daughter      Hypertension Daughter      Clotting disorder Daughter       Review of patient's allergies indicates:   Allergen Reactions    Ace inhibitors Edema    Losartan Swelling      Social History     Tobacco Use    Smoking status: Never     Passive exposure: Never    Smokeless tobacco: Never   Substance Use Topics    Alcohol use: Not Currently    Drug use: Never      Review of Systems   Constitutional:  Negative for chills, activity change and night sweats.   HENT:  Negative for congestion and ear pain.    Eyes:  Negative for redness and itching.   Cardiovascular:  Negative for chest pain and palpitations.   Musculoskeletal:  Negative for arthralgias and back pain.   Skin:  Negative for rash.   Gastrointestinal:  Negative for abdominal pain and abdominal distention.   Neurological:  Negative for dizziness and headaches.   Hematological:  Negative for adenopathy. Does not bruise/bleed easily.   Psychiatric/Behavioral:  Negative for confusion. The patient is not nervous/anxious.        Objective:      Physical Exam   Constitutional: She is oriented to person, place, and time. She appears well-developed and well-nourished.   HENT:   Head: Normocephalic.   Nose: Nose normal.   Mouth/Throat: Oropharynx is clear and moist.   Neck: No JVD present. No thyromegaly present.   Cardiovascular: Normal rate, regular rhythm, normal heart sounds and intact distal pulses.   Pulmonary/Chest: Normal expansion, hyperinflation, symmetric chest wall expansion, effort normal and breath sounds normal.   Abdominal: Soft. Bowel sounds are normal.   Musculoskeletal:         General: Normal range of motion.      Cervical back: Normal range of motion and neck supple.   Lymphadenopathy: No  "supraclavicular adenopathy is present.     She has no cervical adenopathy.   Neurological: She is alert and oriented to person, place, and time. She has normal reflexes.   Skin: Skin is warm and dry.   Psychiatric: She has a normal mood and affect. Her behavior is normal.     Personal Diagnostic Review  none pertinent        1/13/2025     1:04 PM 1/3/2025     8:48 AM 12/3/2024     3:17 PM 12/3/2024     1:17 PM 11/19/2024     2:46 PM 11/19/2024     2:31 PM 11/19/2024     2:16 PM   Pulmonary Function Tests   SpO2 96 % 99 %  99 % 90 % 92 % 97 %   Height 5' 10" (1.778 m) 5' 10" (1.778 m) 5' 10" (1.778 m) 5' 10" (1.778 m)      Weight 98.4 kg (216 lb 14.9 oz) 98.4 kg (217 lb) 96 kg (211 lb 10.3 oz) 96.7 kg (213 lb 3.2 oz)      BMI (Calculated) 31.1 31.1 30.4 30.6            Assessment:       1. Shortness of breath        Outpatient Encounter Medications as of 1/13/2025   Medication Sig Dispense Refill    alendronate (FOSAMAX) 70 MG tablet Take 1 tablet (70 mg total) by mouth every 7 days. 12 tablet 3    amLODIPine (NORVASC) 10 MG tablet Take 1 tablet (10 mg total) by mouth once daily. 90 tablet 3    aspirin (ECOTRIN) 81 MG EC tablet Take 81 mg by mouth once daily.      cholecalciferol, vitamin D3, (VITAMIN D3) 50 mcg (2,000 unit) Cap capsule Take 1 capsule by mouth once daily.      colchicine (COLCRYS) 0.6 mg tablet Take 1 tablet (0.6 mg total) by mouth once daily. for gout 90 tablet 3    furosemide (LASIX) 80 MG tablet Take 1 tablet (80 mg total) by mouth every morning. 90 tablet 11    glimepiride (AMARYL) 4 MG tablet Take 1 tablet (4 mg total) by mouth daily with breakfast. 90 tablet 3    hydrALAZINE (APRESOLINE) 50 MG tablet Take 1 tablet (50 mg total) by mouth 2 (two) times a day. 180 tablet 3    isosorbide mononitrate (IMDUR) 30 MG 24 hr tablet Take 1 tablet (30 mg total) by mouth once daily. 90 tablet 3    lovastatin (MEVACOR) 20 MG tablet Take 1 tablet (20 mg total) by mouth every night 90 tablet 3    metoprolol " tartrate (LOPRESSOR) 50 MG tablet Take 1 tablet (50 mg total) by mouth 2 (two) times a day. 180 tablet 3    mupirocin (BACTROBAN) 2 % ointment Apply topically 3 (three) times daily to breast 22 g 0    omeprazole (PRILOSEC) 20 MG capsule Take 1 capsule (20 mg total) by mouth once daily. 90 capsule 3    potassium chloride (MICRO-K) 10 MEQ CpSR Take 1 capsule (10 mEq total) by mouth once daily. 90 capsule 3    rivaroxaban (XARELTO) 15 mg Tab Take 1 tablet (15 mg total) by mouth once daily. 90 tablet 2    semaglutide (OZEMPIC) 1 mg/dose (4 mg/3 mL) Inject 1 mg into the skin every 7 days. 9 mL 3    spironolactone (ALDACTONE) 25 MG tablet Take 1 tablet (25 mg total) by mouth once daily. 90 tablet 3    albuterol (VENTOLIN HFA) 90 mcg/actuation inhaler Inhale 2 puffs into the lungs every 6 (six) hours as needed for Wheezing. Rescue Inhaler 18 g 5    fluticasone-salmeterol diskus inhaler 250-50 mcg Inhale 1 puff into the lungs 2 (two) times daily. Controller Inhaler 60 each 5    furosemide (LASIX) 40 MG tablet Take 1 tablet (40 mg total) by mouth once daily. (Patient not taking: Reported on 1/13/2025) 90 tablet 3     No facility-administered encounter medications on file as of 1/13/2025.     No orders of the defined types were placed in this encounter.      Plan:       Problem List Items Addressed This Visit          Pulmonary    Shortness of breath - Primary     Patient has been more short of breath lately.  No new problems this is come on over last 2 weeks she saw Dr. Dutta today was put on a higher dose of Lasix my plan is to treat her with inhaled steroids and broncho dilators will see her back in the clinic in 1 week

## 2025-01-13 NOTE — ASSESSMENT & PLAN NOTE
Patient has been more short of breath lately.  No new problems this is come on over last 2 weeks she saw Dr. Dutta today was put on a higher dose of Lasix my plan is to treat her with inhaled steroids and broncho dilators will see her back in the clinic in 1 week

## 2025-01-15 ENCOUNTER — TELEPHONE (OUTPATIENT)
Dept: PULMONOLOGY | Facility: CLINIC | Age: 80
End: 2025-01-15
Payer: MEDICARE

## 2025-01-15 RX ORDER — BUDESONIDE AND FORMOTEROL FUMARATE DIHYDRATE 160; 4.5 UG/1; UG/1
2 AEROSOL RESPIRATORY (INHALATION) EVERY 12 HOURS
Qty: 10.2 G | Refills: 5 | Status: SHIPPED | OUTPATIENT
Start: 2025-01-15 | End: 2025-04-17

## 2025-01-15 NOTE — TELEPHONE ENCOUNTER
----- Message from Radha sent at 1/15/2025  3:43 PM CST -----  Who Called: Marianela Ortega, Daughter      Preferred Method of Contact: Phone Call  Patient's Preferred Phone Number on File: [phone]   Best Call Back Number, if different: [phone]  Additional Information: pt daughter had called earlier and spoke with the nurse about her mother's medication and she was checking to see if the medicine had been sent in to the pharmacy

## 2025-01-15 NOTE — TELEPHONE ENCOUNTER
Called both numbers listed below [phone] (voiced Madhavi was not there) and [phone](no answer).  sent a new rx to pharmacy (Symbicort)

## 2025-01-19 ENCOUNTER — HOSPITAL ENCOUNTER (INPATIENT)
Facility: HOSPITAL | Age: 80
LOS: 8 days | Discharge: HOME HEALTH | DRG: 189 | End: 2025-01-27
Attending: EMERGENCY MEDICINE | Admitting: EMERGENCY MEDICINE
Payer: MEDICARE

## 2025-01-19 DIAGNOSIS — R06.02 SHORTNESS OF BREATH: ICD-10-CM

## 2025-01-19 DIAGNOSIS — J96.01 ACUTE HYPOXIC RESPIRATORY FAILURE: Primary | ICD-10-CM

## 2025-01-19 DIAGNOSIS — R07.9 CHEST PAIN: ICD-10-CM

## 2025-01-19 DIAGNOSIS — J90 PLEURAL EFFUSION: ICD-10-CM

## 2025-01-19 PROBLEM — E11.21 DIABETIC NEPHROPATHY: Status: ACTIVE | Noted: 2025-01-19

## 2025-01-19 PROBLEM — I73.9 PERIPHERAL VASCULAR DISEASE: Status: ACTIVE | Noted: 2025-01-19

## 2025-01-19 LAB
ALBUMIN SERPL BCP-MCNC: 3.4 G/DL (ref 3.4–4.8)
ALBUMIN/GLOB SERPL: 0.9 {RATIO}
ALP SERPL-CCNC: 81 U/L (ref 40–150)
ALT SERPL W P-5'-P-CCNC: 16 U/L
ANION GAP SERPL CALCULATED.3IONS-SCNC: 18 MMOL/L (ref 7–16)
AST SERPL W P-5'-P-CCNC: 44 U/L (ref 5–34)
BASOPHILS # BLD AUTO: 0.02 K/UL (ref 0–0.2)
BASOPHILS NFR BLD AUTO: 0.3 % (ref 0–1)
BILIRUB SERPL-MCNC: 2.2 MG/DL
BUN SERPL-MCNC: 28 MG/DL (ref 10–20)
BUN/CREAT SERPL: 14 (ref 6–20)
CALCIUM SERPL-MCNC: 8.7 MG/DL (ref 8.4–10.2)
CHLORIDE SERPL-SCNC: 105 MMOL/L (ref 98–107)
CO2 SERPL-SCNC: 16 MMOL/L (ref 23–31)
CREAT SERPL-MCNC: 2 MG/DL (ref 0.55–1.02)
DIFFERENTIAL METHOD BLD: ABNORMAL
EGFR (NO RACE VARIABLE) (RUSH/TITUS): 25 ML/MIN/1.73M2
EOSINOPHIL # BLD AUTO: 0.01 K/UL (ref 0–0.5)
EOSINOPHIL NFR BLD AUTO: 0.1 % (ref 1–4)
ERYTHROCYTE [DISTWIDTH] IN BLOOD BY AUTOMATED COUNT: 17.2 % (ref 11.5–14.5)
GLOBULIN SER-MCNC: 3.8 G/DL (ref 2–4)
GLUCOSE SERPL-MCNC: 86 MG/DL (ref 70–105)
GLUCOSE SERPL-MCNC: 94 MG/DL (ref 82–115)
HCT VFR BLD AUTO: 36.8 % (ref 38–47)
HGB BLD-MCNC: 11.6 G/DL (ref 12–16)
IMM GRANULOCYTES # BLD AUTO: 0.02 K/UL (ref 0–0.04)
IMM GRANULOCYTES NFR BLD: 0.3 % (ref 0–0.4)
LYMPHOCYTES # BLD AUTO: 1.9 K/UL (ref 1–4.8)
LYMPHOCYTES NFR BLD AUTO: 27.6 % (ref 27–41)
MCH RBC QN AUTO: 26.5 PG (ref 27–31)
MCHC RBC AUTO-ENTMCNC: 31.5 G/DL (ref 32–36)
MCV RBC AUTO: 84 FL (ref 80–96)
MONOCYTES # BLD AUTO: 0.95 K/UL (ref 0–0.8)
MONOCYTES NFR BLD AUTO: 13.8 % (ref 2–6)
MPC BLD CALC-MCNC: 10 FL (ref 9.4–12.4)
NEUTROPHILS # BLD AUTO: 3.98 K/UL (ref 1.8–7.7)
NEUTROPHILS NFR BLD AUTO: 57.9 % (ref 53–65)
NRBC # BLD AUTO: 0 X10E3/UL
NRBC, AUTO (.00): 0 %
NT-PROBNP SERPL-MCNC: 4404 PG/ML (ref 1–450)
PLATELET # BLD AUTO: 216 K/UL (ref 150–400)
POTASSIUM SERPL-SCNC: 4.6 MMOL/L (ref 3.5–5.1)
PROT SERPL-MCNC: 7.2 G/DL (ref 5.8–7.6)
RBC # BLD AUTO: 4.38 M/UL (ref 4.2–5.4)
SODIUM SERPL-SCNC: 134 MMOL/L (ref 136–145)
TROPONIN I SERPL HS-MCNC: 627.9 NG/L
TROPONIN I SERPL HS-MCNC: 650 NG/L
WBC # BLD AUTO: 6.88 K/UL (ref 4.5–11)

## 2025-01-19 PROCEDURE — 99900035 HC TECH TIME PER 15 MIN (STAT)

## 2025-01-19 PROCEDURE — 25000003 PHARM REV CODE 250: Performed by: EMERGENCY MEDICINE

## 2025-01-19 PROCEDURE — 63600175 PHARM REV CODE 636 W HCPCS: Performed by: STUDENT IN AN ORGANIZED HEALTH CARE EDUCATION/TRAINING PROGRAM

## 2025-01-19 PROCEDURE — 85025 COMPLETE CBC W/AUTO DIFF WBC: CPT | Performed by: EMERGENCY MEDICINE

## 2025-01-19 PROCEDURE — 82962 GLUCOSE BLOOD TEST: CPT

## 2025-01-19 PROCEDURE — 5A09357 ASSISTANCE WITH RESPIRATORY VENTILATION, LESS THAN 24 CONSECUTIVE HOURS, CONTINUOUS POSITIVE AIRWAY PRESSURE: ICD-10-PCS | Performed by: STUDENT IN AN ORGANIZED HEALTH CARE EDUCATION/TRAINING PROGRAM

## 2025-01-19 PROCEDURE — 96374 THER/PROPH/DIAG INJ IV PUSH: CPT

## 2025-01-19 PROCEDURE — 83880 ASSAY OF NATRIURETIC PEPTIDE: CPT | Performed by: EMERGENCY MEDICINE

## 2025-01-19 PROCEDURE — 27000190 HC CPAP FULL FACE MASK W/VALVE

## 2025-01-19 PROCEDURE — 99285 EMERGENCY DEPT VISIT HI MDM: CPT | Mod: 25

## 2025-01-19 PROCEDURE — 27000221 HC OXYGEN, UP TO 24 HOURS

## 2025-01-19 PROCEDURE — 80053 COMPREHEN METABOLIC PANEL: CPT | Performed by: EMERGENCY MEDICINE

## 2025-01-19 PROCEDURE — 99900031 HC PATIENT EDUCATION (STAT)

## 2025-01-19 PROCEDURE — 94799 UNLISTED PULMONARY SVC/PX: CPT

## 2025-01-19 PROCEDURE — 11000001 HC ACUTE MED/SURG PRIVATE ROOM

## 2025-01-19 PROCEDURE — 25000003 PHARM REV CODE 250: Performed by: STUDENT IN AN ORGANIZED HEALTH CARE EDUCATION/TRAINING PROGRAM

## 2025-01-19 PROCEDURE — 99223 1ST HOSP IP/OBS HIGH 75: CPT | Mod: AI,,, | Performed by: STUDENT IN AN ORGANIZED HEALTH CARE EDUCATION/TRAINING PROGRAM

## 2025-01-19 PROCEDURE — 93010 ELECTROCARDIOGRAM REPORT: CPT | Mod: ,,, | Performed by: INTERNAL MEDICINE

## 2025-01-19 PROCEDURE — 84484 ASSAY OF TROPONIN QUANT: CPT | Performed by: EMERGENCY MEDICINE

## 2025-01-19 PROCEDURE — 94660 CPAP INITIATION&MGMT: CPT

## 2025-01-19 PROCEDURE — 63600175 PHARM REV CODE 636 W HCPCS: Performed by: EMERGENCY MEDICINE

## 2025-01-19 PROCEDURE — 93005 ELECTROCARDIOGRAM TRACING: CPT

## 2025-01-19 RX ORDER — SPIRONOLACTONE 25 MG/1
25 TABLET ORAL DAILY
Status: DISCONTINUED | OUTPATIENT
Start: 2025-01-20 | End: 2025-01-22

## 2025-01-19 RX ORDER — ASPIRIN 81 MG/1
81 TABLET ORAL DAILY
Status: DISCONTINUED | OUTPATIENT
Start: 2025-01-20 | End: 2025-01-27 | Stop reason: HOSPADM

## 2025-01-19 RX ORDER — NALOXONE HCL 0.4 MG/ML
0.02 VIAL (ML) INJECTION
Status: DISCONTINUED | OUTPATIENT
Start: 2025-01-19 | End: 2025-01-27 | Stop reason: HOSPADM

## 2025-01-19 RX ORDER — PANTOPRAZOLE SODIUM 40 MG/1
40 TABLET, DELAYED RELEASE ORAL DAILY
Status: DISCONTINUED | OUTPATIENT
Start: 2025-01-20 | End: 2025-01-27 | Stop reason: HOSPADM

## 2025-01-19 RX ORDER — COLCHICINE 0.6 MG/1
0.6 TABLET, FILM COATED ORAL DAILY
Status: DISCONTINUED | OUTPATIENT
Start: 2025-01-20 | End: 2025-01-22

## 2025-01-19 RX ORDER — INSULIN ASPART 100 [IU]/ML
0-5 INJECTION, SOLUTION INTRAVENOUS; SUBCUTANEOUS
Status: DISCONTINUED | OUTPATIENT
Start: 2025-01-19 | End: 2025-01-27 | Stop reason: HOSPADM

## 2025-01-19 RX ORDER — HYDRALAZINE HYDROCHLORIDE 50 MG/1
50 TABLET, FILM COATED ORAL 2 TIMES DAILY
Status: DISCONTINUED | OUTPATIENT
Start: 2025-01-19 | End: 2025-01-23

## 2025-01-19 RX ORDER — TRAZODONE HYDROCHLORIDE 50 MG/1
100 TABLET ORAL NIGHTLY PRN
Status: DISCONTINUED | OUTPATIENT
Start: 2025-01-19 | End: 2025-01-27 | Stop reason: HOSPADM

## 2025-01-19 RX ORDER — ATORVASTATIN CALCIUM 40 MG/1
40 TABLET, FILM COATED ORAL DAILY
Status: DISCONTINUED | OUTPATIENT
Start: 2025-01-20 | End: 2025-01-22

## 2025-01-19 RX ORDER — IBUPROFEN 200 MG
16 TABLET ORAL
Status: DISCONTINUED | OUTPATIENT
Start: 2025-01-19 | End: 2025-01-27 | Stop reason: HOSPADM

## 2025-01-19 RX ORDER — FUROSEMIDE 10 MG/ML
80 INJECTION INTRAMUSCULAR; INTRAVENOUS EVERY 12 HOURS
Status: DISCONTINUED | OUTPATIENT
Start: 2025-01-19 | End: 2025-01-20

## 2025-01-19 RX ORDER — ISOSORBIDE MONONITRATE 30 MG/1
30 TABLET, EXTENDED RELEASE ORAL DAILY
Status: DISCONTINUED | OUTPATIENT
Start: 2025-01-20 | End: 2025-01-27 | Stop reason: HOSPADM

## 2025-01-19 RX ORDER — ASPIRIN 325 MG
325 TABLET ORAL
Status: COMPLETED | OUTPATIENT
Start: 2025-01-19 | End: 2025-01-19

## 2025-01-19 RX ORDER — GLUCAGON 1 MG
1 KIT INJECTION
Status: DISCONTINUED | OUTPATIENT
Start: 2025-01-19 | End: 2025-01-27 | Stop reason: HOSPADM

## 2025-01-19 RX ORDER — OXYCODONE HYDROCHLORIDE 5 MG/1
5 TABLET ORAL EVERY 6 HOURS PRN
Status: DISCONTINUED | OUTPATIENT
Start: 2025-01-19 | End: 2025-01-22

## 2025-01-19 RX ORDER — POLYETHYLENE GLYCOL 3350 17 G/17G
17 POWDER, FOR SOLUTION ORAL 2 TIMES DAILY PRN
Status: DISCONTINUED | OUTPATIENT
Start: 2025-01-19 | End: 2025-01-27 | Stop reason: HOSPADM

## 2025-01-19 RX ORDER — FUROSEMIDE 10 MG/ML
80 INJECTION INTRAMUSCULAR; INTRAVENOUS EVERY 12 HOURS
Status: DISCONTINUED | OUTPATIENT
Start: 2025-01-19 | End: 2025-01-19

## 2025-01-19 RX ORDER — AMLODIPINE BESYLATE 10 MG/1
10 TABLET ORAL DAILY
Status: DISCONTINUED | OUTPATIENT
Start: 2025-01-20 | End: 2025-01-27 | Stop reason: HOSPADM

## 2025-01-19 RX ORDER — SODIUM CHLORIDE 0.9 % (FLUSH) 0.9 %
10 SYRINGE (ML) INJECTION EVERY 12 HOURS PRN
Status: DISCONTINUED | OUTPATIENT
Start: 2025-01-19 | End: 2025-01-27 | Stop reason: HOSPADM

## 2025-01-19 RX ORDER — FUROSEMIDE 10 MG/ML
80 INJECTION INTRAMUSCULAR; INTRAVENOUS
Status: COMPLETED | OUTPATIENT
Start: 2025-01-19 | End: 2025-01-19

## 2025-01-19 RX ORDER — ONDANSETRON 4 MG/1
4 TABLET, ORALLY DISINTEGRATING ORAL EVERY 6 HOURS PRN
Status: DISCONTINUED | OUTPATIENT
Start: 2025-01-19 | End: 2025-01-27 | Stop reason: HOSPADM

## 2025-01-19 RX ORDER — IBUPROFEN 200 MG
24 TABLET ORAL
Status: DISCONTINUED | OUTPATIENT
Start: 2025-01-19 | End: 2025-01-27 | Stop reason: HOSPADM

## 2025-01-19 RX ADMIN — FUROSEMIDE 80 MG: 10 INJECTION, SOLUTION INTRAMUSCULAR; INTRAVENOUS at 09:01

## 2025-01-19 RX ADMIN — FUROSEMIDE 80 MG: 10 INJECTION, SOLUTION INTRAMUSCULAR; INTRAVENOUS at 03:01

## 2025-01-19 RX ADMIN — HYDRALAZINE HYDROCHLORIDE 50 MG: 50 TABLET ORAL at 09:01

## 2025-01-19 RX ADMIN — ASPIRIN 325 MG ORAL TABLET 325 MG: 325 PILL ORAL at 03:01

## 2025-01-19 NOTE — H&P
79-year-old female with anemia chronic disease, AFib, CKD, hypertension, bronchitis, CAD, type 2 diabetes, hyperlipidemia, dilated cardiomyopathy, heart failure with preserved ejection fraction, obesity, peripheral vascular disease presents to the ED with progressively worsening shortness of breath.  She has had pleural effusion chronically and follow up with Dr. Mckee.  She recently saw him in clinic and had her inhalers adjusted.  Recently saw Dr. Dutta and had her diuretics increased.  Even with this for shortness of breath progressively worsened over the last several days which prompted her presentation to the ED.  She denies any chest pain.  Denies any fever, chills, palpitations, nausea vomiting diarrhea dysuria.  Review of systems otherwise negative     #respiratory failure secondary to pleural effusion   We will try diuresis overnight if no better we will consult pulmonology per Dr. Rocha request for thoracentesis tomorrow.    Effusion loculated.  We will consult pulmonology.  May need surgery eval.    Full H and P to follow

## 2025-01-19 NOTE — ED PROVIDER NOTES
Encounter Date: 1/19/2025    SCRIBE #1 NOTE: I, Marycarmen Dewitt, am scribing for, and in the presence of,  Aleksander Leonardo MD. I have scribed the entire note.       History     Chief Complaint   Patient presents with    Shortness of Breath     Patient presents to the ER with complaints of SOB that has been going on about a month but has worsened today .Has hx of pleural effusion and chf     The pt is a 78 y/o female coming into the ED with complaints of SOB. Pt reports SOB is usual as of the last month but reports worsening today. Pt's relative reports cough, nausea, and vomiting as well. Pt denies fever and constipation. Pt describes feeling smothered. She reports a Hx of Afib, CHF, and diabetes. Pt also reports a pleural effusion 5 years ago. She reports a recent office visit with Dr. Mckee 1/13. She states had her lasix increased to 80mg. The relative reports pt has another appointment with Dr. Mckee on 1/21. She states they were told pt would receive a CT if there had been no improvement with the medication changes. There are no other complaints at this time.    The history is provided by the patient and a relative. No  was used.     Review of patient's allergies indicates:   Allergen Reactions    Ace inhibitors Edema    Losartan Swelling     Past Medical History:   Diagnosis Date    Anemia of chronic disease     Atrial fibrillation     Benign hypertensive CKD, stage 1-4 or unspecified chronic kidney disease     Bronchitis 12/22/2023    Carotid artery occlusion     CKD (chronic kidney disease)     Coronary atherosclerosis     Cough 12/22/2023    COVID-19 ruled out 03/20/2024    Diabetes mellitus, type 2     DJD (degenerative joint disease)     History of CVA (cerebrovascular accident)     HTN (hypertension)     Hyperlipidemia     Nonischemic dilated cardiomyopathy     Obesity     Osteopenia     Paroxysmal atrial fibrillation     Presence of cardiac pacemaker     PVD (peripheral vascular  disease)     Upper respiratory tract infection 12/22/2023    Vitamin D deficiency      Past Surgical History:   Procedure Laterality Date    BREAST BIOPSY      COLONOSCOPY  12/04/2018    repeat in 5 years    EMBOLECTOMY OR THROMBECTOMY, ARTERY, AORTOILIAC, FEMORAL, OR POPLITEAL Right 11/17/2024    Procedure: EMBOLECTOMY OR THROMBECTOMY, ARTERY, AORTOILIAC, FEMORAL, OR POPLITEAL;  Surgeon: Jordana Guillermo MD;  Location: [facility];  Service: Vascular;  Laterality: Right;    pace maker      VAGINAL DELIVERY      x 3     Family History   Problem Relation Name Age of Onset    Hypertension Mother      Hypertension Father      Hypertension Sister      Stroke Sister      Cancer Brother      Stroke Brother      Hypertension Brother      Alzheimer's disease Maternal Grandmother      Breast cancer Maternal Grandmother      Hypertension Maternal Grandmother      Hyperlipidemia Daughter      Breast cancer Daughter      Diabetes Daughter      Hypertension Daughter      Diabetes Daughter      Hypertension Daughter      Hypertension Daughter      Clotting disorder Daughter       Social History     Tobacco Use    Smoking status: Never     Passive exposure: Never    Smokeless tobacco: Never   Substance Use Topics    Alcohol use: Not Currently    Drug use: Never     Review of Systems   Constitutional:  Negative for fever.   Respiratory:  Positive for cough and shortness of breath.    Gastrointestinal:  Positive for nausea and vomiting. Negative for constipation.   All other systems reviewed and are negative.      Physical Exam     Initial Vitals [01/19/25 1257]   BP Pulse Resp Temp SpO2   137/79 70 (!) 24 98.4 °F (36.9 °C) 95 %      MAP       --         Physical Exam    Nursing note and vitals reviewed.  Constitutional: She appears well-developed and well-nourished. She is Obese .   HENT:   Head: Normocephalic and atraumatic.   Right Ear: External ear normal.   Left Ear: External ear normal.   Nose: Nose normal. Mouth/Throat:  Oropharynx is clear and moist.   Eyes: Conjunctivae and EOM are normal. Pupils are equal, round, and reactive to light. No scleral icterus.   Neck: Neck supple. No JVD present.   Normal range of motion.  Cardiovascular:  Normal rate, regular rhythm, normal heart sounds and intact distal pulses.     Exam reveals no gallop and no friction rub.       No murmur heard.  Pulmonary/Chest: Breath sounds normal. No stridor. Tachypnea noted. No respiratory distress. She has no wheezes. She exhibits no tenderness.   Abdominal: Abdomen is soft. Bowel sounds are normal. She exhibits no distension and no mass. There is no abdominal tenderness.   Abdomen is protuberant  There is no rebound and no guarding.   Musculoskeletal:         General: No tenderness or edema. Normal range of motion.      Cervical back: Normal range of motion and neck supple.      Comments: Back is nontender to palpation.      Neurological: She is alert and oriented to person, place, and time. She has normal strength. No cranial nerve deficit.   Skin: Skin is warm and dry. Capillary refill takes less than 2 seconds. No rash noted. No pallor.   Psychiatric: She has a normal mood and affect. Thought content normal.         ED Course   Procedures  Labs Reviewed   COMPREHENSIVE METABOLIC PANEL - Abnormal       Result Value    Sodium 134 (*)     Potassium 4.6      Chloride 105      CO2 16 (*)     Anion Gap 18 (*)     Glucose 94      BUN 28 (*)     Creatinine 2.00 (*)     BUN/Creatinine Ratio 14      Calcium 8.7      Total Protein 7.2      Albumin 3.4      Globulin 3.8      A/G Ratio 0.9      Bilirubin, Total 2.2 (*)     Alk Phos 81      ALT 16      AST 44 (*)     eGFR 25 (*)    TROPONIN I - Abnormal    Troponin I High Sensitivity 650.0 (*)    NT-PRO NATRIURETIC PEPTIDE - Abnormal    ProBNP 4,404 (*)    CBC WITH DIFFERENTIAL - Abnormal    WBC 6.88      RBC 4.38      Hemoglobin 11.6 (*)     Hematocrit 36.8 (*)     MCV 84.0      MCH 26.5 (*)     MCHC 31.5 (*)     RDW  17.2 (*)     Platelet Count 216      MPV 10.0      Neutrophils % 57.9      Lymphocytes % 27.6      Monocytes % 13.8 (*)     Eosinophils % 0.1 (*)     Basophils % 0.3      Immature Granulocytes % 0.3      nRBC, Auto 0.0      Neutrophils, Abs 3.98      Lymphocytes, Absolute 1.90      Monocytes, Absolute 0.95 (*)     Eosinophils, Absolute 0.01      Basophils, Absolute 0.02      Immature Granulocytes, Absolute 0.02      nRBC, Absolute 0.00      Diff Type Auto     TROPONIN I - Abnormal    Troponin I High Sensitivity 627.9 (*)    BASIC METABOLIC PANEL - Abnormal    Sodium 137      Potassium 4.0      Chloride 104      CO2 23      Anion Gap 14      Glucose 131 (*)     BUN 33 (*)     Creatinine 2.07 (*)     BUN/Creatinine Ratio 16      Calcium 9.0      eGFR 24 (*)    CBC WITH DIFFERENTIAL - Abnormal    WBC 7.00      RBC 4.39      Hemoglobin 11.4 (*)     Hematocrit 36.0 (*)     MCV 82.0      MCH 26.0 (*)     MCHC 31.7 (*)     RDW 16.8 (*)     Platelet Count 215      MPV 9.8      Neutrophils % 54.1      Lymphocytes % 31.1      Monocytes % 13.7 (*)     Eosinophils % 0.3 (*)     Basophils % 0.4      Immature Granulocytes % 0.4      nRBC, Auto 0.0      Neutrophils, Abs 3.78      Lymphocytes, Absolute 2.18      Monocytes, Absolute 0.96 (*)     Eosinophils, Absolute 0.02      Basophils, Absolute 0.03      Immature Granulocytes, Absolute 0.03      nRBC, Absolute 0.00      Diff Type Auto     POCT GLUCOSE MONITORING CONTINUOUS - Abnormal    POC Glucose 154 (*)    MAGNESIUM - Normal    Magnesium 2.3     PHOSPHORUS - Normal    Phosphorus 4.1     CBC W/ AUTO DIFFERENTIAL    Narrative:     The following orders were created for panel order CBC auto differential.  Procedure                               Abnormality         Status                     ---------                               -----------         ------                     CBC with Differential[[id number]]       Abnormal            Final result                 Please view  results for these tests on the individual orders.   CBC W/ AUTO DIFFERENTIAL    Narrative:     The following orders were created for panel order CBC with Automated Differential.  Procedure                               Abnormality         Status                     ---------                               -----------         ------                     CBC with Differential[[id number]]       Abnormal            Final result                 Please view results for these tests on the individual orders.   POCT GLUCOSE, HAND-HELD DEVICE   POCT GLUCOSE, HAND-HELD DEVICE   POCT GLUCOSE, HAND-HELD DEVICE   POCT GLUCOSE MONITORING CONTINUOUS    POC Glucose 86     POCT GLUCOSE MONITORING CONTINUOUS    POC Glucose 98          ECG Results              EKG 12-lead (Final result)        Collection Time Result Time QRS Duration OHS QTC Calculation    01/19/25 13:09:39 01/21/25 16:55:56 150 454                     Final result by Interface, Lab In [facility] [place] (01/21/25 16:56:03)                   Narrative:    Test Reason : R06.02,    Vent. Rate :  67 BPM     Atrial Rate :    BPM     P-R Int :   0 ms          QRS Dur : 150 ms      QT Int : 442 ms       P-R-T Axes :   0 150 -39 degrees    QTcB Int : 454 ms    Ventricular pacing  Pacemaker rhythm   Abnormal ECG    Confirmed by Darinel Mcgarry ([id number]) on 1/21/2025 4:55:52 PM    Referred By: AAAREFERRAL SELF           Confirmed By: Rehmat Zeferino                                  Imaging Results              US Lower Extremity Veins Left (Final result)  Result time 01/19/25 17:49:22      Final result by Oliver Sweeney MD (01/19/25 17:49:22)                   Impression:      No evidence of deep venous thrombosis in the left lower extremity.      Electronically signed by: Oliver Sweeney  Date:    01/19/2025  Time:    17:49               Narrative:    EXAMINATION:  US LOWER EXTREMITY VEINS LEFT    CLINICAL HISTORY:  Edema;    TECHNIQUE:  Duplex and color flow Doppler evaluation and graded  compression of the left lower extremity veins was performed.    COMPARISON:  11/18/2024    FINDINGS:  Left thigh veins: The common femoral, femoral, popliteal, upper greater saphenous, and deep femoral veins are patent and free of thrombus. The veins are normally compressible and have normal phasic flow and augmentation response.    Left calf veins: The visualized calf veins are patent.    Contralateral CFV: The contralateral (right) common femoral vein is patent and free of thrombus.    Miscellaneous: None                                       CT Chest Abdomen Pelvis Without Contrast (XPD) (Final result)  Result time 01/19/25 16:11:01      Final result by Danie Agustin MD (01/19/25 16:11:01)                   Impression:      1. Moderate to large loculated right pleural effusion noting compressive atelectasis of the right lower lobe.  2. No findings to suggest obstructive uropathy or bowel obstruction.  3. Colonic diverticulosis without diverticulitis.  4. There is induration along the anterior abdominal wall, correlation with any superficial cellulitis.  5. Please see above for several additional findings.      Electronically signed by: Danie Agustin MD  Date:    01/19/2025  Time:    16:11               Narrative:    EXAMINATION:  CT CHEST ABDOMEN PELVIS WITHOUT CONTRAST(XPD)    CLINICAL HISTORY:  Aortic aneurysm, known or suspected;Patient with protuberant abdomen, no BM x 1 day, CKD, also with CHF and pleural effusion;    TECHNIQUE:  Low dose axial images, sagittal and coronal reformations were obtained from the thoracic inlet to the pubic symphysis .  Oral contrast was not administered.    COMPARISON:  CT chest 12/25/2018    FINDINGS:  The structures at the base of the neck are unremarkable.  There are a few scattered upper limit of normal caliber mediastinal lymph nodes.  The heart is not significantly enlarged.  Pacer wire noted.  No pericardial effusion.  The thoracic aorta tapers normally noting  atherosclerotic calcification along its course and in the distribution of the coronary arteries.    Motion artifact limits evaluation of the airways and pulmonary parenchyma.  Allowing for this, the airways are patent.  There is a moderate right pleural effusion with associated compressive atelectasis of the right lower lobe.  There is fluid along the fissure on the right.  The right pleural effusion is partially loculated.  No pneumothorax.    The liver, spleen, and adrenal glands have a grossly unremarkable noncontrast appearance.  There is high attenuating material layering within the gallbladder suggesting calculi or sludge.  There is a small hiatal hernia.  The stomach is nondistended, no gastric wall thickening.  No significant abdominal lymphadenopathy.    There is right renal cortical scarring involving the interpolar region.  No hydronephrosis or nephrolithiasis.  The bilateral ureters are unable to be followed in their entirety to the urinary bladder, no definite calculi seen or secondary findings to suggest obstructive uropathy.  The urinary bladder is unremarkable.  There are uterine leiomyoma.  The adnexa is unremarkable.  No significant free fluid in the pelvis.    There are a few scattered colonic diverticula without surrounding inflammation to suggest diverticulitis.  There is a small focus of suspected fat infarct along the left lateral aspect of the descending colon.  The terminal ileum and appendix are unremarkable.  The small bowel is grossly unremarkable.  There are several scattered shotty periaortic, pericaval, and mesenteric lymph nodes.  There is atherosclerotic calcification of the aorta and its branches.    There is osteopenia.  There are degenerative changes of the spine.  There are degenerative changes of the sacroiliac joints.  No significant inguinal lymphadenopathy.  There is scattered body wall anasarca.  There is a fat containing umbilical hernia.                                   "     X-Ray Chest 1 View (Final result)  Result time 01/19/25 14:55:11      Final result by Manpreet Del Real MD (01/19/25 14:55:11)                   Impression:      Cardiomegaly with increased size of now moderate to large right-sided pleural effusion.  Right lung aeration is worse compared to the prior study.      Electronically signed by: Manpreet Del Real MD  Date:    01/19/2025  Time:    14:55               Narrative:    EXAMINATION:  XR CHEST 1 VIEW    CLINICAL HISTORY:  Provided history is "  Shortness of breath".    TECHNIQUE:  One view of the chest.    COMPARISON:  01/03/2025.    FINDINGS:  Cardiac silhouette is enlarged and similar to the prior study.  Moderate to large right pleural effusion, worse from prior study.  Calcified mediastinal and hilar lymph nodes suggestive of remote granulomatous process.  Atherosclerotic calcifications overlie the aortic arch.  No significant consolidation in the left lung.  No sizable left pleural effusion.  No distinct pneumothorax.                                    X-Rays:   Independently Interpreted Readings:   Other Readings:  Xray Chest 1 View:  Cardiomegaly with increased size of now moderate to large right-sided pleural effusion.  Right lung aeration is worse compared to the prior study.    CT Chest Abdomen Pelvis c/o Contrast:  1. Moderate to large loculated right pleural effusion noting compressive atelectasis of the right lower lobe.  2. No findings to suggest obstructive uropathy or bowel obstruction.  3. Colonic diverticulosis without diverticulitis.  4. There is induration along the anterior abdominal wall, correlation with any superficial cellulitis.    Medications   amLODIPine tablet 10 mg (10 mg Oral Given 1/23/25 0818)   aspirin EC tablet 81 mg (81 mg Oral Given 1/23/25 0818)   isosorbide mononitrate 24 hr tablet 30 mg (30 mg Oral Given 1/23/25 0818)   pantoprazole EC tablet 40 mg (40 mg Oral Given 1/23/25 0818)   sodium chloride 0.9% flush 10 mL (has " no administration in time range)   naloxone 0.4 mg/mL injection 0.02 mg (has no administration in time range)   glucose chewable tablet 16 g (has no administration in time range)   glucose chewable tablet 24 g (has no administration in time range)   dextrose 50% injection 12.5 g (has no administration in time range)   dextrose 50% injection 25 g (has no administration in time range)   glucagon (human recombinant) injection 1 mg (has no administration in time range)   polyethylene glycol packet 17 g (17 g Oral Given 1/21/25 1844)   insulin aspart U-100 injection 0-5 Units (has no administration in time range)   traZODone tablet 100 mg (has no administration in time range)   ondansetron disintegrating tablet 4 mg (4 mg Oral Given 1/21/25 1056)   furosemide injection 80 mg (80 mg Intravenous Given 1/23/25 0818)   budesonide nebulizer solution 0.5 mg (0.5 mg Nebulization Given 1/23/25 2040)   colchicine capsule/tablet 0.6 mg (has no administration in time range)   allopurinoL tablet 100 mg (100 mg Oral Given 1/23/25 0818)   atorvastatin tablet 40 mg (40 mg Oral Given 1/23/25 2037)   oxyCODONE immediate release tablet 5 mg (5 mg Oral Given 1/23/25 2037)   acetaminophen tablet 650 mg (650 mg Oral Given 1/23/25 1432)   hydrALAZINE tablet 50 mg (has no administration in time range)   glimepiride tablet 2 mg (has no administration in time range)   furosemide injection 80 mg (80 mg Intravenous Given 1/19/25 1545)   aspirin tablet 325 mg (325 mg Oral Given 1/19/25 1545)   midazolam (PF) (VERSED) 1 mg/mL injection (2 mg Intravenous Given 1/22/25 1034)   fentaNYL 50 mcg/mL injection (25 mcg Intravenous Given 1/22/25 1035)     Medical Decision Making  Amount and/or Complexity of Data Reviewed  Labs: ordered.  Radiology: ordered.    Risk  OTC drugs.  Prescription drug management.  Decision regarding hospitalization.              Attending Attestation:           Physician Attestation for Scribe:  Physician Attestation Statement for  Scribe #1: I, Aleksander Leonardo MD, reviewed documentation, as scribed by Marycarmen Dewitt in my presence, and it is both accurate and complete.             ED Course as of 01/24/25 0241   Sun Jan 19, 2025   1537 Discussed with hospitalist about need to hospitalize for worsened CHF and for worsened pleural effusion and newly elevated troponin.  Patient presents with gradual shortness of breath has been seen pulmonologist.  She was recently referred to pulmonologist for pleural effusion.  She has never received thoracentesis but moderate size pleural effusion today.  Sats in the low 90s but not acutely toxic appearing.  Patient has failed outpatient therapy as pulmonologist has increased her Lasix to 80 mg dose after recent visit.  Will treat with aspirin and 80 mg of Lasix IV.  Did order CT chest does show moderate to large pleural effusion.  Repeating troponin.  No chest pain [PK]   1553 Xray Chest 1 View:  Cardiomegaly with increased size of now moderate to large right-sided pleural effusion.  Right lung aeration is worse compared to the prior study.   [LP]   1614 CT Chest Abdomen Pelvis c/o Contrast:  1. Moderate to large loculated right pleural effusion noting compressive atelectasis of the right lower lobe.  2. No findings to suggest obstructive uropathy or bowel obstruction.  3. Colonic diverticulosis without diverticulitis.  4. There is induration along the anterior abdominal wall, correlation with any superficial cellulitis. [LP]   1814 Care transferred to Dr. Jimenez   [PK]      ED Course User Index  [LP] Marycarmen Dewitt  [PK] Aleksander Leonardo MD                             Clinical Impression:  Final diagnoses:  [R06.02] Shortness of breath          ED Disposition Condition    Admit                 Aleksander Leonardo MD  01/19/25 1712       Aleksander Leonardo MD  01/24/25 0241

## 2025-01-20 PROBLEM — I50.33 ACUTE ON CHRONIC HEART FAILURE WITH PRESERVED EJECTION FRACTION: Status: ACTIVE | Noted: 2025-01-20

## 2025-01-20 PROBLEM — E78.5 DYSLIPIDEMIA ASSOCIATED WITH TYPE 2 DIABETES MELLITUS: Status: ACTIVE | Noted: 2022-06-14

## 2025-01-20 PROBLEM — N18.32 STAGE 3B CHRONIC KIDNEY DISEASE: Status: RESOLVED | Noted: 2023-07-05 | Resolved: 2025-01-20

## 2025-01-20 PROBLEM — I12.9 CKD STAGE 4 SECONDARY TO HYPERTENSION: Status: ACTIVE | Noted: 2024-08-23

## 2025-01-20 LAB
ANION GAP SERPL CALCULATED.3IONS-SCNC: 14 MMOL/L (ref 7–16)
BASOPHILS # BLD AUTO: 0.03 K/UL (ref 0–0.2)
BASOPHILS NFR BLD AUTO: 0.4 % (ref 0–1)
BUN SERPL-MCNC: 33 MG/DL (ref 10–20)
BUN/CREAT SERPL: 16 (ref 6–20)
CALCIUM SERPL-MCNC: 9 MG/DL (ref 8.4–10.2)
CHLORIDE SERPL-SCNC: 104 MMOL/L (ref 98–107)
CO2 SERPL-SCNC: 23 MMOL/L (ref 23–31)
CREAT SERPL-MCNC: 2.07 MG/DL (ref 0.55–1.02)
DIFFERENTIAL METHOD BLD: ABNORMAL
EGFR (NO RACE VARIABLE) (RUSH/TITUS): 24 ML/MIN/1.73M2
EOSINOPHIL # BLD AUTO: 0.02 K/UL (ref 0–0.5)
EOSINOPHIL NFR BLD AUTO: 0.3 % (ref 1–4)
ERYTHROCYTE [DISTWIDTH] IN BLOOD BY AUTOMATED COUNT: 16.8 % (ref 11.5–14.5)
GLUCOSE SERPL-MCNC: 131 MG/DL (ref 82–115)
GLUCOSE SERPL-MCNC: 154 MG/DL (ref 70–105)
GLUCOSE SERPL-MCNC: 164 MG/DL (ref 70–105)
GLUCOSE SERPL-MCNC: 178 MG/DL (ref 70–105)
GLUCOSE SERPL-MCNC: 98 MG/DL (ref 70–105)
HCT VFR BLD AUTO: 36 % (ref 38–47)
HGB BLD-MCNC: 11.4 G/DL (ref 12–16)
IMM GRANULOCYTES # BLD AUTO: 0.03 K/UL (ref 0–0.04)
IMM GRANULOCYTES NFR BLD: 0.4 % (ref 0–0.4)
LYMPHOCYTES # BLD AUTO: 2.18 K/UL (ref 1–4.8)
LYMPHOCYTES NFR BLD AUTO: 31.1 % (ref 27–41)
MAGNESIUM SERPL-MCNC: 2.3 MG/DL (ref 1.6–2.6)
MCH RBC QN AUTO: 26 PG (ref 27–31)
MCHC RBC AUTO-ENTMCNC: 31.7 G/DL (ref 32–36)
MCV RBC AUTO: 82 FL (ref 80–96)
MONOCYTES # BLD AUTO: 0.96 K/UL (ref 0–0.8)
MONOCYTES NFR BLD AUTO: 13.7 % (ref 2–6)
MPC BLD CALC-MCNC: 9.8 FL (ref 9.4–12.4)
NEUTROPHILS # BLD AUTO: 3.78 K/UL (ref 1.8–7.7)
NEUTROPHILS NFR BLD AUTO: 54.1 % (ref 53–65)
NRBC # BLD AUTO: 0 X10E3/UL
NRBC, AUTO (.00): 0 %
PHOSPHATE SERPL-MCNC: 4.1 MG/DL (ref 2.3–4.7)
PLATELET # BLD AUTO: 215 K/UL (ref 150–400)
POTASSIUM SERPL-SCNC: 4 MMOL/L (ref 3.5–5.1)
RBC # BLD AUTO: 4.39 M/UL (ref 4.2–5.4)
SODIUM SERPL-SCNC: 137 MMOL/L (ref 136–145)
WBC # BLD AUTO: 7 K/UL (ref 4.5–11)

## 2025-01-20 PROCEDURE — 80048 BASIC METABOLIC PNL TOTAL CA: CPT | Performed by: STUDENT IN AN ORGANIZED HEALTH CARE EDUCATION/TRAINING PROGRAM

## 2025-01-20 PROCEDURE — 83735 ASSAY OF MAGNESIUM: CPT | Performed by: STUDENT IN AN ORGANIZED HEALTH CARE EDUCATION/TRAINING PROGRAM

## 2025-01-20 PROCEDURE — 84100 ASSAY OF PHOSPHORUS: CPT | Performed by: STUDENT IN AN ORGANIZED HEALTH CARE EDUCATION/TRAINING PROGRAM

## 2025-01-20 PROCEDURE — 27000221 HC OXYGEN, UP TO 24 HOURS

## 2025-01-20 PROCEDURE — 25000003 PHARM REV CODE 250: Performed by: STUDENT IN AN ORGANIZED HEALTH CARE EDUCATION/TRAINING PROGRAM

## 2025-01-20 PROCEDURE — 85025 COMPLETE CBC W/AUTO DIFF WBC: CPT | Performed by: STUDENT IN AN ORGANIZED HEALTH CARE EDUCATION/TRAINING PROGRAM

## 2025-01-20 PROCEDURE — 63600175 PHARM REV CODE 636 W HCPCS: Performed by: STUDENT IN AN ORGANIZED HEALTH CARE EDUCATION/TRAINING PROGRAM

## 2025-01-20 PROCEDURE — 11000001 HC ACUTE MED/SURG PRIVATE ROOM

## 2025-01-20 PROCEDURE — 99900035 HC TECH TIME PER 15 MIN (STAT)

## 2025-01-20 PROCEDURE — 99233 SBSQ HOSP IP/OBS HIGH 50: CPT | Mod: ,,, | Performed by: STUDENT IN AN ORGANIZED HEALTH CARE EDUCATION/TRAINING PROGRAM

## 2025-01-20 PROCEDURE — 94660 CPAP INITIATION&MGMT: CPT

## 2025-01-20 PROCEDURE — 82962 GLUCOSE BLOOD TEST: CPT

## 2025-01-20 RX ORDER — FUROSEMIDE 10 MG/ML
80 INJECTION INTRAMUSCULAR; INTRAVENOUS DAILY
Status: DISCONTINUED | OUTPATIENT
Start: 2025-01-21 | End: 2025-01-26

## 2025-01-20 RX ADMIN — ASPIRIN 81 MG: 81 TABLET, COATED ORAL at 08:01

## 2025-01-20 RX ADMIN — COLCHICINE 0.6 MG: 0.6 TABLET ORAL at 08:01

## 2025-01-20 RX ADMIN — HYDRALAZINE HYDROCHLORIDE 50 MG: 50 TABLET ORAL at 08:01

## 2025-01-20 RX ADMIN — SPIRONOLACTONE 25 MG: 25 TABLET ORAL at 08:01

## 2025-01-20 RX ADMIN — ATORVASTATIN CALCIUM 40 MG: 40 TABLET, FILM COATED ORAL at 08:01

## 2025-01-20 RX ADMIN — ISOSORBIDE MONONITRATE 30 MG: 30 TABLET, EXTENDED RELEASE ORAL at 08:01

## 2025-01-20 RX ADMIN — ONDANSETRON 4 MG: 4 TABLET, ORALLY DISINTEGRATING ORAL at 09:01

## 2025-01-20 RX ADMIN — FUROSEMIDE 80 MG: 10 INJECTION, SOLUTION INTRAMUSCULAR; INTRAVENOUS at 08:01

## 2025-01-20 RX ADMIN — PANTOPRAZOLE SODIUM 40 MG: 40 TABLET, DELAYED RELEASE ORAL at 08:01

## 2025-01-20 RX ADMIN — RIVAROXABAN 15 MG: 15 TABLET, FILM COATED ORAL at 08:01

## 2025-01-20 RX ADMIN — AMLODIPINE BESYLATE 10 MG: 10 TABLET ORAL at 08:01

## 2025-01-20 NOTE — ASSESSMENT & PLAN NOTE
"Patient has Diastolic (HFpEF) heart failure that is Acute on chronic. On presentation their CHF was decompensated. Evidence of decompensated CHF on presentation includes:  Pleural effusion. The etiology of their decompensation is likely dietary indiscretion. Most recent BNP and echo results are listed below.  No results for input(s): "BNP" in the last 72 hours.  Latest ECHO  Results for orders placed during the hospital encounter of 11/17/24    Echo Saline Bubble? Yes    Interpretation Summary    Left Ventricle: The left ventricle is mildly dilated. Mildly increased wall thickness. Normal wall motion. Septal motion is consistent with pacing. There is diastolic dysfunction.    Right Ventricle: Moderate right ventricular enlargement.    Left Atrium: Left atrium is mildly dilated.    Right Atrium: Right atrium is severely dilated.    Aortic Valve: The aortic valve is a trileaflet valve. Mildly calcified cusps.    Mitral Valve: There is mild bileaflet sclerosis. Mildly thickened leaflets. There is mild to moderate regurgitation with an eccentric jet.    Tricuspid Valve: There is moderate to severe regurgitation with an eccentrically directed jet.    Pulmonic Valve: There is mild to moderate regurgitation.    IVC/SVC: Elevated venous pressure at 15 mmHg.    Current Heart Failure Medications  hydrALAZINE tablet 50 mg, 2 times daily, Oral  spironolactone tablet 25 mg, Daily, Oral  furosemide injection 80 mg, Every 12 hours, Intravenous    Plan  - Monitor strict I&Os and daily weights.    - Place on telemetry  - Low sodium diet  - Place on fluid restriction of 1.5 L.   - Cardiology has not been consulted  - The patient's volume status is improving but not at their baseline as indicated by shortness of breath  - follow      "

## 2025-01-20 NOTE — HOSPITAL COURSE
1/20 breathing significantly improved today.  Continue diuresis    01/21 Record reviewed. Progressive worse SOB. Pulmonary consulted   PMHx: anemia chronic disease, AFib, CKD, hypertension, bronchitis, CAD, PPM, type 2 diabetes, hyperlipidemia, dilated cardiomyopathy, heart failure with preserved ejection fraction, obesity, peripheral vascular disease with symptomatic pleural effusion.    Seen DR Tra ambriz for renal dx.   Had vascular surgery 11/2024 follow arterial thrombus right leg.   Holding xarelto and plan placement CXR in am. C/O some constipation  01/22 Chest tube placed and pt says feels much better. No other complaints. Had BM. BP and BS OK.  01/23 feels better.  BP and BS slight up  01/24 BP and BS both mildly up. C/O constipation otherwise seems to be doing well. No bleeding and restart at least DVT prophylaxis. Restart eliquis when OK with pulm. .   01/25 BP and BS OK. Still constipation. Lovenox until pulm says OK to start eliquis.   1/26 looks remarkably better  1/27- chest tube removed, discussed with pulm stable for dc. Follow up pcp, follow up pulm

## 2025-01-20 NOTE — SUBJECTIVE & OBJECTIVE
Interval History:  No acute events overnight    Review of Systems  Objective:     Vital Signs (Most Recent):  Temp: 97.8 °F (36.6 °C) (01/20/25 0706)  Pulse: 60 (01/20/25 0604)  Resp: 14 (01/20/25 0604)  BP: 123/65 (01/20/25 0604)  SpO2: (!) 94 % (01/20/25 0604) Vital Signs (24h Range):  Temp:  [97.8 °F (36.6 °C)-98.4 °F (36.9 °C)] 97.8 °F (36.6 °C)  Pulse:  [57-70] 60  Resp:  [14-33] 14  SpO2:  [92 %-98 %] 94 %  BP: (123-137)/(65-80) 123/65     Weight: 97.5 kg (215 lb)  Body mass index is 30.85 kg/m².  No intake or output data in the 24 hours ending 01/20/25 0954      Physical Exam  Vitals reviewed.   Constitutional:       Appearance: Normal appearance.   HENT:      Head: Normocephalic and atraumatic.      Nose: Nose normal.   Eyes:      Extraocular Movements: Extraocular movements intact.      Conjunctiva/sclera: Conjunctivae normal.   Neck:      Trachea: Trachea normal.   Cardiovascular:      Rate and Rhythm: Regular rhythm. Tachycardia present.      Pulses: Normal pulses.      Heart sounds: Normal heart sounds.   Pulmonary:      Effort: Respiratory distress present.      Breath sounds: Normal air entry.      Comments: Breath sounds reduced in the right  Abdominal:      General: Bowel sounds are normal.      Palpations: Abdomen is soft.   Musculoskeletal:         General: Normal range of motion.      Cervical back: Neck supple.   Skin:     General: Skin is warm and dry.   Neurological:      General: No focal deficit present.      Mental Status: She is alert and oriented to person, place, and time.      Comments: Grossly normal motor and sensory function without focal deficit appreciated.   Psychiatric:         Mood and Affect: Mood and affect normal.         Behavior: Behavior is cooperative.             Significant Labs: All pertinent labs within the past 24 hours have been reviewed.    Significant Imaging: I have reviewed all pertinent imaging results/findings within the past 24 hours.

## 2025-01-20 NOTE — ASSESSMENT & PLAN NOTE
BiPAP at night.  Wear CPAP at home.  Given BiPAP due to respiratory distress to support overnight

## 2025-01-20 NOTE — ASSESSMENT & PLAN NOTE
Patient has persistent (7 days or more) atrial fibrillation. Patient is currently in atrial fibrillation. [id number] Score: 5. The patients heart rate in the last 24 hours is as follows:  Pulse  Min: 57  Max: 70     Antiarrhythmics       Anticoagulants  rivaroxaban tablet 15 mg, Daily, Oral    Plan  - Replete lytes with a goal of K>4, Mg >2  - Patient is not anticoagulated due to holding anticoagulation (Xarelto) in anticipation of procedure  - Patient's afib is currently controlled  - follow

## 2025-01-20 NOTE — ASSESSMENT & PLAN NOTE
Patient found to have large pleural effusion on imaging. I have personally reviewed and interpreted the following imaging: Xray. A thoracentesis was deferred. Most likely etiology includes Congestive Heart Failure and Pneumonia. Management to include Diuresis

## 2025-01-20 NOTE — ASSESSMENT & PLAN NOTE
Patient with Hypoxic Respiratory failure which is Acute.  she is not on home oxygen. Supplemental oxygen was provided and noted- Oxygen Concentration (%):  [28-40] 28    .   Signs/symptoms of respiratory failure include- tachypnea, increased work of breathing, and respiratory distress. Contributing diagnoses includes - Pleural effusion and Pneumonia Labs and images were reviewed. Patient Has not had a recent ABG. Will treat underlying causes and adjust management of respiratory failure as follows-     Nebs   Diuresis   Pulmonology consult

## 2025-01-20 NOTE — ED NOTES
Patient denies any complaints at this time, family at bedside. Vitals stable, instructed that if she did not get a room upstairs that we would transfer her into a hospital bed.

## 2025-01-20 NOTE — ASSESSMENT & PLAN NOTE
Right pleural effusion with elements of loculation effusions markedly worse since January 3rd patient has taken Xarelto will have a thoracentesis versus chest tube placed 48 hours after we have stopped his Xarelto I am going to consult Dr. Rocha to look at him in the morning

## 2025-01-20 NOTE — ASSESSMENT & PLAN NOTE
"Patient's FSGs are controlled on current medication regimen.  Last A1c reviewed-   Lab Results   Component Value Date    HGBA1C 6.2 08/23/2024     Most recent fingerstick glucose reviewed- No results for input(s): "POCTGLUCOSE" in the last 24 hours.  Current correctional scale  Low  Maintain anti-hyperglycemic dose as follows-   Antihyperglycemics (From admission, onward)      Start     Stop Route Frequency Ordered    01/19/25 1805  insulin aspart U-100 injection 0-5 Units         -- SubQ Before meals & nightly PRN 01/19/25 1712          Hold Oral hypoglycemics while patient is in the hospital.  "

## 2025-01-20 NOTE — PROGRESS NOTES
[facility] - Emergency Department  Hospital Medicine  Progress Note    Patient Name: Pam Ortega  MRN: [id number]  Patient Class: IP- Inpatient   Admission Date: 1/19/2025  Length of Stay: 1 days  Attending Physician: Robert Gramajo DO  Primary Care Provider: Janice Velez FNP        Subjective     Principal Problem:Acute hypoxic respiratory failure        HPI:       79-year-old female with anemia chronic disease, AFib, CKD, hypertension, bronchitis, CAD, type 2 diabetes, hyperlipidemia, dilated cardiomyopathy, heart failure with preserved ejection fraction, obesity, peripheral vascular disease presents to the ED with progressively worsening shortness of breath.  She has had pleural effusion chronically and follow up with Dr. Mckee.  She recently saw him in clinic and had her inhalers adjusted.  Recently saw Dr. Dutta and had her diuretics increased.  Even with this for shortness of breath progressively worsened over the last several days which prompted her presentation to the ED.  CT reveals loculated pleural effusion.  Pulmonology consulted.  She denies any chest pain.  Denies any fever, chills, palpitations, nausea vomiting diarrhea dysuria.  Review of systems otherwise negative           Overview/Hospital Course:  1/20 breathing significantly improved today.  Continue diuresis    Interval History:  No acute events overnight    Review of Systems  Objective:     Vital Signs (Most Recent):  Temp: 97.8 °F (36.6 °C) (01/20/25 0706)  Pulse: 60 (01/20/25 0604)  Resp: 14 (01/20/25 0604)  BP: 123/65 (01/20/25 0604)  SpO2: (!) 94 % (01/20/25 0604) Vital Signs (24h Range):  Temp:  [97.8 °F (36.6 °C)-98.4 °F (36.9 °C)] 97.8 °F (36.6 °C)  Pulse:  [57-70] 60  Resp:  [14-33] 14  SpO2:  [92 %-98 %] 94 %  BP: (123-137)/(65-80) 123/65     Weight: 97.5 kg (215 lb)  Body mass index is 30.85 kg/m².  No intake or output data in the 24 hours ending 01/20/25 0954      Physical Exam  Vitals reviewed.    Constitutional:       Appearance: Normal appearance.   HENT:      Head: Normocephalic and atraumatic.      Nose: Nose normal.   Eyes:      Extraocular Movements: Extraocular movements intact.      Conjunctiva/sclera: Conjunctivae normal.   Neck:      Trachea: Trachea normal.   Cardiovascular:      Rate and Rhythm: Regular rhythm. Tachycardia present.      Pulses: Normal pulses.      Heart sounds: Normal heart sounds.   Pulmonary:      Effort: Respiratory distress present.      Breath sounds: Normal air entry.      Comments: Breath sounds reduced in the right  Abdominal:      General: Bowel sounds are normal.      Palpations: Abdomen is soft.   Musculoskeletal:         General: Normal range of motion.      Cervical back: Neck supple.   Skin:     General: Skin is warm and dry.   Neurological:      General: No focal deficit present.      Mental Status: She is alert and oriented to person, place, and time.      Comments: Grossly normal motor and sensory function without focal deficit appreciated.   Psychiatric:         Mood and Affect: Mood and affect normal.         Behavior: Behavior is cooperative.             Significant Labs: All pertinent labs within the past 24 hours have been reviewed.    Significant Imaging: I have reviewed all pertinent imaging results/findings within the past 24 hours.    Assessment and Plan     * Acute hypoxic respiratory failure  Patient with Hypoxic Respiratory failure which is Acute.  she is not on home oxygen. Supplemental oxygen was provided and noted- Oxygen Concentration (%):  [28-40] 28    .   Signs/symptoms of respiratory failure include- tachypnea, increased work of breathing, and respiratory distress. Contributing diagnoses includes - Pleural effusion and Pneumonia Labs and images were reviewed. Patient Has not had a recent ABG. Will treat underlying causes and adjust management of respiratory failure as follows-     Nebs   Diuresis   Pulmonology consult   Discussed case with  "Dr. Mckee today    Acute on chronic heart failure with preserved ejection fraction  Patient has Diastolic (HFpEF) heart failure that is Acute on chronic. On presentation their CHF was decompensated. Evidence of decompensated CHF on presentation includes:  Pleural effusion. The etiology of their decompensation is likely dietary indiscretion. Most recent BNP and echo results are listed below.  No results for input(s): "BNP" in the last 72 hours.  Latest ECHO  Results for orders placed during the hospital encounter of 11/17/24    Echo Saline Bubble? Yes    Interpretation Summary    Left Ventricle: The left ventricle is mildly dilated. Mildly increased wall thickness. Normal wall motion. Septal motion is consistent with pacing. There is diastolic dysfunction.    Right Ventricle: Moderate right ventricular enlargement.    Left Atrium: Left atrium is mildly dilated.    Right Atrium: Right atrium is severely dilated.    Aortic Valve: The aortic valve is a trileaflet valve. Mildly calcified cusps.    Mitral Valve: There is mild bileaflet sclerosis. Mildly thickened leaflets. There is mild to moderate regurgitation with an eccentric jet.    Tricuspid Valve: There is moderate to severe regurgitation with an eccentrically directed jet.    Pulmonic Valve: There is mild to moderate regurgitation.    IVC/SVC: Elevated venous pressure at 15 mmHg.    Current Heart Failure Medications  hydrALAZINE tablet 50 mg, 2 times daily, Oral  spironolactone tablet 25 mg, Daily, Oral  furosemide injection 80 mg, Daily, Intravenous    Plan  - Monitor strict I&Os and daily weights.    - Place on telemetry  - Low sodium diet  - Place on fluid restriction of 1.5 L.   - Cardiology has not been consulted  - The patient's volume status is improving but not at their baseline as indicated by shortness of breath  - follow  Continue diuresis    Pleural effusion  Patient found to have large pleural effusion on imaging. I have personally reviewed and " interpreted the following imaging: Xray. A thoracentesis was deferred. Most likely etiology includes Congestive Heart Failure and Pneumonia. Management to include Diuresis  Continue diuresis    Diabetic nephropathy  Glucose control      Peripheral vascular disease  Aspirin statin      CKD stage 4 secondary to hypertension  Creatine stable for now. BMP reviewed- noted Estimated Creatinine Clearance: 28.8 mL/min (A) (based on SCr of 2.07 mg/dL (H)). according to latest data. Based on current GFR, CKD stage is stage 4 - GFR 15-29.  Monitor UOP and serial BMP and adjust therapy as needed. Renally dose meds. Avoid nephrotoxic medications and procedures.    Essential hypertension  Patient's blood pressure range in the last 24 hours was: BP  Min: 123/65  Max: 137/79.The patient's inpatient anti-hypertensive regimen is listed below:  Current Antihypertensives  amLODIPine tablet 10 mg, Daily, Oral  hydrALAZINE tablet 50 mg, 2 times daily, Oral  isosorbide mononitrate 24 hr tablet 30 mg, Daily, Oral  spironolactone tablet 25 mg, Daily, Oral  furosemide injection 80 mg, Daily, Intravenous    Plan  BP is controlled   Follow    Dyslipidemia associated with type 2 diabetes mellitus  Statin   Glucose control      Obstructive sleep apnea    BiPAP at night.  Wear CPAP at home.  Given BiPAP due to respiratory distress to support overnight    Obese  Body mass index is 30.85 kg/m². Morbid obesity complicates all aspects of disease management from diagnostic modalities to treatment. Weight loss encouraged and health benefits explained to patient.         Dyslipidemia  Statin      Chronic a-fib  Patient has persistent (7 days or more) atrial fibrillation. Patient is currently in atrial fibrillation. [id number] Score: 5. The patients heart rate in the last 24 hours is as follows:  Pulse  Min: 57  Max: 70     Antiarrhythmics       Anticoagulants       Plan  - Replete lytes with a goal of K>4, Mg >2  - Patient is not anticoagulated due to  "holding anticoagulation (Xarelto) in anticipation of procedure  - Patient's afib is currently controlled  - follow        Cardiomyopathy  Diurese    Diabetes mellitus without complication  Patient's FSGs are controlled on current medication regimen.  Last A1c reviewed-   Lab Results   Component Value Date    HGBA1C 6.2 08/23/2024     Most recent fingerstick glucose reviewed- No results for input(s): "POCTGLUCOSE" in the last 24 hours.  Current correctional scale  Low  Maintain anti-hyperglycemic dose as follows-   Antihyperglycemics (From admission, onward)      Start     Stop Route Frequency Ordered    01/19/25 1805  insulin aspart U-100 injection 0-5 Units         -- SubQ Before meals & nightly PRN 01/19/25 1712          Hold Oral hypoglycemics while patient is in the hospital.    Benign hypertensive CKD, stage 1-4 or unspecified chronic kidney disease  Creatine stable for now. BMP reviewed- noted Estimated Creatinine Clearance: 28.8 mL/min (A) (based on SCr of 2.07 mg/dL (H)). according to latest data. Based on current GFR, CKD stage is stage 4 - GFR 15-29.  Monitor UOP and serial BMP and adjust therapy as needed. Renally dose meds. Avoid nephrotoxic medications and procedures.    Coronary atherosclerosis  Patient with known CAD s/p  unclear , which is controlled Will continue ASA and Statin and monitor for S/Sx of angina/ACS. Continue to monitor on telemetry.     Hyperlipidemia  Statin      Hypertension  Patient's blood pressure range in the last 24 hours was: BP  Min: 123/65  Max: 137/79.The patient's inpatient anti-hypertensive regimen is listed below:  Current Antihypertensives  amLODIPine tablet 10 mg, Daily, Oral  hydrALAZINE tablet 50 mg, 2 times daily, Oral  isosorbide mononitrate 24 hr tablet 30 mg, Daily, Oral  spironolactone tablet 25 mg, Daily, Oral  furosemide injection 80 mg, Daily, Intravenous    Plan  - BP is controlled, no changes needed to their regimen  - follow    History of CVA " (cerebrovascular accident)  Aspirin statin        VTE Risk Mitigation (From admission, onward)           Ordered     Reason for No Pharmacological VTE Prophylaxis  Once        Question:  Reasons:  Answer:  Already adequately anticoagulated on oral Anticoagulants    01/19/25 1712     IP VTE HIGH RISK PATIENT  Once         01/19/25 1712     Place sequential compression device  Until discontinued         01/19/25 1712                    Discharge Planning   HERBERT:      Code Status: Full Code   Medical Readiness for Discharge Date:            Labs reviewed.  Metabolic panel tomorrow.  Family in room updated with the assessment and plan.  No questions.  Discussed case with Dr. Mckee today.                Robert Gramajo DO  Department of Hospital Medicine   [facility] - Emergency Department

## 2025-01-20 NOTE — ASSESSMENT & PLAN NOTE
"Patient has Diastolic (HFpEF) heart failure that is Acute on chronic. On presentation their CHF was decompensated. Evidence of decompensated CHF on presentation includes:  Pleural effusion. The etiology of their decompensation is likely dietary indiscretion. Most recent BNP and echo results are listed below.  No results for input(s): "BNP" in the last 72 hours.  Latest ECHO  Results for orders placed during the hospital encounter of 11/17/24    Echo Saline Bubble? Yes    Interpretation Summary    Left Ventricle: The left ventricle is mildly dilated. Mildly increased wall thickness. Normal wall motion. Septal motion is consistent with pacing. There is diastolic dysfunction.    Right Ventricle: Moderate right ventricular enlargement.    Left Atrium: Left atrium is mildly dilated.    Right Atrium: Right atrium is severely dilated.    Aortic Valve: The aortic valve is a trileaflet valve. Mildly calcified cusps.    Mitral Valve: There is mild bileaflet sclerosis. Mildly thickened leaflets. There is mild to moderate regurgitation with an eccentric jet.    Tricuspid Valve: There is moderate to severe regurgitation with an eccentrically directed jet.    Pulmonic Valve: There is mild to moderate regurgitation.    IVC/SVC: Elevated venous pressure at 15 mmHg.    Current Heart Failure Medications  hydrALAZINE tablet 50 mg, 2 times daily, Oral  spironolactone tablet 25 mg, Daily, Oral  furosemide injection 80 mg, Daily, Intravenous    Plan  - Monitor strict I&Os and daily weights.    - Place on telemetry  - Low sodium diet  - Place on fluid restriction of 1.5 L.   - Cardiology has not been consulted  - The patient's volume status is improving but not at their baseline as indicated by shortness of breath  - follow  Continue diuresis  "

## 2025-01-20 NOTE — ASSESSMENT & PLAN NOTE
Creatine stable for now. BMP reviewed- noted Estimated Creatinine Clearance: 28.8 mL/min (A) (based on SCr of 2.07 mg/dL (H)). according to latest data. Based on current GFR, CKD stage is stage 4 - GFR 15-29.  Monitor UOP and serial BMP and adjust therapy as needed. Renally dose meds. Avoid nephrotoxic medications and procedures.

## 2025-01-20 NOTE — ASSESSMENT & PLAN NOTE
Patient has persistent (7 days or more) atrial fibrillation. Patient is currently in atrial fibrillation. [id number] Score: 5. The patients heart rate in the last 24 hours is as follows:  Pulse  Min: 57  Max: 70     Antiarrhythmics       Anticoagulants       Plan  - Replete lytes with a goal of K>4, Mg >2  - Patient is not anticoagulated due to holding anticoagulation (Xarelto) in anticipation of procedure  - Patient's afib is currently controlled  - follow

## 2025-01-20 NOTE — ASSESSMENT & PLAN NOTE
Patient's blood pressure range in the last 24 hours was: BP  Min: 123/65  Max: 137/79.The patient's inpatient anti-hypertensive regimen is listed below:  Current Antihypertensives  amLODIPine tablet 10 mg, Daily, Oral  hydrALAZINE tablet 50 mg, 2 times daily, Oral  isosorbide mononitrate 24 hr tablet 30 mg, Daily, Oral  spironolactone tablet 25 mg, Daily, Oral  furosemide injection 80 mg, Daily, Intravenous    Plan  BP is controlled   Follow

## 2025-01-20 NOTE — HPI
79-year-old female with anemia chronic disease, AFib, CKD, hypertension, bronchitis, CAD, type 2 diabetes, hyperlipidemia, dilated cardiomyopathy, heart failure with preserved ejection fraction, obesity, peripheral vascular disease presents to the ED with progressively worsening shortness of breath.  She has had pleural effusion chronically and follow up with Dr. Mckee.  She recently saw him in clinic and had her inhalers adjusted.  Recently saw Dr. Dutta and had her diuretics increased.  Even with this for shortness of breath progressively worsened over the last several days which prompted her presentation to the ED.  CT reveals loculated pleural effusion.  Pulmonology consulted.  She denies any chest pain.  Denies any fever, chills, palpitations, nausea vomiting diarrhea dysuria.  Review of systems otherwise negative

## 2025-01-20 NOTE — NURSING
Patient to 454 at this time. NAD noted. Patient oriented to room, call bell given to patient. Called monitor room for tele monitor. No monitors available at this time.

## 2025-01-20 NOTE — SUBJECTIVE & OBJECTIVE
Past Medical History:   Diagnosis Date    Anemia of chronic disease     Atrial fibrillation     Benign hypertensive CKD, stage 1-4 or unspecified chronic kidney disease     Bronchitis 12/22/2023    Carotid artery occlusion     CKD (chronic kidney disease)     Coronary atherosclerosis     Cough 12/22/2023    COVID-19 ruled out 03/20/2024    Diabetes mellitus, type 2     DJD (degenerative joint disease)     History of CVA (cerebrovascular accident)     HTN (hypertension)     Hyperlipidemia     Nonischemic dilated cardiomyopathy     Obesity     Osteopenia     Paroxysmal atrial fibrillation     Presence of cardiac pacemaker     PVD (peripheral vascular disease)     Upper respiratory tract infection 12/22/2023    Vitamin D deficiency        Past Surgical History:   Procedure Laterality Date    BREAST BIOPSY      COLONOSCOPY  12/04/2018    repeat in 5 years    EMBOLECTOMY OR THROMBECTOMY, ARTERY, AORTOILIAC, FEMORAL, OR POPLITEAL Right 11/17/2024    Procedure: EMBOLECTOMY OR THROMBECTOMY, ARTERY, AORTOILIAC, FEMORAL, OR POPLITEAL;  Surgeon: Jordana Guillermo MD;  Location: [facility];  Service: Vascular;  Laterality: Right;    pace maker      VAGINAL DELIVERY      x 3       Review of patient's allergies indicates:   Allergen Reactions    Ace inhibitors Edema    Losartan Swelling       No current facility-administered medications on file prior to encounter.     Current Outpatient Medications on File Prior to Encounter   Medication Sig    albuterol (VENTOLIN HFA) 90 mcg/actuation inhaler Inhale 2 puffs into the lungs every 6 (six) hours as needed for Wheezing. Rescue Inhaler    amLODIPine (NORVASC) 10 MG tablet Take 1 tablet (10 mg total) by mouth once daily.    aspirin (ECOTRIN) 81 MG EC tablet Take 81 mg by mouth once daily.    budesonide-formoterol 160-4.5 mcg (SYMBICORT) 160-4.5 mcg/actuation HFAA Inhale 2 puffs into the lungs every 12 (twelve) hours. Controller Inhaler    colchicine (COLCRYS) 0.6 mg tablet Take 1  tablet (0.6 mg total) by mouth once daily. for gout    furosemide (LASIX) 80 MG tablet Take 1 tablet (80 mg total) by mouth every morning.    semaglutide (OZEMPIC) 1 mg/dose (4 mg/3 mL) Inject 1 mg into the skin every 7 days.    alendronate (FOSAMAX) 70 MG tablet Take 1 tablet (70 mg total) by mouth every 7 days.    cholecalciferol, vitamin D3, (VITAMIN D3) 50 mcg (2,000 unit) Cap capsule Take 1 capsule by mouth once daily.    fluticasone-salmeterol diskus inhaler 250-50 mcg Inhale 1 puff into the lungs 2 (two) times daily. Controller Inhaler    glimepiride (AMARYL) 4 MG tablet Take 1 tablet (4 mg total) by mouth daily with breakfast.    hydrALAZINE (APRESOLINE) 50 MG tablet Take 1 tablet (50 mg total) by mouth 2 (two) times a day.    isosorbide mononitrate (IMDUR) 30 MG 24 hr tablet Take 1 tablet (30 mg total) by mouth once daily.    lovastatin (MEVACOR) 20 MG tablet Take 1 tablet (20 mg total) by mouth every night    metoprolol tartrate (LOPRESSOR) 50 MG tablet Take 1 tablet (50 mg total) by mouth 2 (two) times a day.    omeprazole (PRILOSEC) 20 MG capsule Take 1 capsule (20 mg total) by mouth once daily.    potassium chloride (MICRO-K) 10 MEQ CpSR Take 1 capsule (10 mEq total) by mouth once daily.    rivaroxaban (XARELTO) 15 mg Tab Take 1 tablet (15 mg total) by mouth once daily.    spironolactone (ALDACTONE) 25 MG tablet Take 1 tablet (25 mg total) by mouth once daily.     Family History       Problem Relation (Age of Onset)    Alzheimer's disease Maternal Grandmother    Breast cancer Maternal Grandmother, Daughter    Cancer Brother    Clotting disorder Daughter    Diabetes Daughter, Daughter    Hyperlipidemia Daughter    Hypertension Mother, Father, Sister, Brother, Maternal Grandmother, Daughter, Daughter, Daughter    Stroke Sister, Brother          Tobacco Use    Smoking status: Never     Passive exposure: Never    Smokeless tobacco: Never   Substance and Sexual Activity    Alcohol use: Not Currently    Drug  use: Never    Sexual activity: Not Currently     Review of Systems   Constitutional:  Positive for fatigue. Negative for chills, fever and unexpected weight change.   HENT:  Negative for congestion, mouth sores and sore throat.    Eyes:  Negative for photophobia and visual disturbance.   Respiratory:  Positive for cough and shortness of breath. Negative for chest tightness and wheezing.    Cardiovascular:  Negative for chest pain, palpitations and leg swelling.   Gastrointestinal:  Negative for abdominal pain, diarrhea, nausea and vomiting.   Endocrine: Negative for cold intolerance and heat intolerance.   Genitourinary:  Negative for difficulty urinating, dysuria, frequency and urgency.   Musculoskeletal:  Negative for arthralgias, back pain and myalgias.   Skin:  Negative for pallor and rash.   Neurological:  Negative for tremors, seizures, syncope, weakness, numbness and headaches.   Hematological:  Does not bruise/bleed easily.   Psychiatric/Behavioral:  Negative for agitation, confusion, hallucinations and suicidal ideas.      Objective:     Vital Signs (Most Recent):  Temp: 97.8 °F (36.6 °C) (01/20/25 0706)  Pulse: 60 (01/20/25 0604)  Resp: 14 (01/20/25 0604)  BP: 123/65 (01/20/25 0604)  SpO2: (!) 94 % (01/20/25 0604) Vital Signs (24h Range):  Temp:  [97.8 °F (36.6 °C)-98.4 °F (36.9 °C)] 97.8 °F (36.6 °C)  Pulse:  [57-70] 60  Resp:  [14-33] 14  SpO2:  [92 %-98 %] 94 %  BP: (123-137)/(65-80) 123/65     Weight: 97.5 kg (215 lb)  Body mass index is 30.85 kg/m².     Physical Exam  Vitals reviewed.   Constitutional:       Appearance: Normal appearance.   HENT:      Head: Normocephalic and atraumatic.      Nose: Nose normal.   Eyes:      Extraocular Movements: Extraocular movements intact.      Conjunctiva/sclera: Conjunctivae normal.   Neck:      Trachea: Trachea normal.   Cardiovascular:      Rate and Rhythm: Regular rhythm. Tachycardia present.      Pulses: Normal pulses.      Heart sounds: Normal heart sounds.    Pulmonary:      Effort: Respiratory distress present.      Breath sounds: Normal air entry.      Comments: Breath sounds reduced in the right  Abdominal:      General: Bowel sounds are normal.      Palpations: Abdomen is soft.   Musculoskeletal:         General: Normal range of motion.      Cervical back: Neck supple.   Skin:     General: Skin is warm and dry.   Neurological:      General: No focal deficit present.      Mental Status: She is alert and oriented to person, place, and time.      Comments: Grossly normal motor and sensory function without focal deficit appreciated.   Psychiatric:         Mood and Affect: Mood and affect normal.         Behavior: Behavior is cooperative.                Significant Labs: All pertinent labs within the past 24 hours have been reviewed.    Significant Imaging: I have reviewed all pertinent imaging results/findings within the past 24 hours.

## 2025-01-20 NOTE — ASSESSMENT & PLAN NOTE
Body mass index is 30.85 kg/m². Morbid obesity complicates all aspects of disease management from diagnostic modalities to treatment. Weight loss encouraged and health benefits explained to patient.

## 2025-01-20 NOTE — ASSESSMENT & PLAN NOTE
Patient with known CAD s/p  unclear , which is controlled Will continue ASA and Statin and monitor for S/Sx of angina/ACS. Continue to monitor on telemetry.

## 2025-01-20 NOTE — ASSESSMENT & PLAN NOTE
Patient's blood pressure range in the last 24 hours was: BP  Min: 123/65  Max: 137/79.The patient's inpatient anti-hypertensive regimen is listed below:  Current Antihypertensives  amLODIPine tablet 10 mg, Daily, Oral  hydrALAZINE tablet 50 mg, 2 times daily, Oral  isosorbide mononitrate 24 hr tablet 30 mg, Daily, Oral  spironolactone tablet 25 mg, Daily, Oral  furosemide injection 80 mg, Every 12 hours, Intravenous    Plan  - BP is controlled, no changes needed to their regimen  - follow

## 2025-01-20 NOTE — H&P
[facility] - Emergency Department  Hospital Medicine  History & Physical    Patient Name: Pam Ortega  MRN: [id number]  Patient Class: IP- Inpatient  Admission Date: 1/19/2025  Attending Physician: Robert Gramajo DO   Primary Care Provider: aJnice Velez FNP         Patient information was obtained from patient, past medical records, and ER records.     Subjective:     Principal Problem:Acute hypoxic respiratory failure    Chief Complaint:   Chief Complaint   Patient presents with    Shortness of Breath     Patient presents to the ER with complaints of SOB that has been going on about a month but has worsened today .Has hx of pleural effusion and chf        HPI:        79-year-old female with anemia chronic disease, AFib, CKD, hypertension, bronchitis, CAD, type 2 diabetes, hyperlipidemia, dilated cardiomyopathy, heart failure with preserved ejection fraction, obesity, peripheral vascular disease presents to the ED with progressively worsening shortness of breath.  She has had pleural effusion chronically and follow up with Dr. Mckee.  She recently saw him in clinic and had her inhalers adjusted.  Recently saw Dr. Dutta and had her diuretics increased.  Even with this for shortness of breath progressively worsened over the last several days which prompted her presentation to the ED.  CT reveals loculated pleural effusion.  Pulmonology consulted.  She denies any chest pain.  Denies any fever, chills, palpitations, nausea vomiting diarrhea dysuria.  Review of systems otherwise negative           Past Medical History:   Diagnosis Date    Anemia of chronic disease     Atrial fibrillation     Benign hypertensive CKD, stage 1-4 or unspecified chronic kidney disease     Bronchitis 12/22/2023    Carotid artery occlusion     CKD (chronic kidney disease)     Coronary atherosclerosis     Cough 12/22/2023    COVID-19 ruled out 03/20/2024    Diabetes mellitus, type 2     DJD (degenerative joint disease)      History of CVA (cerebrovascular accident)     HTN (hypertension)     Hyperlipidemia     Nonischemic dilated cardiomyopathy     Obesity     Osteopenia     Paroxysmal atrial fibrillation     Presence of cardiac pacemaker     PVD (peripheral vascular disease)     Upper respiratory tract infection 12/22/2023    Vitamin D deficiency        Past Surgical History:   Procedure Laterality Date    BREAST BIOPSY      COLONOSCOPY  12/04/2018    repeat in 5 years    EMBOLECTOMY OR THROMBECTOMY, ARTERY, AORTOILIAC, FEMORAL, OR POPLITEAL Right 11/17/2024    Procedure: EMBOLECTOMY OR THROMBECTOMY, ARTERY, AORTOILIAC, FEMORAL, OR POPLITEAL;  Surgeon: Jordana Guillermo MD;  Location: [facility];  Service: Vascular;  Laterality: Right;    pace maker      VAGINAL DELIVERY      x 3       Review of patient's allergies indicates:   Allergen Reactions    Ace inhibitors Edema    Losartan Swelling       No current facility-administered medications on file prior to encounter.     Current Outpatient Medications on File Prior to Encounter   Medication Sig    albuterol (VENTOLIN HFA) 90 mcg/actuation inhaler Inhale 2 puffs into the lungs every 6 (six) hours as needed for Wheezing. Rescue Inhaler    amLODIPine (NORVASC) 10 MG tablet Take 1 tablet (10 mg total) by mouth once daily.    aspirin (ECOTRIN) 81 MG EC tablet Take 81 mg by mouth once daily.    budesonide-formoterol 160-4.5 mcg (SYMBICORT) 160-4.5 mcg/actuation HFAA Inhale 2 puffs into the lungs every 12 (twelve) hours. Controller Inhaler    colchicine (COLCRYS) 0.6 mg tablet Take 1 tablet (0.6 mg total) by mouth once daily. for gout    furosemide (LASIX) 80 MG tablet Take 1 tablet (80 mg total) by mouth every morning.    semaglutide (OZEMPIC) 1 mg/dose (4 mg/3 mL) Inject 1 mg into the skin every 7 days.    alendronate (FOSAMAX) 70 MG tablet Take 1 tablet (70 mg total) by mouth every 7 days.    cholecalciferol, vitamin D3, (VITAMIN D3) 50 mcg (2,000 unit) Cap capsule Take 1 capsule by  mouth once daily.    fluticasone-salmeterol diskus inhaler 250-50 mcg Inhale 1 puff into the lungs 2 (two) times daily. Controller Inhaler    glimepiride (AMARYL) 4 MG tablet Take 1 tablet (4 mg total) by mouth daily with breakfast.    hydrALAZINE (APRESOLINE) 50 MG tablet Take 1 tablet (50 mg total) by mouth 2 (two) times a day.    isosorbide mononitrate (IMDUR) 30 MG 24 hr tablet Take 1 tablet (30 mg total) by mouth once daily.    lovastatin (MEVACOR) 20 MG tablet Take 1 tablet (20 mg total) by mouth every night    metoprolol tartrate (LOPRESSOR) 50 MG tablet Take 1 tablet (50 mg total) by mouth 2 (two) times a day.    omeprazole (PRILOSEC) 20 MG capsule Take 1 capsule (20 mg total) by mouth once daily.    potassium chloride (MICRO-K) 10 MEQ CpSR Take 1 capsule (10 mEq total) by mouth once daily.    rivaroxaban (XARELTO) 15 mg Tab Take 1 tablet (15 mg total) by mouth once daily.    spironolactone (ALDACTONE) 25 MG tablet Take 1 tablet (25 mg total) by mouth once daily.     Family History       Problem Relation (Age of Onset)    Alzheimer's disease Maternal Grandmother    Breast cancer Maternal Grandmother, Daughter    Cancer Brother    Clotting disorder Daughter    Diabetes Daughter, Daughter    Hyperlipidemia Daughter    Hypertension Mother, Father, Sister, Brother, Maternal Grandmother, Daughter, Daughter, Daughter    Stroke Sister, Brother          Tobacco Use    Smoking status: Never     Passive exposure: Never    Smokeless tobacco: Never   Substance and Sexual Activity    Alcohol use: Not Currently    Drug use: Never    Sexual activity: Not Currently     Review of Systems   Constitutional:  Positive for fatigue. Negative for chills, fever and unexpected weight change.   HENT:  Negative for congestion, mouth sores and sore throat.    Eyes:  Negative for photophobia and visual disturbance.   Respiratory:  Positive for cough and shortness of breath. Negative for chest tightness and wheezing.    Cardiovascular:   Negative for chest pain, palpitations and leg swelling.   Gastrointestinal:  Negative for abdominal pain, diarrhea, nausea and vomiting.   Endocrine: Negative for cold intolerance and heat intolerance.   Genitourinary:  Negative for difficulty urinating, dysuria, frequency and urgency.   Musculoskeletal:  Negative for arthralgias, back pain and myalgias.   Skin:  Negative for pallor and rash.   Neurological:  Negative for tremors, seizures, syncope, weakness, numbness and headaches.   Hematological:  Does not bruise/bleed easily.   Psychiatric/Behavioral:  Negative for agitation, confusion, hallucinations and suicidal ideas.      Objective:     Vital Signs (Most Recent):  Temp: 97.8 °F (36.6 °C) (01/20/25 0706)  Pulse: 60 (01/20/25 0604)  Resp: 14 (01/20/25 0604)  BP: 123/65 (01/20/25 0604)  SpO2: (!) 94 % (01/20/25 0604) Vital Signs (24h Range):  Temp:  [97.8 °F (36.6 °C)-98.4 °F (36.9 °C)] 97.8 °F (36.6 °C)  Pulse:  [57-70] 60  Resp:  [14-33] 14  SpO2:  [92 %-98 %] 94 %  BP: (123-137)/(65-80) 123/65     Weight: 97.5 kg (215 lb)  Body mass index is 30.85 kg/m².     Physical Exam  Vitals reviewed.   Constitutional:       Appearance: Normal appearance.   HENT:      Head: Normocephalic and atraumatic.      Nose: Nose normal.   Eyes:      Extraocular Movements: Extraocular movements intact.      Conjunctiva/sclera: Conjunctivae normal.   Neck:      Trachea: Trachea normal.   Cardiovascular:      Rate and Rhythm: Regular rhythm. Tachycardia present.      Pulses: Normal pulses.      Heart sounds: Normal heart sounds.   Pulmonary:      Effort: Respiratory distress present.      Breath sounds: Normal air entry.      Comments: Breath sounds reduced in the right  Abdominal:      General: Bowel sounds are normal.      Palpations: Abdomen is soft.   Musculoskeletal:         General: Normal range of motion.      Cervical back: Neck supple.   Skin:     General: Skin is warm and dry.   Neurological:      General: No focal deficit  "present.      Mental Status: She is alert and oriented to person, place, and time.      Comments: Grossly normal motor and sensory function without focal deficit appreciated.   Psychiatric:         Mood and Affect: Mood and affect normal.         Behavior: Behavior is cooperative.                Significant Labs: All pertinent labs within the past 24 hours have been reviewed.    Significant Imaging: I have reviewed all pertinent imaging results/findings within the past 24 hours.  Assessment/Plan:     * Acute hypoxic respiratory failure  Patient with Hypoxic Respiratory failure which is Acute.  she is not on home oxygen. Supplemental oxygen was provided and noted- Oxygen Concentration (%):  [28-40] 28    .   Signs/symptoms of respiratory failure include- tachypnea, increased work of breathing, and respiratory distress. Contributing diagnoses includes - Pleural effusion and Pneumonia Labs and images were reviewed. Patient Has not had a recent ABG. Will treat underlying causes and adjust management of respiratory failure as follows-     Nebs   Diuresis   Pulmonology consult       Acute on chronic heart failure with preserved ejection fraction  Patient has Diastolic (HFpEF) heart failure that is Acute on chronic. On presentation their CHF was decompensated. Evidence of decompensated CHF on presentation includes:  Pleural effusion. The etiology of their decompensation is likely dietary indiscretion. Most recent BNP and echo results are listed below.  No results for input(s): "BNP" in the last 72 hours.  Latest ECHO  Results for orders placed during the hospital encounter of 11/17/24    Echo Saline Bubble? Yes    Interpretation Summary    Left Ventricle: The left ventricle is mildly dilated. Mildly increased wall thickness. Normal wall motion. Septal motion is consistent with pacing. There is diastolic dysfunction.    Right Ventricle: Moderate right ventricular enlargement.    Left Atrium: Left atrium is mildly dilated.   "  Right Atrium: Right atrium is severely dilated.    Aortic Valve: The aortic valve is a trileaflet valve. Mildly calcified cusps.    Mitral Valve: There is mild bileaflet sclerosis. Mildly thickened leaflets. There is mild to moderate regurgitation with an eccentric jet.    Tricuspid Valve: There is moderate to severe regurgitation with an eccentrically directed jet.    Pulmonic Valve: There is mild to moderate regurgitation.    IVC/SVC: Elevated venous pressure at 15 mmHg.    Current Heart Failure Medications  hydrALAZINE tablet 50 mg, 2 times daily, Oral  spironolactone tablet 25 mg, Daily, Oral  furosemide injection 80 mg, Every 12 hours, Intravenous    Plan  - Monitor strict I&Os and daily weights.    - Place on telemetry  - Low sodium diet  - Place on fluid restriction of 1.5 L.   - Cardiology has not been consulted  - The patient's volume status is improving but not at their baseline as indicated by shortness of breath  - follow        Pleural effusion  Patient found to have large pleural effusion on imaging. I have personally reviewed and interpreted the following imaging: Xray. A thoracentesis was deferred. Most likely etiology includes Congestive Heart Failure and Pneumonia. Management to include Diuresis      Diabetic nephropathy  Glucose control      Peripheral vascular disease  Aspirin statin      CKD stage 4 secondary to hypertension  Creatine stable for now. BMP reviewed- noted Estimated Creatinine Clearance: 28.8 mL/min (A) (based on SCr of 2.07 mg/dL (H)). according to latest data. Based on current GFR, CKD stage is stage 4 - GFR 15-29.  Monitor UOP and serial BMP and adjust therapy as needed. Renally dose meds. Avoid nephrotoxic medications and procedures.    Essential hypertension  Patient's blood pressure range in the last 24 hours was: BP  Min: 123/65  Max: 137/79.The patient's inpatient anti-hypertensive regimen is listed below:  Current Antihypertensives  amLODIPine tablet 10 mg, Daily,  "Oral  hydrALAZINE tablet 50 mg, 2 times daily, Oral  isosorbide mononitrate 24 hr tablet 30 mg, Daily, Oral  spironolactone tablet 25 mg, Daily, Oral  furosemide injection 80 mg, Every 12 hours, Intravenous    Plan  BP is controlled   Follow    Dyslipidemia associated with type 2 diabetes mellitus  Statin   Glucose control      Obstructive sleep apnea    BiPAP at night.  Wear CPAP at home.  Given BiPAP due to respiratory distress to support overnight    Obese  Body mass index is 30.85 kg/m². Morbid obesity complicates all aspects of disease management from diagnostic modalities to treatment. Weight loss encouraged and health benefits explained to patient.         Dyslipidemia  Statin      Chronic a-fib  Patient has persistent (7 days or more) atrial fibrillation. Patient is currently in atrial fibrillation. [id number] Score: 5. The patients heart rate in the last 24 hours is as follows:  Pulse  Min: 57  Max: 70     Antiarrhythmics       Anticoagulants  rivaroxaban tablet 15 mg, Daily, Oral    Plan  - Replete lytes with a goal of K>4, Mg >2  - Patient is not anticoagulated due to holding anticoagulation (Xarelto) in anticipation of procedure  - Patient's afib is currently controlled  - follow        Cardiomyopathy  Diurese    Diabetes mellitus without complication  Patient's FSGs are controlled on current medication regimen.  Last A1c reviewed-   Lab Results   Component Value Date    HGBA1C 6.2 08/23/2024     Most recent fingerstick glucose reviewed- No results for input(s): "POCTGLUCOSE" in the last 24 hours.  Current correctional scale  Low  Maintain anti-hyperglycemic dose as follows-   Antihyperglycemics (From admission, onward)      Start     Stop Route Frequency Ordered    01/19/25 1805  insulin aspart U-100 injection 0-5 Units         -- SubQ Before meals & nightly PRN 01/19/25 1712          Hold Oral hypoglycemics while patient is in the hospital.    Benign hypertensive CKD, stage 1-4 or unspecified chronic " kidney disease  Creatine stable for now. BMP reviewed- noted Estimated Creatinine Clearance: 28.8 mL/min (A) (based on SCr of 2.07 mg/dL (H)). according to latest data. Based on current GFR, CKD stage is stage 4 - GFR 15-29.  Monitor UOP and serial BMP and adjust therapy as needed. Renally dose meds. Avoid nephrotoxic medications and procedures.    Coronary atherosclerosis  Patient with known CAD s/p  unclear , which is controlled Will continue ASA and Statin and monitor for S/Sx of angina/ACS. Continue to monitor on telemetry.     Hyperlipidemia  Statin      Hypertension  Patient's blood pressure range in the last 24 hours was: BP  Min: 123/65  Max: 137/79.The patient's inpatient anti-hypertensive regimen is listed below:  Current Antihypertensives  amLODIPine tablet 10 mg, Daily, Oral  hydrALAZINE tablet 50 mg, 2 times daily, Oral  isosorbide mononitrate 24 hr tablet 30 mg, Daily, Oral  spironolactone tablet 25 mg, Daily, Oral  furosemide injection 80 mg, Every 12 hours, Intravenous    Plan  - BP is controlled, no changes needed to their regimen  - follow    History of CVA (cerebrovascular accident)  Aspirin statin        VTE Risk Mitigation (From admission, onward)           Ordered     Reason for No Pharmacological VTE Prophylaxis  Once        Question:  Reasons:  Answer:  Already adequately anticoagulated on oral Anticoagulants    01/19/25 1712     IP VTE HIGH RISK PATIENT  Once         01/19/25 1712     Place sequential compression device  Until discontinued         01/19/25 1712                                    Robert Gramajo DO  Department of Hospital Medicine  [facility] - Emergency Department

## 2025-01-20 NOTE — ASSESSMENT & PLAN NOTE
Patient's blood pressure range in the last 24 hours was: BP  Min: 123/65  Max: 137/79.The patient's inpatient anti-hypertensive regimen is listed below:  Current Antihypertensives  amLODIPine tablet 10 mg, Daily, Oral  hydrALAZINE tablet 50 mg, 2 times daily, Oral  isosorbide mononitrate 24 hr tablet 30 mg, Daily, Oral  spironolactone tablet 25 mg, Daily, Oral  furosemide injection 80 mg, Every 12 hours, Intravenous    Plan  BP is controlled   Follow

## 2025-01-20 NOTE — ED NOTES
Patient transferred into hospital bed with waffle overlay. Daughter at bedside, call light within reach. BSC in room, patient instructed someone from respiratory would be down around 10 to put her on BIPAP. No complaints voiced.

## 2025-01-20 NOTE — ASSESSMENT & PLAN NOTE
Patient's blood pressure range in the last 24 hours was: BP  Min: 123/65  Max: 137/79.The patient's inpatient anti-hypertensive regimen is listed below:  Current Antihypertensives  amLODIPine tablet 10 mg, Daily, Oral  hydrALAZINE tablet 50 mg, 2 times daily, Oral  isosorbide mononitrate 24 hr tablet 30 mg, Daily, Oral  spironolactone tablet 25 mg, Daily, Oral  furosemide injection 80 mg, Daily, Intravenous    Plan  - BP is controlled, no changes needed to their regimen  - follow

## 2025-01-20 NOTE — SUBJECTIVE & OBJECTIVE
Past Medical History:   Diagnosis Date    Anemia of chronic disease     Atrial fibrillation     Benign hypertensive CKD, stage 1-4 or unspecified chronic kidney disease     Bronchitis 12/22/2023    Carotid artery occlusion     CKD (chronic kidney disease)     Coronary atherosclerosis     Cough 12/22/2023    COVID-19 ruled out 03/20/2024    Diabetes mellitus, type 2     DJD (degenerative joint disease)     History of CVA (cerebrovascular accident)     HTN (hypertension)     Hyperlipidemia     Nonischemic dilated cardiomyopathy     Obesity     Osteopenia     Paroxysmal atrial fibrillation     Presence of cardiac pacemaker     PVD (peripheral vascular disease)     Upper respiratory tract infection 12/22/2023    Vitamin D deficiency        Past Surgical History:   Procedure Laterality Date    BREAST BIOPSY      COLONOSCOPY  12/04/2018    repeat in 5 years    EMBOLECTOMY OR THROMBECTOMY, ARTERY, AORTOILIAC, FEMORAL, OR POPLITEAL Right 11/17/2024    Procedure: EMBOLECTOMY OR THROMBECTOMY, ARTERY, AORTOILIAC, FEMORAL, OR POPLITEAL;  Surgeon: Jordana Guillermo MD;  Location: [facility];  Service: Vascular;  Laterality: Right;    pace maker      VAGINAL DELIVERY      x 3       Review of patient's allergies indicates:   Allergen Reactions    Ace inhibitors Edema    Losartan Swelling       Family History       Problem Relation (Age of Onset)    Alzheimer's disease Maternal Grandmother    Breast cancer Maternal Grandmother, Daughter    Cancer Brother    Clotting disorder Daughter    Diabetes Daughter, Daughter    Hyperlipidemia Daughter    Hypertension Mother, Father, Sister, Brother, Maternal Grandmother, Daughter, Daughter, Daughter    Stroke Sister, Brother          Tobacco Use    Smoking status: Never     Passive exposure: Never    Smokeless tobacco: Never   Substance and Sexual Activity    Alcohol use: Not Currently    Drug use: Never    Sexual activity: Not Currently      Review of Systems  Objective:     Vital Signs  (Most Recent):  Temp: 97.8 °F (36.6 °C) (01/20/25 0706)  Pulse: 60 (01/20/25 0604)  Resp: 14 (01/20/25 0604)  BP: 123/65 (01/20/25 0604)  SpO2: (!) 94 % (01/20/25 0604) Vital Signs (24h Range):  Temp:  [97.8 °F (36.6 °C)-98.4 °F (36.9 °C)] 97.8 °F (36.6 °C)  Pulse:  [57-70] 60  Resp:  [14-33] 14  SpO2:  [92 %-98 %] 94 %  BP: (123-137)/(65-80) 123/65   Weight: 97.5 kg (215 lb)  Body mass index is 30.85 kg/m².    No intake or output data in the 24 hours ending 01/20/25 1041       Physical Exam  Vitals reviewed.   Constitutional:       Appearance: Normal appearance.      Interventions: She is not intubated.  HENT:      Head: Normocephalic and atraumatic.      Nose: Nose normal.      Mouth/Throat:      Mouth: Mucous membranes are dry.      Pharynx: Oropharynx is clear.   Eyes:      Extraocular Movements: Extraocular movements intact.      Conjunctiva/sclera: Conjunctivae normal.      Pupils: Pupils are equal, round, and reactive to light.   Cardiovascular:      Rate and Rhythm: Normal rate.      Heart sounds: Normal heart sounds. No murmur heard.  Pulmonary:      Effort: Pulmonary effort is normal. She is not intubated.      Breath sounds: Normal breath sounds.   Abdominal:      General: Abdomen is flat. Bowel sounds are normal.      Palpations: Abdomen is soft.   Musculoskeletal:         General: Normal range of motion.      Cervical back: Normal range of motion and neck supple.      Right lower leg: No edema.      Left lower leg: No edema.   Skin:     General: Skin is warm and dry.      Capillary Refill: Capillary refill takes less than 2 seconds.   Neurological:      General: No focal deficit present.      Mental Status: She is alert and oriented to person, place, and time.   Psychiatric:         Mood and Affect: Mood normal.         Behavior: Behavior normal.            Vents:  Oxygen Concentration (%): 28 (01/20/25 0447)  Lines/Drains/Airways       Peripheral Intravenous Line  Duration                  Peripheral  IV - Single Lumen 01/19/25 1500 Anterior;Distal;Left Forearm <1 day                  Significant Labs:    CBC/Anemia Profile:  Recent Labs   Lab 01/19/25  1346 01/20/25  0416   WBC 6.88 7.00   HGB 11.6* 11.4*   HCT 36.8* 36.0*    215   MCV 84.0 82.0   RDW 17.2* 16.8*        Chemistries:  Recent Labs   Lab 01/19/25  1346 01/20/25  0416   * 137   K 4.6 4.0    104   CO2 16* 23   BUN 28* 33*   CREATININE 2.00* 2.07*   CALCIUM 8.7 9.0   ALBUMIN 3.4  --    PROT 7.2  --    BILITOT 2.2*  --    ALKPHOS 81  --    ALT 16  --    AST 44*  --    MG  --  2.3   PHOS  --  4.1       Recent Lab Results  (Last 5 results in the past 24 hours)        01/20/25  0734   01/20/25  0416   01/20/25  0252   01/19/25  1918   01/19/25  1608        Anion Gap   14             Baso #   0.03             Basophil %   0.4             BUN   33             BUN/CREAT RATIO   16             Calcium   9.0             Chloride   104             CO2   23             Creatinine   2.07             Differential Method   Auto             eGFR   24             Eos #   0.02             Eos %   0.3             Glucose   131             Hematocrit   36.0             Hemoglobin   11.4             Immature Grans (Abs)   0.03             Immature Granulocytes   0.4             Lymph #   2.18             Lymph %   31.1             Magnesium    2.3             MCH   26.0             MCHC   31.7             MCV   82.0             Mono #   0.96             Mono %   13.7             MPV   9.8             Neutrophils, Abs   3.78             Neutrophils Relative   54.1             nRBC   0.0             NUCLEATED RBC ABSOLUTE   0.00             Phosphorus Level   4.1             Platelet Count   215             POC Glucose 98     154   86         Potassium   4.0             RBC   4.39             RDW   16.8             Sodium   137             Troponin I High Sensitivity         627.9  Comment: Critical Result called and verified by verbal readback to:  yesenia FOSTER RN on 01/19/2025 at 17:09. Reported by [id number].       WBC   7.00                                    Significant Imaging: I have reviewed all pertinent imaging results/findings within the past 24 hours.

## 2025-01-20 NOTE — CONSULTS
Consult received and appreciated. Consult for diet education. Patient currently in ED. Will provide on follow up as appropriate.

## 2025-01-20 NOTE — CONSULTS
[facility] - Emergency Department  Critical Care Medicine  Consult Note    Patient Name: Pam Ortega  MRN: [id number]  Admission Date: 1/19/2025  Hospital Length of Stay: 1 days  Code Status: Full Code  Attending Physician: Robert Gramajo DO   Primary Care Provider: Janice Velez FNP   Principal Problem: Acute hypoxic respiratory failure    Consults  Subjective:     HPI:  Patient I saw in clinic January 13 for evaluation of shortness of breath cough right pleural effusion.  At that time she had seen Dr. Dutta and could put on increasing diuresis had increased her Lasix found to have not ejection fraction 35% restart her on inhaled steroids and bronchodilators will see her back tomorrow in the clinic.  However she got more short of breath had more chest pain presented to the emergency room with a markedly increased pleural effusion which was read to have some loculations by Radiology.  Since being diuresed here in the hospital her breathing is a little bit better will repeat the chest x-ray in the morning she is currently short of breath but not near as much as she was not she came in    Hospital/ICU Course:  No notes on file    Past Medical History:   Diagnosis Date    Anemia of chronic disease     Atrial fibrillation     Benign hypertensive CKD, stage 1-4 or unspecified chronic kidney disease     Bronchitis 12/22/2023    Carotid artery occlusion     CKD (chronic kidney disease)     Coronary atherosclerosis     Cough 12/22/2023    COVID-19 ruled out 03/20/2024    Diabetes mellitus, type 2     DJD (degenerative joint disease)     History of CVA (cerebrovascular accident)     HTN (hypertension)     Hyperlipidemia     Nonischemic dilated cardiomyopathy     Obesity     Osteopenia     Paroxysmal atrial fibrillation     Presence of cardiac pacemaker     PVD (peripheral vascular disease)     Upper respiratory tract infection 12/22/2023    Vitamin D deficiency        Past Surgical History:   Procedure  Laterality Date    BREAST BIOPSY      COLONOSCOPY  12/04/2018    repeat in 5 years    EMBOLECTOMY OR THROMBECTOMY, ARTERY, AORTOILIAC, FEMORAL, OR POPLITEAL Right 11/17/2024    Procedure: EMBOLECTOMY OR THROMBECTOMY, ARTERY, AORTOILIAC, FEMORAL, OR POPLITEAL;  Surgeon: Jordana Guillermo MD;  Location: [facility];  Service: Vascular;  Laterality: Right;    pace maker      VAGINAL DELIVERY      x 3       Review of patient's allergies indicates:   Allergen Reactions    Ace inhibitors Edema    Losartan Swelling       Family History       Problem Relation (Age of Onset)    Alzheimer's disease Maternal Grandmother    Breast cancer Maternal Grandmother, Daughter    Cancer Brother    Clotting disorder Daughter    Diabetes Daughter, Daughter    Hyperlipidemia Daughter    Hypertension Mother, Father, Sister, Brother, Maternal Grandmother, Daughter, Daughter, Daughter    Stroke Sister, Brother          Tobacco Use    Smoking status: Never     Passive exposure: Never    Smokeless tobacco: Never   Substance and Sexual Activity    Alcohol use: Not Currently    Drug use: Never    Sexual activity: Not Currently      Review of Systems  Objective:     Vital Signs (Most Recent):  Temp: 97.8 °F (36.6 °C) (01/20/25 0706)  Pulse: 60 (01/20/25 0604)  Resp: 14 (01/20/25 0604)  BP: 123/65 (01/20/25 0604)  SpO2: (!) 94 % (01/20/25 0604) Vital Signs (24h Range):  Temp:  [97.8 °F (36.6 °C)-98.4 °F (36.9 °C)] 97.8 °F (36.6 °C)  Pulse:  [57-70] 60  Resp:  [14-33] 14  SpO2:  [92 %-98 %] 94 %  BP: (123-137)/(65-80) 123/65   Weight: 97.5 kg (215 lb)  Body mass index is 30.85 kg/m².    No intake or output data in the 24 hours ending 01/20/25 1041       Physical Exam  Vitals reviewed.   Constitutional:       Appearance: Normal appearance.      Interventions: She is not intubated.  HENT:      Head: Normocephalic and atraumatic.      Nose: Nose normal.      Mouth/Throat:      Mouth: Mucous membranes are dry.      Pharynx: Oropharynx is clear.   Eyes:       Extraocular Movements: Extraocular movements intact.      Conjunctiva/sclera: Conjunctivae normal.      Pupils: Pupils are equal, round, and reactive to light.   Cardiovascular:      Rate and Rhythm: Normal rate.      Heart sounds: Normal heart sounds. No murmur heard.  Pulmonary:      Effort: Pulmonary effort is normal. She is not intubated.      Breath sounds: Normal breath sounds.   Abdominal:      General: Abdomen is flat. Bowel sounds are normal.      Palpations: Abdomen is soft.   Musculoskeletal:         General: Normal range of motion.      Cervical back: Normal range of motion and neck supple.      Right lower leg: No edema.      Left lower leg: No edema.   Skin:     General: Skin is warm and dry.      Capillary Refill: Capillary refill takes less than 2 seconds.   Neurological:      General: No focal deficit present.      Mental Status: She is alert and oriented to person, place, and time.   Psychiatric:         Mood and Affect: Mood normal.         Behavior: Behavior normal.            Vents:  Oxygen Concentration (%): 28 (01/20/25 0447)  Lines/Drains/Airways       Peripheral Intravenous Line  Duration                  Peripheral IV - Single Lumen 01/19/25 1500 Anterior;Distal;Left Forearm <1 day                  Significant Labs:    CBC/Anemia Profile:  Recent Labs   Lab 01/19/25  1346 01/20/25  0416   WBC 6.88 7.00   HGB 11.6* 11.4*   HCT 36.8* 36.0*    215   MCV 84.0 82.0   RDW 17.2* 16.8*        Chemistries:  Recent Labs   Lab 01/19/25  1346 01/20/25  0416   * 137   K 4.6 4.0    104   CO2 16* 23   BUN 28* 33*   CREATININE 2.00* 2.07*   CALCIUM 8.7 9.0   ALBUMIN 3.4  --    PROT 7.2  --    BILITOT 2.2*  --    ALKPHOS 81  --    ALT 16  --    AST 44*  --    MG  --  2.3   PHOS  --  4.1       Recent Lab Results  (Last 5 results in the past 24 hours)        01/20/25  0734   01/20/25  0416   01/20/25  0252   01/19/25  1918   01/19/25  1608        Anion Gap   14             Baso #    "0.03             Basophil %   0.4             BUN   33             BUN/CREAT RATIO   16             Calcium   9.0             Chloride   104             CO2   23             Creatinine   2.07             Differential Method   Auto             eGFR   24             Eos #   0.02             Eos %   0.3             Glucose   131             Hematocrit   36.0             Hemoglobin   11.4             Immature Grans (Abs)   0.03             Immature Granulocytes   0.4             Lymph #   2.18             Lymph %   31.1             Magnesium    2.3             MCH   26.0             MCHC   31.7             MCV   82.0             Mono #   0.96             Mono %   13.7             MPV   9.8             Neutrophils, Abs   3.78             Neutrophils Relative   54.1             nRBC   0.0             NUCLEATED RBC ABSOLUTE   0.00             Phosphorus Level   4.1             Platelet Count   215             POC Glucose 98     154   86         Potassium   4.0             RBC   4.39             RDW   16.8             Sodium   137             Troponin I High Sensitivity         627.9  Comment: Critical Result called and verified by verbal readback to: yesenia FOSTER RN on 01/19/2025 at 17:09. Reported by [id number].       WBC   7.00                                    Significant Imaging: I have reviewed all pertinent imaging results/findings within the past 24 hours.    ABG  No results for input(s): "PH", "PO2", "PCO2", "HCO3", "BE" in the last 168 hours.  Assessment/Plan:     Pulmonary  * Acute hypoxic respiratory failure  This is better with diuresis and oxygen she looks comfortable currently    Pleural effusion  Right pleural effusion with elements of loculation effusions markedly worse since January 3rd patient has taken Xarelto will have a thoracentesis versus chest tube placed 48 hours after we have stopped his Xarelto I am going to consult Dr. Rocha to look at him in the morning             Thank you for your consult. I will " follow-up with patient. Please contact us if you have any additional questions.     Yonny Mckee MD  Critical Care Medicine  [facility] - Emergency Department

## 2025-01-20 NOTE — ASSESSMENT & PLAN NOTE
Patient with Hypoxic Respiratory failure which is Acute.  she is not on home oxygen. Supplemental oxygen was provided and noted- Oxygen Concentration (%):  [28-40] 28    .   Signs/symptoms of respiratory failure include- tachypnea, increased work of breathing, and respiratory distress. Contributing diagnoses includes - Pleural effusion and Pneumonia Labs and images were reviewed. Patient Has not had a recent ABG. Will treat underlying causes and adjust management of respiratory failure as follows-     Nebs   Diuresis   Pulmonology consult   Discussed case with Dr. Mckee today

## 2025-01-20 NOTE — ASSESSMENT & PLAN NOTE
Patient found to have large pleural effusion on imaging. I have personally reviewed and interpreted the following imaging: Xray. A thoracentesis was deferred. Most likely etiology includes Congestive Heart Failure and Pneumonia. Management to include Diuresis  Continue diuresis

## 2025-01-21 PROBLEM — Z95.0 PRESENCE OF CARDIAC PACEMAKER: Status: ACTIVE | Noted: 2025-01-21

## 2025-01-21 LAB
ANION GAP SERPL CALCULATED.3IONS-SCNC: 13 MMOL/L (ref 7–16)
BASOPHILS # BLD AUTO: 0.02 K/UL (ref 0–0.2)
BASOPHILS NFR BLD AUTO: 0.3 % (ref 0–1)
BUN SERPL-MCNC: 32 MG/DL (ref 10–20)
BUN/CREAT SERPL: 15 (ref 6–20)
CALCIUM SERPL-MCNC: 8.7 MG/DL (ref 8.4–10.2)
CHLORIDE SERPL-SCNC: 105 MMOL/L (ref 98–107)
CO2 SERPL-SCNC: 21 MMOL/L (ref 23–31)
CREAT SERPL-MCNC: 2.07 MG/DL (ref 0.55–1.02)
DIFFERENTIAL METHOD BLD: ABNORMAL
EGFR (NO RACE VARIABLE) (RUSH/TITUS): 24 ML/MIN/1.73M2
EOSINOPHIL # BLD AUTO: 0.04 K/UL (ref 0–0.5)
EOSINOPHIL NFR BLD AUTO: 0.6 % (ref 1–4)
ERYTHROCYTE [DISTWIDTH] IN BLOOD BY AUTOMATED COUNT: 16.8 % (ref 11.5–14.5)
GLUCOSE SERPL-MCNC: 117 MG/DL (ref 82–115)
GLUCOSE SERPL-MCNC: 119 MG/DL (ref 70–105)
GLUCOSE SERPL-MCNC: 143 MG/DL (ref 70–105)
GLUCOSE SERPL-MCNC: 162 MG/DL (ref 70–105)
GLUCOSE SERPL-MCNC: 169 MG/DL (ref 70–105)
HCT VFR BLD AUTO: 33 % (ref 38–47)
HGB BLD-MCNC: 10.7 G/DL (ref 12–16)
IMM GRANULOCYTES # BLD AUTO: 0.03 K/UL (ref 0–0.04)
IMM GRANULOCYTES NFR BLD: 0.4 % (ref 0–0.4)
LYMPHOCYTES # BLD AUTO: 1.89 K/UL (ref 1–4.8)
LYMPHOCYTES NFR BLD AUTO: 26.4 % (ref 27–41)
MAGNESIUM SERPL-MCNC: 2.4 MG/DL (ref 1.6–2.6)
MCH RBC QN AUTO: 26.6 PG (ref 27–31)
MCHC RBC AUTO-ENTMCNC: 32.4 G/DL (ref 32–36)
MCV RBC AUTO: 81.9 FL (ref 80–96)
MONOCYTES # BLD AUTO: 0.88 K/UL (ref 0–0.8)
MONOCYTES NFR BLD AUTO: 12.3 % (ref 2–6)
MPC BLD CALC-MCNC: 10.1 FL (ref 9.4–12.4)
NEUTROPHILS # BLD AUTO: 4.31 K/UL (ref 1.8–7.7)
NEUTROPHILS NFR BLD AUTO: 60 % (ref 53–65)
NRBC # BLD AUTO: 0 X10E3/UL
NRBC, AUTO (.00): 0 %
OHS QRS DURATION: 150 MS
OHS QTC CALCULATION: 454 MS
PHOSPHATE SERPL-MCNC: 3.9 MG/DL (ref 2.3–4.7)
PLATELET # BLD AUTO: 194 K/UL (ref 150–400)
POTASSIUM SERPL-SCNC: 4 MMOL/L (ref 3.5–5.1)
RBC # BLD AUTO: 4.03 M/UL (ref 4.2–5.4)
SODIUM SERPL-SCNC: 135 MMOL/L (ref 136–145)
WBC # BLD AUTO: 7.17 K/UL (ref 4.5–11)

## 2025-01-21 PROCEDURE — 63600175 PHARM REV CODE 636 W HCPCS: Performed by: STUDENT IN AN ORGANIZED HEALTH CARE EDUCATION/TRAINING PROGRAM

## 2025-01-21 PROCEDURE — 84100 ASSAY OF PHOSPHORUS: CPT | Performed by: STUDENT IN AN ORGANIZED HEALTH CARE EDUCATION/TRAINING PROGRAM

## 2025-01-21 PROCEDURE — 36415 COLL VENOUS BLD VENIPUNCTURE: CPT | Performed by: STUDENT IN AN ORGANIZED HEALTH CARE EDUCATION/TRAINING PROGRAM

## 2025-01-21 PROCEDURE — 80048 BASIC METABOLIC PNL TOTAL CA: CPT | Performed by: STUDENT IN AN ORGANIZED HEALTH CARE EDUCATION/TRAINING PROGRAM

## 2025-01-21 PROCEDURE — 11000001 HC ACUTE MED/SURG PRIVATE ROOM

## 2025-01-21 PROCEDURE — 94761 N-INVAS EAR/PLS OXIMETRY MLT: CPT

## 2025-01-21 PROCEDURE — 27000221 HC OXYGEN, UP TO 24 HOURS

## 2025-01-21 PROCEDURE — 82962 GLUCOSE BLOOD TEST: CPT

## 2025-01-21 PROCEDURE — 99233 SBSQ HOSP IP/OBS HIGH 50: CPT | Mod: ,,, | Performed by: HOSPITALIST

## 2025-01-21 PROCEDURE — 83735 ASSAY OF MAGNESIUM: CPT | Performed by: STUDENT IN AN ORGANIZED HEALTH CARE EDUCATION/TRAINING PROGRAM

## 2025-01-21 PROCEDURE — 25000242 PHARM REV CODE 250 ALT 637 W/ HCPCS: Performed by: INTERNAL MEDICINE

## 2025-01-21 PROCEDURE — 99900035 HC TECH TIME PER 15 MIN (STAT)

## 2025-01-21 PROCEDURE — 85025 COMPLETE CBC W/AUTO DIFF WBC: CPT | Performed by: STUDENT IN AN ORGANIZED HEALTH CARE EDUCATION/TRAINING PROGRAM

## 2025-01-21 PROCEDURE — 99232 SBSQ HOSP IP/OBS MODERATE 35: CPT | Mod: ,,, | Performed by: INTERNAL MEDICINE

## 2025-01-21 PROCEDURE — 25000003 PHARM REV CODE 250: Performed by: STUDENT IN AN ORGANIZED HEALTH CARE EDUCATION/TRAINING PROGRAM

## 2025-01-21 PROCEDURE — 94640 AIRWAY INHALATION TREATMENT: CPT

## 2025-01-21 RX ORDER — BUDESONIDE 0.5 MG/2ML
0.5 INHALANT ORAL EVERY 12 HOURS
Status: DISCONTINUED | OUTPATIENT
Start: 2025-01-21 | End: 2025-01-27 | Stop reason: HOSPADM

## 2025-01-21 RX ADMIN — BUDESONIDE INHALATION 0.5 MG: 0.5 SUSPENSION RESPIRATORY (INHALATION) at 07:01

## 2025-01-21 RX ADMIN — ISOSORBIDE MONONITRATE 30 MG: 30 TABLET, EXTENDED RELEASE ORAL at 09:01

## 2025-01-21 RX ADMIN — ONDANSETRON 4 MG: 4 TABLET, ORALLY DISINTEGRATING ORAL at 10:01

## 2025-01-21 RX ADMIN — ASPIRIN 81 MG: 81 TABLET, COATED ORAL at 09:01

## 2025-01-21 RX ADMIN — POLYETHYLENE GLYCOL 3350 17 G: 17 POWDER, FOR SOLUTION ORAL at 06:01

## 2025-01-21 RX ADMIN — COLCHICINE 0.6 MG: 0.6 TABLET ORAL at 09:01

## 2025-01-21 RX ADMIN — HYDRALAZINE HYDROCHLORIDE 50 MG: 50 TABLET ORAL at 09:01

## 2025-01-21 RX ADMIN — OXYCODONE HYDROCHLORIDE 5 MG: 5 TABLET ORAL at 06:01

## 2025-01-21 RX ADMIN — PANTOPRAZOLE SODIUM 40 MG: 40 TABLET, DELAYED RELEASE ORAL at 09:01

## 2025-01-21 RX ADMIN — ATORVASTATIN CALCIUM 40 MG: 40 TABLET, FILM COATED ORAL at 09:01

## 2025-01-21 RX ADMIN — FUROSEMIDE 80 MG: 10 INJECTION, SOLUTION INTRAMUSCULAR; INTRAVENOUS at 09:01

## 2025-01-21 RX ADMIN — HYDRALAZINE HYDROCHLORIDE 50 MG: 50 TABLET ORAL at 08:01

## 2025-01-21 RX ADMIN — SPIRONOLACTONE 25 MG: 25 TABLET ORAL at 09:01

## 2025-01-21 RX ADMIN — AMLODIPINE BESYLATE 10 MG: 10 TABLET ORAL at 09:01

## 2025-01-21 NOTE — PROGRESS NOTES
[facility]  Critical Care Medicine  Progress Note    Patient Name: Pam Ortega  MRN: [id number]  Admission Date: 1/19/2025  Hospital Length of Stay: 2 days  Code Status: Full Code  Attending Provider: Ye Chahal MD  Primary Care Provider: Janice Velez FNP   Principal Problem: Acute hypoxic respiratory failure    Subjective:     HPI:  Patient I saw in clinic January 13 for evaluation of shortness of breath cough right pleural effusion.  At that time she had seen Dr. Dutta and could put on increasing diuresis had increased her Lasix found to have not ejection fraction 35% restart her on inhaled steroids and bronchodilators will see her back tomorrow in the clinic.  However she got more short of breath had more chest pain presented to the emergency room with a markedly increased pleural effusion which was read to have some loculations by Radiology.  Since being diuresed here in the hospital her breathing is a little bit better will repeat the chest x-ray in the morning she is currently short of breath but not near as much as she was not she came in    Hospital/ICU Course:  No notes on file    Interval History/Significant Events:  Patient without complaints cough is better looks comfortable    Review of Systems  Objective:     Vital Signs (Most Recent):  Temp: 97.7 °F (36.5 °C) (01/21/25 0803)  Pulse: 62 (01/21/25 0803)  Resp: 16 (01/21/25 0803)  BP: (!) 150/79 (01/21/25 0803)  SpO2: 97 % (01/21/25 0803) Vital Signs (24h Range):  Temp:  [97.4 °F (36.3 °C)-97.9 °F (36.6 °C)] 97.7 °F (36.5 °C)  Pulse:  [58-74] 62  Resp:  [16-23] 16  SpO2:  [93 %-100 %] 97 %  BP: (124-152)/(66-83) 150/79   Weight: 97.5 kg (214 lb 15.2 oz)  Body mass index is 30.84 kg/m².      Intake/Output Summary (Last 24 hours) at 1/21/2025 0818  Last data filed at 1/20/2025 1158  Gross per 24 hour   Intake --   Output 1 ml   Net -1 ml          Physical Exam  Vitals reviewed.   Constitutional:       Appearance:  Normal appearance.      Interventions: She is not intubated.  HENT:      Head: Normocephalic and atraumatic.      Nose: Nose normal.      Mouth/Throat:      Mouth: Mucous membranes are dry.      Pharynx: Oropharynx is clear.   Eyes:      Extraocular Movements: Extraocular movements intact.      Conjunctiva/sclera: Conjunctivae normal.      Pupils: Pupils are equal, round, and reactive to light.   Cardiovascular:      Rate and Rhythm: Normal rate.      Heart sounds: Normal heart sounds. No murmur heard.  Pulmonary:      Effort: Pulmonary effort is normal. She is not intubated.      Breath sounds: Normal breath sounds.   Abdominal:      General: Abdomen is flat. Bowel sounds are normal.      Palpations: Abdomen is soft.   Musculoskeletal:         General: Normal range of motion.      Cervical back: Normal range of motion and neck supple.      Right lower leg: No edema.      Left lower leg: No edema.   Skin:     General: Skin is warm and dry.      Capillary Refill: Capillary refill takes less than 2 seconds.   Neurological:      General: No focal deficit present.      Mental Status: She is alert and oriented to person, place, and time.   Psychiatric:         Mood and Affect: Mood normal.         Behavior: Behavior normal.            Vents:  Oxygen Concentration (%): 28 (01/20/25 1158)  Lines/Drains/Airways       Peripheral Intravenous Line  Duration                  Peripheral IV - Single Lumen 01/19/25 1500 Anterior;Distal;Left Forearm 1 day                  Significant Labs:    CBC/Anemia Profile:  Recent Labs   Lab 01/19/25  1346 01/20/25  0416 01/21/25  0326   WBC 6.88 7.00 7.17   HGB 11.6* 11.4* 10.7*   HCT 36.8* 36.0* 33.0*    215 194   MCV 84.0 82.0 81.9   RDW 17.2* 16.8* 16.8*        Chemistries:  Recent Labs   Lab 01/19/25  1346 01/20/25  0416 01/21/25  0326   * 137 135*   K 4.6 4.0 4.0    104 105   CO2 16* 23 21*   BUN 28* 33* 32*   CREATININE 2.00* 2.07* 2.07*   CALCIUM 8.7 9.0 8.7  "  ALBUMIN 3.4  --   --    PROT 7.2  --   --    BILITOT 2.2*  --   --    ALKPHOS 81  --   --    ALT 16  --   --    AST 44*  --   --    MG  --  2.3 2.4   PHOS  --  4.1 3.9       Recent Lab Results         01/21/25  0326   01/20/25  2121   01/20/25  1702        Anion Gap 13           Baso # 0.02           Basophil % 0.3           BUN 32           BUN/CREAT RATIO 15           Calcium 8.7           Chloride 105           CO2 21           Creatinine 2.07           Differential Method Auto           eGFR 24           Eos # 0.04           Eos % 0.6           Glucose 117           Hematocrit 33.0           Hemoglobin 10.7           Immature Grans (Abs) 0.03           Immature Granulocytes 0.4           Lymph # 1.89           Lymph % 26.4           Magnesium  2.4           MCH 26.6           MCHC 32.4           MCV 81.9           Mono # 0.88           Mono % 12.3           MPV 10.1           Neutrophils, Abs 4.31           Neutrophils Relative 60.0           nRBC 0.0           NUCLEATED RBC ABSOLUTE 0.00           Phosphorus Level 3.9           Platelet Count 194           POC Glucose   178   164       Potassium 4.0           RBC 4.03           RDW 16.8           Sodium 135           WBC 7.17                   Significant Imaging:  I have reviewed all pertinent imaging results/findings within the past 24 hours.    ABG  No results for input(s): "PH", "PO2", "PCO2", "HCO3", "BE" in the last 168 hours.  Assessment/Plan:     Pulmonary  * Acute hypoxic respiratory failure  This is better with diuresis and oxygen she looks comfortable currently    Pleural effusion  Dr. Rocha to evaluate for possible chest tube versus thoracentesis    Cough  Continue inhaled steroids    Other  Obstructive sleep apnea  Noted             Yonny Mckee MD  Critical Care Medicine  [facility]  "

## 2025-01-21 NOTE — PLAN OF CARE
[facility]  Initial Discharge Assessment       Primary Care Provider: Janice Velez FNP    Admission Diagnosis: Shortness of breath [R06.02]  Chest pain [R07.9]  Acute hypoxic respiratory failure [J96.01]    Admission Date: 1/19/2025  Expected Discharge Date:     Transition of Care Barriers: None    Payor: MEDICARE / Plan: MEDICARE PART A & B / Product Type: Government /     Extended Emergency Contact Information  Primary Emergency Contact: Madhavi Ortega  Mobile Phone: [phone]  Relation: Daughter  Preferred language: English   needed? No  Secondary Emergency Contact: JAYESH SENIOR  Mobile Phone: [phone]  Relation: Grandchild  Preferred language: English   needed? No    Discharge Plan A: Home with family  Discharge Plan B: Home with family, Home Health, Long-term acute care facility (LTAC), Rehab, Skilled Nursing Facility      [facility]  [address] [place], [place] [address]  Phone: [phone] Fax: [phone]      Initial Assessment (most recent)       Adult Discharge Assessment - 01/21/25 1306          Discharge Assessment    Assessment Type Discharge Planning Assessment     Confirmed/corrected address, phone number and insurance Yes     Confirmed Demographics Correct on Facesheet     Source of Information patient;family     People in Home child(supriya), adult     Do you expect to return to your current living situation? Yes     Do you have help at home or someone to help you manage your care at home? Yes     Who are your caregiver(s) and their phone number(s)? Marianela Ortega, daughter, [phone]     Prior to hospitilization cognitive status: Unable to Assess     Current cognitive status: Alert/Oriented     Walking or Climbing Stairs Difficulty no     Dressing/Bathing Difficulty no     Home Accessibility wheelchair accessible     Home Layout Able to live on 1st floor     Equipment Currently Used at Home CPAP     Readmission  within 30 days? No     Patient currently being followed by outpatient case management? No     Do you currently have service(s) that help you manage your care at home? No     Do you take prescription medications? Yes     Do you have prescription coverage? Yes     Coverage Medicare     Do you have any problems affording any of your prescribed medications? No     Is the patient taking medications as prescribed? yes     Who is going to help you get home at discharge? Marianela Ortega, daughter, [phone]     How do you get to doctors appointments? family or friend will provide     Are you on dialysis? No     Do you take coumadin? No     Discharge Plan A Home with family     Discharge Plan B Home with family;Home Health;Long-term acute care facility (LTAC);Rehab;Skilled Nursing Facility     DME Needed Upon Discharge  none     Discharge Plan discussed with: Patient;Adult children     Transition of Care Barriers None        Physical Activity    On average, how many days per week do you engage in moderate to strenuous exercise (like a brisk walk)? 0 days     On average, how many minutes do you engage in exercise at this level? 0 min        Financial Resource Strain    How hard is it for you to pay for the very basics like food, housing, medical care, and heating? Not hard at all        Housing Stability    In the last 12 months, was there a time when you were not able to pay the mortgage or rent on time? No     At any time in the past 12 months, were you homeless or living in a shelter (including now)? No        Transportation Needs    Has the lack of transportation kept you from medical appointments, meetings, work or from getting things needed for daily living? No        Food Insecurity    Within the past 12 months, you worried that your food would run out before you got the money to buy more. Never true     Within the past 12 months, the food you bought just didn't last and you didn't have money to get more. Never true         Stress    Do you feel stress - tense, restless, nervous, or anxious, or unable to sleep at night because your mind is troubled all the time - these days? Not at all        Social Isolation    How often do you feel lonely or isolated from those around you?  Never        Alcohol Use    Q1: How often do you have a drink containing alcohol? Never     Q2: How many drinks containing alcohol do you have on a typical day when you are drinking? Patient does not drink     Q3: How often do you have six or more drinks on one occasion? Never        Utilities    In the past 12 months has the electric, gas, oil, or water company threatened to shut off services in your home? No        Health Literacy    How often do you need to have someone help you when you read instructions, pamphlets, or other written material from your doctor or pharmacy? Rarely        OTHER    Name(s) of People in Home Marianela Ortega, daughter, [phone]                   SS spoke with pt and family at bedside. Pt lives at home with her daughter and plans to return when medically ready for dc. Pt has required dme. Pt is not current with . SDOH complete. IM obtained. Ss following

## 2025-01-21 NOTE — PLAN OF CARE
Ss consulted to assist with cardiac rehab scheduling when ordered. Ss brought cardiac rehab packet to bedside. Pt and family requesting home health through [facility]. Ss notified MD. Javier following

## 2025-01-21 NOTE — PLAN OF CARE
Ss consulted for . Choice obtained for [facility]. Ss faxed referral and notified [facility]. Javier following

## 2025-01-21 NOTE — SUBJECTIVE & OBJECTIVE
Interval History/Significant Events:  Patient without complaints cough is better looks comfortable    Review of Systems  Objective:     Vital Signs (Most Recent):  Temp: 97.7 °F (36.5 °C) (01/21/25 0803)  Pulse: 62 (01/21/25 0803)  Resp: 16 (01/21/25 0803)  BP: (!) 150/79 (01/21/25 0803)  SpO2: 97 % (01/21/25 0803) Vital Signs (24h Range):  Temp:  [97.4 °F (36.3 °C)-97.9 °F (36.6 °C)] 97.7 °F (36.5 °C)  Pulse:  [58-74] 62  Resp:  [16-23] 16  SpO2:  [93 %-100 %] 97 %  BP: (124-152)/(66-83) 150/79   Weight: 97.5 kg (214 lb 15.2 oz)  Body mass index is 30.84 kg/m².      Intake/Output Summary (Last 24 hours) at 1/21/2025 0818  Last data filed at 1/20/2025 1158  Gross per 24 hour   Intake --   Output 1 ml   Net -1 ml          Physical Exam  Vitals reviewed.   Constitutional:       Appearance: Normal appearance.      Interventions: She is not intubated.  HENT:      Head: Normocephalic and atraumatic.      Nose: Nose normal.      Mouth/Throat:      Mouth: Mucous membranes are dry.      Pharynx: Oropharynx is clear.   Eyes:      Extraocular Movements: Extraocular movements intact.      Conjunctiva/sclera: Conjunctivae normal.      Pupils: Pupils are equal, round, and reactive to light.   Cardiovascular:      Rate and Rhythm: Normal rate.      Heart sounds: Normal heart sounds. No murmur heard.  Pulmonary:      Effort: Pulmonary effort is normal. She is not intubated.      Breath sounds: Normal breath sounds.   Abdominal:      General: Abdomen is flat. Bowel sounds are normal.      Palpations: Abdomen is soft.   Musculoskeletal:         General: Normal range of motion.      Cervical back: Normal range of motion and neck supple.      Right lower leg: No edema.      Left lower leg: No edema.   Skin:     General: Skin is warm and dry.      Capillary Refill: Capillary refill takes less than 2 seconds.   Neurological:      General: No focal deficit present.      Mental Status: She is alert and oriented to person, place, and time.    Psychiatric:         Mood and Affect: Mood normal.         Behavior: Behavior normal.            Vents:  Oxygen Concentration (%): 28 (01/20/25 1158)  Lines/Drains/Airways       Peripheral Intravenous Line  Duration                  Peripheral IV - Single Lumen 01/19/25 1500 Anterior;Distal;Left Forearm 1 day                  Significant Labs:    CBC/Anemia Profile:  Recent Labs   Lab 01/19/25  1346 01/20/25  0416 01/21/25  0326   WBC 6.88 7.00 7.17   HGB 11.6* 11.4* 10.7*   HCT 36.8* 36.0* 33.0*    215 194   MCV 84.0 82.0 81.9   RDW 17.2* 16.8* 16.8*        Chemistries:  Recent Labs   Lab 01/19/25  1346 01/20/25  0416 01/21/25  0326   * 137 135*   K 4.6 4.0 4.0    104 105   CO2 16* 23 21*   BUN 28* 33* 32*   CREATININE 2.00* 2.07* 2.07*   CALCIUM 8.7 9.0 8.7   ALBUMIN 3.4  --   --    PROT 7.2  --   --    BILITOT 2.2*  --   --    ALKPHOS 81  --   --    ALT 16  --   --    AST 44*  --   --    MG  --  2.3 2.4   PHOS  --  4.1 3.9       Recent Lab Results         01/21/25  0326   01/20/25  2121   01/20/25  1702        Anion Gap 13           Baso # 0.02           Basophil % 0.3           BUN 32           BUN/CREAT RATIO 15           Calcium 8.7           Chloride 105           CO2 21           Creatinine 2.07           Differential Method Auto           eGFR 24           Eos # 0.04           Eos % 0.6           Glucose 117           Hematocrit 33.0           Hemoglobin 10.7           Immature Grans (Abs) 0.03           Immature Granulocytes 0.4           Lymph # 1.89           Lymph % 26.4           Magnesium  2.4           MCH 26.6           MCHC 32.4           MCV 81.9           Mono # 0.88           Mono % 12.3           MPV 10.1           Neutrophils, Abs 4.31           Neutrophils Relative 60.0           nRBC 0.0           NUCLEATED RBC ABSOLUTE 0.00           Phosphorus Level 3.9           Platelet Count 194           POC Glucose   178   164       Potassium 4.0           RBC 4.03           RDW  16.8           Sodium 135           WBC 7.17                   Significant Imaging:  I have reviewed all pertinent imaging results/findings within the past 24 hours.

## 2025-01-22 LAB
25(OH)D3 SERPL-MCNC: 36.2 NG/ML (ref 30–80)
ALBUMIN FLD-MCNC: 1.8 G/DL
ALBUMIN SERPL BCP-MCNC: 3.3 G/DL (ref 3.4–4.8)
ALP SERPL-CCNC: 86 U/L (ref 40–150)
ALT SERPL W P-5'-P-CCNC: 12 U/L
ANION GAP SERPL CALCULATED.3IONS-SCNC: 16 MMOL/L (ref 7–16)
AST SERPL W P-5'-P-CCNC: 29 U/L (ref 5–34)
BASOPHILS # BLD AUTO: 0.02 K/UL (ref 0–0.2)
BASOPHILS NFR BLD AUTO: 0.3 % (ref 0–1)
BILIRUB DIRECT SERPL-MCNC: 1.1 MG/DL
BILIRUB SERPL-MCNC: 2.7 MG/DL
BUN SERPL-MCNC: 35 MG/DL (ref 10–20)
BUN/CREAT SERPL: 17 (ref 6–20)
CALCIUM SERPL-MCNC: 8.6 MG/DL (ref 8.4–10.2)
CHLORIDE SERPL-SCNC: 101 MMOL/L (ref 98–107)
CHOLEST FLD-MCNC: 23 MG/DL
CLARITY FLD: ABNORMAL
CO2 SERPL-SCNC: 22 MMOL/L (ref 23–31)
COLOR FLD: ABNORMAL
CREAT SERPL-MCNC: 2.06 MG/DL (ref 0.55–1.02)
DIFFERENTIAL METHOD BLD: ABNORMAL
EGFR (NO RACE VARIABLE) (RUSH/TITUS): 24 ML/MIN/1.73M2
EOSINOPHIL # BLD AUTO: 0.07 K/UL (ref 0–0.5)
EOSINOPHIL NFR BLD AUTO: 1 % (ref 1–4)
ERYTHROCYTE [DISTWIDTH] IN BLOOD BY AUTOMATED COUNT: 17.3 % (ref 11.5–14.5)
FOLATE SERPL-MCNC: 11.6 NG/ML (ref 7–31.4)
GLUCOSE FLD-MCNC: 106 MG/DL
GLUCOSE SERPL-MCNC: 108 MG/DL (ref 70–105)
GLUCOSE SERPL-MCNC: 159 MG/DL (ref 70–105)
GLUCOSE SERPL-MCNC: 201 MG/DL (ref 70–105)
GLUCOSE SERPL-MCNC: 75 MG/DL (ref 82–115)
GRANULOCYTES NFR FLD MANUAL: 14 % (ref 0–25)
HCT VFR BLD AUTO: 32.6 % (ref 38–47)
HGB BLD-MCNC: 10.4 G/DL (ref 12–16)
IMM GRANULOCYTES # BLD AUTO: 0.01 K/UL (ref 0–0.04)
IMM GRANULOCYTES NFR BLD: 0.1 % (ref 0–0.4)
LDH FLD-CCNC: 231 U/L
LYMPHOCYTES # BLD AUTO: 1.9 K/UL (ref 1–4.8)
LYMPHOCYTES NFR BLD AUTO: 28.4 % (ref 27–41)
LYMPHOCYTES NFR FLD MANUAL: 30 %
MCH RBC QN AUTO: 26.2 PG (ref 27–31)
MCHC RBC AUTO-ENTMCNC: 31.9 G/DL (ref 32–36)
MCV RBC AUTO: 82.1 FL (ref 80–96)
MONOCYTES # BLD AUTO: 0.88 K/UL (ref 0–0.8)
MONOCYTES NFR BLD AUTO: 13.2 % (ref 2–6)
MONOCYTES NFR FLD MANUAL: 56 %
MPC BLD CALC-MCNC: 10.4 FL (ref 9.4–12.4)
NEUTROPHILS # BLD AUTO: 3.8 K/UL (ref 1.8–7.7)
NEUTROPHILS NFR BLD AUTO: 57 % (ref 53–65)
NRBC # BLD AUTO: 0 X10E3/UL
NRBC, AUTO (.00): 0 %
PLATELET # BLD AUTO: 207 K/UL (ref 150–400)
POTASSIUM SERPL-SCNC: 3.9 MMOL/L (ref 3.5–5.1)
PROT FLD-MCNC: 2.8 G/DL
PROT SERPL-MCNC: 6.8 G/DL (ref 5.8–7.6)
RBC # BLD AUTO: 3.97 M/UL (ref 4.2–5.4)
RBC # FLD MANUAL: ABNORMAL /CUMM
SODIUM SERPL-SCNC: 135 MMOL/L (ref 136–145)
T4 SERPL-MCNC: 8 ΜG/DL (ref 4.9–11.7)
TSH SERPL DL<=0.005 MIU/L-ACNC: 2.24 UIU/ML (ref 0.35–4.94)
URATE SERPL-MCNC: 12.8 MG/DL (ref 2.6–6)
VIT B12 SERPL-MCNC: 465 PG/ML (ref 213–816)
WBC # BLD AUTO: 6.68 K/UL (ref 4.5–11)
WBC # FLD MANUAL: 507 /CUMM

## 2025-01-22 PROCEDURE — 25000003 PHARM REV CODE 250: Performed by: HOSPITALIST

## 2025-01-22 PROCEDURE — 99233 SBSQ HOSP IP/OBS HIGH 50: CPT | Mod: 25,ICN,, | Performed by: STUDENT IN AN ORGANIZED HEALTH CARE EDUCATION/TRAINING PROGRAM

## 2025-01-22 PROCEDURE — 85025 COMPLETE CBC W/AUTO DIFF WBC: CPT | Performed by: STUDENT IN AN ORGANIZED HEALTH CARE EDUCATION/TRAINING PROGRAM

## 2025-01-22 PROCEDURE — 88112 CYTOPATH CELL ENHANCE TECH: CPT | Mod: 26,,, | Performed by: PATHOLOGY

## 2025-01-22 PROCEDURE — 84443 ASSAY THYROID STIM HORMONE: CPT | Performed by: HOSPITALIST

## 2025-01-22 PROCEDURE — 63600175 PHARM REV CODE 636 W HCPCS: Performed by: STUDENT IN AN ORGANIZED HEALTH CARE EDUCATION/TRAINING PROGRAM

## 2025-01-22 PROCEDURE — 82306 VITAMIN D 25 HYDROXY: CPT | Performed by: HOSPITALIST

## 2025-01-22 PROCEDURE — 11000001 HC ACUTE MED/SURG PRIVATE ROOM

## 2025-01-22 PROCEDURE — 76604 US EXAM CHEST: CPT | Mod: 26,,, | Performed by: STUDENT IN AN ORGANIZED HEALTH CARE EDUCATION/TRAINING PROGRAM

## 2025-01-22 PROCEDURE — 32551 INSERTION OF CHEST TUBE: CPT | Mod: RT,,, | Performed by: STUDENT IN AN ORGANIZED HEALTH CARE EDUCATION/TRAINING PROGRAM

## 2025-01-22 PROCEDURE — 88305 TISSUE EXAM BY PATHOLOGIST: CPT | Mod: 26,,, | Performed by: PATHOLOGY

## 2025-01-22 PROCEDURE — 84157 ASSAY OF PROTEIN OTHER: CPT | Performed by: STUDENT IN AN ORGANIZED HEALTH CARE EDUCATION/TRAINING PROGRAM

## 2025-01-22 PROCEDURE — 94640 AIRWAY INHALATION TREATMENT: CPT

## 2025-01-22 PROCEDURE — 84550 ASSAY OF BLOOD/URIC ACID: CPT | Performed by: HOSPITALIST

## 2025-01-22 PROCEDURE — 94761 N-INVAS EAR/PLS OXIMETRY MLT: CPT

## 2025-01-22 PROCEDURE — 80076 HEPATIC FUNCTION PANEL: CPT | Performed by: HOSPITALIST

## 2025-01-22 PROCEDURE — 36415 COLL VENOUS BLD VENIPUNCTURE: CPT | Performed by: STUDENT IN AN ORGANIZED HEALTH CARE EDUCATION/TRAINING PROGRAM

## 2025-01-22 PROCEDURE — 25000003 PHARM REV CODE 250: Performed by: STUDENT IN AN ORGANIZED HEALTH CARE EDUCATION/TRAINING PROGRAM

## 2025-01-22 PROCEDURE — 80048 BASIC METABOLIC PNL TOTAL CA: CPT | Performed by: STUDENT IN AN ORGANIZED HEALTH CARE EDUCATION/TRAINING PROGRAM

## 2025-01-22 PROCEDURE — 89050 BODY FLUID CELL COUNT: CPT | Performed by: STUDENT IN AN ORGANIZED HEALTH CARE EDUCATION/TRAINING PROGRAM

## 2025-01-22 PROCEDURE — 0W9930Z DRAINAGE OF RIGHT PLEURAL CAVITY WITH DRAINAGE DEVICE, PERCUTANEOUS APPROACH: ICD-10-PCS | Performed by: STUDENT IN AN ORGANIZED HEALTH CARE EDUCATION/TRAINING PROGRAM

## 2025-01-22 PROCEDURE — 32551 INSERTION OF CHEST TUBE: CPT | Mod: RT | Performed by: STUDENT IN AN ORGANIZED HEALTH CARE EDUCATION/TRAINING PROGRAM

## 2025-01-22 PROCEDURE — 25000242 PHARM REV CODE 250 ALT 637 W/ HCPCS: Performed by: INTERNAL MEDICINE

## 2025-01-22 PROCEDURE — 82746 ASSAY OF FOLIC ACID SERUM: CPT | Performed by: HOSPITALIST

## 2025-01-22 PROCEDURE — 99232 SBSQ HOSP IP/OBS MODERATE 35: CPT | Mod: ,,, | Performed by: HOSPITALIST

## 2025-01-22 PROCEDURE — 84436 ASSAY OF TOTAL THYROXINE: CPT | Performed by: HOSPITALIST

## 2025-01-22 PROCEDURE — 88305 TISSUE EXAM BY PATHOLOGIST: CPT | Mod: TC,MCY | Performed by: STUDENT IN AN ORGANIZED HEALTH CARE EDUCATION/TRAINING PROGRAM

## 2025-01-22 PROCEDURE — 82962 GLUCOSE BLOOD TEST: CPT

## 2025-01-22 PROCEDURE — 87070 CULTURE OTHR SPECIMN AEROBIC: CPT | Performed by: STUDENT IN AN ORGANIZED HEALTH CARE EDUCATION/TRAINING PROGRAM

## 2025-01-22 PROCEDURE — 27000221 HC OXYGEN, UP TO 24 HOURS

## 2025-01-22 PROCEDURE — 99900035 HC TECH TIME PER 15 MIN (STAT)

## 2025-01-22 PROCEDURE — 82042 OTHER SOURCE ALBUMIN QUAN EA: CPT | Performed by: STUDENT IN AN ORGANIZED HEALTH CARE EDUCATION/TRAINING PROGRAM

## 2025-01-22 RX ORDER — ATORVASTATIN CALCIUM 40 MG/1
40 TABLET, FILM COATED ORAL NIGHTLY
Status: DISCONTINUED | OUTPATIENT
Start: 2025-01-23 | End: 2025-01-27 | Stop reason: HOSPADM

## 2025-01-22 RX ORDER — COLCHICINE 0.6 MG/1
0.6 TABLET, FILM COATED ORAL
Status: DISCONTINUED | OUTPATIENT
Start: 2025-01-24 | End: 2025-01-27 | Stop reason: HOSPADM

## 2025-01-22 RX ORDER — OXYCODONE HYDROCHLORIDE 5 MG/1
5 TABLET ORAL EVERY 4 HOURS PRN
Status: DISCONTINUED | OUTPATIENT
Start: 2025-01-22 | End: 2025-01-27 | Stop reason: HOSPADM

## 2025-01-22 RX ORDER — FENTANYL CITRATE 50 UG/ML
INJECTION, SOLUTION INTRAMUSCULAR; INTRAVENOUS
Status: COMPLETED | OUTPATIENT
Start: 2025-01-22 | End: 2025-01-22

## 2025-01-22 RX ORDER — MIDAZOLAM HYDROCHLORIDE 2 MG/2ML
INJECTION, SOLUTION INTRAMUSCULAR; INTRAVENOUS
Status: COMPLETED | OUTPATIENT
Start: 2025-01-22 | End: 2025-01-22

## 2025-01-22 RX ORDER — ALLOPURINOL 100 MG/1
100 TABLET ORAL DAILY
Status: DISCONTINUED | OUTPATIENT
Start: 2025-01-22 | End: 2025-01-27 | Stop reason: HOSPADM

## 2025-01-22 RX ADMIN — MIDAZOLAM HYDROCHLORIDE 2 MG: 1 INJECTION, SOLUTION INTRAMUSCULAR; INTRAVENOUS at 10:01

## 2025-01-22 RX ADMIN — ISOSORBIDE MONONITRATE 30 MG: 30 TABLET, EXTENDED RELEASE ORAL at 07:01

## 2025-01-22 RX ADMIN — OXYCODONE HYDROCHLORIDE 5 MG: 5 TABLET ORAL at 03:01

## 2025-01-22 RX ADMIN — HYDRALAZINE HYDROCHLORIDE 50 MG: 50 TABLET ORAL at 09:01

## 2025-01-22 RX ADMIN — FUROSEMIDE 80 MG: 10 INJECTION, SOLUTION INTRAMUSCULAR; INTRAVENOUS at 07:01

## 2025-01-22 RX ADMIN — ATORVASTATIN CALCIUM 40 MG: 40 TABLET, FILM COATED ORAL at 07:01

## 2025-01-22 RX ADMIN — FENTANYL CITRATE 25 MCG: 50 INJECTION, SOLUTION INTRAMUSCULAR; INTRAVENOUS at 10:01

## 2025-01-22 RX ADMIN — BUDESONIDE INHALATION 0.5 MG: 0.5 SUSPENSION RESPIRATORY (INHALATION) at 08:01

## 2025-01-22 RX ADMIN — COLCHICINE 0.6 MG: 0.6 TABLET ORAL at 07:01

## 2025-01-22 RX ADMIN — SPIRONOLACTONE 25 MG: 25 TABLET ORAL at 08:01

## 2025-01-22 RX ADMIN — OXYCODONE 5 MG: 5 TABLET ORAL at 11:01

## 2025-01-22 RX ADMIN — PANTOPRAZOLE SODIUM 40 MG: 40 TABLET, DELAYED RELEASE ORAL at 07:01

## 2025-01-22 RX ADMIN — AMLODIPINE BESYLATE 10 MG: 10 TABLET ORAL at 07:01

## 2025-01-22 RX ADMIN — HYDRALAZINE HYDROCHLORIDE 50 MG: 50 TABLET ORAL at 07:01

## 2025-01-22 RX ADMIN — OXYCODONE 5 MG: 5 TABLET ORAL at 07:01

## 2025-01-22 RX ADMIN — ALLOPURINOL 100 MG: 100 TABLET ORAL at 01:01

## 2025-01-22 RX ADMIN — ASPIRIN 81 MG: 81 TABLET, COATED ORAL at 07:01

## 2025-01-22 NOTE — PROGRESS NOTES
[facility]  Critical Care Medicine  Progress Note    Patient Name: Pam Ortega  MRN: [id number]  Admission Date: 1/19/2025  Hospital Length of Stay: 3 days  Code Status: Full Code  Attending Provider: Ye Chahal MD  Primary Care Provider: Janice Velez FNP   Principal Problem: Acute hypoxic respiratory failure    Subjective:     HPI:      Hospital/ICU Course:  1/22/25-chest x-ray reviewed by me personally aspirin which shows evidence of moderate-large loculated pleural effusion.  I did a bedside ultrasound which confirmed the same.  Patient has consented for chest tube placement.  Hemodynamically stable this morning saturating at 98% on 2 L oxygen.  Xarelto has now been held for    Interval History/Significant Events:  Refer to hospital course  Review of Systems  Objective:     Vital Signs (Most Recent):  Temp: 97.9 °F (36.6 °C) (01/22/25 1007)  Pulse: 64 (01/22/25 1007)  Resp: 20 (01/22/25 1007)  BP: 129/66 (01/22/25 1007)  SpO2: 97 % (01/22/25 1007) Vital Signs (24h Range):  Temp:  [97.5 °F (36.4 °C)-98.1 °F (36.7 °C)] 97.9 °F (36.6 °C)  Pulse:  [60-67] 64  Resp:  [16-20] 20  SpO2:  [93 %-98 %] 97 %  BP: (123-154)/(66-84) 129/66   Weight: 97.1 kg (214 lb)  Body mass index is 30.71 kg/m².      Intake/Output Summary (Last 24 hours) at 1/22/2025 1023  Last data filed at 1/21/2025 1531  Gross per 24 hour   Intake 900 ml   Output --   Net 900 ml          Physical Exam  Vitals reviewed.   Constitutional:       Appearance: Normal appearance.      Interventions: She is not intubated.  HENT:      Head: Normocephalic and atraumatic.      Nose: Nose normal.      Mouth/Throat:      Mouth: Mucous membranes are dry.      Pharynx: Oropharynx is clear.   Eyes:      Extraocular Movements: Extraocular movements intact.      Conjunctiva/sclera: Conjunctivae normal.      Pupils: Pupils are equal, round, and reactive to light.   Cardiovascular:      Rate and Rhythm: Normal rate.      Heart  sounds: Normal heart sounds. No murmur heard.  Pulmonary:      Effort: Pulmonary effort is normal. She is not intubated.      Comments: Decreased breath sounds right base  Abdominal:      General: Abdomen is flat. Bowel sounds are normal.      Palpations: Abdomen is soft.   Musculoskeletal:         General: Normal range of motion.      Cervical back: Normal range of motion and neck supple.      Right lower leg: Edema present.      Left lower leg: Edema present.      Comments: Trace pitting edema present bilaterally lower extremities   Skin:     General: Skin is warm and dry.      Capillary Refill: Capillary refill takes less than 2 seconds.   Neurological:      General: No focal deficit present.      Mental Status: She is alert and oriented to person, place, and time.   Psychiatric:         Mood and Affect: Mood normal.         Behavior: Behavior normal.            Vents:  Oxygen Concentration (%): 28 (01/22/25 0811)  Lines/Drains/Airways       Peripheral Intravenous Line  Duration                  Peripheral IV - Single Lumen 01/19/25 1500 Anterior;Distal;Left Forearm 2 days                  Significant Labs:    CBC/Anemia Profile:  Recent Labs   Lab 01/21/25  0326 01/22/25  0434   WBC 7.17 6.68   HGB 10.7* 10.4*   HCT 33.0* 32.6*    207   MCV 81.9 82.1   RDW 16.8* 17.3*   FOLATE  --  11.6   [id number]  --  465        Chemistries:  Recent Labs   Lab 01/21/25  0326 01/22/25  0434   * 135*   K 4.0 3.9    101   CO2 21* 22*   BUN 32* 35*   CREATININE 2.07* 2.06*   CALCIUM 8.7 8.6   ALBUMIN  --  3.3*   PROT  --  6.8   BILITOT  --  2.7*   ALKPHOS  --  86   ALT  --  12   AST  --  29   MG 2.4  --    PHOS 3.9  --        Recent Lab Results  (Last 5 results in the past 24 hours)        01/22/25  0800   01/22/25  0434   01/21/25  2004   01/21/25  1654   01/21/25  1220        Albumin   3.3             ALP   86             ALT   12             Anion Gap   16             AST   29             Baso #   0.02        "      Basophil %   0.3             Bilirubin Direct   1.1             BILIRUBIN TOTAL   2.7             BUN   35             BUN/CREAT RATIO   17             Calcium   8.6             Chloride   101             CO2   22             Creatinine   2.06             Differential Method   Auto             eGFR   24             Eos #   0.07             Eos %   1.0             Folate   11.6             Glucose   75             Hematocrit   32.6             Hemoglobin   10.4             Immature Grans (Abs)   0.01             Immature Granulocytes   0.1             Lymph #   1.90             Lymph %   28.4             MCH   26.2             MCHC   31.9             MCV   82.1             Mono #   0.88             Mono %   13.2             MPV   10.4             Neutrophils, Abs   3.80             Neutrophils Relative   57.0             nRBC   0.0             NUCLEATED RBC ABSOLUTE   0.00             Platelet Count   207             POC Glucose 108     169   162   143       Potassium   3.9             PROTEIN TOTAL   6.8             RBC   3.97             RDW   17.3             Sodium   135             T4 Total   8.0             TSH   2.237             Uric Acid   12.8             Vitamin D   36.2  Comment: Vitamin D 25-OH Adult Reference Values:  Deficiency: <20 ng/mL  Insufficiency: 20 - <30 ng/mL  Sufficiency: 30 -100 ng/mL    Vitamin D 25-OH Pediatric Reference Values:  Deficiency: <15 ng/mL  Insufficiency: 15 - <20 ng/mL  Sufficiency: 20 - 100 ng/mL             Vitamin B12   465             WBC   6.68                                    Significant Imaging:  I have reviewed all pertinent imaging results/findings within the past 24 hours.    ABG  No results for input(s): "PH", "PO2", "PCO2", "HCO3", "BE" in the last 168 hours.  Assessment/Plan:     Pulmonary  * Acute hypoxic respiratory failure  Much improved, recommend targeting SpO2 of 92-96%    Pleural effusion  Patient with a loculated pleural effusion, confirmed with " bedside ultrasound.  Patient's anticoagulation has now been held for 48 hours.  She is now a candidate for chest tube placement +/-instillation of tPA/dornase pending pleural fluid analysis.  Differential is broad at this time and includes parapneumonic effusion, transudative with volume overload and/or malignant effusion.     Plan for pigtail chest tube insertion under moderate sedation discussed with the family.  Risks of insertion explained to patient as well as his family.  These risks include but are not limited to respiratory failure, bleeding, infection, injury to the lung, liver, kidneys and/or pneumothorax.  Patient and family agree.      Cough  Continue inhaled steroids    Other  Obstructive sleep apnea  Noted      ASA Score: 3     Tanvir Rocha MD  Critical Care Medicine  [facility]

## 2025-01-22 NOTE — PROGRESS NOTES
[facility]  Wound Care    Patient Name:  Pam Ortega   MRN:  [id number]  Date: 1/22/2025  Diagnosis: Acute hypoxic respiratory failure    History:     Past Medical History:   Diagnosis Date    Anemia of chronic disease     Atrial fibrillation     Benign hypertensive CKD, stage 1-4 or unspecified chronic kidney disease     Bronchitis 12/22/2023    Carotid artery occlusion     CKD (chronic kidney disease)     Coronary atherosclerosis     Cough 12/22/2023    COVID-19 ruled out 03/20/2024    Diabetes mellitus, type 2     DJD (degenerative joint disease)     History of CVA (cerebrovascular accident)     HTN (hypertension)     Hyperlipidemia     Nonischemic dilated cardiomyopathy     Obesity     Osteopenia     Paroxysmal atrial fibrillation     Presence of cardiac pacemaker     PVD (peripheral vascular disease)     Upper respiratory tract infection 12/22/2023    Vitamin D deficiency        Social History     Socioeconomic History    Marital status:    Tobacco Use    Smoking status: Never     Passive exposure: Never    Smokeless tobacco: Never   Substance and Sexual Activity    Alcohol use: Not Currently    Drug use: Never    Sexual activity: Not Currently     Social Drivers of Health     Financial Resource Strain: Low Risk  (1/21/2025)    Overall Financial Resource Strain (CARDIA)     Difficulty of Paying Living Expenses: Not hard at all   Food Insecurity: No Food Insecurity (1/21/2025)    Hunger Vital Sign     Worried About Running Out of Food in the Last Year: Never true     Ran Out of Food in the Last Year: Never true   Transportation Needs: No Transportation Needs (1/21/2025)    TRANSPORTATION NEEDS     Transportation : No   Physical Activity: Inactive (1/21/2025)    Exercise Vital Sign     Days of Exercise per Week: 0 days     Minutes of Exercise per Session: 0 min   Stress: No Stress Concern Present (1/21/2025)    [employer] - Occupational Stress  Questionnaire     Feeling of Stress : Not at all   Housing Stability: Low Risk  (1/21/2025)    Housing Stability Vital Sign     Unable to Pay for Housing in the Last Year: No     Homeless in the Last Year: No       Precautions:     Allergies as of 01/19/2025 - Reviewed 01/19/2025   Allergen Reaction Noted    Ace inhibitors Edema 04/05/2021    Losartan Swelling 04/05/2021       WOC Assessment Details/Treatment     Narrative: Seen patient for initiation of preventative skin care measures    Patient in bed, Alert. Family present. Has no skin issues. Has oxygen in use, no issues noted.   Bret score 20    Consult wound care for any skin issues         01/22/2025

## 2025-01-22 NOTE — ASSESSMENT & PLAN NOTE
Patient with a loculated pleural effusion, confirmed with bedside ultrasound.  Patient's anticoagulation has now been held for 48 hours.  She is now a candidate for chest tube placement +/-instillation of tPA/dornase pending pleural fluid analysis.  Differential is broad at this time and includes parapneumonic effusion, transudative with volume overload and/or malignant effusion.     Plan for pigtail chest tube insertion discussed with the family.  Risks of insertion explained to patient as well as his family.  These risks include but are not limited to bleeding, infection, injury to the plan, liver, kidneys and/or pneumothorax.  Patient and family agree.

## 2025-01-22 NOTE — ASSESSMENT & PLAN NOTE
Patient found to have large pleural effusion on imaging. I have personally reviewed and interpreted the following imaging: Xray. A thoracentesis was deferred. Most likely etiology includes Congestive Heart Failure and Pneumonia. Management to include Diuresis  Continue diuresis    01/21 Chest tube planned in am

## 2025-01-22 NOTE — SUBJECTIVE & OBJECTIVE
Interval History:     Review of Systems   Constitutional:  Positive for fatigue. Negative for appetite change and fever.   HENT:  Negative for congestion, hearing loss and trouble swallowing.    Respiratory:  Positive for shortness of breath. Negative for chest tightness and wheezing.    Cardiovascular:  Negative for chest pain and palpitations.   Gastrointestinal:  Positive for constipation. Negative for abdominal pain and nausea.   Genitourinary:  Negative for difficulty urinating and dysuria.   Musculoskeletal:  Positive for gait problem. Negative for back pain and neck stiffness.   Skin:  Negative for pallor and rash.   Neurological:  Negative for dizziness, speech difficulty and headaches.   Psychiatric/Behavioral:  Negative for confusion and suicidal ideas.      Objective:     Vital Signs (Most Recent):  Temp: 97.9 °F (36.6 °C) (01/21/25 2005)  Pulse: 60 (01/21/25 2005)  Resp: 16 (01/21/25 1914)  BP: (!) 142/75 (01/21/25 2005)  SpO2: (!) 94 % (01/21/25 2005) Vital Signs (24h Range):  Temp:  [97.4 °F (36.3 °C)-98.1 °F (36.7 °C)] 97.9 °F (36.6 °C)  Pulse:  [58-67] 60  Resp:  [16-18] 16  SpO2:  [93 %-98 %] 94 %  BP: (124-150)/(66-83) 142/75     Weight: 97.5 kg (214 lb 15.2 oz)  Body mass index is 30.84 kg/m².    Intake/Output Summary (Last 24 hours) at 1/21/2025 2245  Last data filed at 1/21/2025 1531  Gross per 24 hour   Intake 900 ml   Output --   Net 900 ml         Physical Exam  Vitals reviewed.   Constitutional:       General: She is awake. She is not in acute distress.     Appearance: She is well-developed. She is obese. She is not toxic-appearing.   HENT:      Head: Normocephalic.      Nose: Nose normal.      Mouth/Throat:      Pharynx: Oropharynx is clear.   Eyes:      Extraocular Movements: Extraocular movements intact.      Pupils: Pupils are equal, round, and reactive to light.   Neck:      Thyroid: No thyroid mass.      Vascular: No carotid bruit.   Cardiovascular:      Rate and Rhythm: Normal rate  and regular rhythm.      Pulses: Normal pulses.      Heart sounds: Normal heart sounds. No murmur heard.  Pulmonary:      Effort: Pulmonary effort is normal.      Breath sounds: Normal breath sounds and air entry. No wheezing.   Abdominal:      General: Bowel sounds are normal. There is no distension.      Palpations: Abdomen is soft.      Tenderness: There is no abdominal tenderness.   Musculoskeletal:         General: Normal range of motion.      Cervical back: Neck supple. No rigidity.   Skin:     General: Skin is warm.      Coloration: Skin is not jaundiced.      Findings: No lesion.   Neurological:      General: No focal deficit present.      Mental Status: She is alert and oriented to person, place, and time.      Cranial Nerves: No cranial nerve deficit.   Psychiatric:         Attention and Perception: Attention normal.         Mood and Affect: Mood normal.         Behavior: Behavior normal. Behavior is cooperative.         Thought Content: Thought content normal.         Cognition and Memory: Cognition normal.             Significant Labs: All pertinent labs within the past 24 hours have been reviewed.  BMP:   Recent Labs   Lab 01/21/25  0326   *   *   K 4.0      CO2 21*   BUN 32*   CREATININE 2.07*   CALCIUM 8.7   MG 2.4     CBC:   Recent Labs   Lab 01/20/25  0416 01/21/25  0326   WBC 7.00 7.17   HGB 11.4* 10.7*   HCT 36.0* 33.0*    194     CMP:   Recent Labs   Lab 01/20/25  0416 01/21/25  0326    135*   K 4.0 4.0    105   CO2 23 21*   * 117*   BUN 33* 32*   CREATININE 2.07* 2.07*   CALCIUM 9.0 8.7   ANIONGAP 14 13       Significant Imaging: I have reviewed all pertinent imaging results/findings within the past 24 hours.    Imaging Results              US Lower Extremity Veins Left (Final result)  Result time 01/19/25 17:49:22      Final result by Oliver Sweeney MD (01/19/25 17:49:22)                   Impression:      No evidence of deep venous thrombosis  in the left lower extremity.      Electronically signed by: Oliver Sweeney  Date:    01/19/2025  Time:    17:49               Narrative:    EXAMINATION:  US LOWER EXTREMITY VEINS LEFT    CLINICAL HISTORY:  Edema;    TECHNIQUE:  Duplex and color flow Doppler evaluation and graded compression of the left lower extremity veins was performed.    COMPARISON:  11/18/2024    FINDINGS:  Left thigh veins: The common femoral, femoral, popliteal, upper greater saphenous, and deep femoral veins are patent and free of thrombus. The veins are normally compressible and have normal phasic flow and augmentation response.    Left calf veins: The visualized calf veins are patent.    Contralateral CFV: The contralateral (right) common femoral vein is patent and free of thrombus.    Miscellaneous: None                                       CT Chest Abdomen Pelvis Without Contrast (XPD) (Final result)  Result time 01/19/25 16:11:01      Final result by Danie Agustin MD (01/19/25 16:11:01)                   Impression:      1. Moderate to large loculated right pleural effusion noting compressive atelectasis of the right lower lobe.  2. No findings to suggest obstructive uropathy or bowel obstruction.  3. Colonic diverticulosis without diverticulitis.  4. There is induration along the anterior abdominal wall, correlation with any superficial cellulitis.  5. Please see above for several additional findings.      Electronically signed by: Danie Agustin MD  Date:    01/19/2025  Time:    16:11               Narrative:    EXAMINATION:  CT CHEST ABDOMEN PELVIS WITHOUT CONTRAST(XPD)    CLINICAL HISTORY:  Aortic aneurysm, known or suspected;Patient with protuberant abdomen, no BM x 1 day, CKD, also with CHF and pleural effusion;    TECHNIQUE:  Low dose axial images, sagittal and coronal reformations were obtained from the thoracic inlet to the pubic symphysis .  Oral contrast was not administered.    COMPARISON:  CT chest  12/25/2018    FINDINGS:  The structures at the base of the neck are unremarkable.  There are a few scattered upper limit of normal caliber mediastinal lymph nodes.  The heart is not significantly enlarged.  Pacer wire noted.  No pericardial effusion.  The thoracic aorta tapers normally noting atherosclerotic calcification along its course and in the distribution of the coronary arteries.    Motion artifact limits evaluation of the airways and pulmonary parenchyma.  Allowing for this, the airways are patent.  There is a moderate right pleural effusion with associated compressive atelectasis of the right lower lobe.  There is fluid along the fissure on the right.  The right pleural effusion is partially loculated.  No pneumothorax.    The liver, spleen, and adrenal glands have a grossly unremarkable noncontrast appearance.  There is high attenuating material layering within the gallbladder suggesting calculi or sludge.  There is a small hiatal hernia.  The stomach is nondistended, no gastric wall thickening.  No significant abdominal lymphadenopathy.    There is right renal cortical scarring involving the interpolar region.  No hydronephrosis or nephrolithiasis.  The bilateral ureters are unable to be followed in their entirety to the urinary bladder, no definite calculi seen or secondary findings to suggest obstructive uropathy.  The urinary bladder is unremarkable.  There are uterine leiomyoma.  The adnexa is unremarkable.  No significant free fluid in the pelvis.    There are a few scattered colonic diverticula without surrounding inflammation to suggest diverticulitis.  There is a small focus of suspected fat infarct along the left lateral aspect of the descending colon.  The terminal ileum and appendix are unremarkable.  The small bowel is grossly unremarkable.  There are several scattered shotty periaortic, pericaval, and mesenteric lymph nodes.  There is atherosclerotic calcification of the aorta and its  "branches.    There is osteopenia.  There are degenerative changes of the spine.  There are degenerative changes of the sacroiliac joints.  No significant inguinal lymphadenopathy.  There is scattered body wall anasarca.  There is a fat containing umbilical hernia.                                       X-Ray Chest 1 View (Final result)  Result time 01/19/25 14:55:11      Final result by Manpreet Del Real MD (01/19/25 14:55:11)                   Impression:      Cardiomegaly with increased size of now moderate to large right-sided pleural effusion.  Right lung aeration is worse compared to the prior study.      Electronically signed by: Manpreet Del Real MD  Date:    01/19/2025  Time:    14:55               Narrative:    EXAMINATION:  XR CHEST 1 VIEW    CLINICAL HISTORY:  Provided history is "  Shortness of breath".    TECHNIQUE:  One view of the chest.    COMPARISON:  01/03/2025.    FINDINGS:  Cardiac silhouette is enlarged and similar to the prior study.  Moderate to large right pleural effusion, worse from prior study.  Calcified mediastinal and hilar lymph nodes suggestive of remote granulomatous process.  Atherosclerotic calcifications overlie the aortic arch.  No significant consolidation in the left lung.  No sizable left pleural effusion.  No distinct pneumothorax.                                    Intake/Output - Last 3 Shifts         01/20 0700  01/21 0659 01/21 0700  01/22 0659    P.O.  900    Total Intake(mL/kg)  900 (9.2)    Urine (mL/kg/hr) 1 (0)     Total Output 1     Net -1 +900                Microbiology Results (last 7 days)       ** No results found for the last 168 hours. **            "

## 2025-01-22 NOTE — PROGRESS NOTES
[facility] Medicine  Progress Note    Patient Name: Pam Ortega  MRN: [id number]  Patient Class: IP- Inpatient   Admission Date: 1/19/2025  Length of Stay: 2 days  Attending Physician: Ye Chahal MD  Primary Care Provider: Janice Velez FNP        Subjective     Principal Problem:Acute hypoxic respiratory failure        HPI:       79-year-old female with anemia chronic disease, AFib, CKD, hypertension, bronchitis, CAD, type 2 diabetes, hyperlipidemia, dilated cardiomyopathy, heart failure with preserved ejection fraction, obesity, peripheral vascular disease presents to the ED with progressively worsening shortness of breath.  She has had pleural effusion chronically and follow up with Dr. Mckee.  She recently saw him in clinic and had her inhalers adjusted.  Recently saw Dr. Dutta and had her diuretics increased.  Even with this for shortness of breath progressively worsened over the last several days which prompted her presentation to the ED.  CT reveals loculated pleural effusion.  Pulmonology consulted.  She denies any chest pain.  Denies any fever, chills, palpitations, nausea vomiting diarrhea dysuria.  Review of systems otherwise negative           Overview/Hospital Course:  1/20 breathing significantly improved today.  Continue diuresis    01/21 Record reviewed. Progressive worse SOB. Pulmonary consulted   PMHx: anemia chronic disease, AFib, CKD, hypertension, bronchitis, CAD, PPM, type 2 diabetes, hyperlipidemia, dilated cardiomyopathy, heart failure with preserved ejection fraction, obesity, peripheral vascular disease with symptomatic pleural effusion.    Seen DR Dutta outpt for renal dx.   Had vascular surgery 11/2024 follow arterial thrombus right leg.   Holding xarelto and plan placement CXR in am. C/O some constipation    Interval History:     Review of Systems   Constitutional:  Positive for fatigue. Negative for appetite change and fever.   HENT:   Negative for congestion, hearing loss and trouble swallowing.    Respiratory:  Positive for shortness of breath. Negative for chest tightness and wheezing.    Cardiovascular:  Negative for chest pain and palpitations.   Gastrointestinal:  Positive for constipation. Negative for abdominal pain and nausea.   Genitourinary:  Negative for difficulty urinating and dysuria.   Musculoskeletal:  Positive for gait problem. Negative for back pain and neck stiffness.   Skin:  Negative for pallor and rash.   Neurological:  Negative for dizziness, speech difficulty and headaches.   Psychiatric/Behavioral:  Negative for confusion and suicidal ideas.      Objective:     Vital Signs (Most Recent):  Temp: 97.9 °F (36.6 °C) (01/21/25 2005)  Pulse: 60 (01/21/25 2005)  Resp: 16 (01/21/25 1914)  BP: (!) 142/75 (01/21/25 2005)  SpO2: (!) 94 % (01/21/25 2005) Vital Signs (24h Range):  Temp:  [97.4 °F (36.3 °C)-98.1 °F (36.7 °C)] 97.9 °F (36.6 °C)  Pulse:  [58-67] 60  Resp:  [16-18] 16  SpO2:  [93 %-98 %] 94 %  BP: (124-150)/(66-83) 142/75     Weight: 97.5 kg (214 lb 15.2 oz)  Body mass index is 30.84 kg/m².    Intake/Output Summary (Last 24 hours) at 1/21/2025 2245  Last data filed at 1/21/2025 1531  Gross per 24 hour   Intake 900 ml   Output --   Net 900 ml         Physical Exam  Vitals reviewed.   Constitutional:       General: She is awake. She is not in acute distress.     Appearance: She is well-developed. She is obese. She is not toxic-appearing.   HENT:      Head: Normocephalic.      Nose: Nose normal.      Mouth/Throat:      Pharynx: Oropharynx is clear.   Eyes:      Extraocular Movements: Extraocular movements intact.      Pupils: Pupils are equal, round, and reactive to light.   Neck:      Thyroid: No thyroid mass.      Vascular: No carotid bruit.   Cardiovascular:      Rate and Rhythm: Normal rate and regular rhythm.      Pulses: Normal pulses.      Heart sounds: Normal heart sounds. No murmur heard.  Pulmonary:      Effort:  Pulmonary effort is normal.      Breath sounds: Normal breath sounds and air entry. No wheezing.   Abdominal:      General: Bowel sounds are normal. There is no distension.      Palpations: Abdomen is soft.      Tenderness: There is no abdominal tenderness.   Musculoskeletal:         General: Normal range of motion.      Cervical back: Neck supple. No rigidity.   Skin:     General: Skin is warm.      Coloration: Skin is not jaundiced.      Findings: No lesion.   Neurological:      General: No focal deficit present.      Mental Status: She is alert and oriented to person, place, and time.      Cranial Nerves: No cranial nerve deficit.   Psychiatric:         Attention and Perception: Attention normal.         Mood and Affect: Mood normal.         Behavior: Behavior normal. Behavior is cooperative.         Thought Content: Thought content normal.         Cognition and Memory: Cognition normal.             Significant Labs: All pertinent labs within the past 24 hours have been reviewed.  BMP:   Recent Labs   Lab 01/21/25  0326   *   *   K 4.0      CO2 21*   BUN 32*   CREATININE 2.07*   CALCIUM 8.7   MG 2.4     CBC:   Recent Labs   Lab 01/20/25  0416 01/21/25  0326   WBC 7.00 7.17   HGB 11.4* 10.7*   HCT 36.0* 33.0*    194     CMP:   Recent Labs   Lab 01/20/25  0416 01/21/25  0326    135*   K 4.0 4.0    105   CO2 23 21*   * 117*   BUN 33* 32*   CREATININE 2.07* 2.07*   CALCIUM 9.0 8.7   ANIONGAP 14 13       Significant Imaging: I have reviewed all pertinent imaging results/findings within the past 24 hours.    Imaging Results              US Lower Extremity Veins Left (Final result)  Result time 01/19/25 17:49:22      Final result by Oliver Sweeney MD (01/19/25 17:49:22)                   Impression:      No evidence of deep venous thrombosis in the left lower extremity.      Electronically signed by: Oliver Sweeney  Date:    01/19/2025  Time:    17:49                Narrative:    EXAMINATION:  US LOWER EXTREMITY VEINS LEFT    CLINICAL HISTORY:  Edema;    TECHNIQUE:  Duplex and color flow Doppler evaluation and graded compression of the left lower extremity veins was performed.    COMPARISON:  11/18/2024    FINDINGS:  Left thigh veins: The common femoral, femoral, popliteal, upper greater saphenous, and deep femoral veins are patent and free of thrombus. The veins are normally compressible and have normal phasic flow and augmentation response.    Left calf veins: The visualized calf veins are patent.    Contralateral CFV: The contralateral (right) common femoral vein is patent and free of thrombus.    Miscellaneous: None                                       CT Chest Abdomen Pelvis Without Contrast (XPD) (Final result)  Result time 01/19/25 16:11:01      Final result by Danie Agustin MD (01/19/25 16:11:01)                   Impression:      1. Moderate to large loculated right pleural effusion noting compressive atelectasis of the right lower lobe.  2. No findings to suggest obstructive uropathy or bowel obstruction.  3. Colonic diverticulosis without diverticulitis.  4. There is induration along the anterior abdominal wall, correlation with any superficial cellulitis.  5. Please see above for several additional findings.      Electronically signed by: Danie Agustin MD  Date:    01/19/2025  Time:    16:11               Narrative:    EXAMINATION:  CT CHEST ABDOMEN PELVIS WITHOUT CONTRAST(XPD)    CLINICAL HISTORY:  Aortic aneurysm, known or suspected;Patient with protuberant abdomen, no BM x 1 day, CKD, also with CHF and pleural effusion;    TECHNIQUE:  Low dose axial images, sagittal and coronal reformations were obtained from the thoracic inlet to the pubic symphysis .  Oral contrast was not administered.    COMPARISON:  CT chest 12/25/2018    FINDINGS:  The structures at the base of the neck are unremarkable.  There are a few scattered upper limit of normal caliber  mediastinal lymph nodes.  The heart is not significantly enlarged.  Pacer wire noted.  No pericardial effusion.  The thoracic aorta tapers normally noting atherosclerotic calcification along its course and in the distribution of the coronary arteries.    Motion artifact limits evaluation of the airways and pulmonary parenchyma.  Allowing for this, the airways are patent.  There is a moderate right pleural effusion with associated compressive atelectasis of the right lower lobe.  There is fluid along the fissure on the right.  The right pleural effusion is partially loculated.  No pneumothorax.    The liver, spleen, and adrenal glands have a grossly unremarkable noncontrast appearance.  There is high attenuating material layering within the gallbladder suggesting calculi or sludge.  There is a small hiatal hernia.  The stomach is nondistended, no gastric wall thickening.  No significant abdominal lymphadenopathy.    There is right renal cortical scarring involving the interpolar region.  No hydronephrosis or nephrolithiasis.  The bilateral ureters are unable to be followed in their entirety to the urinary bladder, no definite calculi seen or secondary findings to suggest obstructive uropathy.  The urinary bladder is unremarkable.  There are uterine leiomyoma.  The adnexa is unremarkable.  No significant free fluid in the pelvis.    There are a few scattered colonic diverticula without surrounding inflammation to suggest diverticulitis.  There is a small focus of suspected fat infarct along the left lateral aspect of the descending colon.  The terminal ileum and appendix are unremarkable.  The small bowel is grossly unremarkable.  There are several scattered shotty periaortic, pericaval, and mesenteric lymph nodes.  There is atherosclerotic calcification of the aorta and its branches.    There is osteopenia.  There are degenerative changes of the spine.  There are degenerative changes of the sacroiliac joints.  No  "significant inguinal lymphadenopathy.  There is scattered body wall anasarca.  There is a fat containing umbilical hernia.                                       X-Ray Chest 1 View (Final result)  Result time 01/19/25 14:55:11      Final result by Manpreet Del Real MD (01/19/25 14:55:11)                   Impression:      Cardiomegaly with increased size of now moderate to large right-sided pleural effusion.  Right lung aeration is worse compared to the prior study.      Electronically signed by: Manpreet Del Real MD  Date:    01/19/2025  Time:    14:55               Narrative:    EXAMINATION:  XR CHEST 1 VIEW    CLINICAL HISTORY:  Provided history is "  Shortness of breath".    TECHNIQUE:  One view of the chest.    COMPARISON:  01/03/2025.    FINDINGS:  Cardiac silhouette is enlarged and similar to the prior study.  Moderate to large right pleural effusion, worse from prior study.  Calcified mediastinal and hilar lymph nodes suggestive of remote granulomatous process.  Atherosclerotic calcifications overlie the aortic arch.  No significant consolidation in the left lung.  No sizable left pleural effusion.  No distinct pneumothorax.                                    Intake/Output - Last 3 Shifts         01/20 0700  01/21 0659 01/21 0700  01/22 0659    P.O.  900    Total Intake(mL/kg)  900 (9.2)    Urine (mL/kg/hr) 1 (0)     Total Output 1     Net -1 +900                Microbiology Results (last 7 days)       ** No results found for the last 168 hours. **              Assessment and Plan     * Acute hypoxic respiratory failure  Patient with Hypoxic Respiratory failure which is Acute.  she is not on home oxygen. Supplemental oxygen was provided and noted- Oxygen Concentration (%):  [28-40] 28    .   Signs/symptoms of respiratory failure include- tachypnea, increased work of breathing, and respiratory distress. Contributing diagnoses includes - Pleural effusion and Pneumonia Labs and images were reviewed. Patient Has " "not had a recent ABG. Will treat underlying causes and adjust management of respiratory failure as follows-     Nebs   Diuresis   Pulmonology consult   Discussed case with Dr. Mckee today    Presence of cardiac pacemaker    Will get additional details    Acute on chronic heart failure with preserved ejection fraction  Patient has Diastolic (HFpEF) heart failure that is Acute on chronic. On presentation their CHF was decompensated. Evidence of decompensated CHF on presentation includes:  Pleural effusion. The etiology of their decompensation is likely dietary indiscretion. Most recent BNP and echo results are listed below.  No results for input(s): "BNP" in the last 72 hours.  Latest ECHO  Results for orders placed during the hospital encounter of 11/17/24    Echo Saline Bubble? Yes    Interpretation Summary    Left Ventricle: The left ventricle is mildly dilated. Mildly increased wall thickness. Normal wall motion. Septal motion is consistent with pacing. There is diastolic dysfunction.    Right Ventricle: Moderate right ventricular enlargement.    Left Atrium: Left atrium is mildly dilated.    Right Atrium: Right atrium is severely dilated.    Aortic Valve: The aortic valve is a trileaflet valve. Mildly calcified cusps.    Mitral Valve: There is mild bileaflet sclerosis. Mildly thickened leaflets. There is mild to moderate regurgitation with an eccentric jet.    Tricuspid Valve: There is moderate to severe regurgitation with an eccentrically directed jet.    Pulmonic Valve: There is mild to moderate regurgitation.    IVC/SVC: Elevated venous pressure at 15 mmHg.    Current Heart Failure Medications  hydrALAZINE tablet 50 mg, 2 times daily, Oral  spironolactone tablet 25 mg, Daily, Oral  furosemide injection 80 mg, Daily, Intravenous    Plan  - Monitor strict I&Os and daily weights.    - Place on telemetry  - Low sodium diet  - Place on fluid restriction of 1.5 L.   - Cardiology has not been consulted  - The " patient's volume status is improving but not at their baseline as indicated by shortness of breath  - follow  Continue diuresis    Pleural effusion  Patient found to have large pleural effusion on imaging. I have personally reviewed and interpreted the following imaging: Xray. A thoracentesis was deferred. Most likely etiology includes Congestive Heart Failure and Pneumonia. Management to include Diuresis  Continue diuresis    01/21 Chest tube planned in am    Diabetic nephropathy  Glucose control      Peripheral vascular disease  Aspirin statin      Arterial occlusion  Vascular surgery right leg Nov 2024      CKD stage 4 secondary to hypertension  Creatine stable for now. BMP reviewed- noted Estimated Creatinine Clearance: 28.8 mL/min (A) (based on SCr of 2.07 mg/dL (H)). according to latest data. Based on current GFR, CKD stage is stage 4 - GFR 15-29.  Monitor UOP and serial BMP and adjust therapy as needed. Renally dose meds. Avoid nephrotoxic medications and procedures.    Essential hypertension  Patient's blood pressure range in the last 24 hours was: BP  Min: 123/65  Max: 137/79.The patient's inpatient anti-hypertensive regimen is listed below:  Current Antihypertensives  amLODIPine tablet 10 mg, Daily, Oral  hydrALAZINE tablet 50 mg, 2 times daily, Oral  isosorbide mononitrate 24 hr tablet 30 mg, Daily, Oral  spironolactone tablet 25 mg, Daily, Oral  furosemide injection 80 mg, Daily, Intravenous    Plan  BP is controlled   Follow    Dyslipidemia associated with type 2 diabetes mellitus  Statin   Glucose control      Obstructive sleep apnea    BiPAP at night.  Wear CPAP at home.  Given BiPAP due to respiratory distress to support overnight    Obese  Body mass index is 30.85 kg/m². Morbid obesity complicates all aspects of disease management from diagnostic modalities to treatment. Weight loss encouraged and health benefits explained to patient.         Dyslipidemia  Statin      Chronic a-fib  Patient has  "persistent (7 days or more) atrial fibrillation. Patient is currently in atrial fibrillation. [id number] Score: 5. The patients heart rate in the last 24 hours is as follows:  Pulse  Min: 57  Max: 70     Antiarrhythmics       Anticoagulants       Plan  - Replete lytes with a goal of K>4, Mg >2  - Patient is not anticoagulated due to holding anticoagulation (Xarelto) in anticipation of procedure  - Patient's afib is currently controlled  - follow        Cardiomyopathy  Diurese    Diabetes mellitus without complication  Patient's FSGs are controlled on current medication regimen.  Last A1c reviewed-   Lab Results   Component Value Date    HGBA1C 6.2 08/23/2024     Most recent fingerstick glucose reviewed- No results for input(s): "POCTGLUCOSE" in the last 24 hours.  Current correctional scale  Low  Maintain anti-hyperglycemic dose as follows-   Antihyperglycemics (From admission, onward)      Start     Stop Route Frequency Ordered    01/19/25 1805  insulin aspart U-100 injection 0-5 Units         -- SubQ Before meals & nightly PRN 01/19/25 1712          Hold Oral hypoglycemics while patient is in the hospital.    Benign hypertensive CKD, stage 1-4 or unspecified chronic kidney disease  Creatine stable for now. BMP reviewed- noted Estimated Creatinine Clearance: 28.8 mL/min (A) (based on SCr of 2.07 mg/dL (H)). according to latest data. Based on current GFR, CKD stage is stage 4 - GFR 15-29.  Monitor UOP and serial BMP and adjust therapy as needed. Renally dose meds. Avoid nephrotoxic medications and procedures.    Coronary atherosclerosis  Patient with known CAD s/p  unclear , which is controlled Will continue ASA and Statin and monitor for S/Sx of angina/ACS. Continue to monitor on telemetry.     Hyperlipidemia  Statin      Hypertension  Vitals:    01/20/25 0604 01/20/25 1158 01/20/25 1707 01/20/25 2023   BP: 123/65 139/79 (!) 152/78 (!) 147/81    01/21/25 0034 01/21/25 0449 01/21/25 0803 01/21/25 1224   BP: 124/66 " 128/83 (!) 150/79 (!) 145/78    01/21/25 1655 01/21/25 2005   BP: 128/70 (!) 142/75         Patient's blood pressure range in the last 24 hours was: BP  Min: 124/66  Max: 150/79.The patient's inpatient anti-hypertensive regimen is listed below:  Current Antihypertensives  amLODIPine tablet 10 mg, Daily, Oral  hydrALAZINE tablet 50 mg, 2 times daily, Oral  isosorbide mononitrate 24 hr tablet 30 mg, Daily, Oral  spironolactone tablet 25 mg, Daily, Oral  furosemide injection 80 mg, Daily, Intravenous    Plan  - BP is controlled, no changes needed to their regimen  - follow    History of CVA (cerebrovascular accident)  Aspirin statin        VTE Risk Mitigation (From admission, onward)           Ordered     Reason for No Pharmacological VTE Prophylaxis  Once        Question:  Reasons:  Answer:  Already adequately anticoagulated on oral Anticoagulants    01/19/25 1712     IP VTE HIGH RISK PATIENT  Once         01/19/25 1712     Place sequential compression device  Until discontinued         01/19/25 1712                    Discharge Planning   HERBERT:      Code Status: Full Code   Medical Readiness for Discharge Date:   Discharge Plan A: Home with family                        Ye Chahal MD  Department of Hospital Medicine   [facility]

## 2025-01-22 NOTE — HOSPITAL COURSE
1/22/25-chest x-ray reviewed by me personally aspirin which shows evidence of moderate-large loculated pleural effusion.  I did a bedside ultrasound which confirmed the same.  Patient has consented for chest tube placement.  Hemodynamically stable this morning saturating at 98% on 2 L oxygen.  Xarelto has now been held for

## 2025-01-22 NOTE — ASSESSMENT & PLAN NOTE
Vitals:    01/20/25 0604 01/20/25 1158 01/20/25 1707 01/20/25 2023   BP: 123/65 139/79 (!) 152/78 (!) 147/81    01/21/25 0034 01/21/25 0449 01/21/25 0803 01/21/25 1224   BP: 124/66 128/83 (!) 150/79 (!) 145/78    01/21/25 1655 01/21/25 2005   BP: 128/70 (!) 142/75         Patient's blood pressure range in the last 24 hours was: BP  Min: 124/66  Max: 150/79.The patient's inpatient anti-hypertensive regimen is listed below:  Current Antihypertensives  amLODIPine tablet 10 mg, Daily, Oral  hydrALAZINE tablet 50 mg, 2 times daily, Oral  isosorbide mononitrate 24 hr tablet 30 mg, Daily, Oral  spironolactone tablet 25 mg, Daily, Oral  furosemide injection 80 mg, Daily, Intravenous    Plan  - BP is controlled, no changes needed to their regimen  - follow

## 2025-01-22 NOTE — SUBJECTIVE & OBJECTIVE
Interval History/Significant Events:  Refer to hospital course  Review of Systems  Objective:     Vital Signs (Most Recent):  Temp: 97.9 °F (36.6 °C) (01/22/25 1007)  Pulse: 64 (01/22/25 1007)  Resp: 20 (01/22/25 1007)  BP: 129/66 (01/22/25 1007)  SpO2: 97 % (01/22/25 1007) Vital Signs (24h Range):  Temp:  [97.5 °F (36.4 °C)-98.1 °F (36.7 °C)] 97.9 °F (36.6 °C)  Pulse:  [60-67] 64  Resp:  [16-20] 20  SpO2:  [93 %-98 %] 97 %  BP: (123-154)/(66-84) 129/66   Weight: 97.1 kg (214 lb)  Body mass index is 30.71 kg/m².      Intake/Output Summary (Last 24 hours) at 1/22/2025 1023  Last data filed at 1/21/2025 1531  Gross per 24 hour   Intake 900 ml   Output --   Net 900 ml          Physical Exam  Vitals reviewed.   Constitutional:       Appearance: Normal appearance.      Interventions: She is not intubated.  HENT:      Head: Normocephalic and atraumatic.      Nose: Nose normal.      Mouth/Throat:      Mouth: Mucous membranes are dry.      Pharynx: Oropharynx is clear.   Eyes:      Extraocular Movements: Extraocular movements intact.      Conjunctiva/sclera: Conjunctivae normal.      Pupils: Pupils are equal, round, and reactive to light.   Cardiovascular:      Rate and Rhythm: Normal rate.      Heart sounds: Normal heart sounds. No murmur heard.  Pulmonary:      Effort: Pulmonary effort is normal. She is not intubated.      Comments: Decreased breath sounds right base  Abdominal:      General: Abdomen is flat. Bowel sounds are normal.      Palpations: Abdomen is soft.   Musculoskeletal:         General: Normal range of motion.      Cervical back: Normal range of motion and neck supple.      Right lower leg: Edema present.      Left lower leg: Edema present.      Comments: Trace pitting edema present bilaterally lower extremities   Skin:     General: Skin is warm and dry.      Capillary Refill: Capillary refill takes less than 2 seconds.   Neurological:      General: No focal deficit present.      Mental Status: She is  alert and oriented to person, place, and time.   Psychiatric:         Mood and Affect: Mood normal.         Behavior: Behavior normal.            Vents:  Oxygen Concentration (%): 28 (01/22/25 0811)  Lines/Drains/Airways       Peripheral Intravenous Line  Duration                  Peripheral IV - Single Lumen 01/19/25 1500 Anterior;Distal;Left Forearm 2 days                  Significant Labs:    CBC/Anemia Profile:  Recent Labs   Lab 01/21/25  0326 01/22/25  0434   WBC 7.17 6.68   HGB 10.7* 10.4*   HCT 33.0* 32.6*    207   MCV 81.9 82.1   RDW 16.8* 17.3*   FOLATE  --  11.6   [id number]  --  465        Chemistries:  Recent Labs   Lab 01/21/25  0326 01/22/25  0434   * 135*   K 4.0 3.9    101   CO2 21* 22*   BUN 32* 35*   CREATININE 2.07* 2.06*   CALCIUM 8.7 8.6   ALBUMIN  --  3.3*   PROT  --  6.8   BILITOT  --  2.7*   ALKPHOS  --  86   ALT  --  12   AST  --  29   MG 2.4  --    PHOS 3.9  --        Recent Lab Results  (Last 5 results in the past 24 hours)        01/22/25  0800   01/22/25  0434   01/21/25  2004   01/21/25  1654   01/21/25  1220        Albumin   3.3             ALP   86             ALT   12             Anion Gap   16             AST   29             Baso #   0.02             Basophil %   0.3             Bilirubin Direct   1.1             BILIRUBIN TOTAL   2.7             BUN   35             BUN/CREAT RATIO   17             Calcium   8.6             Chloride   101             CO2   22             Creatinine   2.06             Differential Method   Auto             eGFR   24             Eos #   0.07             Eos %   1.0             Folate   11.6             Glucose   75             Hematocrit   32.6             Hemoglobin   10.4             Immature Grans (Abs)   0.01             Immature Granulocytes   0.1             Lymph #   1.90             Lymph %   28.4             MCH   26.2             MCHC   31.9             MCV   82.1             Mono #   0.88             Mono %   13.2              MPV   10.4             Neutrophils, Abs   3.80             Neutrophils Relative   57.0             nRBC   0.0             NUCLEATED RBC ABSOLUTE   0.00             Platelet Count   207             POC Glucose 108     169   162   143       Potassium   3.9             PROTEIN TOTAL   6.8             RBC   3.97             RDW   17.3             Sodium   135             T4 Total   8.0             TSH   2.237             Uric Acid   12.8             Vitamin D   36.2  Comment: Vitamin D 25-OH Adult Reference Values:  Deficiency: <20 ng/mL  Insufficiency: 20 - <30 ng/mL  Sufficiency: 30 -100 ng/mL    Vitamin D 25-OH Pediatric Reference Values:  Deficiency: <15 ng/mL  Insufficiency: 15 - <20 ng/mL  Sufficiency: 20 - 100 ng/mL             Vitamin B12   465             WBC   6.68                                    Significant Imaging:  I have reviewed all pertinent imaging results/findings within the past 24 hours.

## 2025-01-23 LAB
ANION GAP SERPL CALCULATED.3IONS-SCNC: 15 MMOL/L (ref 7–16)
BASOPHILS # BLD AUTO: 0.02 K/UL (ref 0–0.2)
BASOPHILS NFR BLD AUTO: 0.2 % (ref 0–1)
BUN SERPL-MCNC: 38 MG/DL (ref 10–20)
BUN/CREAT SERPL: 16 (ref 6–20)
CALCIUM SERPL-MCNC: 8.8 MG/DL (ref 8.4–10.2)
CHLORIDE SERPL-SCNC: 101 MMOL/L (ref 98–107)
CO2 SERPL-SCNC: 22 MMOL/L (ref 23–31)
CREAT SERPL-MCNC: 2.45 MG/DL (ref 0.55–1.02)
DIFFERENTIAL METHOD BLD: ABNORMAL
EGFR (NO RACE VARIABLE) (RUSH/TITUS): 20 ML/MIN/1.73M2
EOSINOPHIL # BLD AUTO: 0.02 K/UL (ref 0–0.5)
EOSINOPHIL NFR BLD AUTO: 0.2 % (ref 1–4)
ERYTHROCYTE [DISTWIDTH] IN BLOOD BY AUTOMATED COUNT: 17.2 % (ref 11.5–14.5)
GLUCOSE SERPL-MCNC: 157 MG/DL (ref 82–115)
GLUCOSE SERPL-MCNC: 168 MG/DL (ref 70–105)
GLUCOSE SERPL-MCNC: 173 MG/DL (ref 70–105)
GLUCOSE SERPL-MCNC: 194 MG/DL (ref 70–105)
GLUCOSE SERPL-MCNC: 221 MG/DL (ref 70–105)
HCT VFR BLD AUTO: 33.5 % (ref 38–47)
HGB BLD-MCNC: 10.7 G/DL (ref 12–16)
IMM GRANULOCYTES # BLD AUTO: 0.04 K/UL (ref 0–0.04)
IMM GRANULOCYTES NFR BLD: 0.5 % (ref 0–0.4)
INSULIN SERPL-ACNC: NORMAL U[IU]/ML
LAB AP GROSS DESCRIPTION: NORMAL
LAB AP LABORATORY NOTES: NORMAL
LAB AP SPECIMEN A NON-GYN GENERAL CATEGORIZATION: NEGATIVE
LAB AP SPECIMEN A NON-GYN INTERPETATION: NORMAL
LYMPHOCYTES # BLD AUTO: 1.41 K/UL (ref 1–4.8)
LYMPHOCYTES NFR BLD AUTO: 16.1 % (ref 27–41)
MCH RBC QN AUTO: 26.2 PG (ref 27–31)
MCHC RBC AUTO-ENTMCNC: 31.9 G/DL (ref 32–36)
MCV RBC AUTO: 82.1 FL (ref 80–96)
MONOCYTES # BLD AUTO: 0.92 K/UL (ref 0–0.8)
MONOCYTES NFR BLD AUTO: 10.5 % (ref 2–6)
MPC BLD CALC-MCNC: 10 FL (ref 9.4–12.4)
NEUTROPHILS # BLD AUTO: 6.34 K/UL (ref 1.8–7.7)
NEUTROPHILS NFR BLD AUTO: 72.5 % (ref 53–65)
NRBC # BLD AUTO: 0 X10E3/UL
NRBC, AUTO (.00): 0 %
PLATELET # BLD AUTO: 198 K/UL (ref 150–400)
POTASSIUM SERPL-SCNC: 4 MMOL/L (ref 3.5–5.1)
RBC # BLD AUTO: 4.08 M/UL (ref 4.2–5.4)
SODIUM SERPL-SCNC: 134 MMOL/L (ref 136–145)
WBC # BLD AUTO: 8.75 K/UL (ref 4.5–11)

## 2025-01-23 PROCEDURE — 99900035 HC TECH TIME PER 15 MIN (STAT)

## 2025-01-23 PROCEDURE — 25000242 PHARM REV CODE 250 ALT 637 W/ HCPCS: Performed by: INTERNAL MEDICINE

## 2025-01-23 PROCEDURE — 63600175 PHARM REV CODE 636 W HCPCS: Performed by: STUDENT IN AN ORGANIZED HEALTH CARE EDUCATION/TRAINING PROGRAM

## 2025-01-23 PROCEDURE — 99231 SBSQ HOSP IP/OBS SF/LOW 25: CPT | Mod: ,,, | Performed by: INTERNAL MEDICINE

## 2025-01-23 PROCEDURE — 36415 COLL VENOUS BLD VENIPUNCTURE: CPT | Performed by: HOSPITALIST

## 2025-01-23 PROCEDURE — 94640 AIRWAY INHALATION TREATMENT: CPT

## 2025-01-23 PROCEDURE — 94761 N-INVAS EAR/PLS OXIMETRY MLT: CPT

## 2025-01-23 PROCEDURE — 80048 BASIC METABOLIC PNL TOTAL CA: CPT | Performed by: HOSPITALIST

## 2025-01-23 PROCEDURE — 85025 COMPLETE CBC W/AUTO DIFF WBC: CPT | Performed by: HOSPITALIST

## 2025-01-23 PROCEDURE — 82962 GLUCOSE BLOOD TEST: CPT

## 2025-01-23 PROCEDURE — 25000003 PHARM REV CODE 250: Performed by: HOSPITALIST

## 2025-01-23 PROCEDURE — 25000003 PHARM REV CODE 250: Performed by: STUDENT IN AN ORGANIZED HEALTH CARE EDUCATION/TRAINING PROGRAM

## 2025-01-23 PROCEDURE — 27000221 HC OXYGEN, UP TO 24 HOURS

## 2025-01-23 PROCEDURE — 99232 SBSQ HOSP IP/OBS MODERATE 35: CPT | Mod: ,,, | Performed by: HOSPITALIST

## 2025-01-23 PROCEDURE — 11000001 HC ACUTE MED/SURG PRIVATE ROOM

## 2025-01-23 RX ORDER — ACETAMINOPHEN 325 MG/1
650 TABLET ORAL EVERY 6 HOURS PRN
Status: DISCONTINUED | OUTPATIENT
Start: 2025-01-23 | End: 2025-01-27 | Stop reason: HOSPADM

## 2025-01-23 RX ORDER — HYDRALAZINE HYDROCHLORIDE 50 MG/1
50 TABLET, FILM COATED ORAL EVERY 8 HOURS
Status: DISCONTINUED | OUTPATIENT
Start: 2025-01-24 | End: 2025-01-27 | Stop reason: HOSPADM

## 2025-01-23 RX ORDER — GLIMEPIRIDE 2 MG/1
2 TABLET ORAL
Status: DISCONTINUED | OUTPATIENT
Start: 2025-01-24 | End: 2025-01-27 | Stop reason: HOSPADM

## 2025-01-23 RX ADMIN — ISOSORBIDE MONONITRATE 30 MG: 30 TABLET, EXTENDED RELEASE ORAL at 08:01

## 2025-01-23 RX ADMIN — ASPIRIN 81 MG: 81 TABLET, COATED ORAL at 08:01

## 2025-01-23 RX ADMIN — HYDRALAZINE HYDROCHLORIDE 50 MG: 50 TABLET ORAL at 08:01

## 2025-01-23 RX ADMIN — BUDESONIDE INHALATION 0.5 MG: 0.5 SUSPENSION RESPIRATORY (INHALATION) at 08:01

## 2025-01-23 RX ADMIN — ATORVASTATIN CALCIUM 40 MG: 40 TABLET, FILM COATED ORAL at 08:01

## 2025-01-23 RX ADMIN — FUROSEMIDE 80 MG: 10 INJECTION, SOLUTION INTRAMUSCULAR; INTRAVENOUS at 08:01

## 2025-01-23 RX ADMIN — ALLOPURINOL 100 MG: 100 TABLET ORAL at 08:01

## 2025-01-23 RX ADMIN — BUDESONIDE INHALATION 0.5 MG: 0.5 SUSPENSION RESPIRATORY (INHALATION) at 07:01

## 2025-01-23 RX ADMIN — AMLODIPINE BESYLATE 10 MG: 10 TABLET ORAL at 08:01

## 2025-01-23 RX ADMIN — OXYCODONE 5 MG: 5 TABLET ORAL at 08:01

## 2025-01-23 RX ADMIN — PANTOPRAZOLE SODIUM 40 MG: 40 TABLET, DELAYED RELEASE ORAL at 08:01

## 2025-01-23 RX ADMIN — ACETAMINOPHEN 650 MG: 325 TABLET ORAL at 02:01

## 2025-01-23 NOTE — PLAN OF CARE
Problem: Adult Inpatient Plan of Care  Goal: Plan of Care Review  Outcome: Progressing  Goal: Patient-Specific Goal (Individualized)  Outcome: Progressing  Goal: Absence of Hospital-Acquired Illness or Injury  Outcome: Progressing  Goal: Optimal Comfort and Wellbeing  Outcome: Progressing  Goal: Readiness for Transition of Care  Outcome: Progressing     Problem: Wound  Goal: Optimal Coping  Outcome: Progressing  Goal: Optimal Functional Ability  Outcome: Progressing  Goal: Absence of Infection Signs and Symptoms  Outcome: Progressing  Goal: Improved Oral Intake  Outcome: Progressing  Goal: Optimal Pain Control and Function  Outcome: Progressing  Goal: Skin Health and Integrity  Outcome: Progressing  Goal: Optimal Wound Healing  Outcome: Progressing     Problem: Diabetes Comorbidity  Goal: Blood Glucose Level Within Targeted Range  Outcome: Progressing     Problem: Gas Exchange Impaired  Goal: Optimal Gas Exchange  Outcome: Progressing

## 2025-01-23 NOTE — ASSESSMENT & PLAN NOTE
Patient with a loculated pleural effusion, confirmed with bedside ultrasound.  Patient's anticoagulation has now been held for 48 hours.  She is now a candidate for chest tube placement +/-instillation of tPA/dornase pending pleural fluid analysis.  Differential is broad at this time and includes parapneumonic effusion, transudative with volume overload and/or malignant effusion.     Plan for pigtail chest tube insertion discussed with the family.  Risks of insertion explained to patient as well as his family.  These risks include but are not limited to bleeding, infection, injury to the plan, liver, kidneys and/or pneumothorax.  Patient and family agree.    Patient had 2 L out of her chest tube yesterday the fluid looks like a transudate with a right shift.  Defer management of chest tube to Dr. Rocha

## 2025-01-23 NOTE — ASSESSMENT & PLAN NOTE
Patient found to have large pleural effusion on imaging. I have personally reviewed and interpreted the following imaging: Xray. A thoracentesis was deferred. Most likely etiology includes Congestive Heart Failure and Pneumonia. Management to include Diuresis  Continue diuresis    01/21 Chest tube planned in am  01/22 Transdate pleural effusion removed and pt feels breathing much better.

## 2025-01-23 NOTE — PROGRESS NOTES
[facility]  Critical Care Medicine  Progress Note    Patient Name: Pam Ortega  MRN: [id number]  Admission Date: 1/19/2025  Hospital Length of Stay: 4 days  Code Status: Full Code  Attending Provider: Ye Chahal MD  Primary Care Provider: Janice Velez FNP   Principal Problem: Acute hypoxic respiratory failure    Subjective:     HPI:      Hospital/ICU Course:  1/22/25-chest x-ray reviewed by me personally aspirin which shows evidence of moderate-large loculated pleural effusion.  I did a bedside ultrasound which confirmed the same.  Patient has consented for chest tube placement.  Hemodynamically stable this morning saturating at 98% on 2 L oxygen.  Xarelto has now been held for    Interval History/Significant Events:  Patient without complaints    Review of Systems  Objective:     Vital Signs (Most Recent):  Temp: 98.1 °F (36.7 °C) (01/23/25 0513)  Pulse: 61 (01/23/25 0513)  Resp: 18 (01/22/25 2349)  BP: 133/78 (01/23/25 0513)  SpO2: (!) 94 % (01/23/25 0513) Vital Signs (24h Range):  Temp:  [97.9 °F (36.6 °C)-98.7 °F (37.1 °C)] 98.1 °F (36.7 °C)  Pulse:  [57-73] 61  Resp:  [15-23] 18  SpO2:  [87 %-100 %] 94 %  BP: (124-154)/(65-84) 133/78   Weight: 97.1 kg (214 lb)  Body mass index is 30.71 kg/m².      Intake/Output Summary (Last 24 hours) at 1/23/2025 0714  Last data filed at 1/23/2025 0631  Gross per 24 hour   Intake 0 ml   Output 2000 ml   Net -2000 ml          Physical Exam  Vitals reviewed.   Constitutional:       Appearance: Normal appearance.      Interventions: She is not intubated.  HENT:      Head: Normocephalic and atraumatic.      Nose: Nose normal.      Mouth/Throat:      Mouth: Mucous membranes are dry.      Pharynx: Oropharynx is clear.   Eyes:      Extraocular Movements: Extraocular movements intact.      Conjunctiva/sclera: Conjunctivae normal.      Pupils: Pupils are equal, round, and reactive to light.   Cardiovascular:      Rate and Rhythm: Normal rate.       Heart sounds: Normal heart sounds. No murmur heard.  Pulmonary:      Effort: Pulmonary effort is normal. She is not intubated.      Breath sounds: Normal breath sounds.   Abdominal:      General: Abdomen is flat. Bowel sounds are normal.      Palpations: Abdomen is soft.   Musculoskeletal:         General: Normal range of motion.      Cervical back: Normal range of motion and neck supple.      Right lower leg: No edema.      Left lower leg: No edema.   Skin:     General: Skin is warm and dry.      Capillary Refill: Capillary refill takes less than 2 seconds.   Neurological:      General: No focal deficit present.      Mental Status: She is alert and oriented to person, place, and time.   Psychiatric:         Mood and Affect: Mood normal.         Behavior: Behavior normal.            Vents:  Oxygen Concentration (%): 28 (01/22/25 2058)  Lines/Drains/Airways       Drain  Duration                  Chest Tube 01/22/25 1045 Tube - 1 Right Midaxillary <1 day              Peripheral Intravenous Line  Duration                  Peripheral IV - Single Lumen 01/19/25 1500 Anterior;Distal;Left Forearm 3 days                  Significant Labs:    CBC/Anemia Profile:  Recent Labs   Lab 01/22/25 0434 01/23/25  0420   WBC 6.68 8.75   HGB 10.4* 10.7*   HCT 32.6* 33.5*    198   MCV 82.1 82.1   RDW 17.3* 17.2*   FOLATE 11.6  --    [id number] 465  --         Chemistries:  Recent Labs   Lab 01/22/25  0434 01/23/25  0419   * 134*   K 3.9 4.0    101   CO2 22* 22*   BUN 35* 38*   CREATININE 2.06* 2.45*   CALCIUM 8.6 8.8   ALBUMIN 3.3*  --    PROT 6.8  --    BILITOT 2.7*  --    ALKPHOS 86  --    ALT 12  --    AST 29  --        Recent Lab Results  (Last 5 results in the past 24 hours)        01/23/25  0420   01/23/25  0419   01/22/25 2014 01/22/25  1548   01/22/25  1056        Lymphs, Fluid         30       Anion Gap   15             Baso # 0.02               Basophil % 0.2               Body Fluid, Albumin          1.8  Comment: Reference ranges for this analyte have not been established for the body fluid specimen submitted for analysis.       Body Fluid, Protein         2.8  Comment: Reference ranges for this analyte have not been established for the body fluid specimen submitted for analysis.       BUN   38             BUN/CREAT RATIO   16             Calcium   8.8             Cell Count Excluding RBCs         507       Chloride   101             Cholesterol, Body Fluid         23  Comment: Reference ranges for this analyte have not been established for the body fluid specimen submitted for analysis.       Clarity, Body Fluid         Slightly Cloudy       CO2   22             Color, Body Fluid         Dark Yellow       Creatinine   2.45             Differential Method Auto               eGFR   20             Eos # 0.02               Eos % 0.2               Glucose   157             Glucose Body Fluid         106  Comment: Reference ranges for this analyte have not been established for the body fluid specimen submitted for analysis.       Hematocrit 33.5               Hemoglobin 10.7               Immature Grans (Abs) 0.04               Immature Granulocytes 0.5               LD, Fluid         231  Comment: Reference ranges for this analyte have not been established for the body fluid specimen submitted for analysis.       Lymph # 1.41               Lymph % 16.1               MCH 26.2               MCHC 31.9               MCV 82.1               Mono # 0.92               Mono % 10.5               Monocytes, Fluid Man %         56       MPV 10.0               Neutrophils, Abs 6.34               Neutrophils Relative 72.5               nRBC 0.0               NUCLEATED RBC ABSOLUTE 0.00               Platelet Count 198               POC Glucose     201   159         Polys, Fluid Man %         14       Potassium   4.0             RBC 4.08               RBC, Body Fluid         10,000       RDW 17.2               Sodium   134         "     WBC 8.75                                      Significant Imaging:  I have reviewed all pertinent imaging results/findings within the past 24 hours.    ABG  No results for input(s): "PH", "PO2", "PCO2", "HCO3", "BE" in the last 168 hours.  Assessment/Plan:     Pulmonary  * Acute hypoxic respiratory failure  Much improved, recommend targeting SpO2 of 92-96%    Pleural effusion  Patient with a loculated pleural effusion, confirmed with bedside ultrasound.  Patient's anticoagulation has now been held for 48 hours.  She is now a candidate for chest tube placement +/-instillation of tPA/dornase pending pleural fluid analysis.  Differential is broad at this time and includes parapneumonic effusion, transudative with volume overload and/or malignant effusion.     Plan for pigtail chest tube insertion discussed with the family.  Risks of insertion explained to patient as well as his family.  These risks include but are not limited to bleeding, infection, injury to the plan, liver, kidneys and/or pneumothorax.  Patient and family agree.    Patient had 2 L out of her chest tube yesterday the fluid looks like a transudate with a right shift.  Defer management of chest tube to Dr. Rocha    Cough  Continue inhaled steroids    Other  Obstructive sleep apnea  Noted             Yonny Mckee MD  Critical Care Medicine  [facility]  "

## 2025-01-23 NOTE — ASSESSMENT & PLAN NOTE
Vitals:    01/22/25 1217 01/22/25 1232 01/22/25 1248 01/22/25 1302   BP: 129/72 137/73 137/67 (!) 143/76    01/22/25 1333 01/22/25 1402 01/22/25 1502 01/22/25 1602   BP: 136/68 (!) 144/75 132/74 (!) 145/74    01/22/25 1702 01/22/25 2013   BP: (!) 144/77 127/66         Patient's blood pressure range in the last 24 hours was: BP  Min: 123/71  Max: 154/84.The patient's inpatient anti-hypertensive regimen is listed below:  Current Antihypertensives  amLODIPine tablet 10 mg, Daily, Oral  hydrALAZINE tablet 50 mg, 2 times daily, Oral  isosorbide mononitrate 24 hr tablet 30 mg, Daily, Oral  furosemide injection 80 mg, Daily, Intravenous    Plan  - BP is controlled, no changes needed to their regimen  - follow

## 2025-01-23 NOTE — SUBJECTIVE & OBJECTIVE
Interval History/Significant Events:  Patient without complaints    Review of Systems  Objective:     Vital Signs (Most Recent):  Temp: 98.1 °F (36.7 °C) (01/23/25 0513)  Pulse: 61 (01/23/25 0513)  Resp: 18 (01/22/25 2349)  BP: 133/78 (01/23/25 0513)  SpO2: (!) 94 % (01/23/25 0513) Vital Signs (24h Range):  Temp:  [97.9 °F (36.6 °C)-98.7 °F (37.1 °C)] 98.1 °F (36.7 °C)  Pulse:  [57-73] 61  Resp:  [15-23] 18  SpO2:  [87 %-100 %] 94 %  BP: (124-154)/(65-84) 133/78   Weight: 97.1 kg (214 lb)  Body mass index is 30.71 kg/m².      Intake/Output Summary (Last 24 hours) at 1/23/2025 0714  Last data filed at 1/23/2025 0631  Gross per 24 hour   Intake 0 ml   Output 2000 ml   Net -2000 ml          Physical Exam  Vitals reviewed.   Constitutional:       Appearance: Normal appearance.      Interventions: She is not intubated.  HENT:      Head: Normocephalic and atraumatic.      Nose: Nose normal.      Mouth/Throat:      Mouth: Mucous membranes are dry.      Pharynx: Oropharynx is clear.   Eyes:      Extraocular Movements: Extraocular movements intact.      Conjunctiva/sclera: Conjunctivae normal.      Pupils: Pupils are equal, round, and reactive to light.   Cardiovascular:      Rate and Rhythm: Normal rate.      Heart sounds: Normal heart sounds. No murmur heard.  Pulmonary:      Effort: Pulmonary effort is normal. She is not intubated.      Breath sounds: Normal breath sounds.   Abdominal:      General: Abdomen is flat. Bowel sounds are normal.      Palpations: Abdomen is soft.   Musculoskeletal:         General: Normal range of motion.      Cervical back: Normal range of motion and neck supple.      Right lower leg: No edema.      Left lower leg: No edema.   Skin:     General: Skin is warm and dry.      Capillary Refill: Capillary refill takes less than 2 seconds.   Neurological:      General: No focal deficit present.      Mental Status: She is alert and oriented to person, place, and time.   Psychiatric:         Mood and  Affect: Mood normal.         Behavior: Behavior normal.            Vents:  Oxygen Concentration (%): 28 (01/22/25 2058)  Lines/Drains/Airways       Drain  Duration                  Chest Tube 01/22/25 1045 Tube - 1 Right Midaxillary <1 day              Peripheral Intravenous Line  Duration                  Peripheral IV - Single Lumen 01/19/25 1500 Anterior;Distal;Left Forearm 3 days                  Significant Labs:    CBC/Anemia Profile:  Recent Labs   Lab 01/22/25  0434 01/23/25  0420   WBC 6.68 8.75   HGB 10.4* 10.7*   HCT 32.6* 33.5*    198   MCV 82.1 82.1   RDW 17.3* 17.2*   FOLATE 11.6  --    [id number] 465  --         Chemistries:  Recent Labs   Lab 01/22/25 0434 01/23/25 0419   * 134*   K 3.9 4.0    101   CO2 22* 22*   BUN 35* 38*   CREATININE 2.06* 2.45*   CALCIUM 8.6 8.8   ALBUMIN 3.3*  --    PROT 6.8  --    BILITOT 2.7*  --    ALKPHOS 86  --    ALT 12  --    AST 29  --        Recent Lab Results  (Last 5 results in the past 24 hours)        01/23/25  0420   01/23/25  0419   01/22/25 2014 01/22/25  1548   01/22/25  1056        Lymphs, Fluid         30       Anion Gap   15             Baso # 0.02               Basophil % 0.2               Body Fluid, Albumin         1.8  Comment: Reference ranges for this analyte have not been established for the body fluid specimen submitted for analysis.       Body Fluid, Protein         2.8  Comment: Reference ranges for this analyte have not been established for the body fluid specimen submitted for analysis.       BUN   38             BUN/CREAT RATIO   16             Calcium   8.8             Cell Count Excluding RBCs         507       Chloride   101             Cholesterol, Body Fluid         23  Comment: Reference ranges for this analyte have not been established for the body fluid specimen submitted for analysis.       Clarity, Body Fluid         Slightly Cloudy       CO2   22             Color, Body Fluid         Dark Yellow        Creatinine   2.45             Differential Method Auto               eGFR   20             Eos # 0.02               Eos % 0.2               Glucose   157             Glucose Body Fluid         106  Comment: Reference ranges for this analyte have not been established for the body fluid specimen submitted for analysis.       Hematocrit 33.5               Hemoglobin 10.7               Immature Grans (Abs) 0.04               Immature Granulocytes 0.5               LD, Fluid         231  Comment: Reference ranges for this analyte have not been established for the body fluid specimen submitted for analysis.       Lymph # 1.41               Lymph % 16.1               MCH 26.2               MCHC 31.9               MCV 82.1               Mono # 0.92               Mono % 10.5               Monocytes, Fluid Man %         56       MPV 10.0               Neutrophils, Abs 6.34               Neutrophils Relative 72.5               nRBC 0.0               NUCLEATED RBC ABSOLUTE 0.00               Platelet Count 198               POC Glucose     201   159         Polys, Fluid Man %         14       Potassium   4.0             RBC 4.08               RBC, Body Fluid         10,000       RDW 17.2               Sodium   134             WBC 8.75                                      Significant Imaging:  I have reviewed all pertinent imaging results/findings within the past 24 hours.

## 2025-01-23 NOTE — PROGRESS NOTES
[facility] Medicine  Progress Note    Patient Name: Pam Ortega  MRN: [id number]  Patient Class: IP- Inpatient   Admission Date: 1/19/2025  Length of Stay: 3 days  Attending Physician: Ye Chahal MD  Primary Care Provider: Janice Velez FNP        Subjective     Principal Problem:Acute hypoxic respiratory failure        HPI:       79-year-old female with anemia chronic disease, AFib, CKD, hypertension, bronchitis, CAD, type 2 diabetes, hyperlipidemia, dilated cardiomyopathy, heart failure with preserved ejection fraction, obesity, peripheral vascular disease presents to the ED with progressively worsening shortness of breath.  She has had pleural effusion chronically and follow up with Dr. Mckee.  She recently saw him in clinic and had her inhalers adjusted.  Recently saw Dr. Dutta and had her diuretics increased.  Even with this for shortness of breath progressively worsened over the last several days which prompted her presentation to the ED.  CT reveals loculated pleural effusion.  Pulmonology consulted.  She denies any chest pain.  Denies any fever, chills, palpitations, nausea vomiting diarrhea dysuria.  Review of systems otherwise negative           Overview/Hospital Course:  1/20 breathing significantly improved today.  Continue diuresis    01/21 Record reviewed. Progressive worse SOB. Pulmonary consulted   PMHx: anemia chronic disease, AFib, CKD, hypertension, bronchitis, CAD, PPM, type 2 diabetes, hyperlipidemia, dilated cardiomyopathy, heart failure with preserved ejection fraction, obesity, peripheral vascular disease with symptomatic pleural effusion.    Seen DR Dutta outpt for renal dx.   Had vascular surgery 11/2024 follow arterial thrombus right leg.   Holding xarelto and plan placement CXR in am. C/O some constipation  01/22 Chest tube placed and pt says feels much better. No other complaints. Had BM. BP and BS OK.    Interval History:     Review of  Systems   Constitutional:  Positive for fatigue. Negative for appetite change and fever.   HENT:  Negative for congestion, hearing loss and trouble swallowing.    Respiratory:  Positive for shortness of breath. Negative for chest tightness and wheezing.    Cardiovascular:  Negative for chest pain and palpitations.   Gastrointestinal:  Positive for constipation. Negative for abdominal pain and nausea.   Genitourinary:  Negative for difficulty urinating and dysuria.   Musculoskeletal:  Positive for gait problem. Negative for back pain and neck stiffness.   Skin:  Negative for pallor and rash.   Neurological:  Negative for dizziness, speech difficulty and headaches.   Psychiatric/Behavioral:  Negative for confusion and suicidal ideas.      Objective:     Vital Signs (Most Recent):  Temp: 98.7 °F (37.1 °C) (01/22/25 2013)  Pulse: 60 (01/22/25 2058)  Resp: 16 (01/22/25 2058)  BP: 127/66 (01/22/25 2013)  SpO2: (!) 93 % (01/22/25 2058) Vital Signs (24h Range):  Temp:  [97.8 °F (36.6 °C)-98.7 °F (37.1 °C)] 98.7 °F (37.1 °C)  Pulse:  [57-73] 60  Resp:  [15-23] 16  SpO2:  [87 %-100 %] 93 %  BP: (123-154)/(65-84) 127/66     Weight: 97.1 kg (214 lb)  Body mass index is 30.71 kg/m².    Intake/Output Summary (Last 24 hours) at 1/22/2025 2148  Last data filed at 1/22/2025 1905  Gross per 24 hour   Intake 0 ml   Output 1440 ml   Net -1440 ml         Physical Exam  Vitals reviewed.   Constitutional:       General: She is awake. She is not in acute distress.     Appearance: She is well-developed. She is obese. She is not toxic-appearing.   HENT:      Head: Normocephalic.      Nose: Nose normal.      Mouth/Throat:      Pharynx: Oropharynx is clear.   Eyes:      Extraocular Movements: Extraocular movements intact.      Pupils: Pupils are equal, round, and reactive to light.   Neck:      Thyroid: No thyroid mass.      Vascular: No carotid bruit.   Cardiovascular:      Rate and Rhythm: Normal rate and regular rhythm.      Pulses: Normal  pulses.      Heart sounds: Normal heart sounds. No murmur heard.  Pulmonary:      Effort: Pulmonary effort is normal.      Breath sounds: Normal breath sounds and air entry. No wheezing.   Abdominal:      General: Bowel sounds are normal. There is no distension.      Palpations: Abdomen is soft.      Tenderness: There is no abdominal tenderness.   Musculoskeletal:         General: Normal range of motion.      Cervical back: Neck supple. No rigidity.   Skin:     General: Skin is warm.      Coloration: Skin is not jaundiced.      Findings: No lesion.   Neurological:      General: No focal deficit present.      Mental Status: She is alert and oriented to person, place, and time.      Cranial Nerves: No cranial nerve deficit.   Psychiatric:         Attention and Perception: Attention normal.         Mood and Affect: Mood normal.         Behavior: Behavior normal. Behavior is cooperative.         Thought Content: Thought content normal.         Cognition and Memory: Cognition normal.             Significant Labs: All pertinent labs within the past 24 hours have been reviewed.  BMP:   Recent Labs   Lab 01/21/25 0326 01/22/25  0434   * 75*   * 135*   K 4.0 3.9    101   CO2 21* 22*   BUN 32* 35*   CREATININE 2.07* 2.06*   CALCIUM 8.7 8.6   MG 2.4  --      CBC:   Recent Labs   Lab 01/21/25 0326 01/22/25  0434   WBC 7.17 6.68   HGB 10.7* 10.4*   HCT 33.0* 32.6*    207     CMP:   Recent Labs   Lab 01/21/25 0326 01/22/25  0434   * 135*   K 4.0 3.9    101   CO2 21* 22*   * 75*   BUN 32* 35*   CREATININE 2.07* 2.06*   CALCIUM 8.7 8.6   PROT  --  6.8   ALBUMIN  --  3.3*   BILITOT  --  2.7*   ALKPHOS  --  86   AST  --  29   ALT  --  12   ANIONGAP 13 16       Significant Imaging: I have reviewed all pertinent imaging results/findings within the past 24 hours.    Imaging Results              US Lower Extremity Veins Left (Final result)  Result time 01/19/25 17:49:22      Final result by  Oliver Sweeney MD (01/19/25 17:49:22)                   Impression:      No evidence of deep venous thrombosis in the left lower extremity.      Electronically signed by: Oliver Sweeney  Date:    01/19/2025  Time:    17:49               Narrative:    EXAMINATION:  US LOWER EXTREMITY VEINS LEFT    CLINICAL HISTORY:  Edema;    TECHNIQUE:  Duplex and color flow Doppler evaluation and graded compression of the left lower extremity veins was performed.    COMPARISON:  11/18/2024    FINDINGS:  Left thigh veins: The common femoral, femoral, popliteal, upper greater saphenous, and deep femoral veins are patent and free of thrombus. The veins are normally compressible and have normal phasic flow and augmentation response.    Left calf veins: The visualized calf veins are patent.    Contralateral CFV: The contralateral (right) common femoral vein is patent and free of thrombus.    Miscellaneous: None                                       CT Chest Abdomen Pelvis Without Contrast (XPD) (Final result)  Result time 01/19/25 16:11:01      Final result by Danie Agustin MD (01/19/25 16:11:01)                   Impression:      1. Moderate to large loculated right pleural effusion noting compressive atelectasis of the right lower lobe.  2. No findings to suggest obstructive uropathy or bowel obstruction.  3. Colonic diverticulosis without diverticulitis.  4. There is induration along the anterior abdominal wall, correlation with any superficial cellulitis.  5. Please see above for several additional findings.      Electronically signed by: Danie Agustin MD  Date:    01/19/2025  Time:    16:11               Narrative:    EXAMINATION:  CT CHEST ABDOMEN PELVIS WITHOUT CONTRAST(XPD)    CLINICAL HISTORY:  Aortic aneurysm, known or suspected;Patient with protuberant abdomen, no BM x 1 day, CKD, also with CHF and pleural effusion;    TECHNIQUE:  Low dose axial images, sagittal and coronal reformations were obtained from the  thoracic inlet to the pubic symphysis .  Oral contrast was not administered.    COMPARISON:  CT chest 12/25/2018    FINDINGS:  The structures at the base of the neck are unremarkable.  There are a few scattered upper limit of normal caliber mediastinal lymph nodes.  The heart is not significantly enlarged.  Pacer wire noted.  No pericardial effusion.  The thoracic aorta tapers normally noting atherosclerotic calcification along its course and in the distribution of the coronary arteries.    Motion artifact limits evaluation of the airways and pulmonary parenchyma.  Allowing for this, the airways are patent.  There is a moderate right pleural effusion with associated compressive atelectasis of the right lower lobe.  There is fluid along the fissure on the right.  The right pleural effusion is partially loculated.  No pneumothorax.    The liver, spleen, and adrenal glands have a grossly unremarkable noncontrast appearance.  There is high attenuating material layering within the gallbladder suggesting calculi or sludge.  There is a small hiatal hernia.  The stomach is nondistended, no gastric wall thickening.  No significant abdominal lymphadenopathy.    There is right renal cortical scarring involving the interpolar region.  No hydronephrosis or nephrolithiasis.  The bilateral ureters are unable to be followed in their entirety to the urinary bladder, no definite calculi seen or secondary findings to suggest obstructive uropathy.  The urinary bladder is unremarkable.  There are uterine leiomyoma.  The adnexa is unremarkable.  No significant free fluid in the pelvis.    There are a few scattered colonic diverticula without surrounding inflammation to suggest diverticulitis.  There is a small focus of suspected fat infarct along the left lateral aspect of the descending colon.  The terminal ileum and appendix are unremarkable.  The small bowel is grossly unremarkable.  There are several scattered shotty periaortic,  "pericaval, and mesenteric lymph nodes.  There is atherosclerotic calcification of the aorta and its branches.    There is osteopenia.  There are degenerative changes of the spine.  There are degenerative changes of the sacroiliac joints.  No significant inguinal lymphadenopathy.  There is scattered body wall anasarca.  There is a fat containing umbilical hernia.                                       X-Ray Chest 1 View (Final result)  Result time 01/19/25 14:55:11      Final result by Manpreet Del Real MD (01/19/25 14:55:11)                   Impression:      Cardiomegaly with increased size of now moderate to large right-sided pleural effusion.  Right lung aeration is worse compared to the prior study.      Electronically signed by: Manpreet Del Real MD  Date:    01/19/2025  Time:    14:55               Narrative:    EXAMINATION:  XR CHEST 1 VIEW    CLINICAL HISTORY:  Provided history is "  Shortness of breath".    TECHNIQUE:  One view of the chest.    COMPARISON:  01/03/2025.    FINDINGS:  Cardiac silhouette is enlarged and similar to the prior study.  Moderate to large right pleural effusion, worse from prior study.  Calcified mediastinal and hilar lymph nodes suggestive of remote granulomatous process.  Atherosclerotic calcifications overlie the aortic arch.  No significant consolidation in the left lung.  No sizable left pleural effusion.  No distinct pneumothorax.                                    Intake/Output - Last 3 Shifts         01/21 0700  01/22 0659 01/22 0700  01/23 0659    P.O. 900 0    Total Intake(mL/kg) 900 (9.2) 0 (0)    Urine (mL/kg/hr)      Chest Tube  1440    Total Output  1440    Net +900 -1440                Microbiology Results (last 7 days)       Procedure Component Value Units Date/Time    Culture, Body Fluid [[id number]] Collected: 01/22/25 1056    Order Status: Resulted Specimen: Body Fluid from Pleural Fluid Updated: 01/22/25 1141              Assessment and Plan     * Acute hypoxic " "respiratory failure  Patient with Hypoxic Respiratory failure which is Acute.  she is not on home oxygen. Supplemental oxygen was provided and noted- Oxygen Concentration (%):  [28-40] 28    .   Signs/symptoms of respiratory failure include- tachypnea, increased work of breathing, and respiratory distress. Contributing diagnoses includes - Pleural effusion and Pneumonia Labs and images were reviewed. Patient Has not had a recent ABG. Will treat underlying causes and adjust management of respiratory failure as follows-     Nebs   Diuresis   Pulmonology consult   Discussed case with Dr. Mckee today    Presence of cardiac pacemaker    Will get additional details    Acute on chronic heart failure with preserved ejection fraction  Patient has Diastolic (HFpEF) heart failure that is Acute on chronic. On presentation their CHF was decompensated. Evidence of decompensated CHF on presentation includes:  Pleural effusion. The etiology of their decompensation is likely dietary indiscretion. Most recent BNP and echo results are listed below.  No results for input(s): "BNP" in the last 72 hours.  Latest ECHO  Results for orders placed during the hospital encounter of 11/17/24    Echo Saline Bubble? Yes    Interpretation Summary    Left Ventricle: The left ventricle is mildly dilated. Mildly increased wall thickness. Normal wall motion. Septal motion is consistent with pacing. There is diastolic dysfunction.    Right Ventricle: Moderate right ventricular enlargement.    Left Atrium: Left atrium is mildly dilated.    Right Atrium: Right atrium is severely dilated.    Aortic Valve: The aortic valve is a trileaflet valve. Mildly calcified cusps.    Mitral Valve: There is mild bileaflet sclerosis. Mildly thickened leaflets. There is mild to moderate regurgitation with an eccentric jet.    Tricuspid Valve: There is moderate to severe regurgitation with an eccentrically directed jet.    Pulmonic Valve: There is mild to moderate " regurgitation.    IVC/SVC: Elevated venous pressure at 15 mmHg.    Current Heart Failure Medications  hydrALAZINE tablet 50 mg, 2 times daily, Oral  spironolactone tablet 25 mg, Daily, Oral  furosemide injection 80 mg, Daily, Intravenous    Plan  - Monitor strict I&Os and daily weights.    - Place on telemetry  - Low sodium diet  - Place on fluid restriction of 1.5 L.   - Cardiology has not been consulted  - The patient's volume status is improving but not at their baseline as indicated by shortness of breath  - follow  Continue diuresis    Pleural effusion  Patient found to have large pleural effusion on imaging. I have personally reviewed and interpreted the following imaging: Xray. A thoracentesis was deferred. Most likely etiology includes Congestive Heart Failure and Pneumonia. Management to include Diuresis  Continue diuresis    01/21 Chest tube planned in am  01/22 Transdate pleural effusion removed and pt feels breathing much better.    Diabetic nephropathy  Glucose control      Peripheral vascular disease  Aspirin statin      Arterial occlusion  Vascular surgery right leg Nov 2024      CKD stage 4 secondary to hypertension  Creatine stable for now. BMP reviewed- noted Estimated Creatinine Clearance: 28.8 mL/min (A) (based on SCr of 2.07 mg/dL (H)). according to latest data. Based on current GFR, CKD stage is stage 4 - GFR 15-29.  Monitor UOP and serial BMP and adjust therapy as needed. Renally dose meds. Avoid nephrotoxic medications and procedures.    Essential hypertension  Patient's blood pressure range in the last 24 hours was: BP  Min: 123/65  Max: 137/79.The patient's inpatient anti-hypertensive regimen is listed below:  Current Antihypertensives  amLODIPine tablet 10 mg, Daily, Oral  hydrALAZINE tablet 50 mg, 2 times daily, Oral  isosorbide mononitrate 24 hr tablet 30 mg, Daily, Oral  spironolactone tablet 25 mg, Daily, Oral  furosemide injection 80 mg, Daily, Intravenous    Plan  BP is controlled  "  Follow    Dyslipidemia associated with type 2 diabetes mellitus  Statin   Glucose control      Obstructive sleep apnea    BiPAP at night.  Wear CPAP at home.  Given BiPAP due to respiratory distress to support overnight    Obese  Body mass index is 30.85 kg/m². Morbid obesity complicates all aspects of disease management from diagnostic modalities to treatment. Weight loss encouraged and health benefits explained to patient.         Dyslipidemia  Statin      Chronic a-fib  Patient has persistent (7 days or more) atrial fibrillation. Patient is currently in atrial fibrillation. [id number] Score: 5. The patients heart rate in the last 24 hours is as follows:  Pulse  Min: 57  Max: 70     Antiarrhythmics       Anticoagulants       Plan  - Replete lytes with a goal of K>4, Mg >2  - Patient is not anticoagulated due to holding anticoagulation (Xarelto) in anticipation of procedure  - Patient's afib is currently controlled  - follow        Cardiomyopathy  Diurese    Diabetes mellitus without complication  Patient's FSGs are controlled on current medication regimen.  Last A1c reviewed-   Lab Results   Component Value Date    HGBA1C 6.2 08/23/2024     Most recent fingerstick glucose reviewed- No results for input(s): "POCTGLUCOSE" in the last 24 hours.  Current correctional scale  Low  Maintain anti-hyperglycemic dose as follows-   Antihyperglycemics (From admission, onward)      Start     Stop Route Frequency Ordered    01/19/25 1805  insulin aspart U-100 injection 0-5 Units         -- SubQ Before meals & nightly PRN 01/19/25 1712          Hold Oral hypoglycemics while patient is in the hospital.    Benign hypertensive CKD, stage 1-4 or unspecified chronic kidney disease  Creatine stable for now. BMP reviewed- noted Estimated Creatinine Clearance: 28.8 mL/min (A) (based on SCr of 2.07 mg/dL (H)). according to latest data. Based on current GFR, CKD stage is stage 4 - GFR 15-29.  Monitor UOP and serial BMP and adjust " therapy as needed. Renally dose meds. Avoid nephrotoxic medications and procedures.    Coronary atherosclerosis  Patient with known CAD s/p  unclear , which is controlled Will continue ASA and Statin and monitor for S/Sx of angina/ACS. Continue to monitor on telemetry.     Hyperlipidemia  Statin      Hypertension  Vitals:    01/22/25 1217 01/22/25 1232 01/22/25 1248 01/22/25 1302   BP: 129/72 137/73 137/67 (!) 143/76    01/22/25 1333 01/22/25 1402 01/22/25 1502 01/22/25 1602   BP: 136/68 (!) 144/75 132/74 (!) 145/74    01/22/25 1702 01/22/25 2013   BP: (!) 144/77 127/66         Patient's blood pressure range in the last 24 hours was: BP  Min: 123/71  Max: 154/84.The patient's inpatient anti-hypertensive regimen is listed below:  Current Antihypertensives  amLODIPine tablet 10 mg, Daily, Oral  hydrALAZINE tablet 50 mg, 2 times daily, Oral  isosorbide mononitrate 24 hr tablet 30 mg, Daily, Oral  furosemide injection 80 mg, Daily, Intravenous    Plan  - BP is controlled, no changes needed to their regimen  - follow    History of CVA (cerebrovascular accident)  Aspirin statin        VTE Risk Mitigation (From admission, onward)           Ordered     Reason for No Pharmacological VTE Prophylaxis  Once        Question:  Reasons:  Answer:  Already adequately anticoagulated on oral Anticoagulants    01/19/25 1712     IP VTE HIGH RISK PATIENT  Once         01/19/25 1712     Place sequential compression device  Until discontinued         01/19/25 1712                    Discharge Planning   HERBERT:      Code Status: Full Code   Medical Readiness for Discharge Date:   Discharge Plan A: Home with family                        eY Chhaal MD  Department of Hospital Medicine   [facility]

## 2025-01-23 NOTE — SUBJECTIVE & OBJECTIVE
Interval History:     Review of Systems   Constitutional:  Positive for fatigue. Negative for appetite change and fever.   HENT:  Negative for congestion, hearing loss and trouble swallowing.    Respiratory:  Positive for shortness of breath. Negative for chest tightness and wheezing.    Cardiovascular:  Negative for chest pain and palpitations.   Gastrointestinal:  Positive for constipation. Negative for abdominal pain and nausea.   Genitourinary:  Negative for difficulty urinating and dysuria.   Musculoskeletal:  Positive for gait problem. Negative for back pain and neck stiffness.   Skin:  Negative for pallor and rash.   Neurological:  Negative for dizziness, speech difficulty and headaches.   Psychiatric/Behavioral:  Negative for confusion and suicidal ideas.      Objective:     Vital Signs (Most Recent):  Temp: 98.7 °F (37.1 °C) (01/22/25 2013)  Pulse: 60 (01/22/25 2058)  Resp: 16 (01/22/25 2058)  BP: 127/66 (01/22/25 2013)  SpO2: (!) 93 % (01/22/25 2058) Vital Signs (24h Range):  Temp:  [97.8 °F (36.6 °C)-98.7 °F (37.1 °C)] 98.7 °F (37.1 °C)  Pulse:  [57-73] 60  Resp:  [15-23] 16  SpO2:  [87 %-100 %] 93 %  BP: (123-154)/(65-84) 127/66     Weight: 97.1 kg (214 lb)  Body mass index is 30.71 kg/m².    Intake/Output Summary (Last 24 hours) at 1/22/2025 2148  Last data filed at 1/22/2025 1905  Gross per 24 hour   Intake 0 ml   Output 1440 ml   Net -1440 ml         Physical Exam  Vitals reviewed.   Constitutional:       General: She is awake. She is not in acute distress.     Appearance: She is well-developed. She is obese. She is not toxic-appearing.   HENT:      Head: Normocephalic.      Nose: Nose normal.      Mouth/Throat:      Pharynx: Oropharynx is clear.   Eyes:      Extraocular Movements: Extraocular movements intact.      Pupils: Pupils are equal, round, and reactive to light.   Neck:      Thyroid: No thyroid mass.      Vascular: No carotid bruit.   Cardiovascular:      Rate and Rhythm: Normal rate and  regular rhythm.      Pulses: Normal pulses.      Heart sounds: Normal heart sounds. No murmur heard.  Pulmonary:      Effort: Pulmonary effort is normal.      Breath sounds: Normal breath sounds and air entry. No wheezing.   Abdominal:      General: Bowel sounds are normal. There is no distension.      Palpations: Abdomen is soft.      Tenderness: There is no abdominal tenderness.   Musculoskeletal:         General: Normal range of motion.      Cervical back: Neck supple. No rigidity.   Skin:     General: Skin is warm.      Coloration: Skin is not jaundiced.      Findings: No lesion.   Neurological:      General: No focal deficit present.      Mental Status: She is alert and oriented to person, place, and time.      Cranial Nerves: No cranial nerve deficit.   Psychiatric:         Attention and Perception: Attention normal.         Mood and Affect: Mood normal.         Behavior: Behavior normal. Behavior is cooperative.         Thought Content: Thought content normal.         Cognition and Memory: Cognition normal.             Significant Labs: All pertinent labs within the past 24 hours have been reviewed.  BMP:   Recent Labs   Lab 01/21/25 0326 01/22/25  0434   * 75*   * 135*   K 4.0 3.9    101   CO2 21* 22*   BUN 32* 35*   CREATININE 2.07* 2.06*   CALCIUM 8.7 8.6   MG 2.4  --      CBC:   Recent Labs   Lab 01/21/25 0326 01/22/25  0434   WBC 7.17 6.68   HGB 10.7* 10.4*   HCT 33.0* 32.6*    207     CMP:   Recent Labs   Lab 01/21/25 0326 01/22/25  0434   * 135*   K 4.0 3.9    101   CO2 21* 22*   * 75*   BUN 32* 35*   CREATININE 2.07* 2.06*   CALCIUM 8.7 8.6   PROT  --  6.8   ALBUMIN  --  3.3*   BILITOT  --  2.7*   ALKPHOS  --  86   AST  --  29   ALT  --  12   ANIONGAP 13 16       Significant Imaging: I have reviewed all pertinent imaging results/findings within the past 24 hours.    Imaging Results              US Lower Extremity Veins Left (Final result)  Result time  01/19/25 17:49:22      Final result by Oliver Sweeney MD (01/19/25 17:49:22)                   Impression:      No evidence of deep venous thrombosis in the left lower extremity.      Electronically signed by: Oliver Sweeney  Date:    01/19/2025  Time:    17:49               Narrative:    EXAMINATION:  US LOWER EXTREMITY VEINS LEFT    CLINICAL HISTORY:  Edema;    TECHNIQUE:  Duplex and color flow Doppler evaluation and graded compression of the left lower extremity veins was performed.    COMPARISON:  11/18/2024    FINDINGS:  Left thigh veins: The common femoral, femoral, popliteal, upper greater saphenous, and deep femoral veins are patent and free of thrombus. The veins are normally compressible and have normal phasic flow and augmentation response.    Left calf veins: The visualized calf veins are patent.    Contralateral CFV: The contralateral (right) common femoral vein is patent and free of thrombus.    Miscellaneous: None                                       CT Chest Abdomen Pelvis Without Contrast (XPD) (Final result)  Result time 01/19/25 16:11:01      Final result by Danie Agustin MD (01/19/25 16:11:01)                   Impression:      1. Moderate to large loculated right pleural effusion noting compressive atelectasis of the right lower lobe.  2. No findings to suggest obstructive uropathy or bowel obstruction.  3. Colonic diverticulosis without diverticulitis.  4. There is induration along the anterior abdominal wall, correlation with any superficial cellulitis.  5. Please see above for several additional findings.      Electronically signed by: Danie Agustin MD  Date:    01/19/2025  Time:    16:11               Narrative:    EXAMINATION:  CT CHEST ABDOMEN PELVIS WITHOUT CONTRAST(XPD)    CLINICAL HISTORY:  Aortic aneurysm, known or suspected;Patient with protuberant abdomen, no BM x 1 day, CKD, also with CHF and pleural effusion;    TECHNIQUE:  Low dose axial images, sagittal and  coronal reformations were obtained from the thoracic inlet to the pubic symphysis .  Oral contrast was not administered.    COMPARISON:  CT chest 12/25/2018    FINDINGS:  The structures at the base of the neck are unremarkable.  There are a few scattered upper limit of normal caliber mediastinal lymph nodes.  The heart is not significantly enlarged.  Pacer wire noted.  No pericardial effusion.  The thoracic aorta tapers normally noting atherosclerotic calcification along its course and in the distribution of the coronary arteries.    Motion artifact limits evaluation of the airways and pulmonary parenchyma.  Allowing for this, the airways are patent.  There is a moderate right pleural effusion with associated compressive atelectasis of the right lower lobe.  There is fluid along the fissure on the right.  The right pleural effusion is partially loculated.  No pneumothorax.    The liver, spleen, and adrenal glands have a grossly unremarkable noncontrast appearance.  There is high attenuating material layering within the gallbladder suggesting calculi or sludge.  There is a small hiatal hernia.  The stomach is nondistended, no gastric wall thickening.  No significant abdominal lymphadenopathy.    There is right renal cortical scarring involving the interpolar region.  No hydronephrosis or nephrolithiasis.  The bilateral ureters are unable to be followed in their entirety to the urinary bladder, no definite calculi seen or secondary findings to suggest obstructive uropathy.  The urinary bladder is unremarkable.  There are uterine leiomyoma.  The adnexa is unremarkable.  No significant free fluid in the pelvis.    There are a few scattered colonic diverticula without surrounding inflammation to suggest diverticulitis.  There is a small focus of suspected fat infarct along the left lateral aspect of the descending colon.  The terminal ileum and appendix are unremarkable.  The small bowel is grossly unremarkable.  There  "are several scattered shotty periaortic, pericaval, and mesenteric lymph nodes.  There is atherosclerotic calcification of the aorta and its branches.    There is osteopenia.  There are degenerative changes of the spine.  There are degenerative changes of the sacroiliac joints.  No significant inguinal lymphadenopathy.  There is scattered body wall anasarca.  There is a fat containing umbilical hernia.                                       X-Ray Chest 1 View (Final result)  Result time 01/19/25 14:55:11      Final result by Manpreet Del Real MD (01/19/25 14:55:11)                   Impression:      Cardiomegaly with increased size of now moderate to large right-sided pleural effusion.  Right lung aeration is worse compared to the prior study.      Electronically signed by: Manpreet Del Real MD  Date:    01/19/2025  Time:    14:55               Narrative:    EXAMINATION:  XR CHEST 1 VIEW    CLINICAL HISTORY:  Provided history is "  Shortness of breath".    TECHNIQUE:  One view of the chest.    COMPARISON:  01/03/2025.    FINDINGS:  Cardiac silhouette is enlarged and similar to the prior study.  Moderate to large right pleural effusion, worse from prior study.  Calcified mediastinal and hilar lymph nodes suggestive of remote granulomatous process.  Atherosclerotic calcifications overlie the aortic arch.  No significant consolidation in the left lung.  No sizable left pleural effusion.  No distinct pneumothorax.                                    Intake/Output - Last 3 Shifts         01/21 0700  01/22 0659 01/22 0700  01/23 0659    P.O. 900 0    Total Intake(mL/kg) 900 (9.2) 0 (0)    Urine (mL/kg/hr)      Chest Tube  1440    Total Output  1440    Net +900 -1440                Microbiology Results (last 7 days)       Procedure Component Value Units Date/Time    Culture, Body Fluid [[id number]] Collected: 01/22/25 1056    Order Status: Resulted Specimen: Body Fluid from Pleural Fluid Updated: 01/22/25 1141            "

## 2025-01-24 PROBLEM — K59.00 CONSTIPATION: Status: ACTIVE | Noted: 2025-01-24

## 2025-01-24 LAB
BACTERIA SPEC BFLD CULT: NORMAL
GLUCOSE SERPL-MCNC: 121 MG/DL (ref 70–105)
GLUCOSE SERPL-MCNC: 168 MG/DL (ref 70–105)
GLUCOSE SERPL-MCNC: 170 MG/DL (ref 70–105)
GLUCOSE SERPL-MCNC: 213 MG/DL (ref 70–105)

## 2025-01-24 PROCEDURE — 25000242 PHARM REV CODE 250 ALT 637 W/ HCPCS: Performed by: INTERNAL MEDICINE

## 2025-01-24 PROCEDURE — 99232 SBSQ HOSP IP/OBS MODERATE 35: CPT | Mod: ,,, | Performed by: HOSPITALIST

## 2025-01-24 PROCEDURE — 11000001 HC ACUTE MED/SURG PRIVATE ROOM

## 2025-01-24 PROCEDURE — 25000003 PHARM REV CODE 250: Performed by: HOSPITALIST

## 2025-01-24 PROCEDURE — 82962 GLUCOSE BLOOD TEST: CPT

## 2025-01-24 PROCEDURE — 94761 N-INVAS EAR/PLS OXIMETRY MLT: CPT

## 2025-01-24 PROCEDURE — 25000003 PHARM REV CODE 250: Performed by: STUDENT IN AN ORGANIZED HEALTH CARE EDUCATION/TRAINING PROGRAM

## 2025-01-24 PROCEDURE — 94640 AIRWAY INHALATION TREATMENT: CPT

## 2025-01-24 PROCEDURE — 27200667 HC PACEMAKER, TEMPORARY MONITORING, PER SHIFT

## 2025-01-24 PROCEDURE — 99900035 HC TECH TIME PER 15 MIN (STAT)

## 2025-01-24 PROCEDURE — 99232 SBSQ HOSP IP/OBS MODERATE 35: CPT | Mod: ,,, | Performed by: INTERNAL MEDICINE

## 2025-01-24 PROCEDURE — 63600175 PHARM REV CODE 636 W HCPCS: Performed by: STUDENT IN AN ORGANIZED HEALTH CARE EDUCATION/TRAINING PROGRAM

## 2025-01-24 RX ORDER — BISACODYL 10 MG/1
10 SUPPOSITORY RECTAL DAILY PRN
Status: DISCONTINUED | OUTPATIENT
Start: 2025-01-24 | End: 2025-01-27 | Stop reason: HOSPADM

## 2025-01-24 RX ORDER — POLYETHYLENE GLYCOL 3350 17 G/17G
17 POWDER, FOR SOLUTION ORAL DAILY
Status: DISCONTINUED | OUTPATIENT
Start: 2025-01-24 | End: 2025-01-27 | Stop reason: HOSPADM

## 2025-01-24 RX ADMIN — HYDRALAZINE HYDROCHLORIDE 50 MG: 50 TABLET ORAL at 05:01

## 2025-01-24 RX ADMIN — AMLODIPINE BESYLATE 10 MG: 10 TABLET ORAL at 08:01

## 2025-01-24 RX ADMIN — ALLOPURINOL 100 MG: 100 TABLET ORAL at 08:01

## 2025-01-24 RX ADMIN — BUDESONIDE INHALATION 0.5 MG: 0.5 SUSPENSION RESPIRATORY (INHALATION) at 07:01

## 2025-01-24 RX ADMIN — GLIMEPIRIDE 2 MG: 2 TABLET ORAL at 07:01

## 2025-01-24 RX ADMIN — ISOSORBIDE MONONITRATE 30 MG: 30 TABLET, EXTENDED RELEASE ORAL at 08:01

## 2025-01-24 RX ADMIN — ASPIRIN 81 MG: 81 TABLET, COATED ORAL at 08:01

## 2025-01-24 RX ADMIN — BISACODYL 10 MG: 10 SUPPOSITORY RECTAL at 09:01

## 2025-01-24 RX ADMIN — PANTOPRAZOLE SODIUM 40 MG: 40 TABLET, DELAYED RELEASE ORAL at 08:01

## 2025-01-24 RX ADMIN — COLCHICINE 0.6 MG: 0.6 TABLET ORAL at 05:01

## 2025-01-24 RX ADMIN — HYDRALAZINE HYDROCHLORIDE 50 MG: 50 TABLET ORAL at 09:01

## 2025-01-24 RX ADMIN — HYDRALAZINE HYDROCHLORIDE 50 MG: 50 TABLET ORAL at 01:01

## 2025-01-24 RX ADMIN — ATORVASTATIN CALCIUM 40 MG: 40 TABLET, FILM COATED ORAL at 09:01

## 2025-01-24 RX ADMIN — POLYETHYLENE GLYCOL 3350 17 G: 17 POWDER, FOR SOLUTION ORAL at 09:01

## 2025-01-24 RX ADMIN — FUROSEMIDE 80 MG: 10 INJECTION, SOLUTION INTRAMUSCULAR; INTRAVENOUS at 08:01

## 2025-01-24 NOTE — SUBJECTIVE & OBJECTIVE
Interval History:     Review of Systems   Constitutional:  Positive for fatigue. Negative for appetite change and fever.   HENT:  Negative for congestion, hearing loss and trouble swallowing.    Respiratory:  Positive for shortness of breath. Negative for chest tightness and wheezing.    Cardiovascular:  Negative for chest pain and palpitations.   Gastrointestinal:  Positive for constipation. Negative for abdominal pain and nausea.   Genitourinary:  Negative for difficulty urinating and dysuria.   Musculoskeletal:  Positive for gait problem. Negative for back pain and neck stiffness.   Skin:  Negative for pallor and rash.   Neurological:  Negative for dizziness, speech difficulty and headaches.   Psychiatric/Behavioral:  Negative for confusion and suicidal ideas.      Objective:     Vital Signs (Most Recent):  Temp: 98.3 °F (36.8 °C) (01/23/25 2109)  Pulse: 70 (01/23/25 2109)  Resp: 17 (01/23/25 2040)  BP: (!) 152/60 (01/23/25 2109)  SpO2: 97 % (01/23/25 2109) Vital Signs (24h Range):  Temp:  [97.1 °F (36.2 °C)-98.3 °F (36.8 °C)] 98.3 °F (36.8 °C)  Pulse:  [60-70] 70  Resp:  [16-18] 17  SpO2:  [91 %-97 %] 97 %  BP: (133-152)/(60-78) 152/60     Weight: 97.1 kg (214 lb)  Body mass index is 30.71 kg/m².    Intake/Output Summary (Last 24 hours) at 1/23/2025 2319  Last data filed at 1/23/2025 1832  Gross per 24 hour   Intake --   Output 840 ml   Net -840 ml         Physical Exam  Vitals reviewed.   Constitutional:       General: She is awake. She is not in acute distress.     Appearance: She is well-developed. She is obese. She is not toxic-appearing.   HENT:      Head: Normocephalic.      Nose: Nose normal.      Mouth/Throat:      Pharynx: Oropharynx is clear.   Eyes:      Extraocular Movements: Extraocular movements intact.      Pupils: Pupils are equal, round, and reactive to light.   Neck:      Thyroid: No thyroid mass.      Vascular: No carotid bruit.   Cardiovascular:      Rate and Rhythm: Normal rate and regular  rhythm.      Pulses: Normal pulses.      Heart sounds: Normal heart sounds. No murmur heard.  Pulmonary:      Effort: Pulmonary effort is normal.      Breath sounds: Normal breath sounds and air entry. No wheezing.   Abdominal:      General: Bowel sounds are normal. There is no distension.      Palpations: Abdomen is soft.      Tenderness: There is no abdominal tenderness.   Musculoskeletal:         General: Normal range of motion.      Cervical back: Neck supple. No rigidity.   Skin:     General: Skin is warm.      Coloration: Skin is not jaundiced.      Findings: No lesion.   Neurological:      General: No focal deficit present.      Mental Status: She is alert and oriented to person, place, and time.      Cranial Nerves: No cranial nerve deficit.   Psychiatric:         Attention and Perception: Attention normal.         Mood and Affect: Mood normal.         Behavior: Behavior normal. Behavior is cooperative.         Thought Content: Thought content normal.         Cognition and Memory: Cognition normal.             Significant Labs: All pertinent labs within the past 24 hours have been reviewed.  BMP:   Recent Labs   Lab 01/23/25  0419   *   *   K 4.0      CO2 22*   BUN 38*   CREATININE 2.45*   CALCIUM 8.8     CBC:   Recent Labs   Lab 01/22/25  0434 01/23/25  0420   WBC 6.68 8.75   HGB 10.4* 10.7*   HCT 32.6* 33.5*    198     CMP:   Recent Labs   Lab 01/22/25  0434 01/23/25  0419   * 134*   K 3.9 4.0    101   CO2 22* 22*   GLU 75* 157*   BUN 35* 38*   CREATININE 2.06* 2.45*   CALCIUM 8.6 8.8   PROT 6.8  --    ALBUMIN 3.3*  --    BILITOT 2.7*  --    ALKPHOS 86  --    AST 29  --    ALT 12  --    ANIONGAP 16 15       Significant Imaging: I have reviewed all pertinent imaging results/findings within the past 24 hours.    Imaging Results              US Lower Extremity Veins Left (Final result)  Result time 01/19/25 17:49:22      Final result by Oliver Sweeney MD (01/19/25  17:49:22)                   Impression:      No evidence of deep venous thrombosis in the left lower extremity.      Electronically signed by: Oliver Sweeney  Date:    01/19/2025  Time:    17:49               Narrative:    EXAMINATION:  US LOWER EXTREMITY VEINS LEFT    CLINICAL HISTORY:  Edema;    TECHNIQUE:  Duplex and color flow Doppler evaluation and graded compression of the left lower extremity veins was performed.    COMPARISON:  11/18/2024    FINDINGS:  Left thigh veins: The common femoral, femoral, popliteal, upper greater saphenous, and deep femoral veins are patent and free of thrombus. The veins are normally compressible and have normal phasic flow and augmentation response.    Left calf veins: The visualized calf veins are patent.    Contralateral CFV: The contralateral (right) common femoral vein is patent and free of thrombus.    Miscellaneous: None                                       CT Chest Abdomen Pelvis Without Contrast (XPD) (Final result)  Result time 01/19/25 16:11:01      Final result by Danie Agustin MD (01/19/25 16:11:01)                   Impression:      1. Moderate to large loculated right pleural effusion noting compressive atelectasis of the right lower lobe.  2. No findings to suggest obstructive uropathy or bowel obstruction.  3. Colonic diverticulosis without diverticulitis.  4. There is induration along the anterior abdominal wall, correlation with any superficial cellulitis.  5. Please see above for several additional findings.      Electronically signed by: Danie Agustin MD  Date:    01/19/2025  Time:    16:11               Narrative:    EXAMINATION:  CT CHEST ABDOMEN PELVIS WITHOUT CONTRAST(XPD)    CLINICAL HISTORY:  Aortic aneurysm, known or suspected;Patient with protuberant abdomen, no BM x 1 day, CKD, also with CHF and pleural effusion;    TECHNIQUE:  Low dose axial images, sagittal and coronal reformations were obtained from the thoracic inlet to the pubic  symphysis .  Oral contrast was not administered.    COMPARISON:  CT chest 12/25/2018    FINDINGS:  The structures at the base of the neck are unremarkable.  There are a few scattered upper limit of normal caliber mediastinal lymph nodes.  The heart is not significantly enlarged.  Pacer wire noted.  No pericardial effusion.  The thoracic aorta tapers normally noting atherosclerotic calcification along its course and in the distribution of the coronary arteries.    Motion artifact limits evaluation of the airways and pulmonary parenchyma.  Allowing for this, the airways are patent.  There is a moderate right pleural effusion with associated compressive atelectasis of the right lower lobe.  There is fluid along the fissure on the right.  The right pleural effusion is partially loculated.  No pneumothorax.    The liver, spleen, and adrenal glands have a grossly unremarkable noncontrast appearance.  There is high attenuating material layering within the gallbladder suggesting calculi or sludge.  There is a small hiatal hernia.  The stomach is nondistended, no gastric wall thickening.  No significant abdominal lymphadenopathy.    There is right renal cortical scarring involving the interpolar region.  No hydronephrosis or nephrolithiasis.  The bilateral ureters are unable to be followed in their entirety to the urinary bladder, no definite calculi seen or secondary findings to suggest obstructive uropathy.  The urinary bladder is unremarkable.  There are uterine leiomyoma.  The adnexa is unremarkable.  No significant free fluid in the pelvis.    There are a few scattered colonic diverticula without surrounding inflammation to suggest diverticulitis.  There is a small focus of suspected fat infarct along the left lateral aspect of the descending colon.  The terminal ileum and appendix are unremarkable.  The small bowel is grossly unremarkable.  There are several scattered shotty periaortic, pericaval, and mesenteric lymph  "nodes.  There is atherosclerotic calcification of the aorta and its branches.    There is osteopenia.  There are degenerative changes of the spine.  There are degenerative changes of the sacroiliac joints.  No significant inguinal lymphadenopathy.  There is scattered body wall anasarca.  There is a fat containing umbilical hernia.                                       X-Ray Chest 1 View (Final result)  Result time 01/19/25 14:55:11      Final result by Manpreet Del Real MD (01/19/25 14:55:11)                   Impression:      Cardiomegaly with increased size of now moderate to large right-sided pleural effusion.  Right lung aeration is worse compared to the prior study.      Electronically signed by: Manpreet Del Real MD  Date:    01/19/2025  Time:    14:55               Narrative:    EXAMINATION:  XR CHEST 1 VIEW    CLINICAL HISTORY:  Provided history is "  Shortness of breath".    TECHNIQUE:  One view of the chest.    COMPARISON:  01/03/2025.    FINDINGS:  Cardiac silhouette is enlarged and similar to the prior study.  Moderate to large right pleural effusion, worse from prior study.  Calcified mediastinal and hilar lymph nodes suggestive of remote granulomatous process.  Atherosclerotic calcifications overlie the aortic arch.  No significant consolidation in the left lung.  No sizable left pleural effusion.  No distinct pneumothorax.                                    Intake/Output - Last 3 Shifts         01/22 0700  01/23 0659 01/23 0700  01/24 0659    P.O. 0     Total Intake(mL/kg) 0 (0)     Chest Tube 2000 280    Total Output 2000 280    Net -2000 -280                Microbiology Results (last 7 days)       Procedure Component Value Units Date/Time    Culture, Body Fluid [[id number]] Collected: 01/22/25 1056    Order Status: Completed Specimen: Body Fluid from Pleural Fluid Updated: 01/23/25 0755     Culture, Body Fluid No Growth To Date            "

## 2025-01-24 NOTE — PROGRESS NOTES
[facility] Medicine  Progress Note    Patient Name: Pam Ortega  MRN: [id number]  Patient Class: IP- Inpatient   Admission Date: 1/19/2025  Length of Stay: 4 days  Attending Physician: Ye Chahal MD  Primary Care Provider: Janice Velez FNP        Subjective     Principal Problem:Acute hypoxic respiratory failure        HPI:       79-year-old female with anemia chronic disease, AFib, CKD, hypertension, bronchitis, CAD, type 2 diabetes, hyperlipidemia, dilated cardiomyopathy, heart failure with preserved ejection fraction, obesity, peripheral vascular disease presents to the ED with progressively worsening shortness of breath.  She has had pleural effusion chronically and follow up with Dr. Mckee.  She recently saw him in clinic and had her inhalers adjusted.  Recently saw Dr. Dutta and had her diuretics increased.  Even with this for shortness of breath progressively worsened over the last several days which prompted her presentation to the ED.  CT reveals loculated pleural effusion.  Pulmonology consulted.  She denies any chest pain.  Denies any fever, chills, palpitations, nausea vomiting diarrhea dysuria.  Review of systems otherwise negative           Overview/Hospital Course:  1/20 breathing significantly improved today.  Continue diuresis    01/21 Record reviewed. Progressive worse SOB. Pulmonary consulted   PMHx: anemia chronic disease, AFib, CKD, hypertension, bronchitis, CAD, PPM, type 2 diabetes, hyperlipidemia, dilated cardiomyopathy, heart failure with preserved ejection fraction, obesity, peripheral vascular disease with symptomatic pleural effusion.    Seen DR Dutta outpt for renal dx.   Had vascular surgery 11/2024 follow arterial thrombus right leg.   Holding xarelto and plan placement CXR in am. C/O some constipation  01/22 Chest tube placed and pt says feels much better. No other complaints. Had BM. BP and BS OK.  01/23 feels better.  BP and BS  slight up    Interval History:     Review of Systems   Constitutional:  Positive for fatigue. Negative for appetite change and fever.   HENT:  Negative for congestion, hearing loss and trouble swallowing.    Respiratory:  Positive for shortness of breath. Negative for chest tightness and wheezing.    Cardiovascular:  Negative for chest pain and palpitations.   Gastrointestinal:  Positive for constipation. Negative for abdominal pain and nausea.   Genitourinary:  Negative for difficulty urinating and dysuria.   Musculoskeletal:  Positive for gait problem. Negative for back pain and neck stiffness.   Skin:  Negative for pallor and rash.   Neurological:  Negative for dizziness, speech difficulty and headaches.   Psychiatric/Behavioral:  Negative for confusion and suicidal ideas.      Objective:     Vital Signs (Most Recent):  Temp: 98.3 °F (36.8 °C) (01/23/25 2109)  Pulse: 70 (01/23/25 2109)  Resp: 17 (01/23/25 2040)  BP: (!) 152/60 (01/23/25 2109)  SpO2: 97 % (01/23/25 2109) Vital Signs (24h Range):  Temp:  [97.1 °F (36.2 °C)-98.3 °F (36.8 °C)] 98.3 °F (36.8 °C)  Pulse:  [60-70] 70  Resp:  [16-18] 17  SpO2:  [91 %-97 %] 97 %  BP: (133-152)/(60-78) 152/60     Weight: 97.1 kg (214 lb)  Body mass index is 30.71 kg/m².    Intake/Output Summary (Last 24 hours) at 1/23/2025 2319  Last data filed at 1/23/2025 1832  Gross per 24 hour   Intake --   Output 840 ml   Net -840 ml         Physical Exam  Vitals reviewed.   Constitutional:       General: She is awake. She is not in acute distress.     Appearance: She is well-developed. She is obese. She is not toxic-appearing.   HENT:      Head: Normocephalic.      Nose: Nose normal.      Mouth/Throat:      Pharynx: Oropharynx is clear.   Eyes:      Extraocular Movements: Extraocular movements intact.      Pupils: Pupils are equal, round, and reactive to light.   Neck:      Thyroid: No thyroid mass.      Vascular: No carotid bruit.   Cardiovascular:      Rate and Rhythm: Normal rate  and regular rhythm.      Pulses: Normal pulses.      Heart sounds: Normal heart sounds. No murmur heard.  Pulmonary:      Effort: Pulmonary effort is normal.      Breath sounds: Normal breath sounds and air entry. No wheezing.   Abdominal:      General: Bowel sounds are normal. There is no distension.      Palpations: Abdomen is soft.      Tenderness: There is no abdominal tenderness.   Musculoskeletal:         General: Normal range of motion.      Cervical back: Neck supple. No rigidity.   Skin:     General: Skin is warm.      Coloration: Skin is not jaundiced.      Findings: No lesion.   Neurological:      General: No focal deficit present.      Mental Status: She is alert and oriented to person, place, and time.      Cranial Nerves: No cranial nerve deficit.   Psychiatric:         Attention and Perception: Attention normal.         Mood and Affect: Mood normal.         Behavior: Behavior normal. Behavior is cooperative.         Thought Content: Thought content normal.         Cognition and Memory: Cognition normal.             Significant Labs: All pertinent labs within the past 24 hours have been reviewed.  BMP:   Recent Labs   Lab 01/23/25 0419   *   *   K 4.0      CO2 22*   BUN 38*   CREATININE 2.45*   CALCIUM 8.8     CBC:   Recent Labs   Lab 01/22/25  0434 01/23/25  0420   WBC 6.68 8.75   HGB 10.4* 10.7*   HCT 32.6* 33.5*    198     CMP:   Recent Labs   Lab 01/22/25 0434 01/23/25  0419   * 134*   K 3.9 4.0    101   CO2 22* 22*   GLU 75* 157*   BUN 35* 38*   CREATININE 2.06* 2.45*   CALCIUM 8.6 8.8   PROT 6.8  --    ALBUMIN 3.3*  --    BILITOT 2.7*  --    ALKPHOS 86  --    AST 29  --    ALT 12  --    ANIONGAP 16 15       Significant Imaging: I have reviewed all pertinent imaging results/findings within the past 24 hours.    Imaging Results              US Lower Extremity Veins Left (Final result)  Result time 01/19/25 17:49:22      Final result by Oliver Sweeney MD  (01/19/25 17:49:22)                   Impression:      No evidence of deep venous thrombosis in the left lower extremity.      Electronically signed by: Oliver Sweeney  Date:    01/19/2025  Time:    17:49               Narrative:    EXAMINATION:  US LOWER EXTREMITY VEINS LEFT    CLINICAL HISTORY:  Edema;    TECHNIQUE:  Duplex and color flow Doppler evaluation and graded compression of the left lower extremity veins was performed.    COMPARISON:  11/18/2024    FINDINGS:  Left thigh veins: The common femoral, femoral, popliteal, upper greater saphenous, and deep femoral veins are patent and free of thrombus. The veins are normally compressible and have normal phasic flow and augmentation response.    Left calf veins: The visualized calf veins are patent.    Contralateral CFV: The contralateral (right) common femoral vein is patent and free of thrombus.    Miscellaneous: None                                       CT Chest Abdomen Pelvis Without Contrast (XPD) (Final result)  Result time 01/19/25 16:11:01      Final result by Danie Agustin MD (01/19/25 16:11:01)                   Impression:      1. Moderate to large loculated right pleural effusion noting compressive atelectasis of the right lower lobe.  2. No findings to suggest obstructive uropathy or bowel obstruction.  3. Colonic diverticulosis without diverticulitis.  4. There is induration along the anterior abdominal wall, correlation with any superficial cellulitis.  5. Please see above for several additional findings.      Electronically signed by: Danie Agustin MD  Date:    01/19/2025  Time:    16:11               Narrative:    EXAMINATION:  CT CHEST ABDOMEN PELVIS WITHOUT CONTRAST(XPD)    CLINICAL HISTORY:  Aortic aneurysm, known or suspected;Patient with protuberant abdomen, no BM x 1 day, CKD, also with CHF and pleural effusion;    TECHNIQUE:  Low dose axial images, sagittal and coronal reformations were obtained from the thoracic inlet to the  pubic symphysis .  Oral contrast was not administered.    COMPARISON:  CT chest 12/25/2018    FINDINGS:  The structures at the base of the neck are unremarkable.  There are a few scattered upper limit of normal caliber mediastinal lymph nodes.  The heart is not significantly enlarged.  Pacer wire noted.  No pericardial effusion.  The thoracic aorta tapers normally noting atherosclerotic calcification along its course and in the distribution of the coronary arteries.    Motion artifact limits evaluation of the airways and pulmonary parenchyma.  Allowing for this, the airways are patent.  There is a moderate right pleural effusion with associated compressive atelectasis of the right lower lobe.  There is fluid along the fissure on the right.  The right pleural effusion is partially loculated.  No pneumothorax.    The liver, spleen, and adrenal glands have a grossly unremarkable noncontrast appearance.  There is high attenuating material layering within the gallbladder suggesting calculi or sludge.  There is a small hiatal hernia.  The stomach is nondistended, no gastric wall thickening.  No significant abdominal lymphadenopathy.    There is right renal cortical scarring involving the interpolar region.  No hydronephrosis or nephrolithiasis.  The bilateral ureters are unable to be followed in their entirety to the urinary bladder, no definite calculi seen or secondary findings to suggest obstructive uropathy.  The urinary bladder is unremarkable.  There are uterine leiomyoma.  The adnexa is unremarkable.  No significant free fluid in the pelvis.    There are a few scattered colonic diverticula without surrounding inflammation to suggest diverticulitis.  There is a small focus of suspected fat infarct along the left lateral aspect of the descending colon.  The terminal ileum and appendix are unremarkable.  The small bowel is grossly unremarkable.  There are several scattered shotty periaortic, pericaval, and mesenteric  "lymph nodes.  There is atherosclerotic calcification of the aorta and its branches.    There is osteopenia.  There are degenerative changes of the spine.  There are degenerative changes of the sacroiliac joints.  No significant inguinal lymphadenopathy.  There is scattered body wall anasarca.  There is a fat containing umbilical hernia.                                       X-Ray Chest 1 View (Final result)  Result time 01/19/25 14:55:11      Final result by Manpreet Del Real MD (01/19/25 14:55:11)                   Impression:      Cardiomegaly with increased size of now moderate to large right-sided pleural effusion.  Right lung aeration is worse compared to the prior study.      Electronically signed by: Manpreet Del Real MD  Date:    01/19/2025  Time:    14:55               Narrative:    EXAMINATION:  XR CHEST 1 VIEW    CLINICAL HISTORY:  Provided history is "  Shortness of breath".    TECHNIQUE:  One view of the chest.    COMPARISON:  01/03/2025.    FINDINGS:  Cardiac silhouette is enlarged and similar to the prior study.  Moderate to large right pleural effusion, worse from prior study.  Calcified mediastinal and hilar lymph nodes suggestive of remote granulomatous process.  Atherosclerotic calcifications overlie the aortic arch.  No significant consolidation in the left lung.  No sizable left pleural effusion.  No distinct pneumothorax.                                    Intake/Output - Last 3 Shifts         01/22 0700  01/23 0659 01/23 0700  01/24 0659    P.O. 0     Total Intake(mL/kg) 0 (0)     Chest Tube 2000 280    Total Output 2000 280    Net -2000 -280                Microbiology Results (last 7 days)       Procedure Component Value Units Date/Time    Culture, Body Fluid [[id number]] Collected: 01/22/25 1056    Order Status: Completed Specimen: Body Fluid from Pleural Fluid Updated: 01/23/25 0755     Culture, Body Fluid No Growth To Date              CHEM PROFILE   Creatinine 2.45 High  (Today) 2.45 " "High  2.06 High  2.07 High  2.07 High  2.00 High  1.65 High  2.14 High    eGFR if non  31 Low  (2 yr ago)          eGFR if  33 Low  (3 yr ago)          Glucose 157 High  (Today) 157 High  75 Low  117 High  131 High  94 104 178 High          Assessment and Plan     * Acute hypoxic respiratory failure  Patient with Hypoxic Respiratory failure which is Acute.  she is not on home oxygen. Supplemental oxygen was provided and noted- Oxygen Concentration (%):  [28-40] 28    .   Signs/symptoms of respiratory failure include- tachypnea, increased work of breathing, and respiratory distress. Contributing diagnoses includes - Pleural effusion and Pneumonia Labs and images were reviewed. Patient Has not had a recent ABG. Will treat underlying causes and adjust management of respiratory failure as follows-     Nebs   Diuresis   Pulmonology consult   Discussed case with Dr. Mckee today    Presence of cardiac pacemaker    Will get additional details    Acute on chronic heart failure with preserved ejection fraction  Patient has Diastolic (HFpEF) heart failure that is Acute on chronic. On presentation their CHF was decompensated. Evidence of decompensated CHF on presentation includes:  Pleural effusion. The etiology of their decompensation is likely dietary indiscretion. Most recent BNP and echo results are listed below.  No results for input(s): "BNP" in the last 72 hours.  Latest ECHO  Results for orders placed during the hospital encounter of 11/17/24    Echo Saline Bubble? Yes    Interpretation Summary    Left Ventricle: The left ventricle is mildly dilated. Mildly increased wall thickness. Normal wall motion. Septal motion is consistent with pacing. There is diastolic dysfunction.    Right Ventricle: Moderate right ventricular enlargement.    Left Atrium: Left atrium is mildly dilated.    Right Atrium: Right atrium is severely dilated.    Aortic Valve: The aortic valve is a trileaflet valve. " Mildly calcified cusps.    Mitral Valve: There is mild bileaflet sclerosis. Mildly thickened leaflets. There is mild to moderate regurgitation with an eccentric jet.    Tricuspid Valve: There is moderate to severe regurgitation with an eccentrically directed jet.    Pulmonic Valve: There is mild to moderate regurgitation.    IVC/SVC: Elevated venous pressure at 15 mmHg.    Current Heart Failure Medications  hydrALAZINE tablet 50 mg, 2 times daily, Oral  spironolactone tablet 25 mg, Daily, Oral  furosemide injection 80 mg, Daily, Intravenous    Plan  - Monitor strict I&Os and daily weights.    - Place on telemetry  - Low sodium diet  - Place on fluid restriction of 1.5 L.   - Cardiology has not been consulted  - The patient's volume status is improving but not at their baseline as indicated by shortness of breath  - follow  Continue diuresis    Pleural effusion  Patient found to have large pleural effusion on imaging. I have personally reviewed and interpreted the following imaging: Xray. A thoracentesis was deferred. Most likely etiology includes Congestive Heart Failure and Pneumonia. Management to include Diuresis  Continue diuresis    01/21 Chest tube planned in am  01/22 Transdate pleural effusion removed and pt feels breathing much better.    Diabetic nephropathy  Glucose control      Peripheral vascular disease  Aspirin statin      Arterial occlusion  Vascular surgery right leg Nov 2024      CKD stage 4 secondary to hypertension  Creatine stable for now. BMP reviewed- noted Estimated Creatinine Clearance: 24.3 mL/min (A) (based on SCr of 2.45 mg/dL (H)). according to latest data. Based on current GFR, CKD stage is stage 4 - GFR 15-29.  Monitor UOP and serial BMP and adjust therapy as needed. Renally dose meds. Avoid nephrotoxic medications and procedures.    Essential hypertension  Patient's blood pressure range in the last 24 hours was: BP  Min: 123/65  Max: 137/79.The patient's inpatient anti-hypertensive  "regimen is listed below:  Current Antihypertensives  amLODIPine tablet 10 mg, Daily, Oral  hydrALAZINE tablet 50 mg, 2 times daily, Oral  isosorbide mononitrate 24 hr tablet 30 mg, Daily, Oral  spironolactone tablet 25 mg, Daily, Oral  furosemide injection 80 mg, Daily, Intravenous    Plan  BP is controlled   Follow    Dyslipidemia associated with type 2 diabetes mellitus  Statin   Glucose control      Obstructive sleep apnea    BiPAP at night.  Wear CPAP at home.  Given BiPAP due to respiratory distress to support overnight    Obese  Body mass index is 30.85 kg/m². Morbid obesity complicates all aspects of disease management from diagnostic modalities to treatment. Weight loss encouraged and health benefits explained to patient.         Dyslipidemia  Statin      Chronic a-fib  Patient has persistent (7 days or more) atrial fibrillation. Patient is currently in atrial fibrillation. [id number] Score: 5. The patients heart rate in the last 24 hours is as follows:  Pulse  Min: 57  Max: 70     Antiarrhythmics       Anticoagulants       Plan  - Replete lytes with a goal of K>4, Mg >2  - Patient is not anticoagulated due to holding anticoagulation (Xarelto) in anticipation of procedure  - Patient's afib is currently controlled  - follow        Cardiomyopathy  Diurese    Diabetes mellitus without complication  Patient's FSGs are controlled on current medication regimen.  Last A1c reviewed-   Lab Results   Component Value Date    HGBA1C 6.2 08/23/2024     Most recent fingerstick glucose reviewed- No results for input(s): "POCTGLUCOSE" in the last 24 hours.  Current correctional scale  Low  Maintain anti-hyperglycemic dose as follows-   Antihyperglycemics (From admission, onward)      Start     Stop Route Frequency Ordered    01/19/25 1805  insulin aspart U-100 injection 0-5 Units         -- SubQ Before meals & nightly PRN 01/19/25 1712          Hold Oral hypoglycemics while patient is in the hospital.    Benign hypertensive " CKD, stage 1-4 or unspecified chronic kidney disease  Creatine stable for now. BMP reviewed- noted Estimated Creatinine Clearance: 28.8 mL/min (A) (based on SCr of 2.07 mg/dL (H)). according to latest data. Based on current GFR, CKD stage is stage 4 - GFR 15-29.  Monitor UOP and serial BMP and adjust therapy as needed. Renally dose meds. Avoid nephrotoxic medications and procedures.    Coronary atherosclerosis  Patient with known CAD s/p  unclear , which is controlled Will continue ASA and Statin and monitor for S/Sx of angina/ACS. Continue to monitor on telemetry.     Hyperlipidemia  Statin      Hypertension  Vitals:    01/22/25 1502 01/22/25 1602 01/22/25 1702 01/22/25 2013   BP: 132/74 (!) 145/74 (!) 144/77 127/66    01/23/25 0009 01/23/25 0513 01/23/25 0753 01/23/25 1145   BP: (!) 143/77 133/78 139/71 138/68    01/23/25 1603 01/23/25 2109   BP: (!) 145/72 (!) 152/60         Patient's blood pressure range in the last 24 hours was: BP  Min: 133/78  Max: 152/60.The patient's inpatient anti-hypertensive regimen is listed below:  Current Antihypertensives  amLODIPine tablet 10 mg, Daily, Oral  hydrALAZINE tablet 50 mg, 2 times daily, Oral  isosorbide mononitrate 24 hr tablet 30 mg, Daily, Oral  furosemide injection 80 mg, Daily, Intravenous    Plan  - BP is controlled, no changes needed to their regimen  - follow    History of CVA (cerebrovascular accident)  Aspirin statin        VTE Risk Mitigation (From admission, onward)           Ordered     Reason for No Pharmacological VTE Prophylaxis  Once        Question:  Reasons:  Answer:  Already adequately anticoagulated on oral Anticoagulants    01/19/25 1712     IP VTE HIGH RISK PATIENT  Once         01/19/25 1712     Place sequential compression device  Until discontinued         01/19/25 1712                    Discharge Planning   HERBERT:      Code Status: Full Code   Medical Readiness for Discharge Date:   Discharge Plan A: Home with family                        Ye RICHTER  MD Tirso  Department of Hospital Medicine   [facility]

## 2025-01-24 NOTE — ASSESSMENT & PLAN NOTE
Patient with a loculated pleural effusion, confirmed with bedside ultrasound.  Patient's anticoagulation has now been held for 48 hours.  She is now a candidate for chest tube placement +/-instillation of tPA/dornase pending pleural fluid analysis.  Differential is broad at this time and includes parapneumonic effusion, transudative with volume overload and/or malignant effusion.     Plan for pigtail chest tube insertion discussed with the family.  Risks of insertion explained to patient as well as his family.  These risks include but are not limited to bleeding, infection, injury to the plan, liver, kidneys and/or pneumothorax.  Patient and family agree.    Patient had 280 cc out of her chest tube yesterday the fluid looks like a transudate with a right shift.  Chest x-ray shows no residual effusion  Defer management of chest tube to Dr. Rocha

## 2025-01-24 NOTE — ASSESSMENT & PLAN NOTE
Vitals:    01/22/25 1502 01/22/25 1602 01/22/25 1702 01/22/25 2013   BP: 132/74 (!) 145/74 (!) 144/77 127/66    01/23/25 0009 01/23/25 0513 01/23/25 0753 01/23/25 1145   BP: (!) 143/77 133/78 139/71 138/68    01/23/25 1603 01/23/25 2109   BP: (!) 145/72 (!) 152/60         Patient's blood pressure range in the last 24 hours was: BP  Min: 133/78  Max: 152/60.The patient's inpatient anti-hypertensive regimen is listed below:  Current Antihypertensives  amLODIPine tablet 10 mg, Daily, Oral  hydrALAZINE tablet 50 mg, 2 times daily, Oral  isosorbide mononitrate 24 hr tablet 30 mg, Daily, Oral  furosemide injection 80 mg, Daily, Intravenous    Plan  - BP is controlled, no changes needed to their regimen  - follow

## 2025-01-24 NOTE — ASSESSMENT & PLAN NOTE
Creatine stable for now. BMP reviewed- noted Estimated Creatinine Clearance: 24.3 mL/min (A) (based on SCr of 2.45 mg/dL (H)). according to latest data. Based on current GFR, CKD stage is stage 4 - GFR 15-29.  Monitor UOP and serial BMP and adjust therapy as needed. Renally dose meds. Avoid nephrotoxic medications and procedures.

## 2025-01-24 NOTE — SUBJECTIVE & OBJECTIVE
"        Burak Javier TAHIRBENÍTEZ   2015 1:40 PM   Office Visit   MRN: [id number]    Department:  [facility]   Dept Phone:  [phone]    Description:  Male : 2015   Provider:  Janet Crawford M.D.           Reason for Visit     Other Lips turning blue      Allergies as of 2017     No Known Allergies      Vital Signs     Pulse Temperature Respirations Height Weight Body Mass Index    160 37.3 °C (99.1 °F) 40 0.572 m (1' 10.52\") 5.891 kg (12 lb 15.8 oz) 18.01 kg/m2    Oxygen Saturation                   98%           Basic Information     Date Of Birth Sex Race Ethnicity Preferred Language    2015 Male White  Origin (Yakut,[place]vian,Gabonese,Ugandan, etc) English      Problem List              ICD-10-CM Priority Class Noted - Resolved    Healthy pediatric patient [id number]   Unknown - Present      Health Maintenance        Date Due Completion Dates    IMM HEP B VACCINE (2 of 3 - Primary Series) 2017    IMM PNEUMOCOCCAL (PCV) 0-5 YRS (1 of 4 - Standard Series) 2017 ---    IMM ROTAVIRUS VACCINE (1 of 3 - 3 Dose Series) 2017 ---    IMM DTaP/Tdap/Td Vaccine (1 - DTaP) 2017 ---    IMM HEP A VACCINE (1 of 2 - Standard Series) 2018 ---    IMM VARICELLA (CHICKENPOX) VACCINE (1 of 2 - 2 Dose Childhood Series) 2018 ---    IMM HPV VACCINE (1 of 3 - Male 3 Dose Series) 2028 ---    IMM MENINGOCOCCAL VACCINE (MCV4) (1 of 2) 2028 ---            Current Immunizations     Hepatitis B Vaccine Non-Recombivax (Ped/Adol) 2017  3:45 PM      Below and/or attached are the medications your provider expects you to take. Review all of your home medications and newly ordered medications with your provider and/or pharmacist. Follow medication instructions as directed by your provider and/or pharmacist. Please keep your medication list with you and share with your provider. Update the information when medications are discontinued, doses are changed, or new " Interval History/Significant Events:  Patient complains of constipation muscle aches in her arms will defer that to primary care physician    Review of Systems  Objective:     Vital Signs (Most Recent):  Temp: 98 °F (36.7 °C) (01/24/25 0104)  Pulse: 61 (01/24/25 0104)  Resp: 17 (01/23/25 2040)  BP: 134/71 (01/24/25 0104)  SpO2: (!) 92 % (01/24/25 0104) Vital Signs (24h Range):  Temp:  [97.1 °F (36.2 °C)-98.3 °F (36.8 °C)] 98 °F (36.7 °C)  Pulse:  [60-70] 61  Resp:  [16-18] 17  SpO2:  [91 %-97 %] 92 %  BP: (134-152)/(60-72) 134/71   Weight: 97.1 kg (214 lb)  Body mass index is 30.71 kg/m².      Intake/Output Summary (Last 24 hours) at 1/24/2025 0543  Last data filed at 1/23/2025 1832  Gross per 24 hour   Intake --   Output 840 ml   Net -840 ml          Physical Exam  Vitals reviewed.   Constitutional:       Appearance: Normal appearance.      Interventions: She is not intubated.  HENT:      Head: Normocephalic and atraumatic.      Nose: Nose normal.      Mouth/Throat:      Mouth: Mucous membranes are dry.      Pharynx: Oropharynx is clear.   Eyes:      Extraocular Movements: Extraocular movements intact.      Conjunctiva/sclera: Conjunctivae normal.      Pupils: Pupils are equal, round, and reactive to light.   Cardiovascular:      Rate and Rhythm: Normal rate.      Heart sounds: Normal heart sounds. No murmur heard.  Pulmonary:      Effort: Pulmonary effort is normal. She is not intubated.      Breath sounds: Normal breath sounds.   Abdominal:      General: Abdomen is flat. Bowel sounds are normal.      Palpations: Abdomen is soft.   Musculoskeletal:         General: Normal range of motion.      Cervical back: Normal range of motion and neck supple.      Right lower leg: No edema.      Left lower leg: No edema.   Skin:     General: Skin is warm and dry.      Capillary Refill: Capillary refill takes less than 2 seconds.   Neurological:      General: No focal deficit present.      Mental Status: She is alert and  medications (including over-the-counter products) are added; and carry medication information at all times in the event of emergency situations     Allergies:  No Known Allergies          Medications  Valid as of: May 19, 2017 -  2:06 PM    Generic Name Brand Name Tablet Size Instructions for use    .                 Medicines prescribed today were sent to:     [id number] - [place], [place] - [address]    [address] [address]    Phone: [phone] Fax: [phone]    Open 24 Hours?: No      Medication refill instructions:       If your prescription bottle indicates you have medication refills left, it is not necessary to call your provider’s office. Please contact your pharmacy and they will refill your medication.    If your prescription bottle indicates you do not have any refills left, you may request refills at any time through one of the following ways: The online [employer] system (except Urgent Care), by calling your provider’s office, or by asking your pharmacy to contact your provider’s office with a refill request. Medication refills are processed only during regular business hours and may not be available until the next business day. Your provider may request additional information or to have a follow-up visit with you prior to refilling your medication.   *Please Note: Medication refills are assigned a new Rx number when refilled electronically. Your pharmacy may indicate that no refills were authorized even though a new prescription for the same medication is available at the pharmacy. Please request the medicine by name with the pharmacy before contacting your provider for a refill.           oriented to person, place, and time.   Psychiatric:         Mood and Affect: Mood normal.         Behavior: Behavior normal.            Vents:  Oxygen Concentration (%): 28 (01/23/25 0747)  Lines/Drains/Airways       Drain  Duration                  Chest Tube 01/22/25 1045 Tube - 1 Right Midaxillary 1 day              Peripheral Intravenous Line  Duration                  Peripheral IV - Single Lumen 01/19/25 1500 Anterior;Distal;Left Forearm 4 days                  Significant Labs:    CBC/Anemia Profile:  Recent Labs   Lab 01/23/25  0420   WBC 8.75   HGB 10.7*   HCT 33.5*      MCV 82.1   RDW 17.2*        Chemistries:  Recent Labs   Lab 01/23/25  0419   *   K 4.0      CO2 22*   BUN 38*   CREATININE 2.45*   CALCIUM 8.8       Recent Lab Results         01/23/25  2109   01/23/25  1601   01/23/25  1142   01/23/25  0749        POC Glucose 194   173   221   168               Significant Imaging:  I have reviewed all pertinent imaging results/findings within the past 24 hours.

## 2025-01-24 NOTE — PROGRESS NOTES
[facility]  Critical Care Medicine  Progress Note    Patient Name: Pam Ortega  MRN: [id number]  Admission Date: 1/19/2025  Hospital Length of Stay: 5 days  Code Status: Full Code  Attending Provider: Ye Chahal MD  Primary Care Provider: Janice Velez FNP   Principal Problem: Acute hypoxic respiratory failure    Subjective:     HPI:      Hospital/ICU Course:  1/22/25-chest x-ray reviewed by me personally aspirin which shows evidence of moderate-large loculated pleural effusion.  I did a bedside ultrasound which confirmed the same.  Patient has consented for chest tube placement.  Hemodynamically stable this morning saturating at 98% on 2 L oxygen.  Xarelto has now been held for    Interval History/Significant Events:  Patient complains of constipation muscle aches in her arms will defer that to primary care physician    Review of Systems  Objective:     Vital Signs (Most Recent):  Temp: 98 °F (36.7 °C) (01/24/25 0104)  Pulse: 61 (01/24/25 0104)  Resp: 17 (01/23/25 2040)  BP: 134/71 (01/24/25 0104)  SpO2: (!) 92 % (01/24/25 0104) Vital Signs (24h Range):  Temp:  [97.1 °F (36.2 °C)-98.3 °F (36.8 °C)] 98 °F (36.7 °C)  Pulse:  [60-70] 61  Resp:  [16-18] 17  SpO2:  [91 %-97 %] 92 %  BP: (134-152)/(60-72) 134/71   Weight: 97.1 kg (214 lb)  Body mass index is 30.71 kg/m².      Intake/Output Summary (Last 24 hours) at 1/24/2025 0543  Last data filed at 1/23/2025 1832  Gross per 24 hour   Intake --   Output 840 ml   Net -840 ml          Physical Exam  Vitals reviewed.   Constitutional:       Appearance: Normal appearance.      Interventions: She is not intubated.  HENT:      Head: Normocephalic and atraumatic.      Nose: Nose normal.      Mouth/Throat:      Mouth: Mucous membranes are dry.      Pharynx: Oropharynx is clear.   Eyes:      Extraocular Movements: Extraocular movements intact.      Conjunctiva/sclera: Conjunctivae normal.      Pupils: Pupils are equal, round, and reactive  "to light.   Cardiovascular:      Rate and Rhythm: Normal rate.      Heart sounds: Normal heart sounds. No murmur heard.  Pulmonary:      Effort: Pulmonary effort is normal. She is not intubated.      Breath sounds: Normal breath sounds.   Abdominal:      General: Abdomen is flat. Bowel sounds are normal.      Palpations: Abdomen is soft.   Musculoskeletal:         General: Normal range of motion.      Cervical back: Normal range of motion and neck supple.      Right lower leg: No edema.      Left lower leg: No edema.   Skin:     General: Skin is warm and dry.      Capillary Refill: Capillary refill takes less than 2 seconds.   Neurological:      General: No focal deficit present.      Mental Status: She is alert and oriented to person, place, and time.   Psychiatric:         Mood and Affect: Mood normal.         Behavior: Behavior normal.            Vents:  Oxygen Concentration (%): 28 (01/23/25 0747)  Lines/Drains/Airways       Drain  Duration                  Chest Tube 01/22/25 1045 Tube - 1 Right Midaxillary 1 day              Peripheral Intravenous Line  Duration                  Peripheral IV - Single Lumen 01/19/25 1500 Anterior;Distal;Left Forearm 4 days                  Significant Labs:    CBC/Anemia Profile:  Recent Labs   Lab 01/23/25  0420   WBC 8.75   HGB 10.7*   HCT 33.5*      MCV 82.1   RDW 17.2*        Chemistries:  Recent Labs   Lab 01/23/25  0419   *   K 4.0      CO2 22*   BUN 38*   CREATININE 2.45*   CALCIUM 8.8       Recent Lab Results         01/23/25  2109   01/23/25  1601   01/23/25  1142   01/23/25  0749        POC Glucose 194   173   221   168               Significant Imaging:  I have reviewed all pertinent imaging results/findings within the past 24 hours.    ABG  No results for input(s): "PH", "PO2", "PCO2", "HCO3", "BE" in the last 168 hours.  Assessment/Plan:     Pulmonary  * Acute hypoxic respiratory failure  Really resolved    Pleural effusion  Patient with a " loculated pleural effusion, confirmed with bedside ultrasound.  Patient's anticoagulation has now been held for 48 hours.  She is now a candidate for chest tube placement +/-instillation of tPA/dornase pending pleural fluid analysis.  Differential is broad at this time and includes parapneumonic effusion, transudative with volume overload and/or malignant effusion.     Plan for pigtail chest tube insertion discussed with the family.  Risks of insertion explained to patient as well as his family.  These risks include but are not limited to bleeding, infection, injury to the plan, liver, kidneys and/or pneumothorax.  Patient and family agree.    Patient had 280 cc out of her chest tube yesterday the fluid looks like a transudate with a right shift.  Chest x-ray shows no residual effusion  Defer management of chest tube to Dr. Rocha    Cough  No complaints of cough this morning    Other  Obstructive sleep apnea  Patient is brought home CPAP machine to use here as appropriate          Yonny Mckee MD  Critical Care Medicine  [facility]

## 2025-01-24 NOTE — PLAN OF CARE
01/24/25 1332   Rounds   Attendance Provider;;Physical therapist;Pharmacist   Discharge Plan A Home with family;Home Health   Why the patient remains in the hospital Requires continued medical care   Transition of Care Barriers None     Chart reviewed. Per MD, pt has chest tube. Pt's bp and bs elevated and being monitored. Pt to dc home with hh through [facility] when medically ready. Ss following

## 2025-01-25 LAB
GLUCOSE SERPL-MCNC: 166 MG/DL (ref 70–105)
GLUCOSE SERPL-MCNC: 166 MG/DL (ref 70–105)
GLUCOSE SERPL-MCNC: 192 MG/DL (ref 70–105)
GLUCOSE SERPL-MCNC: 97 MG/DL (ref 70–105)

## 2025-01-25 PROCEDURE — 99232 SBSQ HOSP IP/OBS MODERATE 35: CPT | Mod: ,,, | Performed by: HOSPITALIST

## 2025-01-25 PROCEDURE — 94640 AIRWAY INHALATION TREATMENT: CPT

## 2025-01-25 PROCEDURE — 99900035 HC TECH TIME PER 15 MIN (STAT)

## 2025-01-25 PROCEDURE — 25000242 PHARM REV CODE 250 ALT 637 W/ HCPCS: Performed by: INTERNAL MEDICINE

## 2025-01-25 PROCEDURE — 11000001 HC ACUTE MED/SURG PRIVATE ROOM

## 2025-01-25 PROCEDURE — 99232 SBSQ HOSP IP/OBS MODERATE 35: CPT | Mod: ,,, | Performed by: INTERNAL MEDICINE

## 2025-01-25 PROCEDURE — 63600175 PHARM REV CODE 636 W HCPCS: Performed by: HOSPITALIST

## 2025-01-25 PROCEDURE — 25000003 PHARM REV CODE 250: Performed by: STUDENT IN AN ORGANIZED HEALTH CARE EDUCATION/TRAINING PROGRAM

## 2025-01-25 PROCEDURE — 27200667 HC PACEMAKER, TEMPORARY MONITORING, PER SHIFT

## 2025-01-25 PROCEDURE — 82962 GLUCOSE BLOOD TEST: CPT

## 2025-01-25 PROCEDURE — 94761 N-INVAS EAR/PLS OXIMETRY MLT: CPT

## 2025-01-25 PROCEDURE — 25000003 PHARM REV CODE 250: Performed by: HOSPITALIST

## 2025-01-25 PROCEDURE — 63600175 PHARM REV CODE 636 W HCPCS: Performed by: STUDENT IN AN ORGANIZED HEALTH CARE EDUCATION/TRAINING PROGRAM

## 2025-01-25 RX ORDER — LACTULOSE 10 G/15ML
30 SOLUTION ORAL ONCE
Status: DISCONTINUED | OUTPATIENT
Start: 2025-01-25 | End: 2025-01-25

## 2025-01-25 RX ORDER — ENOXAPARIN SODIUM 100 MG/ML
30 INJECTION SUBCUTANEOUS EVERY 24 HOURS
Status: DISCONTINUED | OUTPATIENT
Start: 2025-01-25 | End: 2025-01-26

## 2025-01-25 RX ORDER — SYRING-NEEDL,DISP,INSUL,0.3 ML 29 G X1/2"
296 SYRINGE, EMPTY DISPOSABLE MISCELLANEOUS ONCE
Status: DISCONTINUED | OUTPATIENT
Start: 2025-01-25 | End: 2025-01-25

## 2025-01-25 RX ORDER — SYRING-NEEDL,DISP,INSUL,0.3 ML 29 G X1/2"
296 SYRINGE, EMPTY DISPOSABLE MISCELLANEOUS ONCE
Status: COMPLETED | OUTPATIENT
Start: 2025-01-25 | End: 2025-01-25

## 2025-01-25 RX ADMIN — BUDESONIDE INHALATION 0.5 MG: 0.5 SUSPENSION RESPIRATORY (INHALATION) at 07:01

## 2025-01-25 RX ADMIN — ENOXAPARIN SODIUM 30 MG: 30 INJECTION SUBCUTANEOUS at 05:01

## 2025-01-25 RX ADMIN — FUROSEMIDE 80 MG: 10 INJECTION, SOLUTION INTRAMUSCULAR; INTRAVENOUS at 09:01

## 2025-01-25 RX ADMIN — ISOSORBIDE MONONITRATE 30 MG: 30 TABLET, EXTENDED RELEASE ORAL at 09:01

## 2025-01-25 RX ADMIN — ASPIRIN 81 MG: 81 TABLET, COATED ORAL at 09:01

## 2025-01-25 RX ADMIN — GLIMEPIRIDE 2 MG: 2 TABLET ORAL at 09:01

## 2025-01-25 RX ADMIN — HYDRALAZINE HYDROCHLORIDE 50 MG: 50 TABLET ORAL at 01:01

## 2025-01-25 RX ADMIN — ACETAMINOPHEN 650 MG: 325 TABLET ORAL at 12:01

## 2025-01-25 RX ADMIN — HYDRALAZINE HYDROCHLORIDE 50 MG: 50 TABLET ORAL at 06:01

## 2025-01-25 RX ADMIN — POLYETHYLENE GLYCOL 3350 17 G: 17 POWDER, FOR SOLUTION ORAL at 09:01

## 2025-01-25 RX ADMIN — AMLODIPINE BESYLATE 10 MG: 10 TABLET ORAL at 09:01

## 2025-01-25 RX ADMIN — PANTOPRAZOLE SODIUM 40 MG: 40 TABLET, DELAYED RELEASE ORAL at 09:01

## 2025-01-25 RX ADMIN — HYDRALAZINE HYDROCHLORIDE 50 MG: 50 TABLET ORAL at 10:01

## 2025-01-25 RX ADMIN — ATORVASTATIN CALCIUM 40 MG: 40 TABLET, FILM COATED ORAL at 10:01

## 2025-01-25 RX ADMIN — ALLOPURINOL 100 MG: 100 TABLET ORAL at 09:01

## 2025-01-25 RX ADMIN — OXYCODONE 5 MG: 5 TABLET ORAL at 03:01

## 2025-01-25 RX ADMIN — MAGNESIUM CITRATE 296 ML: 1.75 LIQUID ORAL at 11:01

## 2025-01-25 NOTE — ASSESSMENT & PLAN NOTE
"Glucose 157 High  (1 d ago) 157 High  75 Low  117 High  131 High  94 104 178 High        Patient's FSGs are controlled on current medication regimen.  Last A1c reviewed-   Lab Results   Component Value Date    HGBA1C 6.2 08/23/2024     Most recent fingerstick glucose reviewed- No results for input(s): "POCTGLUCOSE" in the last 24 hours.  Current correctional scale  Low  Maintain anti-hyperglycemic dose as follows-   Antihyperglycemics (From admission, onward)      Start     Stop Route Frequency Ordered    01/24/25 0745  glimepiride tablet 2 mg         -- Oral With breakfast 01/23/25 2324    01/19/25 1805  insulin aspart U-100 injection 0-5 Units         -- SubQ Before meals & nightly PRN 01/19/25 1712          Hold Oral hypoglycemics while patient is in the hospital.  "

## 2025-01-25 NOTE — PROGRESS NOTES
[facility]  Critical Care Medicine  Progress Note    Patient Name: Pam Ortega  MRN: [id number]  Admission Date: 1/19/2025  Hospital Length of Stay: 6 days  Code Status: Full Code  Attending Provider: Ye Chahal MD  Primary Care Provider: Janice Velez FNP   Principal Problem: Acute hypoxic respiratory failure    Subjective:     HPI:      Hospital/ICU Course:  1/22/25-chest x-ray reviewed by me personally aspirin which shows evidence of moderate-large loculated pleural effusion.  I did a bedside ultrasound which confirmed the same.  Patient has consented for chest tube placement.  Hemodynamically stable this morning saturating at 98% on 2 L oxygen.  Xarelto has now been held for    Interval History/Significant Events:  Patient without complaints    Review of Systems  Objective:     Vital Signs (Most Recent):  Temp: 98.4 °F (36.9 °C) (01/25/25 0749)  Pulse: 60 (01/25/25 0749)  Resp: 16 (01/25/25 0749)  BP: (!) 140/72 (01/25/25 0749)  SpO2: 95 % (01/25/25 0749) Vital Signs (24h Range):  Temp:  [97.3 °F (36.3 °C)-98.4 °F (36.9 °C)] 98.4 °F (36.9 °C)  Pulse:  [56-64] 60  Resp:  [13-18] 16  SpO2:  [94 %-98 %] 95 %  BP: (127-148)/(49-75) 140/72   Weight: 97.1 kg (214 lb)  Body mass index is 30.71 kg/m².      Intake/Output Summary (Last 24 hours) at 1/25/2025 0854  Last data filed at 1/25/2025 0700  Gross per 24 hour   Intake --   Output 275 ml   Net -275 ml          Physical Exam  Vitals reviewed.   Constitutional:       Appearance: Normal appearance.      Interventions: She is not intubated.  HENT:      Head: Normocephalic and atraumatic.      Nose: Nose normal.      Mouth/Throat:      Mouth: Mucous membranes are dry.      Pharynx: Oropharynx is clear.   Eyes:      Extraocular Movements: Extraocular movements intact.      Conjunctiva/sclera: Conjunctivae normal.      Pupils: Pupils are equal, round, and reactive to light.   Cardiovascular:      Rate and Rhythm: Normal rate.       "Heart sounds: Normal heart sounds. No murmur heard.  Pulmonary:      Effort: Pulmonary effort is normal. She is not intubated.      Breath sounds: Normal breath sounds.   Abdominal:      General: Abdomen is flat. Bowel sounds are normal.      Palpations: Abdomen is soft.   Musculoskeletal:         General: Normal range of motion.      Cervical back: Normal range of motion and neck supple.      Right lower leg: No edema.      Left lower leg: No edema.   Skin:     General: Skin is warm and dry.      Capillary Refill: Capillary refill takes less than 2 seconds.   Neurological:      General: No focal deficit present.      Mental Status: She is alert and oriented to person, place, and time.   Psychiatric:         Mood and Affect: Mood normal.         Behavior: Behavior normal.            Vents:  Oxygen Concentration (%): 28 (01/23/25 0747)  Lines/Drains/Airways       Drain  Duration                  Chest Tube 01/22/25 1045 Tube - 1 Right Midaxillary 2 days              Peripheral Intravenous Line  Duration                  Peripheral IV - Single Lumen 01/24/25 1120 22 G 1 in Anterior;Right Forearm <1 day                  Significant Labs:    CBC/Anemia Profile:  No results for input(s): "WBC", "HGB", "HCT", "PLT", "MCV", "RDW", "IRON", "FERRITIN", "RETIC", "FOLATE", "[id number]", "OCCULTBLOOD" in the last 48 hours.     Chemistries:  No results for input(s): "NA", "K", "CL", "CO2", "BUN", "CREATININE", "CALCIUM", "ALBUMIN", "PROT", "BILITOT", "ALKPHOS", "ALT", "AST", "GLUCOSE", "MG", "PHOS" in the last 48 hours.    Recent Lab Results         01/25/25  0746   01/24/25  2105   01/24/25  1641   01/24/25  1105        POC Glucose 97   168   170   213               Significant Imaging:  I have reviewed all pertinent imaging results/findings within the past 24 hours.    ABG  No results for input(s): "PH", "PO2", "PCO2", "HCO3", "BE" in the last 168 hours.  Assessment/Plan:     Pulmonary  * Acute hypoxic respiratory " failure  Really resolved    Pleural effusion  Patient with a loculated pleural effusion, confirmed with bedside ultrasound.  Patient's anticoagulation has now been held for 48 hours.  She is now a candidate for chest tube placement +/-instillation of tPA/dornase pending pleural fluid analysis.  Differential is broad at this time and includes parapneumonic effusion, transudative with volume overload and/or malignant effusion.     Plan for pigtail chest tube insertion discussed with the family.  Risks of insertion explained to patient as well as his family.  These risks include but are not limited to bleeding, infection, injury to the plan, liver, kidneys and/or pneumothorax.  Patient and family agree.    Discussed the case with Dr. Rocha will remove chest tube when drainage is less than 200 cc per day drainage is decreasing every day was only 280 cc yesterday    Cough  No complaints of cough this morning    Other  Obstructive sleep apnea  Patient is brought home CPAP machine to use here as appropriate             Yonny Mckee MD  Critical Care Medicine  [facility]

## 2025-01-25 NOTE — PROGRESS NOTES
[facility] Medicine  Progress Note    Patient Name: Pam Ortega  MRN: [id number]  Patient Class: IP- Inpatient   Admission Date: 1/19/2025  Length of Stay: 5 days  Attending Physician: Ye Chahal MD  Primary Care Provider: Janice Velez FNP        Subjective     Principal Problem:Acute hypoxic respiratory failure        HPI:       79-year-old female with anemia chronic disease, AFib, CKD, hypertension, bronchitis, CAD, type 2 diabetes, hyperlipidemia, dilated cardiomyopathy, heart failure with preserved ejection fraction, obesity, peripheral vascular disease presents to the ED with progressively worsening shortness of breath.  She has had pleural effusion chronically and follow up with Dr. Mckee.  She recently saw him in clinic and had her inhalers adjusted.  Recently saw Dr. Dutta and had her diuretics increased.  Even with this for shortness of breath progressively worsened over the last several days which prompted her presentation to the ED.  CT reveals loculated pleural effusion.  Pulmonology consulted.  She denies any chest pain.  Denies any fever, chills, palpitations, nausea vomiting diarrhea dysuria.  Review of systems otherwise negative           Overview/Hospital Course:  1/20 breathing significantly improved today.  Continue diuresis    01/21 Record reviewed. Progressive worse SOB. Pulmonary consulted   PMHx: anemia chronic disease, AFib, CKD, hypertension, bronchitis, CAD, PPM, type 2 diabetes, hyperlipidemia, dilated cardiomyopathy, heart failure with preserved ejection fraction, obesity, peripheral vascular disease with symptomatic pleural effusion.    Seen DR Dutta outpt for renal dx.   Had vascular surgery 11/2024 follow arterial thrombus right leg.   Holding xarelto and plan placement CXR in am. C/O some constipation  01/22 Chest tube placed and pt says feels much better. No other complaints. Had BM. BP and BS OK.  01/23 feels better.  BP and BS  slight up  01/24 BP and BS both mildly up. C/O constipation otherwise seems to be doing well. No bleeding and restart at least DVT prophylaxis. Restart eliquis when OK with pulm. .     Interval History:     Review of Systems   Constitutional:  Positive for fatigue. Negative for appetite change and fever.   HENT:  Negative for congestion, hearing loss and trouble swallowing.    Respiratory:  Positive for shortness of breath. Negative for chest tightness and wheezing.    Cardiovascular:  Negative for chest pain and palpitations.   Gastrointestinal:  Positive for constipation. Negative for abdominal pain and nausea.   Genitourinary:  Negative for difficulty urinating and dysuria.   Musculoskeletal:  Positive for gait problem. Negative for back pain and neck stiffness.   Skin:  Negative for pallor and rash.   Neurological:  Negative for dizziness, speech difficulty and headaches.   Psychiatric/Behavioral:  Negative for confusion and suicidal ideas.      Objective:     Vital Signs (Most Recent):  Temp: 97.9 °F (36.6 °C) (01/24/25 2105)  Pulse: 61 (01/24/25 2105)  Resp: 13 (01/24/25 2105)  BP: (!) 145/67 (01/24/25 2105)  SpO2: (!) 94 % (01/24/25 2105) Vital Signs (24h Range):  Temp:  [97.5 °F (36.4 °C)-98 °F (36.7 °C)] 97.9 °F (36.6 °C)  Pulse:  [56-64] 61  Resp:  [13-18] 13  SpO2:  [92 %-97 %] 94 %  BP: (127-148)/(63-71) 145/67     Weight: 97.1 kg (214 lb)  Body mass index is 30.71 kg/m².    Intake/Output Summary (Last 24 hours) at 1/24/2025 2441  Last data filed at 1/24/2025 0554  Gross per 24 hour   Intake --   Output 170 ml   Net -170 ml         Physical Exam  Vitals reviewed.   Constitutional:       General: She is awake. She is not in acute distress.     Appearance: She is well-developed. She is obese. She is not toxic-appearing.   HENT:      Head: Normocephalic.      Nose: Nose normal.      Mouth/Throat:      Pharynx: Oropharynx is clear.   Eyes:      Extraocular Movements: Extraocular movements intact.       Pupils: Pupils are equal, round, and reactive to light.   Neck:      Thyroid: No thyroid mass.      Vascular: No carotid bruit.   Cardiovascular:      Rate and Rhythm: Normal rate and regular rhythm.      Pulses: Normal pulses.      Heart sounds: Normal heart sounds. No murmur heard.  Pulmonary:      Effort: Pulmonary effort is normal.      Breath sounds: Normal breath sounds and air entry. No wheezing.   Abdominal:      General: Bowel sounds are normal. There is no distension.      Palpations: Abdomen is soft.      Tenderness: There is no abdominal tenderness.   Musculoskeletal:         General: Normal range of motion.      Cervical back: Neck supple. No rigidity.   Skin:     General: Skin is warm.      Coloration: Skin is not jaundiced.      Findings: No lesion.   Neurological:      General: No focal deficit present.      Mental Status: She is alert and oriented to person, place, and time.      Cranial Nerves: No cranial nerve deficit.   Psychiatric:         Attention and Perception: Attention normal.         Mood and Affect: Mood normal.         Behavior: Behavior normal. Behavior is cooperative.         Thought Content: Thought content normal.         Cognition and Memory: Cognition normal.             Significant Labs: All pertinent labs within the past 24 hours have been reviewed.  BMP:   Recent Labs   Lab 01/23/25  0419   *   *   K 4.0      CO2 22*   BUN 38*   CREATININE 2.45*   CALCIUM 8.8     CBC:   Recent Labs   Lab 01/23/25  0420   WBC 8.75   HGB 10.7*   HCT 33.5*        CMP:   Recent Labs   Lab 01/23/25  0419   *   K 4.0      CO2 22*   *   BUN 38*   CREATININE 2.45*   CALCIUM 8.8   ANIONGAP 15       Significant Imaging: I have reviewed all pertinent imaging results/findings within the past 24 hours.    Imaging Results              US Lower Extremity Veins Left (Final result)  Result time 01/19/25 17:49:22      Final result by Oliver Sweeney MD (01/19/25  17:49:22)                   Impression:      No evidence of deep venous thrombosis in the left lower extremity.      Electronically signed by: Oliver Sweeney  Date:    01/19/2025  Time:    17:49               Narrative:    EXAMINATION:  US LOWER EXTREMITY VEINS LEFT    CLINICAL HISTORY:  Edema;    TECHNIQUE:  Duplex and color flow Doppler evaluation and graded compression of the left lower extremity veins was performed.    COMPARISON:  11/18/2024    FINDINGS:  Left thigh veins: The common femoral, femoral, popliteal, upper greater saphenous, and deep femoral veins are patent and free of thrombus. The veins are normally compressible and have normal phasic flow and augmentation response.    Left calf veins: The visualized calf veins are patent.    Contralateral CFV: The contralateral (right) common femoral vein is patent and free of thrombus.    Miscellaneous: None                                       CT Chest Abdomen Pelvis Without Contrast (XPD) (Final result)  Result time 01/19/25 16:11:01      Final result by Danie Agustin MD (01/19/25 16:11:01)                   Impression:      1. Moderate to large loculated right pleural effusion noting compressive atelectasis of the right lower lobe.  2. No findings to suggest obstructive uropathy or bowel obstruction.  3. Colonic diverticulosis without diverticulitis.  4. There is induration along the anterior abdominal wall, correlation with any superficial cellulitis.  5. Please see above for several additional findings.      Electronically signed by: Danie Agustin MD  Date:    01/19/2025  Time:    16:11               Narrative:    EXAMINATION:  CT CHEST ABDOMEN PELVIS WITHOUT CONTRAST(XPD)    CLINICAL HISTORY:  Aortic aneurysm, known or suspected;Patient with protuberant abdomen, no BM x 1 day, CKD, also with CHF and pleural effusion;    TECHNIQUE:  Low dose axial images, sagittal and coronal reformations were obtained from the thoracic inlet to the pubic  symphysis .  Oral contrast was not administered.    COMPARISON:  CT chest 12/25/2018    FINDINGS:  The structures at the base of the neck are unremarkable.  There are a few scattered upper limit of normal caliber mediastinal lymph nodes.  The heart is not significantly enlarged.  Pacer wire noted.  No pericardial effusion.  The thoracic aorta tapers normally noting atherosclerotic calcification along its course and in the distribution of the coronary arteries.    Motion artifact limits evaluation of the airways and pulmonary parenchyma.  Allowing for this, the airways are patent.  There is a moderate right pleural effusion with associated compressive atelectasis of the right lower lobe.  There is fluid along the fissure on the right.  The right pleural effusion is partially loculated.  No pneumothorax.    The liver, spleen, and adrenal glands have a grossly unremarkable noncontrast appearance.  There is high attenuating material layering within the gallbladder suggesting calculi or sludge.  There is a small hiatal hernia.  The stomach is nondistended, no gastric wall thickening.  No significant abdominal lymphadenopathy.    There is right renal cortical scarring involving the interpolar region.  No hydronephrosis or nephrolithiasis.  The bilateral ureters are unable to be followed in their entirety to the urinary bladder, no definite calculi seen or secondary findings to suggest obstructive uropathy.  The urinary bladder is unremarkable.  There are uterine leiomyoma.  The adnexa is unremarkable.  No significant free fluid in the pelvis.    There are a few scattered colonic diverticula without surrounding inflammation to suggest diverticulitis.  There is a small focus of suspected fat infarct along the left lateral aspect of the descending colon.  The terminal ileum and appendix are unremarkable.  The small bowel is grossly unremarkable.  There are several scattered shotty periaortic, pericaval, and mesenteric lymph  "nodes.  There is atherosclerotic calcification of the aorta and its branches.    There is osteopenia.  There are degenerative changes of the spine.  There are degenerative changes of the sacroiliac joints.  No significant inguinal lymphadenopathy.  There is scattered body wall anasarca.  There is a fat containing umbilical hernia.                                       X-Ray Chest 1 View (Final result)  Result time 01/19/25 14:55:11      Final result by Manpreet Del Real MD (01/19/25 14:55:11)                   Impression:      Cardiomegaly with increased size of now moderate to large right-sided pleural effusion.  Right lung aeration is worse compared to the prior study.      Electronically signed by: Manpreet Del Real MD  Date:    01/19/2025  Time:    14:55               Narrative:    EXAMINATION:  XR CHEST 1 VIEW    CLINICAL HISTORY:  Provided history is "  Shortness of breath".    TECHNIQUE:  One view of the chest.    COMPARISON:  01/03/2025.    FINDINGS:  Cardiac silhouette is enlarged and similar to the prior study.  Moderate to large right pleural effusion, worse from prior study.  Calcified mediastinal and hilar lymph nodes suggestive of remote granulomatous process.  Atherosclerotic calcifications overlie the aortic arch.  No significant consolidation in the left lung.  No sizable left pleural effusion.  No distinct pneumothorax.                                    Intake/Output - Last 3 Shifts         01/23 0700  01/24 0659 01/24 0700  01/25 0659    P.O.      Total Intake(mL/kg)      Chest Tube 450     Total Output 450     Net -450                 Microbiology Results (last 7 days)       Procedure Component Value Units Date/Time    Culture, Body Fluid [[id number]] Collected: 01/22/25 1056    Order Status: Completed Specimen: Body Fluid from Pleural Fluid Updated: 01/24/25 0724     Culture, Body Fluid No Growth at 2 Days              Assessment and Plan     * Acute hypoxic respiratory failure  Patient with " "Hypoxic Respiratory failure which is Acute.  she is not on home oxygen. Supplemental oxygen was provided and noted- Oxygen Concentration (%):  [28-40] 28    .   Signs/symptoms of respiratory failure include- tachypnea, increased work of breathing, and respiratory distress. Contributing diagnoses includes - Pleural effusion and Pneumonia Labs and images were reviewed. Patient Has not had a recent ABG. Will treat underlying causes and adjust management of respiratory failure as follows-     Nebs   Diuresis   Pulmonology consult   Discussed case with Dr. Mckee today    Constipation    Treat. This long standing problem Only 1 BM every three days    Presence of cardiac pacemaker    Will get additional details    Acute on chronic heart failure with preserved ejection fraction  Patient has Diastolic (HFpEF) heart failure that is Acute on chronic. On presentation their CHF was decompensated. Evidence of decompensated CHF on presentation includes:  Pleural effusion. The etiology of their decompensation is likely dietary indiscretion. Most recent BNP and echo results are listed below.  No results for input(s): "BNP" in the last 72 hours.  Latest ECHO  Results for orders placed during the hospital encounter of 11/17/24    Echo Saline Bubble? Yes    Interpretation Summary    Left Ventricle: The left ventricle is mildly dilated. Mildly increased wall thickness. Normal wall motion. Septal motion is consistent with pacing. There is diastolic dysfunction.    Right Ventricle: Moderate right ventricular enlargement.    Left Atrium: Left atrium is mildly dilated.    Right Atrium: Right atrium is severely dilated.    Aortic Valve: The aortic valve is a trileaflet valve. Mildly calcified cusps.    Mitral Valve: There is mild bileaflet sclerosis. Mildly thickened leaflets. There is mild to moderate regurgitation with an eccentric jet.    Tricuspid Valve: There is moderate to severe regurgitation with an eccentrically directed jet.    " Pulmonic Valve: There is mild to moderate regurgitation.    IVC/SVC: Elevated venous pressure at 15 mmHg.    Current Heart Failure Medications  hydrALAZINE tablet 50 mg, 2 times daily, Oral  spironolactone tablet 25 mg, Daily, Oral  furosemide injection 80 mg, Daily, Intravenous    Plan  - Monitor strict I&Os and daily weights.    - Place on telemetry  - Low sodium diet  - Place on fluid restriction of 1.5 L.   - Cardiology has not been consulted  - The patient's volume status is improving but not at their baseline as indicated by shortness of breath  - follow  Continue diuresis    Pleural effusion  Patient found to have large pleural effusion on imaging. I have personally reviewed and interpreted the following imaging: Xray. A thoracentesis was deferred. Most likely etiology includes Congestive Heart Failure and Pneumonia. Management to include Diuresis  Continue diuresis    01/21 Chest tube planned in am  01/22 Transdate pleural effusion removed and pt feels breathing much better.    Diabetic nephropathy  Glucose control      Peripheral vascular disease  Aspirin statin      Arterial occlusion  Vascular surgery right leg Nov 2024      CKD stage 4 secondary to hypertension  Creatine stable for now. BMP reviewed- noted Estimated Creatinine Clearance: 24.3 mL/min (A) (based on SCr of 2.45 mg/dL (H)). according to latest data. Based on current GFR, CKD stage is stage 4 - GFR 15-29.  Monitor UOP and serial BMP and adjust therapy as needed. Renally dose meds. Avoid nephrotoxic medications and procedures.    Essential hypertension  Patient's blood pressure range in the last 24 hours was: BP  Min: 123/65  Max: 137/79.The patient's inpatient anti-hypertensive regimen is listed below:  Current Antihypertensives  amLODIPine tablet 10 mg, Daily, Oral  hydrALAZINE tablet 50 mg, 2 times daily, Oral  isosorbide mononitrate 24 hr tablet 30 mg, Daily, Oral  spironolactone tablet 25 mg, Daily, Oral  furosemide injection 80 mg,  "Daily, Intravenous    Plan  BP is controlled   Follow    Dyslipidemia associated with type 2 diabetes mellitus  Statin   Glucose control      Obstructive sleep apnea    BiPAP at night.  Wear CPAP at home.  Given BiPAP due to respiratory distress to support overnight    Obese  Body mass index is 30.85 kg/m². Morbid obesity complicates all aspects of disease management from diagnostic modalities to treatment. Weight loss encouraged and health benefits explained to patient.         Dyslipidemia  Statin      Chronic a-fib  Patient has persistent (7 days or more) atrial fibrillation. Patient is currently in atrial fibrillation. [id number] Score: 5. The patients heart rate in the last 24 hours is as follows:  Pulse  Min: 57  Max: 70     Antiarrhythmics       Anticoagulants       Plan  - Replete lytes with a goal of K>4, Mg >2  - Patient is not anticoagulated due to holding anticoagulation (Xarelto) in anticipation of procedure  - Patient's afib is currently controlled  - follow        Cardiomyopathy  Diurese    Diabetes mellitus without complication  Glucose 157 High  (1 d ago) 157 High  75 Low  117 High  131 High  94 104 178 High        Patient's FSGs are controlled on current medication regimen.  Last A1c reviewed-   Lab Results   Component Value Date    HGBA1C 6.2 08/23/2024     Most recent fingerstick glucose reviewed- No results for input(s): "POCTGLUCOSE" in the last 24 hours.  Current correctional scale  Low  Maintain anti-hyperglycemic dose as follows-   Antihyperglycemics (From admission, onward)      Start     Stop Route Frequency Ordered    01/24/25 0745  glimepiride tablet 2 mg         -- Oral With breakfast 01/23/25 2324    01/19/25 1805  insulin aspart U-100 injection 0-5 Units         -- SubQ Before meals & nightly PRN 01/19/25 1712          Hold Oral hypoglycemics while patient is in the hospital.    Benign hypertensive CKD, stage 1-4 or unspecified chronic kidney disease  Creatine stable for now. BMP " reviewed- noted Estimated Creatinine Clearance: 28.8 mL/min (A) (based on SCr of 2.07 mg/dL (H)). according to latest data. Based on current GFR, CKD stage is stage 4 - GFR 15-29.  Monitor UOP and serial BMP and adjust therapy as needed. Renally dose meds. Avoid nephrotoxic medications and procedures.    Coronary atherosclerosis  Patient with known CAD s/p  unclear , which is controlled Will continue ASA and Statin and monitor for S/Sx of angina/ACS. Continue to monitor on telemetry.     Hyperlipidemia  Statin      Hypertension  Vitals:    01/23/25 0513 01/23/25 0753 01/23/25 1145 01/23/25 1603   BP: 133/78 139/71 138/68 (!) 145/72    01/23/25 2109 01/24/25 0104 01/24/25 0451 01/24/25 1107   BP: (!) 152/60 134/71 (!) 145/65 127/63    01/24/25 1641 01/24/25 2105   BP: (!) 148/70 (!) 145/67         Patient's blood pressure range in the last 24 hours was: BP  Min: 127/63  Max: 148/70.The patient's inpatient anti-hypertensive regimen is listed below:  Current Antihypertensives  amLODIPine tablet 10 mg, Daily, Oral  isosorbide mononitrate 24 hr tablet 30 mg, Daily, Oral  furosemide injection 80 mg, Daily, Intravenous  hydrALAZINE tablet 50 mg, Every 8 hours, Oral    Plan  - BP is controlled, no changes needed to their regimen  - follow    History of CVA (cerebrovascular accident)  Aspirin statin        VTE Risk Mitigation (From admission, onward)           Ordered     Reason for No Pharmacological VTE Prophylaxis  Once        Question:  Reasons:  Answer:  Already adequately anticoagulated on oral Anticoagulants    01/19/25 1712     IP VTE HIGH RISK PATIENT  Once         01/19/25 1712     Place sequential compression device  Until discontinued         01/19/25 1712                    Discharge Planning   HERBERT:      Code Status: Full Code   Medical Readiness for Discharge Date:   Discharge Plan A: Home with family, Home Health                        Ye Chahal MD  Department of Hospital Medicine   [facility]

## 2025-01-25 NOTE — SUBJECTIVE & OBJECTIVE
Interval History/Significant Events:  Patient without complaints    Review of Systems  Objective:     Vital Signs (Most Recent):  Temp: 98.4 °F (36.9 °C) (01/25/25 0749)  Pulse: 60 (01/25/25 0749)  Resp: 16 (01/25/25 0749)  BP: (!) 140/72 (01/25/25 0749)  SpO2: 95 % (01/25/25 0749) Vital Signs (24h Range):  Temp:  [97.3 °F (36.3 °C)-98.4 °F (36.9 °C)] 98.4 °F (36.9 °C)  Pulse:  [56-64] 60  Resp:  [13-18] 16  SpO2:  [94 %-98 %] 95 %  BP: (127-148)/(49-75) 140/72   Weight: 97.1 kg (214 lb)  Body mass index is 30.71 kg/m².      Intake/Output Summary (Last 24 hours) at 1/25/2025 0854  Last data filed at 1/25/2025 0700  Gross per 24 hour   Intake --   Output 275 ml   Net -275 ml          Physical Exam  Vitals reviewed.   Constitutional:       Appearance: Normal appearance.      Interventions: She is not intubated.  HENT:      Head: Normocephalic and atraumatic.      Nose: Nose normal.      Mouth/Throat:      Mouth: Mucous membranes are dry.      Pharynx: Oropharynx is clear.   Eyes:      Extraocular Movements: Extraocular movements intact.      Conjunctiva/sclera: Conjunctivae normal.      Pupils: Pupils are equal, round, and reactive to light.   Cardiovascular:      Rate and Rhythm: Normal rate.      Heart sounds: Normal heart sounds. No murmur heard.  Pulmonary:      Effort: Pulmonary effort is normal. She is not intubated.      Breath sounds: Normal breath sounds.   Abdominal:      General: Abdomen is flat. Bowel sounds are normal.      Palpations: Abdomen is soft.   Musculoskeletal:         General: Normal range of motion.      Cervical back: Normal range of motion and neck supple.      Right lower leg: No edema.      Left lower leg: No edema.   Skin:     General: Skin is warm and dry.      Capillary Refill: Capillary refill takes less than 2 seconds.   Neurological:      General: No focal deficit present.      Mental Status: She is alert and oriented to person, place, and time.   Psychiatric:         Mood and  "Affect: Mood normal.         Behavior: Behavior normal.            Vents:  Oxygen Concentration (%): 28 (01/23/25 0747)  Lines/Drains/Airways       Drain  Duration                  Chest Tube 01/22/25 1045 Tube - 1 Right Midaxillary 2 days              Peripheral Intravenous Line  Duration                  Peripheral IV - Single Lumen 01/24/25 1120 22 G 1 in Anterior;Right Forearm <1 day                  Significant Labs:    CBC/Anemia Profile:  No results for input(s): "WBC", "HGB", "HCT", "PLT", "MCV", "RDW", "IRON", "FERRITIN", "RETIC", "FOLATE", "[id number]", "OCCULTBLOOD" in the last 48 hours.     Chemistries:  No results for input(s): "NA", "K", "CL", "CO2", "BUN", "CREATININE", "CALCIUM", "ALBUMIN", "PROT", "BILITOT", "ALKPHOS", "ALT", "AST", "GLUCOSE", "MG", "PHOS" in the last 48 hours.    Recent Lab Results         01/25/25  0746   01/24/25  2105   01/24/25  1641   01/24/25  1105        POC Glucose 97   168   170   213               Significant Imaging:  I have reviewed all pertinent imaging results/findings within the past 24 hours.  "

## 2025-01-25 NOTE — ASSESSMENT & PLAN NOTE
Patient with a loculated pleural effusion, confirmed with bedside ultrasound.  Patient's anticoagulation has now been held for 48 hours.  She is now a candidate for chest tube placement +/-instillation of tPA/dornase pending pleural fluid analysis.  Differential is broad at this time and includes parapneumonic effusion, transudative with volume overload and/or malignant effusion.     Plan for pigtail chest tube insertion discussed with the family.  Risks of insertion explained to patient as well as his family.  These risks include but are not limited to bleeding, infection, injury to the plan, liver, kidneys and/or pneumothorax.  Patient and family agree.    Discussed the case with Dr. Rocha will remove chest tube when drainage is less than 200 cc per day drainage is decreasing every day was only 280 cc yesterday

## 2025-01-25 NOTE — SUBJECTIVE & OBJECTIVE
Interval History:     Review of Systems   Constitutional:  Positive for fatigue. Negative for appetite change and fever.   HENT:  Negative for congestion, hearing loss and trouble swallowing.    Respiratory:  Positive for shortness of breath. Negative for chest tightness and wheezing.    Cardiovascular:  Negative for chest pain and palpitations.   Gastrointestinal:  Positive for constipation. Negative for abdominal pain and nausea.   Genitourinary:  Negative for difficulty urinating and dysuria.   Musculoskeletal:  Positive for gait problem. Negative for back pain and neck stiffness.   Skin:  Negative for pallor and rash.   Neurological:  Negative for dizziness, speech difficulty and headaches.   Psychiatric/Behavioral:  Negative for confusion and suicidal ideas.      Objective:     Vital Signs (Most Recent):  Temp: 97.9 °F (36.6 °C) (01/24/25 2105)  Pulse: 61 (01/24/25 2105)  Resp: 13 (01/24/25 2105)  BP: (!) 145/67 (01/24/25 2105)  SpO2: (!) 94 % (01/24/25 2105) Vital Signs (24h Range):  Temp:  [97.5 °F (36.4 °C)-98 °F (36.7 °C)] 97.9 °F (36.6 °C)  Pulse:  [56-64] 61  Resp:  [13-18] 13  SpO2:  [92 %-97 %] 94 %  BP: (127-148)/(63-71) 145/67     Weight: 97.1 kg (214 lb)  Body mass index is 30.71 kg/m².    Intake/Output Summary (Last 24 hours) at 1/24/2025 2246  Last data filed at 1/24/2025 0554  Gross per 24 hour   Intake --   Output 170 ml   Net -170 ml         Physical Exam  Vitals reviewed.   Constitutional:       General: She is awake. She is not in acute distress.     Appearance: She is well-developed. She is obese. She is not toxic-appearing.   HENT:      Head: Normocephalic.      Nose: Nose normal.      Mouth/Throat:      Pharynx: Oropharynx is clear.   Eyes:      Extraocular Movements: Extraocular movements intact.      Pupils: Pupils are equal, round, and reactive to light.   Neck:      Thyroid: No thyroid mass.      Vascular: No carotid bruit.   Cardiovascular:      Rate and Rhythm: Normal rate and regular  rhythm.      Pulses: Normal pulses.      Heart sounds: Normal heart sounds. No murmur heard.  Pulmonary:      Effort: Pulmonary effort is normal.      Breath sounds: Normal breath sounds and air entry. No wheezing.   Abdominal:      General: Bowel sounds are normal. There is no distension.      Palpations: Abdomen is soft.      Tenderness: There is no abdominal tenderness.   Musculoskeletal:         General: Normal range of motion.      Cervical back: Neck supple. No rigidity.   Skin:     General: Skin is warm.      Coloration: Skin is not jaundiced.      Findings: No lesion.   Neurological:      General: No focal deficit present.      Mental Status: She is alert and oriented to person, place, and time.      Cranial Nerves: No cranial nerve deficit.   Psychiatric:         Attention and Perception: Attention normal.         Mood and Affect: Mood normal.         Behavior: Behavior normal. Behavior is cooperative.         Thought Content: Thought content normal.         Cognition and Memory: Cognition normal.             Significant Labs: All pertinent labs within the past 24 hours have been reviewed.  BMP:   Recent Labs   Lab 01/23/25  0419   *   *   K 4.0      CO2 22*   BUN 38*   CREATININE 2.45*   CALCIUM 8.8     CBC:   Recent Labs   Lab 01/23/25  0420   WBC 8.75   HGB 10.7*   HCT 33.5*        CMP:   Recent Labs   Lab 01/23/25  0419   *   K 4.0      CO2 22*   *   BUN 38*   CREATININE 2.45*   CALCIUM 8.8   ANIONGAP 15       Significant Imaging: I have reviewed all pertinent imaging results/findings within the past 24 hours.    Imaging Results              US Lower Extremity Veins Left (Final result)  Result time 01/19/25 17:49:22      Final result by Oliver Sweeney MD (01/19/25 17:49:22)                   Impression:      No evidence of deep venous thrombosis in the left lower extremity.      Electronically signed by: Oliver  Zahra  Date:    01/19/2025  Time:    17:49               Narrative:    EXAMINATION:  US LOWER EXTREMITY VEINS LEFT    CLINICAL HISTORY:  Edema;    TECHNIQUE:  Duplex and color flow Doppler evaluation and graded compression of the left lower extremity veins was performed.    COMPARISON:  11/18/2024    FINDINGS:  Left thigh veins: The common femoral, femoral, popliteal, upper greater saphenous, and deep femoral veins are patent and free of thrombus. The veins are normally compressible and have normal phasic flow and augmentation response.    Left calf veins: The visualized calf veins are patent.    Contralateral CFV: The contralateral (right) common femoral vein is patent and free of thrombus.    Miscellaneous: None                                       CT Chest Abdomen Pelvis Without Contrast (XPD) (Final result)  Result time 01/19/25 16:11:01      Final result by Danie Agustin MD (01/19/25 16:11:01)                   Impression:      1. Moderate to large loculated right pleural effusion noting compressive atelectasis of the right lower lobe.  2. No findings to suggest obstructive uropathy or bowel obstruction.  3. Colonic diverticulosis without diverticulitis.  4. There is induration along the anterior abdominal wall, correlation with any superficial cellulitis.  5. Please see above for several additional findings.      Electronically signed by: Danie Agustin MD  Date:    01/19/2025  Time:    16:11               Narrative:    EXAMINATION:  CT CHEST ABDOMEN PELVIS WITHOUT CONTRAST(XPD)    CLINICAL HISTORY:  Aortic aneurysm, known or suspected;Patient with protuberant abdomen, no BM x 1 day, CKD, also with CHF and pleural effusion;    TECHNIQUE:  Low dose axial images, sagittal and coronal reformations were obtained from the thoracic inlet to the pubic symphysis .  Oral contrast was not administered.    COMPARISON:  CT chest 12/25/2018    FINDINGS:  The structures at the base of the neck are unremarkable.   There are a few scattered upper limit of normal caliber mediastinal lymph nodes.  The heart is not significantly enlarged.  Pacer wire noted.  No pericardial effusion.  The thoracic aorta tapers normally noting atherosclerotic calcification along its course and in the distribution of the coronary arteries.    Motion artifact limits evaluation of the airways and pulmonary parenchyma.  Allowing for this, the airways are patent.  There is a moderate right pleural effusion with associated compressive atelectasis of the right lower lobe.  There is fluid along the fissure on the right.  The right pleural effusion is partially loculated.  No pneumothorax.    The liver, spleen, and adrenal glands have a grossly unremarkable noncontrast appearance.  There is high attenuating material layering within the gallbladder suggesting calculi or sludge.  There is a small hiatal hernia.  The stomach is nondistended, no gastric wall thickening.  No significant abdominal lymphadenopathy.    There is right renal cortical scarring involving the interpolar region.  No hydronephrosis or nephrolithiasis.  The bilateral ureters are unable to be followed in their entirety to the urinary bladder, no definite calculi seen or secondary findings to suggest obstructive uropathy.  The urinary bladder is unremarkable.  There are uterine leiomyoma.  The adnexa is unremarkable.  No significant free fluid in the pelvis.    There are a few scattered colonic diverticula without surrounding inflammation to suggest diverticulitis.  There is a small focus of suspected fat infarct along the left lateral aspect of the descending colon.  The terminal ileum and appendix are unremarkable.  The small bowel is grossly unremarkable.  There are several scattered shotty periaortic, pericaval, and mesenteric lymph nodes.  There is atherosclerotic calcification of the aorta and its branches.    There is osteopenia.  There are degenerative changes of the spine.  There  "are degenerative changes of the sacroiliac joints.  No significant inguinal lymphadenopathy.  There is scattered body wall anasarca.  There is a fat containing umbilical hernia.                                       X-Ray Chest 1 View (Final result)  Result time 01/19/25 14:55:11      Final result by Manpreet Del Real MD (01/19/25 14:55:11)                   Impression:      Cardiomegaly with increased size of now moderate to large right-sided pleural effusion.  Right lung aeration is worse compared to the prior study.      Electronically signed by: Manpreet Del Real MD  Date:    01/19/2025  Time:    14:55               Narrative:    EXAMINATION:  XR CHEST 1 VIEW    CLINICAL HISTORY:  Provided history is "  Shortness of breath".    TECHNIQUE:  One view of the chest.    COMPARISON:  01/03/2025.    FINDINGS:  Cardiac silhouette is enlarged and similar to the prior study.  Moderate to large right pleural effusion, worse from prior study.  Calcified mediastinal and hilar lymph nodes suggestive of remote granulomatous process.  Atherosclerotic calcifications overlie the aortic arch.  No significant consolidation in the left lung.  No sizable left pleural effusion.  No distinct pneumothorax.                                    Intake/Output - Last 3 Shifts         01/23 0700  01/24 0659 01/24 0700  01/25 0659    P.O.      Total Intake(mL/kg)      Chest Tube 450     Total Output 450     Net -450                 Microbiology Results (last 7 days)       Procedure Component Value Units Date/Time    Culture, Body Fluid [[id number]] Collected: 01/22/25 1056    Order Status: Completed Specimen: Body Fluid from Pleural Fluid Updated: 01/24/25 0724     Culture, Body Fluid No Growth at 2 Days            "

## 2025-01-25 NOTE — ASSESSMENT & PLAN NOTE
Vitals:    01/23/25 0513 01/23/25 0753 01/23/25 1145 01/23/25 1603   BP: 133/78 139/71 138/68 (!) 145/72    01/23/25 2109 01/24/25 0104 01/24/25 0451 01/24/25 1107   BP: (!) 152/60 134/71 (!) 145/65 127/63    01/24/25 1641 01/24/25 2105   BP: (!) 148/70 (!) 145/67         Patient's blood pressure range in the last 24 hours was: BP  Min: 127/63  Max: 148/70.The patient's inpatient anti-hypertensive regimen is listed below:  Current Antihypertensives  amLODIPine tablet 10 mg, Daily, Oral  isosorbide mononitrate 24 hr tablet 30 mg, Daily, Oral  furosemide injection 80 mg, Daily, Intravenous  hydrALAZINE tablet 50 mg, Every 8 hours, Oral    Plan  - BP is controlled, no changes needed to their regimen  - follow

## 2025-01-26 LAB
ANION GAP SERPL CALCULATED.3IONS-SCNC: 15 MMOL/L (ref 7–16)
BASOPHILS # BLD AUTO: 0.02 K/UL (ref 0–0.2)
BASOPHILS NFR BLD AUTO: 0.3 % (ref 0–1)
BUN SERPL-MCNC: 39 MG/DL (ref 10–20)
BUN/CREAT SERPL: 20 (ref 6–20)
CALCIUM SERPL-MCNC: 8.7 MG/DL (ref 8.4–10.2)
CHLORIDE SERPL-SCNC: 98 MMOL/L (ref 98–107)
CO2 SERPL-SCNC: 21 MMOL/L (ref 23–31)
CREAT SERPL-MCNC: 1.96 MG/DL (ref 0.55–1.02)
DIFFERENTIAL METHOD BLD: ABNORMAL
EGFR (NO RACE VARIABLE) (RUSH/TITUS): 26 ML/MIN/1.73M2
EOSINOPHIL # BLD AUTO: 0.07 K/UL (ref 0–0.5)
EOSINOPHIL NFR BLD AUTO: 1.1 % (ref 1–4)
ERYTHROCYTE [DISTWIDTH] IN BLOOD BY AUTOMATED COUNT: 16.9 % (ref 11.5–14.5)
GLUCOSE SERPL-MCNC: 125 MG/DL (ref 82–115)
GLUCOSE SERPL-MCNC: 131 MG/DL (ref 70–105)
GLUCOSE SERPL-MCNC: 141 MG/DL (ref 70–105)
GLUCOSE SERPL-MCNC: 152 MG/DL (ref 70–105)
GLUCOSE SERPL-MCNC: 175 MG/DL (ref 70–105)
HCT VFR BLD AUTO: 31.7 % (ref 38–47)
HGB BLD-MCNC: 10.2 G/DL (ref 12–16)
IMM GRANULOCYTES # BLD AUTO: 0.03 K/UL (ref 0–0.04)
IMM GRANULOCYTES NFR BLD: 0.5 % (ref 0–0.4)
LDH SERPL-CCNC: 253 U/L (ref 125–220)
LYMPHOCYTES # BLD AUTO: 1.03 K/UL (ref 1–4.8)
LYMPHOCYTES NFR BLD AUTO: 16.6 % (ref 27–41)
MCH RBC QN AUTO: 25.9 PG (ref 27–31)
MCHC RBC AUTO-ENTMCNC: 32.2 G/DL (ref 32–36)
MCV RBC AUTO: 80.5 FL (ref 80–96)
MONOCYTES # BLD AUTO: 0.79 K/UL (ref 0–0.8)
MONOCYTES NFR BLD AUTO: 12.7 % (ref 2–6)
MPC BLD CALC-MCNC: 10.3 FL (ref 9.4–12.4)
NEUTROPHILS # BLD AUTO: 4.26 K/UL (ref 1.8–7.7)
NEUTROPHILS NFR BLD AUTO: 68.8 % (ref 53–65)
NRBC # BLD AUTO: 0 X10E3/UL
NRBC, AUTO (.00): 0 %
PLATELET # BLD AUTO: 196 K/UL (ref 150–400)
POTASSIUM SERPL-SCNC: 4.2 MMOL/L (ref 3.5–5.1)
RBC # BLD AUTO: 3.94 M/UL (ref 4.2–5.4)
SODIUM SERPL-SCNC: 130 MMOL/L (ref 136–145)
WBC # BLD AUTO: 6.2 K/UL (ref 4.5–11)

## 2025-01-26 PROCEDURE — 94761 N-INVAS EAR/PLS OXIMETRY MLT: CPT

## 2025-01-26 PROCEDURE — 99232 SBSQ HOSP IP/OBS MODERATE 35: CPT | Mod: ,,, | Performed by: INTERNAL MEDICINE

## 2025-01-26 PROCEDURE — 25000242 PHARM REV CODE 250 ALT 637 W/ HCPCS: Performed by: INTERNAL MEDICINE

## 2025-01-26 PROCEDURE — 83615 LACTATE (LD) (LDH) ENZYME: CPT | Performed by: STUDENT IN AN ORGANIZED HEALTH CARE EDUCATION/TRAINING PROGRAM

## 2025-01-26 PROCEDURE — 99233 SBSQ HOSP IP/OBS HIGH 50: CPT | Mod: ,,, | Performed by: STUDENT IN AN ORGANIZED HEALTH CARE EDUCATION/TRAINING PROGRAM

## 2025-01-26 PROCEDURE — 25000003 PHARM REV CODE 250: Performed by: HOSPITALIST

## 2025-01-26 PROCEDURE — 27200667 HC PACEMAKER, TEMPORARY MONITORING, PER SHIFT

## 2025-01-26 PROCEDURE — 36415 COLL VENOUS BLD VENIPUNCTURE: CPT | Performed by: HOSPITALIST

## 2025-01-26 PROCEDURE — 94640 AIRWAY INHALATION TREATMENT: CPT

## 2025-01-26 PROCEDURE — 80048 BASIC METABOLIC PNL TOTAL CA: CPT | Performed by: HOSPITALIST

## 2025-01-26 PROCEDURE — 82962 GLUCOSE BLOOD TEST: CPT

## 2025-01-26 PROCEDURE — 11000001 HC ACUTE MED/SURG PRIVATE ROOM

## 2025-01-26 PROCEDURE — 25000003 PHARM REV CODE 250: Performed by: STUDENT IN AN ORGANIZED HEALTH CARE EDUCATION/TRAINING PROGRAM

## 2025-01-26 PROCEDURE — 63600175 PHARM REV CODE 636 W HCPCS: Performed by: STUDENT IN AN ORGANIZED HEALTH CARE EDUCATION/TRAINING PROGRAM

## 2025-01-26 PROCEDURE — 85025 COMPLETE CBC W/AUTO DIFF WBC: CPT | Performed by: HOSPITALIST

## 2025-01-26 RX ORDER — FUROSEMIDE 40 MG/1
80 TABLET ORAL DAILY
Status: DISCONTINUED | OUTPATIENT
Start: 2025-01-27 | End: 2025-01-27 | Stop reason: HOSPADM

## 2025-01-26 RX ORDER — ENOXAPARIN SODIUM 100 MG/ML
40 INJECTION SUBCUTANEOUS EVERY 24 HOURS
Status: DISCONTINUED | OUTPATIENT
Start: 2025-01-26 | End: 2025-01-26

## 2025-01-26 RX ORDER — ENOXAPARIN SODIUM 100 MG/ML
1 INJECTION SUBCUTANEOUS EVERY 12 HOURS
Status: DISCONTINUED | OUTPATIENT
Start: 2025-01-26 | End: 2025-01-27 | Stop reason: HOSPADM

## 2025-01-26 RX ADMIN — OXYCODONE 5 MG: 5 TABLET ORAL at 04:01

## 2025-01-26 RX ADMIN — AMLODIPINE BESYLATE 10 MG: 10 TABLET ORAL at 08:01

## 2025-01-26 RX ADMIN — PANTOPRAZOLE SODIUM 40 MG: 40 TABLET, DELAYED RELEASE ORAL at 08:01

## 2025-01-26 RX ADMIN — BUDESONIDE INHALATION 0.5 MG: 0.5 SUSPENSION RESPIRATORY (INHALATION) at 07:01

## 2025-01-26 RX ADMIN — ISOSORBIDE MONONITRATE 30 MG: 30 TABLET, EXTENDED RELEASE ORAL at 08:01

## 2025-01-26 RX ADMIN — FUROSEMIDE 80 MG: 10 INJECTION, SOLUTION INTRAMUSCULAR; INTRAVENOUS at 08:01

## 2025-01-26 RX ADMIN — HYDRALAZINE HYDROCHLORIDE 50 MG: 50 TABLET ORAL at 03:01

## 2025-01-26 RX ADMIN — ATORVASTATIN CALCIUM 40 MG: 40 TABLET, FILM COATED ORAL at 08:01

## 2025-01-26 RX ADMIN — ENOXAPARIN SODIUM 100 MG: 100 INJECTION SUBCUTANEOUS at 08:01

## 2025-01-26 RX ADMIN — POLYETHYLENE GLYCOL 3350 17 G: 17 POWDER, FOR SOLUTION ORAL at 08:01

## 2025-01-26 RX ADMIN — BUDESONIDE INHALATION 0.5 MG: 0.5 SUSPENSION RESPIRATORY (INHALATION) at 08:01

## 2025-01-26 RX ADMIN — ONDANSETRON 4 MG: 4 TABLET, ORALLY DISINTEGRATING ORAL at 02:01

## 2025-01-26 RX ADMIN — ALLOPURINOL 100 MG: 100 TABLET ORAL at 08:01

## 2025-01-26 RX ADMIN — HYDRALAZINE HYDROCHLORIDE 50 MG: 50 TABLET ORAL at 08:01

## 2025-01-26 RX ADMIN — HYDRALAZINE HYDROCHLORIDE 50 MG: 50 TABLET ORAL at 06:01

## 2025-01-26 RX ADMIN — ASPIRIN 81 MG: 81 TABLET, COATED ORAL at 08:01

## 2025-01-26 RX ADMIN — GLIMEPIRIDE 2 MG: 2 TABLET ORAL at 08:01

## 2025-01-26 RX ADMIN — OXYCODONE 5 MG: 5 TABLET ORAL at 02:01

## 2025-01-26 NOTE — ASSESSMENT & PLAN NOTE
Patient with Hypoxic Respiratory failure which is Acute.  she is not on home oxygen. Supplemental oxygen was provided and noted- Oxygen Concentration (%):  [21] 21    .   Signs/symptoms of respiratory failure include- tachypnea, increased work of breathing, and respiratory distress. Contributing diagnoses includes - Pleural effusion and Pneumonia Labs and images were reviewed. Patient Has not had a recent ABG. Will treat underlying causes and adjust management of respiratory failure as follows-     Nebs   Diuresis   Pulmonology consult   Discussed case with Dr. Mckee today  Marked improvement

## 2025-01-26 NOTE — PROGRESS NOTES
[facility] Medicine  Progress Note    Patient Name: Pam Ortega  MRN: [id number]  Patient Class: IP- Inpatient   Admission Date: 1/19/2025  Length of Stay: 7 days  Attending Physician: Robert Gramajo DO  Primary Care Provider: Janice Velez FNP        Subjective     Principal Problem:Acute hypoxic respiratory failure        HPI:       79-year-old female with anemia chronic disease, AFib, CKD, hypertension, bronchitis, CAD, type 2 diabetes, hyperlipidemia, dilated cardiomyopathy, heart failure with preserved ejection fraction, obesity, peripheral vascular disease presents to the ED with progressively worsening shortness of breath.  She has had pleural effusion chronically and follow up with Dr. Mckee.  She recently saw him in clinic and had her inhalers adjusted.  Recently saw Dr. Dutta and had her diuretics increased.  Even with this for shortness of breath progressively worsened over the last several days which prompted her presentation to the ED.  CT reveals loculated pleural effusion.  Pulmonology consulted.  She denies any chest pain.  Denies any fever, chills, palpitations, nausea vomiting diarrhea dysuria.  Review of systems otherwise negative           Overview/Hospital Course:  1/20 breathing significantly improved today.  Continue diuresis    01/21 Record reviewed. Progressive worse SOB. Pulmonary consulted   PMHx: anemia chronic disease, AFib, CKD, hypertension, bronchitis, CAD, PPM, type 2 diabetes, hyperlipidemia, dilated cardiomyopathy, heart failure with preserved ejection fraction, obesity, peripheral vascular disease with symptomatic pleural effusion.    Seen DR Dutta outpt for renal dx.   Had vascular surgery 11/2024 follow arterial thrombus right leg.   Holding xarelto and plan placement CXR in am. C/O some constipation  01/22 Chest tube placed and pt says feels much better. No other complaints. Had BM. BP and BS OK.  01/23 feels better.  BP and BS  slight up  01/24 BP and BS both mildly up. C/O constipation otherwise seems to be doing well. No bleeding and restart at least DVT prophylaxis. Restart eliquis when OK with pulm. .   01/25 BP and BS OK. Still constipation. Lovenox until pulm says OK to start eliquis.   1/26 looks remarkably better    Interval History:  No acute events overnight      Objective:     Vital Signs (Most Recent):  Temp: 97.6 °F (36.4 °C) (01/26/25 1143)  Pulse: 60 (01/26/25 1143)  Resp: 16 (01/26/25 1143)  BP: 138/71 (01/26/25 1143)  SpO2: 98 % (01/26/25 1143) Vital Signs (24h Range):  Temp:  [97.5 °F (36.4 °C)-98.4 °F (36.9 °C)] 97.6 °F (36.4 °C)  Pulse:  [60-68] 60  Resp:  [16-18] 16  SpO2:  [94 %-98 %] 98 %  BP: (136-153)/(69-81) 138/71     Weight: 97.1 kg (214 lb)  Body mass index is 30.71 kg/m².    Intake/Output Summary (Last 24 hours) at 1/26/2025 1435  Last data filed at 1/26/2025 0611  Gross per 24 hour   Intake 320 ml   Output 1090 ml   Net -770 ml         Physical Exam  Vitals reviewed.   Constitutional:       General: She is awake. She is not in acute distress.     Appearance: She is well-developed. She is obese. She is not toxic-appearing.   HENT:      Head: Normocephalic.      Nose: Nose normal.      Mouth/Throat:      Pharynx: Oropharynx is clear.   Eyes:      Extraocular Movements: Extraocular movements intact.      Pupils: Pupils are equal, round, and reactive to light.   Neck:      Thyroid: No thyroid mass.      Vascular: No carotid bruit.   Cardiovascular:      Rate and Rhythm: Normal rate and regular rhythm.      Pulses: Normal pulses.      Heart sounds: Normal heart sounds. No murmur heard.  Pulmonary:      Effort: Pulmonary effort is normal.      Breath sounds: Normal breath sounds and air entry. No wheezing.   Abdominal:      General: Bowel sounds are normal. There is no distension.      Palpations: Abdomen is soft.      Tenderness: There is no abdominal tenderness.   Musculoskeletal:         General: Normal range of  motion.      Cervical back: Neck supple. No rigidity.   Skin:     General: Skin is warm.      Coloration: Skin is not jaundiced.      Findings: No lesion.   Neurological:      General: No focal deficit present.      Mental Status: She is alert and oriented to person, place, and time.      Cranial Nerves: No cranial nerve deficit.   Psychiatric:         Attention and Perception: Attention normal.         Mood and Affect: Mood normal.         Behavior: Behavior normal. Behavior is cooperative.         Thought Content: Thought content normal.         Cognition and Memory: Cognition normal.             Significant Labs: All pertinent labs within the past 24 hours have been reviewed.  BMP:   Recent Labs   Lab 01/26/25  0551   *   *   K 4.2   CL 98   CO2 21*   BUN 39*   CREATININE 1.96*   CALCIUM 8.7       CBC:   Recent Labs   Lab 01/26/25  0551   WBC 6.20   HGB 10.2*   HCT 31.7*          CMP:   Recent Labs   Lab 01/26/25  0551   *   K 4.2   CL 98   CO2 21*   *   BUN 39*   CREATININE 1.96*   CALCIUM 8.7   ANIONGAP 15         Significant Imaging: I have reviewed all pertinent imaging results/findings within the past 24 hours.    Imaging Results              US Lower Extremity Veins Left (Final result)  Result time 01/19/25 17:49:22      Final result by Oliver Sweeney MD (01/19/25 17:49:22)                   Impression:      No evidence of deep venous thrombosis in the left lower extremity.      Electronically signed by: Oliver Sweeney  Date:    01/19/2025  Time:    17:49               Narrative:    EXAMINATION:  US LOWER EXTREMITY VEINS LEFT    CLINICAL HISTORY:  Edema;    TECHNIQUE:  Duplex and color flow Doppler evaluation and graded compression of the left lower extremity veins was performed.    COMPARISON:  11/18/2024    FINDINGS:  Left thigh veins: The common femoral, femoral, popliteal, upper greater saphenous, and deep femoral veins are patent and free of thrombus. The veins  are normally compressible and have normal phasic flow and augmentation response.    Left calf veins: The visualized calf veins are patent.    Contralateral CFV: The contralateral (right) common femoral vein is patent and free of thrombus.    Miscellaneous: None                                       CT Chest Abdomen Pelvis Without Contrast (XPD) (Final result)  Result time 01/19/25 16:11:01      Final result by Danie Agustin MD (01/19/25 16:11:01)                   Impression:      1. Moderate to large loculated right pleural effusion noting compressive atelectasis of the right lower lobe.  2. No findings to suggest obstructive uropathy or bowel obstruction.  3. Colonic diverticulosis without diverticulitis.  4. There is induration along the anterior abdominal wall, correlation with any superficial cellulitis.  5. Please see above for several additional findings.      Electronically signed by: Danie Agustin MD  Date:    01/19/2025  Time:    16:11               Narrative:    EXAMINATION:  CT CHEST ABDOMEN PELVIS WITHOUT CONTRAST(XPD)    CLINICAL HISTORY:  Aortic aneurysm, known or suspected;Patient with protuberant abdomen, no BM x 1 day, CKD, also with CHF and pleural effusion;    TECHNIQUE:  Low dose axial images, sagittal and coronal reformations were obtained from the thoracic inlet to the pubic symphysis .  Oral contrast was not administered.    COMPARISON:  CT chest 12/25/2018    FINDINGS:  The structures at the base of the neck are unremarkable.  There are a few scattered upper limit of normal caliber mediastinal lymph nodes.  The heart is not significantly enlarged.  Pacer wire noted.  No pericardial effusion.  The thoracic aorta tapers normally noting atherosclerotic calcification along its course and in the distribution of the coronary arteries.    Motion artifact limits evaluation of the airways and pulmonary parenchyma.  Allowing for this, the airways are patent.  There is a moderate right pleural  effusion with associated compressive atelectasis of the right lower lobe.  There is fluid along the fissure on the right.  The right pleural effusion is partially loculated.  No pneumothorax.    The liver, spleen, and adrenal glands have a grossly unremarkable noncontrast appearance.  There is high attenuating material layering within the gallbladder suggesting calculi or sludge.  There is a small hiatal hernia.  The stomach is nondistended, no gastric wall thickening.  No significant abdominal lymphadenopathy.    There is right renal cortical scarring involving the interpolar region.  No hydronephrosis or nephrolithiasis.  The bilateral ureters are unable to be followed in their entirety to the urinary bladder, no definite calculi seen or secondary findings to suggest obstructive uropathy.  The urinary bladder is unremarkable.  There are uterine leiomyoma.  The adnexa is unremarkable.  No significant free fluid in the pelvis.    There are a few scattered colonic diverticula without surrounding inflammation to suggest diverticulitis.  There is a small focus of suspected fat infarct along the left lateral aspect of the descending colon.  The terminal ileum and appendix are unremarkable.  The small bowel is grossly unremarkable.  There are several scattered shotty periaortic, pericaval, and mesenteric lymph nodes.  There is atherosclerotic calcification of the aorta and its branches.    There is osteopenia.  There are degenerative changes of the spine.  There are degenerative changes of the sacroiliac joints.  No significant inguinal lymphadenopathy.  There is scattered body wall anasarca.  There is a fat containing umbilical hernia.                                       X-Ray Chest 1 View (Final result)  Result time 01/19/25 14:55:11      Final result by Manpreet Del Real MD (01/19/25 14:55:11)                   Impression:      Cardiomegaly with increased size of now moderate to large right-sided pleural effusion.  " Right lung aeration is worse compared to the prior study.      Electronically signed by: Manpreet Del Real MD  Date:    01/19/2025  Time:    14:55               Narrative:    EXAMINATION:  XR CHEST 1 VIEW    CLINICAL HISTORY:  Provided history is "  Shortness of breath".    TECHNIQUE:  One view of the chest.    COMPARISON:  01/03/2025.    FINDINGS:  Cardiac silhouette is enlarged and similar to the prior study.  Moderate to large right pleural effusion, worse from prior study.  Calcified mediastinal and hilar lymph nodes suggestive of remote granulomatous process.  Atherosclerotic calcifications overlie the aortic arch.  No significant consolidation in the left lung.  No sizable left pleural effusion.  No distinct pneumothorax.                                    Intake/Output - Last 3 Shifts         01/24 0700  01/25 0659 01/25 0700 01/26 0659 01/26 0700 01/27 0659    P.O.  960     Total Intake(mL/kg)  960 (9.9)     Stool  0     Chest Tube  1365     Total Output  1365     Net  -405            Stool Occurrence  1 x           Microbiology Results (last 7 days)       Procedure Component Value Units Date/Time    Culture, Body Fluid [[id number]] Collected: 01/22/25 1056    Order Status: Completed Specimen: Body Fluid from Pleural Fluid Updated: 01/24/25 0724     Culture, Body Fluid No Growth at 2 Days              Assessment and Plan     * Acute hypoxic respiratory failure  Patient with Hypoxic Respiratory failure which is Acute.  she is not on home oxygen. Supplemental oxygen was provided and noted- Oxygen Concentration (%):  [21] 21    .   Signs/symptoms of respiratory failure include- tachypnea, increased work of breathing, and respiratory distress. Contributing diagnoses includes - Pleural effusion and Pneumonia Labs and images were reviewed. Patient Has not had a recent ABG. Will treat underlying causes and adjust management of respiratory failure as follows-     Nebs   Diuresis   Pulmonology consult   Discussed " "case with Dr. Mckee today  Marked improvement    Constipation    Treat. This long standing problem Only 1 BM every three days    01/25 Continue to work on constipation.    Presence of cardiac pacemaker    Will get additional details    Acute on chronic heart failure with preserved ejection fraction  Patient has Diastolic (HFpEF) heart failure that is Acute on chronic. On presentation their CHF was decompensated. Evidence of decompensated CHF on presentation includes:  Pleural effusion. The etiology of their decompensation is likely dietary indiscretion. Most recent BNP and echo results are listed below.  No results for input(s): "BNP" in the last 72 hours.  Latest ECHO  Results for orders placed during the hospital encounter of 11/17/24    Echo Saline Bubble? Yes    Interpretation Summary    Left Ventricle: The left ventricle is mildly dilated. Mildly increased wall thickness. Normal wall motion. Septal motion is consistent with pacing. There is diastolic dysfunction.    Right Ventricle: Moderate right ventricular enlargement.    Left Atrium: Left atrium is mildly dilated.    Right Atrium: Right atrium is severely dilated.    Aortic Valve: The aortic valve is a trileaflet valve. Mildly calcified cusps.    Mitral Valve: There is mild bileaflet sclerosis. Mildly thickened leaflets. There is mild to moderate regurgitation with an eccentric jet.    Tricuspid Valve: There is moderate to severe regurgitation with an eccentrically directed jet.    Pulmonic Valve: There is mild to moderate regurgitation.    IVC/SVC: Elevated venous pressure at 15 mmHg.    Current Heart Failure Medications  hydrALAZINE tablet 50 mg, Every 8 hours, Oral  furosemide tablet 80 mg, Daily, Oral    Plan  - Monitor strict I&Os and daily weights.    - Place on telemetry  - Low sodium diet  - Place on fluid restriction of 1.5 L.   - Cardiology has not been consulted  - The patient's volume status is improving but not at their baseline as indicated " by shortness of breath  - follow  Continue diuresis    Pleural effusion  Patient found to have large pleural effusion on imaging. I have personally reviewed and interpreted the following imaging: Xray. A thoracentesis was deferred. Most likely etiology includes Congestive Heart Failure and Pneumonia. Management to include Diuresis  Continue diuresis    01/21 Chest tube planned in am  01/22 Transdate pleural effusion removed and pt feels breathing much better.  1/26 marked improvement.  Chest tube per pulmonology.    Diabetic nephropathy  Glucose control      Peripheral vascular disease  Aspirin statin      Arterial occlusion  Vascular surgery right leg Nov 2024      CKD stage 4 secondary to hypertension  Creatine stable for now. BMP reviewed- noted Estimated Creatinine Clearance: 30.3 mL/min (A) (based on SCr of 1.96 mg/dL (H)). according to latest data. Based on current GFR, CKD stage is stage 4 - GFR 15-29.  Monitor UOP and serial BMP and adjust therapy as needed. Renally dose meds. Avoid nephrotoxic medications and procedures.    Cough  Better      Essential hypertension  Patient's blood pressure range in the last 24 hours was: BP  Min: 136/69  Max: 153/77.The patient's inpatient anti-hypertensive regimen is listed below:  Current Antihypertensives  amLODIPine tablet 10 mg, Daily, Oral  isosorbide mononitrate 24 hr tablet 30 mg, Daily, Oral  furosemide injection 80 mg, Daily, Intravenous  hydrALAZINE tablet 50 mg, Every 8 hours, Oral    Plan  BP is controlled   Follow    Dyslipidemia associated with type 2 diabetes mellitus  Statin   Glucose control      Obstructive sleep apnea    BiPAP at night.  Wear CPAP at home.  Given BiPAP due to respiratory distress to support overnight    Obese  Body mass index is 30.71 kg/m². Morbid obesity complicates all aspects of disease management from diagnostic modalities to treatment. Weight loss encouraged and health benefits explained to  "patient.         Dyslipidemia  Statin      Chronic a-fib  Patient has persistent (7 days or more) atrial fibrillation. Patient is currently in atrial fibrillation. [id number] Score: 5. The patients heart rate in the last 24 hours is as follows:  Pulse  Min: 60  Max: 68     Antiarrhythmics       Anticoagulants  enoxaparin injection 40 mg, Every 24 hours, Subcutaneous    Plan  - Replete lytes with a goal of K>4, Mg >2  - Patient is not anticoagulated due to holding anticoagulation (Xarelto) in anticipation of procedure  - Patient's afib is currently controlled  - follow        Cardiomyopathy  Diurese    Diabetes mellitus without complication  Glucose 157 High  (1 d ago) 157 High  75 Low  117 High  131 High  94 104 178 High        Patient's FSGs are controlled on current medication regimen.  Last A1c reviewed-   Lab Results   Component Value Date    HGBA1C 6.2 08/23/2024     Most recent fingerstick glucose reviewed- No results for input(s): "POCTGLUCOSE" in the last 24 hours.  Current correctional scale  Low  Maintain anti-hyperglycemic dose as follows-   Antihyperglycemics (From admission, onward)      Start     Stop Route Frequency Ordered    01/24/25 0745  glimepiride tablet 2 mg         -- Oral With breakfast 01/23/25 2324    01/19/25 1805  insulin aspart U-100 injection 0-5 Units         -- SubQ Before meals & nightly PRN 01/19/25 1712          Hold Oral hypoglycemics while patient is in the hospital.    Benign hypertensive CKD, stage 1-4 or unspecified chronic kidney disease  Creatine stable for now. BMP reviewed- noted Estimated Creatinine Clearance: 30.3 mL/min (A) (based on SCr of 1.96 mg/dL (H)). according to latest data. Based on current GFR, CKD stage is stage 4 - GFR 15-29.  Monitor UOP and serial BMP and adjust therapy as needed. Renally dose meds. Avoid nephrotoxic medications and procedures.    Coronary atherosclerosis  Patient with known CAD s/p  unclear , which is controlled Will continue ASA and " Statin and monitor for S/Sx of angina/ACS. Continue to monitor on telemetry.     Hyperlipidemia  Statin      Hypertension  Vitals:    01/25/25 0030 01/25/25 0518 01/25/25 0749 01/25/25 1134   BP: (!) 146/49 (!) 143/75 (!) 140/72 136/76    01/25/25 1648 01/25/25 2200 01/25/25 2358 01/26/25 0611   BP: 136/69 (!) 153/77 (!) 145/69 (!) 142/70    01/26/25 0753 01/26/25 1143   BP: (!) 148/81 138/71         Patient's blood pressure range in the last 24 hours was: BP  Min: 136/69  Max: 153/77.The patient's inpatient anti-hypertensive regimen is listed below:  Current Antihypertensives  amLODIPine tablet 10 mg, Daily, Oral  isosorbide mononitrate 24 hr tablet 30 mg, Daily, Oral  furosemide injection 80 mg, Daily, Intravenous  hydrALAZINE tablet 50 mg, Every 8 hours, Oral    Plan  - BP is controlled, no changes needed to their regimen  - follow    History of CVA (cerebrovascular accident)  Aspirin statin        VTE Risk Mitigation (From admission, onward)           Ordered     enoxaparin injection 100 mg  Every 12 hours         01/26/25 1437     Reason for No Pharmacological VTE Prophylaxis  Once        Question:  Reasons:  Answer:  Already adequately anticoagulated on oral Anticoagulants    01/19/25 1712     IP VTE HIGH RISK PATIENT  Once         01/19/25 1712     Place sequential compression device  Until discontinued         01/19/25 1712                    Discharge Planning   HERBERT:      Code Status: Full Code   Medical Readiness for Discharge Date:   Discharge Plan A: Home with family, Home Health          Labs and consultant notes reviewed.  Metabolic panel tomorrow.  Recent chest x-ray reviewed and independently interpreted.  Marked improvement chest x-ray from the 21st 23rd after placement of chest tube              Robert Gramajo DO  Department of Hospital Medicine   [facility]

## 2025-01-26 NOTE — SUBJECTIVE & OBJECTIVE
Interval History:  No acute events overnight      Objective:     Vital Signs (Most Recent):  Temp: 97.6 °F (36.4 °C) (01/26/25 1143)  Pulse: 60 (01/26/25 1143)  Resp: 16 (01/26/25 1143)  BP: 138/71 (01/26/25 1143)  SpO2: 98 % (01/26/25 1143) Vital Signs (24h Range):  Temp:  [97.5 °F (36.4 °C)-98.4 °F (36.9 °C)] 97.6 °F (36.4 °C)  Pulse:  [60-68] 60  Resp:  [16-18] 16  SpO2:  [94 %-98 %] 98 %  BP: (136-153)/(69-81) 138/71     Weight: 97.1 kg (214 lb)  Body mass index is 30.71 kg/m².    Intake/Output Summary (Last 24 hours) at 1/26/2025 1435  Last data filed at 1/26/2025 0611  Gross per 24 hour   Intake 320 ml   Output 1090 ml   Net -770 ml         Physical Exam  Vitals reviewed.   Constitutional:       General: She is awake. She is not in acute distress.     Appearance: She is well-developed. She is obese. She is not toxic-appearing.   HENT:      Head: Normocephalic.      Nose: Nose normal.      Mouth/Throat:      Pharynx: Oropharynx is clear.   Eyes:      Extraocular Movements: Extraocular movements intact.      Pupils: Pupils are equal, round, and reactive to light.   Neck:      Thyroid: No thyroid mass.      Vascular: No carotid bruit.   Cardiovascular:      Rate and Rhythm: Normal rate and regular rhythm.      Pulses: Normal pulses.      Heart sounds: Normal heart sounds. No murmur heard.  Pulmonary:      Effort: Pulmonary effort is normal.      Breath sounds: Normal breath sounds and air entry. No wheezing.   Abdominal:      General: Bowel sounds are normal. There is no distension.      Palpations: Abdomen is soft.      Tenderness: There is no abdominal tenderness.   Musculoskeletal:         General: Normal range of motion.      Cervical back: Neck supple. No rigidity.   Skin:     General: Skin is warm.      Coloration: Skin is not jaundiced.      Findings: No lesion.   Neurological:      General: No focal deficit present.      Mental Status: She is alert and oriented to person, place, and time.      Cranial  Nerves: No cranial nerve deficit.   Psychiatric:         Attention and Perception: Attention normal.         Mood and Affect: Mood normal.         Behavior: Behavior normal. Behavior is cooperative.         Thought Content: Thought content normal.         Cognition and Memory: Cognition normal.             Significant Labs: All pertinent labs within the past 24 hours have been reviewed.  BMP:   Recent Labs   Lab 01/26/25  0551   *   *   K 4.2   CL 98   CO2 21*   BUN 39*   CREATININE 1.96*   CALCIUM 8.7       CBC:   Recent Labs   Lab 01/26/25  0551   WBC 6.20   HGB 10.2*   HCT 31.7*          CMP:   Recent Labs   Lab 01/26/25  0551   *   K 4.2   CL 98   CO2 21*   *   BUN 39*   CREATININE 1.96*   CALCIUM 8.7   ANIONGAP 15         Significant Imaging: I have reviewed all pertinent imaging results/findings within the past 24 hours.    Imaging Results              US Lower Extremity Veins Left (Final result)  Result time 01/19/25 17:49:22      Final result by Oliver Sweeney MD (01/19/25 17:49:22)                   Impression:      No evidence of deep venous thrombosis in the left lower extremity.      Electronically signed by: Oliver Sweeney  Date:    01/19/2025  Time:    17:49               Narrative:    EXAMINATION:  US LOWER EXTREMITY VEINS LEFT    CLINICAL HISTORY:  Edema;    TECHNIQUE:  Duplex and color flow Doppler evaluation and graded compression of the left lower extremity veins was performed.    COMPARISON:  11/18/2024    FINDINGS:  Left thigh veins: The common femoral, femoral, popliteal, upper greater saphenous, and deep femoral veins are patent and free of thrombus. The veins are normally compressible and have normal phasic flow and augmentation response.    Left calf veins: The visualized calf veins are patent.    Contralateral CFV: The contralateral (right) common femoral vein is patent and free of thrombus.    Miscellaneous: None                                        CT Chest Abdomen Pelvis Without Contrast (XPD) (Final result)  Result time 01/19/25 16:11:01      Final result by Danie Agustin MD (01/19/25 16:11:01)                   Impression:      1. Moderate to large loculated right pleural effusion noting compressive atelectasis of the right lower lobe.  2. No findings to suggest obstructive uropathy or bowel obstruction.  3. Colonic diverticulosis without diverticulitis.  4. There is induration along the anterior abdominal wall, correlation with any superficial cellulitis.  5. Please see above for several additional findings.      Electronically signed by: Danie Agustin MD  Date:    01/19/2025  Time:    16:11               Narrative:    EXAMINATION:  CT CHEST ABDOMEN PELVIS WITHOUT CONTRAST(XPD)    CLINICAL HISTORY:  Aortic aneurysm, known or suspected;Patient with protuberant abdomen, no BM x 1 day, CKD, also with CHF and pleural effusion;    TECHNIQUE:  Low dose axial images, sagittal and coronal reformations were obtained from the thoracic inlet to the pubic symphysis .  Oral contrast was not administered.    COMPARISON:  CT chest 12/25/2018    FINDINGS:  The structures at the base of the neck are unremarkable.  There are a few scattered upper limit of normal caliber mediastinal lymph nodes.  The heart is not significantly enlarged.  Pacer wire noted.  No pericardial effusion.  The thoracic aorta tapers normally noting atherosclerotic calcification along its course and in the distribution of the coronary arteries.    Motion artifact limits evaluation of the airways and pulmonary parenchyma.  Allowing for this, the airways are patent.  There is a moderate right pleural effusion with associated compressive atelectasis of the right lower lobe.  There is fluid along the fissure on the right.  The right pleural effusion is partially loculated.  No pneumothorax.    The liver, spleen, and adrenal glands have a grossly unremarkable noncontrast appearance.  There is high  "attenuating material layering within the gallbladder suggesting calculi or sludge.  There is a small hiatal hernia.  The stomach is nondistended, no gastric wall thickening.  No significant abdominal lymphadenopathy.    There is right renal cortical scarring involving the interpolar region.  No hydronephrosis or nephrolithiasis.  The bilateral ureters are unable to be followed in their entirety to the urinary bladder, no definite calculi seen or secondary findings to suggest obstructive uropathy.  The urinary bladder is unremarkable.  There are uterine leiomyoma.  The adnexa is unremarkable.  No significant free fluid in the pelvis.    There are a few scattered colonic diverticula without surrounding inflammation to suggest diverticulitis.  There is a small focus of suspected fat infarct along the left lateral aspect of the descending colon.  The terminal ileum and appendix are unremarkable.  The small bowel is grossly unremarkable.  There are several scattered shotty periaortic, pericaval, and mesenteric lymph nodes.  There is atherosclerotic calcification of the aorta and its branches.    There is osteopenia.  There are degenerative changes of the spine.  There are degenerative changes of the sacroiliac joints.  No significant inguinal lymphadenopathy.  There is scattered body wall anasarca.  There is a fat containing umbilical hernia.                                       X-Ray Chest 1 View (Final result)  Result time 01/19/25 14:55:11      Final result by Manpreet Del Real MD (01/19/25 14:55:11)                   Impression:      Cardiomegaly with increased size of now moderate to large right-sided pleural effusion.  Right lung aeration is worse compared to the prior study.      Electronically signed by: Manpreet Del Real MD  Date:    01/19/2025  Time:    14:55               Narrative:    EXAMINATION:  XR CHEST 1 VIEW    CLINICAL HISTORY:  Provided history is "  Shortness of breath".    TECHNIQUE:  One view of " the chest.    COMPARISON:  01/03/2025.    FINDINGS:  Cardiac silhouette is enlarged and similar to the prior study.  Moderate to large right pleural effusion, worse from prior study.  Calcified mediastinal and hilar lymph nodes suggestive of remote granulomatous process.  Atherosclerotic calcifications overlie the aortic arch.  No significant consolidation in the left lung.  No sizable left pleural effusion.  No distinct pneumothorax.                                    Intake/Output - Last 3 Shifts         01/24 0700 01/25 0659 01/25 0700 01/26 0659 01/26 0700 01/27 0659    P.O.  960     Total Intake(mL/kg)  960 (9.9)     Stool  0     Chest Tube  1365     Total Output  1365     Net  -405            Stool Occurrence  1 x           Microbiology Results (last 7 days)       Procedure Component Value Units Date/Time    Culture, Body Fluid [[id number]] Collected: 01/22/25 1056    Order Status: Completed Specimen: Body Fluid from Pleural Fluid Updated: 01/24/25 0724     Culture, Body Fluid No Growth at 2 Days

## 2025-01-26 NOTE — PROGRESS NOTES
[facility] Medicine  Progress Note    Patient Name: Pam Ortega  MRN: [id number]  Patient Class: IP- Inpatient   Admission Date: 1/19/2025  Length of Stay: 6 days  Attending Physician: Ye Chahal MD  Primary Care Provider: Janice Velez FNP        Subjective     Principal Problem:Acute hypoxic respiratory failure        HPI:       79-year-old female with anemia chronic disease, AFib, CKD, hypertension, bronchitis, CAD, type 2 diabetes, hyperlipidemia, dilated cardiomyopathy, heart failure with preserved ejection fraction, obesity, peripheral vascular disease presents to the ED with progressively worsening shortness of breath.  She has had pleural effusion chronically and follow up with Dr. Mckee.  She recently saw him in clinic and had her inhalers adjusted.  Recently saw Dr. Dutta and had her diuretics increased.  Even with this for shortness of breath progressively worsened over the last several days which prompted her presentation to the ED.  CT reveals loculated pleural effusion.  Pulmonology consulted.  She denies any chest pain.  Denies any fever, chills, palpitations, nausea vomiting diarrhea dysuria.  Review of systems otherwise negative           Overview/Hospital Course:  1/20 breathing significantly improved today.  Continue diuresis    01/21 Record reviewed. Progressive worse SOB. Pulmonary consulted   PMHx: anemia chronic disease, AFib, CKD, hypertension, bronchitis, CAD, PPM, type 2 diabetes, hyperlipidemia, dilated cardiomyopathy, heart failure with preserved ejection fraction, obesity, peripheral vascular disease with symptomatic pleural effusion.    Seen DR Dutta outpt for renal dx.   Had vascular surgery 11/2024 follow arterial thrombus right leg.   Holding xarelto and plan placement CXR in am. C/O some constipation  01/22 Chest tube placed and pt says feels much better. No other complaints. Had BM. BP and BS OK.  01/23 feels better.  BP and BS  slight up  01/24 BP and BS both mildly up. C/O constipation otherwise seems to be doing well. No bleeding and restart at least DVT prophylaxis. Restart eliquis when OK with pulm. .   01/25 BP and BS OK. Still constipation. Lovenox until pulm says OK to start eliquis.     Interval History:     Review of Systems   Constitutional:  Positive for fatigue. Negative for appetite change and fever.   HENT:  Negative for congestion, hearing loss and trouble swallowing.    Respiratory:  Positive for shortness of breath. Negative for chest tightness and wheezing.    Cardiovascular:  Negative for chest pain and palpitations.   Gastrointestinal:  Positive for constipation. Negative for abdominal pain and nausea.   Genitourinary:  Negative for difficulty urinating and dysuria.   Musculoskeletal:  Positive for gait problem. Negative for back pain and neck stiffness.   Skin:  Negative for pallor and rash.   Neurological:  Negative for dizziness, speech difficulty and headaches.   Psychiatric/Behavioral:  Negative for confusion and suicidal ideas.      Objective:     Vital Signs (Most Recent):  Temp: 98.4 °F (36.9 °C) (01/25/25 1648)  Pulse: 61 (01/25/25 1925)  Resp: 18 (01/25/25 1925)  BP: 136/69 (01/25/25 1648)  SpO2: 98 % (01/25/25 1925) Vital Signs (24h Range):  Temp:  [97.3 °F (36.3 °C)-98.4 °F (36.9 °C)] 98.4 °F (36.9 °C)  Pulse:  [60-64] 61  Resp:  [14-18] 18  SpO2:  [95 %-99 %] 98 %  BP: (136-146)/(49-76) 136/69     Weight: 97.1 kg (214 lb)  Body mass index is 30.71 kg/m².    Intake/Output Summary (Last 24 hours) at 1/25/2025 2140  Last data filed at 1/25/2025 1648  Gross per 24 hour   Intake 960 ml   Output 275 ml   Net 685 ml         Physical Exam  Vitals reviewed.   Constitutional:       General: She is awake. She is not in acute distress.     Appearance: She is well-developed. She is obese. She is not toxic-appearing.   HENT:      Head: Normocephalic.      Nose: Nose normal.      Mouth/Throat:      Pharynx: Oropharynx is  "clear.   Eyes:      Extraocular Movements: Extraocular movements intact.      Pupils: Pupils are equal, round, and reactive to light.   Neck:      Thyroid: No thyroid mass.      Vascular: No carotid bruit.   Cardiovascular:      Rate and Rhythm: Normal rate and regular rhythm.      Pulses: Normal pulses.      Heart sounds: Normal heart sounds. No murmur heard.  Pulmonary:      Effort: Pulmonary effort is normal.      Breath sounds: Normal breath sounds and air entry. No wheezing.   Abdominal:      General: Bowel sounds are normal. There is no distension.      Palpations: Abdomen is soft.      Tenderness: There is no abdominal tenderness.   Musculoskeletal:         General: Normal range of motion.      Cervical back: Neck supple. No rigidity.   Skin:     General: Skin is warm.      Coloration: Skin is not jaundiced.      Findings: No lesion.   Neurological:      General: No focal deficit present.      Mental Status: She is alert and oriented to person, place, and time.      Cranial Nerves: No cranial nerve deficit.   Psychiatric:         Attention and Perception: Attention normal.         Mood and Affect: Mood normal.         Behavior: Behavior normal. Behavior is cooperative.         Thought Content: Thought content normal.         Cognition and Memory: Cognition normal.             Significant Labs: All pertinent labs within the past 24 hours have been reviewed.  BMP:   No results for input(s): "GLU", "NA", "K", "CL", "CO2", "BUN", "CREATININE", "CALCIUM", "MG" in the last 48 hours.    CBC:   No results for input(s): "WBC", "HGB", "HCT", "PLT" in the last 48 hours.    CMP:   No results for input(s): "NA", "K", "CL", "CO2", "GLU", "BUN", "CREATININE", "CALCIUM", "PROT", "ALBUMIN", "BILITOT", "ALKPHOS", "AST", "ALT", "ANIONGAP", "EGFRNONAA" in the last 48 hours.    Invalid input(s): "ESTGFAFRICA"      Significant Imaging: I have reviewed all pertinent imaging results/findings within the past 24 hours.    Imaging " Results              US Lower Extremity Veins Left (Final result)  Result time 01/19/25 17:49:22      Final result by Oliver Sweeney MD (01/19/25 17:49:22)                   Impression:      No evidence of deep venous thrombosis in the left lower extremity.      Electronically signed by: Oliver Sweeney  Date:    01/19/2025  Time:    17:49               Narrative:    EXAMINATION:  US LOWER EXTREMITY VEINS LEFT    CLINICAL HISTORY:  Edema;    TECHNIQUE:  Duplex and color flow Doppler evaluation and graded compression of the left lower extremity veins was performed.    COMPARISON:  11/18/2024    FINDINGS:  Left thigh veins: The common femoral, femoral, popliteal, upper greater saphenous, and deep femoral veins are patent and free of thrombus. The veins are normally compressible and have normal phasic flow and augmentation response.    Left calf veins: The visualized calf veins are patent.    Contralateral CFV: The contralateral (right) common femoral vein is patent and free of thrombus.    Miscellaneous: None                                       CT Chest Abdomen Pelvis Without Contrast (XPD) (Final result)  Result time 01/19/25 16:11:01      Final result by Danie Agustin MD (01/19/25 16:11:01)                   Impression:      1. Moderate to large loculated right pleural effusion noting compressive atelectasis of the right lower lobe.  2. No findings to suggest obstructive uropathy or bowel obstruction.  3. Colonic diverticulosis without diverticulitis.  4. There is induration along the anterior abdominal wall, correlation with any superficial cellulitis.  5. Please see above for several additional findings.      Electronically signed by: Danie Agustin MD  Date:    01/19/2025  Time:    16:11               Narrative:    EXAMINATION:  CT CHEST ABDOMEN PELVIS WITHOUT CONTRAST(XPD)    CLINICAL HISTORY:  Aortic aneurysm, known or suspected;Patient with protuberant abdomen, no BM x 1 day, CKD, also with CHF  and pleural effusion;    TECHNIQUE:  Low dose axial images, sagittal and coronal reformations were obtained from the thoracic inlet to the pubic symphysis .  Oral contrast was not administered.    COMPARISON:  CT chest 12/25/2018    FINDINGS:  The structures at the base of the neck are unremarkable.  There are a few scattered upper limit of normal caliber mediastinal lymph nodes.  The heart is not significantly enlarged.  Pacer wire noted.  No pericardial effusion.  The thoracic aorta tapers normally noting atherosclerotic calcification along its course and in the distribution of the coronary arteries.    Motion artifact limits evaluation of the airways and pulmonary parenchyma.  Allowing for this, the airways are patent.  There is a moderate right pleural effusion with associated compressive atelectasis of the right lower lobe.  There is fluid along the fissure on the right.  The right pleural effusion is partially loculated.  No pneumothorax.    The liver, spleen, and adrenal glands have a grossly unremarkable noncontrast appearance.  There is high attenuating material layering within the gallbladder suggesting calculi or sludge.  There is a small hiatal hernia.  The stomach is nondistended, no gastric wall thickening.  No significant abdominal lymphadenopathy.    There is right renal cortical scarring involving the interpolar region.  No hydronephrosis or nephrolithiasis.  The bilateral ureters are unable to be followed in their entirety to the urinary bladder, no definite calculi seen or secondary findings to suggest obstructive uropathy.  The urinary bladder is unremarkable.  There are uterine leiomyoma.  The adnexa is unremarkable.  No significant free fluid in the pelvis.    There are a few scattered colonic diverticula without surrounding inflammation to suggest diverticulitis.  There is a small focus of suspected fat infarct along the left lateral aspect of the descending colon.  The terminal ileum and  "appendix are unremarkable.  The small bowel is grossly unremarkable.  There are several scattered shotty periaortic, pericaval, and mesenteric lymph nodes.  There is atherosclerotic calcification of the aorta and its branches.    There is osteopenia.  There are degenerative changes of the spine.  There are degenerative changes of the sacroiliac joints.  No significant inguinal lymphadenopathy.  There is scattered body wall anasarca.  There is a fat containing umbilical hernia.                                       X-Ray Chest 1 View (Final result)  Result time 01/19/25 14:55:11      Final result by Manpreet Del Real MD (01/19/25 14:55:11)                   Impression:      Cardiomegaly with increased size of now moderate to large right-sided pleural effusion.  Right lung aeration is worse compared to the prior study.      Electronically signed by: Manpreet Del Real MD  Date:    01/19/2025  Time:    14:55               Narrative:    EXAMINATION:  XR CHEST 1 VIEW    CLINICAL HISTORY:  Provided history is "  Shortness of breath".    TECHNIQUE:  One view of the chest.    COMPARISON:  01/03/2025.    FINDINGS:  Cardiac silhouette is enlarged and similar to the prior study.  Moderate to large right pleural effusion, worse from prior study.  Calcified mediastinal and hilar lymph nodes suggestive of remote granulomatous process.  Atherosclerotic calcifications overlie the aortic arch.  No significant consolidation in the left lung.  No sizable left pleural effusion.  No distinct pneumothorax.                                    Intake/Output - Last 3 Shifts         01/24 0700  01/25 0659 01/25 0700  01/26 0659    P.O.  960    Total Intake(mL/kg)  960 (9.9)    Chest Tube  275    Total Output  275    Net  +685                Microbiology Results (last 7 days)       Procedure Component Value Units Date/Time    Culture, Body Fluid [[id number]] Collected: 01/22/25 1056    Order Status: Completed Specimen: Body Fluid from Pleural " "Fluid Updated: 01/24/25 0724     Culture, Body Fluid No Growth at 2 Days              Assessment and Plan     * Acute hypoxic respiratory failure  Patient with Hypoxic Respiratory failure which is Acute.  she is not on home oxygen. Supplemental oxygen was provided and noted- Oxygen Concentration (%):  [28-40] 28    .   Signs/symptoms of respiratory failure include- tachypnea, increased work of breathing, and respiratory distress. Contributing diagnoses includes - Pleural effusion and Pneumonia Labs and images were reviewed. Patient Has not had a recent ABG. Will treat underlying causes and adjust management of respiratory failure as follows-     Nebs   Diuresis   Pulmonology consult   Discussed case with Dr. Mckee today    Constipation    Treat. This long standing problem Only 1 BM every three days    01/25 Continue to work on constipation.    Presence of cardiac pacemaker    Will get additional details    Acute on chronic heart failure with preserved ejection fraction  Patient has Diastolic (HFpEF) heart failure that is Acute on chronic. On presentation their CHF was decompensated. Evidence of decompensated CHF on presentation includes:  Pleural effusion. The etiology of their decompensation is likely dietary indiscretion. Most recent BNP and echo results are listed below.  No results for input(s): "BNP" in the last 72 hours.  Latest ECHO  Results for orders placed during the hospital encounter of 11/17/24    Echo Saline Bubble? Yes    Interpretation Summary    Left Ventricle: The left ventricle is mildly dilated. Mildly increased wall thickness. Normal wall motion. Septal motion is consistent with pacing. There is diastolic dysfunction.    Right Ventricle: Moderate right ventricular enlargement.    Left Atrium: Left atrium is mildly dilated.    Right Atrium: Right atrium is severely dilated.    Aortic Valve: The aortic valve is a trileaflet valve. Mildly calcified cusps.    Mitral Valve: There is mild bileaflet " sclerosis. Mildly thickened leaflets. There is mild to moderate regurgitation with an eccentric jet.    Tricuspid Valve: There is moderate to severe regurgitation with an eccentrically directed jet.    Pulmonic Valve: There is mild to moderate regurgitation.    IVC/SVC: Elevated venous pressure at 15 mmHg.    Current Heart Failure Medications  hydrALAZINE tablet 50 mg, 2 times daily, Oral  spironolactone tablet 25 mg, Daily, Oral  furosemide injection 80 mg, Daily, Intravenous    Plan  - Monitor strict I&Os and daily weights.    - Place on telemetry  - Low sodium diet  - Place on fluid restriction of 1.5 L.   - Cardiology has not been consulted  - The patient's volume status is improving but not at their baseline as indicated by shortness of breath  - follow  Continue diuresis    Pleural effusion  Patient found to have large pleural effusion on imaging. I have personally reviewed and interpreted the following imaging: Xray. A thoracentesis was deferred. Most likely etiology includes Congestive Heart Failure and Pneumonia. Management to include Diuresis  Continue diuresis    01/21 Chest tube planned in am  01/22 Transdate pleural effusion removed and pt feels breathing much better.    Diabetic nephropathy  Glucose control      Peripheral vascular disease  Aspirin statin      Arterial occlusion  Vascular surgery right leg Nov 2024      CKD stage 4 secondary to hypertension  Creatine stable for now. BMP reviewed- noted Estimated Creatinine Clearance: 24.3 mL/min (A) (based on SCr of 2.45 mg/dL (H)). according to latest data. Based on current GFR, CKD stage is stage 4 - GFR 15-29.  Monitor UOP and serial BMP and adjust therapy as needed. Renally dose meds. Avoid nephrotoxic medications and procedures.    Essential hypertension  Patient's blood pressure range in the last 24 hours was: BP  Min: 123/65  Max: 137/79.The patient's inpatient anti-hypertensive regimen is listed below:  Current Antihypertensives  amLODIPine  "tablet 10 mg, Daily, Oral  hydrALAZINE tablet 50 mg, 2 times daily, Oral  isosorbide mononitrate 24 hr tablet 30 mg, Daily, Oral  spironolactone tablet 25 mg, Daily, Oral  furosemide injection 80 mg, Daily, Intravenous    Plan  BP is controlled   Follow    Dyslipidemia associated with type 2 diabetes mellitus  Statin   Glucose control      Obstructive sleep apnea    BiPAP at night.  Wear CPAP at home.  Given BiPAP due to respiratory distress to support overnight    Obese  Body mass index is 30.85 kg/m². Morbid obesity complicates all aspects of disease management from diagnostic modalities to treatment. Weight loss encouraged and health benefits explained to patient.         Dyslipidemia  Statin      Chronic a-fib  Patient has persistent (7 days or more) atrial fibrillation. Patient is currently in atrial fibrillation. [id number] Score: 5. The patients heart rate in the last 24 hours is as follows:  Pulse  Min: 57  Max: 70     Antiarrhythmics       Anticoagulants       Plan  - Replete lytes with a goal of K>4, Mg >2  - Patient is not anticoagulated due to holding anticoagulation (Xarelto) in anticipation of procedure  - Patient's afib is currently controlled  - follow        Cardiomyopathy  Diurese    Diabetes mellitus without complication  Glucose 157 High  (1 d ago) 157 High  75 Low  117 High  131 High  94 104 178 High        Patient's FSGs are controlled on current medication regimen.  Last A1c reviewed-   Lab Results   Component Value Date    HGBA1C 6.2 08/23/2024     Most recent fingerstick glucose reviewed- No results for input(s): "POCTGLUCOSE" in the last 24 hours.  Current correctional scale  Low  Maintain anti-hyperglycemic dose as follows-   Antihyperglycemics (From admission, onward)      Start     Stop Route Frequency Ordered    01/24/25 0745  glimepiride tablet 2 mg         -- Oral With breakfast 01/23/25 8729    01/19/25 1805  insulin aspart U-100 injection 0-5 Units         -- SubQ Before meals & " nightly PRN 01/19/25 1712          Hold Oral hypoglycemics while patient is in the hospital.    Benign hypertensive CKD, stage 1-4 or unspecified chronic kidney disease  Creatine stable for now. BMP reviewed- noted Estimated Creatinine Clearance: 28.8 mL/min (A) (based on SCr of 2.07 mg/dL (H)). according to latest data. Based on current GFR, CKD stage is stage 4 - GFR 15-29.  Monitor UOP and serial BMP and adjust therapy as needed. Renally dose meds. Avoid nephrotoxic medications and procedures.    Coronary atherosclerosis  Patient with known CAD s/p  unclear , which is controlled Will continue ASA and Statin and monitor for S/Sx of angina/ACS. Continue to monitor on telemetry.     Hyperlipidemia  Statin      Hypertension  Vitals:    01/24/25 0104 01/24/25 0451 01/24/25 1107 01/24/25 1641   BP: 134/71 (!) 145/65 127/63 (!) 148/70    01/24/25 2105 01/25/25 0030 01/25/25 0518 01/25/25 0749   BP: (!) 145/67 (!) 146/49 (!) 143/75 (!) 140/72    01/25/25 1134 01/25/25 1648   BP: 136/76 136/69         Patient's blood pressure range in the last 24 hours was: BP  Min: 136/69  Max: 146/49.The patient's inpatient anti-hypertensive regimen is listed below:  Current Antihypertensives  amLODIPine tablet 10 mg, Daily, Oral  isosorbide mononitrate 24 hr tablet 30 mg, Daily, Oral  furosemide injection 80 mg, Daily, Intravenous  hydrALAZINE tablet 50 mg, Every 8 hours, Oral    Plan  - BP is controlled, no changes needed to their regimen  - follow    History of CVA (cerebrovascular accident)  Aspirin statin        VTE Risk Mitigation (From admission, onward)           Ordered     enoxaparin injection 30 mg  Every 24 hours         01/25/25 1520     Reason for No Pharmacological VTE Prophylaxis  Once        Question:  Reasons:  Answer:  Already adequately anticoagulated on oral Anticoagulants    01/19/25 1712     IP VTE HIGH RISK PATIENT  Once         01/19/25 1712     Place sequential compression device  Until discontinued          01/19/25 1712                    Discharge Planning   HERBERT:      Code Status: Full Code   Medical Readiness for Discharge Date:   Discharge Plan A: Home with family, Home Health                        Ye Chahal MD  Department of Hospital Medicine   [facility]

## 2025-01-26 NOTE — ASSESSMENT & PLAN NOTE
Patient has persistent (7 days or more) atrial fibrillation. Patient is currently in atrial fibrillation. [id number] Score: 5. The patients heart rate in the last 24 hours is as follows:  Pulse  Min: 60  Max: 68     Antiarrhythmics       Anticoagulants  enoxaparin injection 40 mg, Every 24 hours, Subcutaneous    Plan  - Replete lytes with a goal of K>4, Mg >2  - Patient is not anticoagulated due to holding anticoagulation (Xarelto) in anticipation of procedure  - Patient's afib is currently controlled  - follow

## 2025-01-26 NOTE — ASSESSMENT & PLAN NOTE
"Patient has Diastolic (HFpEF) heart failure that is Acute on chronic. On presentation their CHF was decompensated. Evidence of decompensated CHF on presentation includes:  Pleural effusion. The etiology of their decompensation is likely dietary indiscretion. Most recent BNP and echo results are listed below.  No results for input(s): "BNP" in the last 72 hours.  Latest ECHO  Results for orders placed during the hospital encounter of 11/17/24    Echo Saline Bubble? Yes    Interpretation Summary    Left Ventricle: The left ventricle is mildly dilated. Mildly increased wall thickness. Normal wall motion. Septal motion is consistent with pacing. There is diastolic dysfunction.    Right Ventricle: Moderate right ventricular enlargement.    Left Atrium: Left atrium is mildly dilated.    Right Atrium: Right atrium is severely dilated.    Aortic Valve: The aortic valve is a trileaflet valve. Mildly calcified cusps.    Mitral Valve: There is mild bileaflet sclerosis. Mildly thickened leaflets. There is mild to moderate regurgitation with an eccentric jet.    Tricuspid Valve: There is moderate to severe regurgitation with an eccentrically directed jet.    Pulmonic Valve: There is mild to moderate regurgitation.    IVC/SVC: Elevated venous pressure at 15 mmHg.    Current Heart Failure Medications  hydrALAZINE tablet 50 mg, Every 8 hours, Oral  furosemide tablet 80 mg, Daily, Oral    Plan  - Monitor strict I&Os and daily weights.    - Place on telemetry  - Low sodium diet  - Place on fluid restriction of 1.5 L.   - Cardiology has not been consulted  - The patient's volume status is improving but not at their baseline as indicated by shortness of breath  - follow  Continue diuresis  "

## 2025-01-26 NOTE — ASSESSMENT & PLAN NOTE
Vitals:    01/24/25 0104 01/24/25 0451 01/24/25 1107 01/24/25 1641   BP: 134/71 (!) 145/65 127/63 (!) 148/70    01/24/25 2105 01/25/25 0030 01/25/25 0518 01/25/25 0749   BP: (!) 145/67 (!) 146/49 (!) 143/75 (!) 140/72    01/25/25 1134 01/25/25 1648   BP: 136/76 136/69         Patient's blood pressure range in the last 24 hours was: BP  Min: 136/69  Max: 146/49.The patient's inpatient anti-hypertensive regimen is listed below:  Current Antihypertensives  amLODIPine tablet 10 mg, Daily, Oral  isosorbide mononitrate 24 hr tablet 30 mg, Daily, Oral  furosemide injection 80 mg, Daily, Intravenous  hydrALAZINE tablet 50 mg, Every 8 hours, Oral    Plan  - BP is controlled, no changes needed to their regimen  - follow

## 2025-01-26 NOTE — ASSESSMENT & PLAN NOTE
Patient found to have large pleural effusion on imaging. I have personally reviewed and interpreted the following imaging: Xray. A thoracentesis was deferred. Most likely etiology includes Congestive Heart Failure and Pneumonia. Management to include Diuresis  Continue diuresis    01/21 Chest tube planned in am  01/22 Transdate pleural effusion removed and pt feels breathing much better.  1/26 marked improvement.  Chest tube per pulmonology.

## 2025-01-26 NOTE — ASSESSMENT & PLAN NOTE
Creatine stable for now. BMP reviewed- noted Estimated Creatinine Clearance: 30.3 mL/min (A) (based on SCr of 1.96 mg/dL (H)). according to latest data. Based on current GFR, CKD stage is stage 4 - GFR 15-29.  Monitor UOP and serial BMP and adjust therapy as needed. Renally dose meds. Avoid nephrotoxic medications and procedures.

## 2025-01-26 NOTE — ASSESSMENT & PLAN NOTE
Patient's blood pressure range in the last 24 hours was: BP  Min: 136/69  Max: 153/77.The patient's inpatient anti-hypertensive regimen is listed below:  Current Antihypertensives  amLODIPine tablet 10 mg, Daily, Oral  isosorbide mononitrate 24 hr tablet 30 mg, Daily, Oral  furosemide injection 80 mg, Daily, Intravenous  hydrALAZINE tablet 50 mg, Every 8 hours, Oral    Plan  BP is controlled   Follow

## 2025-01-26 NOTE — ASSESSMENT & PLAN NOTE
Treat. This long standing problem Only 1 BM every three days    01/25 Continue to work on constipation.

## 2025-01-26 NOTE — SUBJECTIVE & OBJECTIVE
Interval History/Significant Events:  Patient without complaints she had a bowel movement    Review of Systems  Objective:     Vital Signs (Most Recent):  Temp: 98.3 °F (36.8 °C) (01/26/25 0611)  Pulse: 60 (01/26/25 0611)  Resp: 16 (01/26/25 0241)  BP: (!) 142/70 (01/26/25 0611)  SpO2: (!) 94 % (01/26/25 0611) Vital Signs (24h Range):  Temp:  [97.5 °F (36.4 °C)-98.4 °F (36.9 °C)] 98.3 °F (36.8 °C)  Pulse:  [60-65] 60  Resp:  [16-18] 16  SpO2:  [94 %-99 %] 94 %  BP: (136-153)/(69-77) 142/70   Weight: 97.1 kg (214 lb)  Body mass index is 30.71 kg/m².      Intake/Output Summary (Last 24 hours) at 1/26/2025 0701  Last data filed at 1/25/2025 1648  Gross per 24 hour   Intake 960 ml   Output --   Net 960 ml          Physical Exam  Vitals reviewed.   Constitutional:       Appearance: Normal appearance.      Interventions: She is not intubated.  HENT:      Head: Normocephalic and atraumatic.      Nose: Nose normal.      Mouth/Throat:      Mouth: Mucous membranes are dry.      Pharynx: Oropharynx is clear.   Eyes:      Extraocular Movements: Extraocular movements intact.      Conjunctiva/sclera: Conjunctivae normal.      Pupils: Pupils are equal, round, and reactive to light.   Cardiovascular:      Rate and Rhythm: Normal rate.      Heart sounds: Normal heart sounds. No murmur heard.  Pulmonary:      Effort: Pulmonary effort is normal. She is not intubated.      Breath sounds: Normal breath sounds.   Abdominal:      General: Abdomen is flat. Bowel sounds are normal.      Palpations: Abdomen is soft.   Musculoskeletal:         General: Normal range of motion.      Cervical back: Normal range of motion and neck supple.      Right lower leg: No edema.      Left lower leg: No edema.   Skin:     General: Skin is warm and dry.      Capillary Refill: Capillary refill takes less than 2 seconds.   Neurological:      General: No focal deficit present.      Mental Status: She is alert and oriented to person, place, and time.  "  Psychiatric:         Mood and Affect: Mood normal.         Behavior: Behavior normal.            Vents:  Oxygen Concentration (%): 28 (01/23/25 0747)  Lines/Drains/Airways       Drain  Duration                  Chest Tube 01/22/25 1045 Tube - 1 Right Midaxillary 3 days              Peripheral Intravenous Line  Duration                  Peripheral IV - Single Lumen 01/25/25 1055 20 G 1 in Anterior;Left Forearm <1 day                  Significant Labs:    CBC/Anemia Profile:  Recent Labs   Lab 01/26/25  0551   WBC 6.20   HGB 10.2*   HCT 31.7*      MCV 80.5   RDW 16.9*        Chemistries:  No results for input(s): "NA", "K", "CL", "CO2", "BUN", "CREATININE", "CALCIUM", "ALBUMIN", "PROT", "BILITOT", "ALKPHOS", "ALT", "AST", "GLUCOSE", "MG", "PHOS" in the last 48 hours.    Recent Lab Results  (Last 5 results in the past 24 hours)        01/26/25  0551   01/25/25  2202   01/25/25  1645   01/25/25  1134   01/25/25  0746        Baso # 0.02               Basophil % 0.3               Differential Method Auto               Eos # 0.07               Eos % 1.1               Hematocrit 31.7               Hemoglobin 10.2               Immature Grans (Abs) 0.03               Immature Granulocytes 0.5               Lymph # 1.03               Lymph % 16.6               MCH 25.9               MCHC 32.2               MCV 80.5               Mono # 0.79               Mono % 12.7               MPV 10.3               Neutrophils, Abs 4.26               Neutrophils Relative 68.8               nRBC 0.0               NUCLEATED RBC ABSOLUTE 0.00               Platelet Count 196               POC Glucose   166   192   166   97       RBC 3.94               RDW 16.9               WBC 6.20                                      Significant Imaging:  I have reviewed all pertinent imaging results/findings within the past 24 hours.  "

## 2025-01-26 NOTE — ASSESSMENT & PLAN NOTE
Vitals:    01/25/25 0030 01/25/25 0518 01/25/25 0749 01/25/25 1134   BP: (!) 146/49 (!) 143/75 (!) 140/72 136/76    01/25/25 1648 01/25/25 2200 01/25/25 2358 01/26/25 0611   BP: 136/69 (!) 153/77 (!) 145/69 (!) 142/70    01/26/25 0753 01/26/25 1143   BP: (!) 148/81 138/71         Patient's blood pressure range in the last 24 hours was: BP  Min: 136/69  Max: 153/77.The patient's inpatient anti-hypertensive regimen is listed below:  Current Antihypertensives  amLODIPine tablet 10 mg, Daily, Oral  isosorbide mononitrate 24 hr tablet 30 mg, Daily, Oral  furosemide injection 80 mg, Daily, Intravenous  hydrALAZINE tablet 50 mg, Every 8 hours, Oral    Plan  - BP is controlled, no changes needed to their regimen  - follow

## 2025-01-26 NOTE — SUBJECTIVE & OBJECTIVE
Interval History:     Review of Systems   Constitutional:  Positive for fatigue. Negative for appetite change and fever.   HENT:  Negative for congestion, hearing loss and trouble swallowing.    Respiratory:  Positive for shortness of breath. Negative for chest tightness and wheezing.    Cardiovascular:  Negative for chest pain and palpitations.   Gastrointestinal:  Positive for constipation. Negative for abdominal pain and nausea.   Genitourinary:  Negative for difficulty urinating and dysuria.   Musculoskeletal:  Positive for gait problem. Negative for back pain and neck stiffness.   Skin:  Negative for pallor and rash.   Neurological:  Negative for dizziness, speech difficulty and headaches.   Psychiatric/Behavioral:  Negative for confusion and suicidal ideas.      Objective:     Vital Signs (Most Recent):  Temp: 98.4 °F (36.9 °C) (01/25/25 1648)  Pulse: 61 (01/25/25 1925)  Resp: 18 (01/25/25 1925)  BP: 136/69 (01/25/25 1648)  SpO2: 98 % (01/25/25 1925) Vital Signs (24h Range):  Temp:  [97.3 °F (36.3 °C)-98.4 °F (36.9 °C)] 98.4 °F (36.9 °C)  Pulse:  [60-64] 61  Resp:  [14-18] 18  SpO2:  [95 %-99 %] 98 %  BP: (136-146)/(49-76) 136/69     Weight: 97.1 kg (214 lb)  Body mass index is 30.71 kg/m².    Intake/Output Summary (Last 24 hours) at 1/25/2025 2140  Last data filed at 1/25/2025 1648  Gross per 24 hour   Intake 960 ml   Output 275 ml   Net 685 ml         Physical Exam  Vitals reviewed.   Constitutional:       General: She is awake. She is not in acute distress.     Appearance: She is well-developed. She is obese. She is not toxic-appearing.   HENT:      Head: Normocephalic.      Nose: Nose normal.      Mouth/Throat:      Pharynx: Oropharynx is clear.   Eyes:      Extraocular Movements: Extraocular movements intact.      Pupils: Pupils are equal, round, and reactive to light.   Neck:      Thyroid: No thyroid mass.      Vascular: No carotid bruit.   Cardiovascular:      Rate and Rhythm: Normal rate and regular  "rhythm.      Pulses: Normal pulses.      Heart sounds: Normal heart sounds. No murmur heard.  Pulmonary:      Effort: Pulmonary effort is normal.      Breath sounds: Normal breath sounds and air entry. No wheezing.   Abdominal:      General: Bowel sounds are normal. There is no distension.      Palpations: Abdomen is soft.      Tenderness: There is no abdominal tenderness.   Musculoskeletal:         General: Normal range of motion.      Cervical back: Neck supple. No rigidity.   Skin:     General: Skin is warm.      Coloration: Skin is not jaundiced.      Findings: No lesion.   Neurological:      General: No focal deficit present.      Mental Status: She is alert and oriented to person, place, and time.      Cranial Nerves: No cranial nerve deficit.   Psychiatric:         Attention and Perception: Attention normal.         Mood and Affect: Mood normal.         Behavior: Behavior normal. Behavior is cooperative.         Thought Content: Thought content normal.         Cognition and Memory: Cognition normal.             Significant Labs: All pertinent labs within the past 24 hours have been reviewed.  BMP:   No results for input(s): "GLU", "NA", "K", "CL", "CO2", "BUN", "CREATININE", "CALCIUM", "MG" in the last 48 hours.    CBC:   No results for input(s): "WBC", "HGB", "HCT", "PLT" in the last 48 hours.    CMP:   No results for input(s): "NA", "K", "CL", "CO2", "GLU", "BUN", "CREATININE", "CALCIUM", "PROT", "ALBUMIN", "BILITOT", "ALKPHOS", "AST", "ALT", "ANIONGAP", "EGFRNONAA" in the last 48 hours.    Invalid input(s): "ESTGFAFR[place]"      Significant Imaging: I have reviewed all pertinent imaging results/findings within the past 24 hours.    Imaging Results              US Lower Extremity Veins Left (Final result)  Result time 01/19/25 17:49:22      Final result by Oliver Sweeney MD (01/19/25 17:49:22)                   Impression:      No evidence of deep venous thrombosis in the left lower " extremity.      Electronically signed by: Oliver Sweeney  Date:    01/19/2025  Time:    17:49               Narrative:    EXAMINATION:  US LOWER EXTREMITY VEINS LEFT    CLINICAL HISTORY:  Edema;    TECHNIQUE:  Duplex and color flow Doppler evaluation and graded compression of the left lower extremity veins was performed.    COMPARISON:  11/18/2024    FINDINGS:  Left thigh veins: The common femoral, femoral, popliteal, upper greater saphenous, and deep femoral veins are patent and free of thrombus. The veins are normally compressible and have normal phasic flow and augmentation response.    Left calf veins: The visualized calf veins are patent.    Contralateral CFV: The contralateral (right) common femoral vein is patent and free of thrombus.    Miscellaneous: None                                       CT Chest Abdomen Pelvis Without Contrast (XPD) (Final result)  Result time 01/19/25 16:11:01      Final result by Danie Agustin MD (01/19/25 16:11:01)                   Impression:      1. Moderate to large loculated right pleural effusion noting compressive atelectasis of the right lower lobe.  2. No findings to suggest obstructive uropathy or bowel obstruction.  3. Colonic diverticulosis without diverticulitis.  4. There is induration along the anterior abdominal wall, correlation with any superficial cellulitis.  5. Please see above for several additional findings.      Electronically signed by: Danie Agustin MD  Date:    01/19/2025  Time:    16:11               Narrative:    EXAMINATION:  CT CHEST ABDOMEN PELVIS WITHOUT CONTRAST(XPD)    CLINICAL HISTORY:  Aortic aneurysm, known or suspected;Patient with protuberant abdomen, no BM x 1 day, CKD, also with CHF and pleural effusion;    TECHNIQUE:  Low dose axial images, sagittal and coronal reformations were obtained from the thoracic inlet to the pubic symphysis .  Oral contrast was not administered.    COMPARISON:  CT chest 12/25/2018    FINDINGS:  The  structures at the base of the neck are unremarkable.  There are a few scattered upper limit of normal caliber mediastinal lymph nodes.  The heart is not significantly enlarged.  Pacer wire noted.  No pericardial effusion.  The thoracic aorta tapers normally noting atherosclerotic calcification along its course and in the distribution of the coronary arteries.    Motion artifact limits evaluation of the airways and pulmonary parenchyma.  Allowing for this, the airways are patent.  There is a moderate right pleural effusion with associated compressive atelectasis of the right lower lobe.  There is fluid along the fissure on the right.  The right pleural effusion is partially loculated.  No pneumothorax.    The liver, spleen, and adrenal glands have a grossly unremarkable noncontrast appearance.  There is high attenuating material layering within the gallbladder suggesting calculi or sludge.  There is a small hiatal hernia.  The stomach is nondistended, no gastric wall thickening.  No significant abdominal lymphadenopathy.    There is right renal cortical scarring involving the interpolar region.  No hydronephrosis or nephrolithiasis.  The bilateral ureters are unable to be followed in their entirety to the urinary bladder, no definite calculi seen or secondary findings to suggest obstructive uropathy.  The urinary bladder is unremarkable.  There are uterine leiomyoma.  The adnexa is unremarkable.  No significant free fluid in the pelvis.    There are a few scattered colonic diverticula without surrounding inflammation to suggest diverticulitis.  There is a small focus of suspected fat infarct along the left lateral aspect of the descending colon.  The terminal ileum and appendix are unremarkable.  The small bowel is grossly unremarkable.  There are several scattered shotty periaortic, pericaval, and mesenteric lymph nodes.  There is atherosclerotic calcification of the aorta and its branches.    There is osteopenia.   "There are degenerative changes of the spine.  There are degenerative changes of the sacroiliac joints.  No significant inguinal lymphadenopathy.  There is scattered body wall anasarca.  There is a fat containing umbilical hernia.                                       X-Ray Chest 1 View (Final result)  Result time 01/19/25 14:55:11      Final result by Manpreet Del Real MD (01/19/25 14:55:11)                   Impression:      Cardiomegaly with increased size of now moderate to large right-sided pleural effusion.  Right lung aeration is worse compared to the prior study.      Electronically signed by: Manpreet Del Real MD  Date:    01/19/2025  Time:    14:55               Narrative:    EXAMINATION:  XR CHEST 1 VIEW    CLINICAL HISTORY:  Provided history is "  Shortness of breath".    TECHNIQUE:  One view of the chest.    COMPARISON:  01/03/2025.    FINDINGS:  Cardiac silhouette is enlarged and similar to the prior study.  Moderate to large right pleural effusion, worse from prior study.  Calcified mediastinal and hilar lymph nodes suggestive of remote granulomatous process.  Atherosclerotic calcifications overlie the aortic arch.  No significant consolidation in the left lung.  No sizable left pleural effusion.  No distinct pneumothorax.                                    Intake/Output - Last 3 Shifts         01/24 0700  01/25 0659 01/25 0700  01/26 0659    P.O.  960    Total Intake(mL/kg)  960 (9.9)    Chest Tube  275    Total Output  275    Net  +685                Microbiology Results (last 7 days)       Procedure Component Value Units Date/Time    Culture, Body Fluid [[id number]] Collected: 01/22/25 1056    Order Status: Completed Specimen: Body Fluid from Pleural Fluid Updated: 01/24/25 0724     Culture, Body Fluid No Growth at 2 Days            "

## 2025-01-26 NOTE — ASSESSMENT & PLAN NOTE
Patient with a loculated pleural effusion, confirmed with bedside ultrasound.  Patient's anticoagulation has now been held for 48 hours.  She is now a candidate for chest tube placement +/-instillation of tPA/dornase pending pleural fluid analysis.  Differential is broad at this time and includes parapneumonic effusion, transudative with volume overload and/or malignant effusion.     Plan for pigtail chest tube insertion discussed with the family.  Risks of insertion explained to patient as well as his family.  These risks include but are not limited to bleeding, infection, injury to the plan, liver, kidneys and/or pneumothorax.  Patient and family agree.    It looks like only 100 cc drained out of chest tube today will discuss with Dr. Porter when he wants remove chest tube

## 2025-01-26 NOTE — PROGRESS NOTES
[facility]  Critical Care Medicine  Progress Note    Patient Name: Pam Ortega  MRN: [id number]  Admission Date: 1/19/2025  Hospital Length of Stay: 7 days  Code Status: Full Code  Attending Provider: Robert Gramajo DO  Primary Care Provider: Janice Velez FNP   Principal Problem: Acute hypoxic respiratory failure    Subjective:     HPI:      Hospital/ICU Course:  1/22/25-chest x-ray reviewed by me personally aspirin which shows evidence of moderate-large loculated pleural effusion.  I did a bedside ultrasound which confirmed the same.  Patient has consented for chest tube placement.  Hemodynamically stable this morning saturating at 98% on 2 L oxygen.  Xarelto has now been held for    Interval History/Significant Events:  Patient without complaints she had a bowel movement    Review of Systems  Objective:     Vital Signs (Most Recent):  Temp: 98.3 °F (36.8 °C) (01/26/25 0611)  Pulse: 60 (01/26/25 0611)  Resp: 16 (01/26/25 0241)  BP: (!) 142/70 (01/26/25 0611)  SpO2: (!) 94 % (01/26/25 0611) Vital Signs (24h Range):  Temp:  [97.5 °F (36.4 °C)-98.4 °F (36.9 °C)] 98.3 °F (36.8 °C)  Pulse:  [60-65] 60  Resp:  [16-18] 16  SpO2:  [94 %-99 %] 94 %  BP: (136-153)/(69-77) 142/70   Weight: 97.1 kg (214 lb)  Body mass index is 30.71 kg/m².      Intake/Output Summary (Last 24 hours) at 1/26/2025 0701  Last data filed at 1/25/2025 1648  Gross per 24 hour   Intake 960 ml   Output --   Net 960 ml          Physical Exam  Vitals reviewed.   Constitutional:       Appearance: Normal appearance.      Interventions: She is not intubated.  HENT:      Head: Normocephalic and atraumatic.      Nose: Nose normal.      Mouth/Throat:      Mouth: Mucous membranes are dry.      Pharynx: Oropharynx is clear.   Eyes:      Extraocular Movements: Extraocular movements intact.      Conjunctiva/sclera: Conjunctivae normal.      Pupils: Pupils are equal, round, and reactive to light.   Cardiovascular:      Rate and  "Rhythm: Normal rate.      Heart sounds: Normal heart sounds. No murmur heard.  Pulmonary:      Effort: Pulmonary effort is normal. She is not intubated.      Breath sounds: Normal breath sounds.   Abdominal:      General: Abdomen is flat. Bowel sounds are normal.      Palpations: Abdomen is soft.   Musculoskeletal:         General: Normal range of motion.      Cervical back: Normal range of motion and neck supple.      Right lower leg: No edema.      Left lower leg: No edema.   Skin:     General: Skin is warm and dry.      Capillary Refill: Capillary refill takes less than 2 seconds.   Neurological:      General: No focal deficit present.      Mental Status: She is alert and oriented to person, place, and time.   Psychiatric:         Mood and Affect: Mood normal.         Behavior: Behavior normal.            Vents:  Oxygen Concentration (%): 28 (01/23/25 0747)  Lines/Drains/Airways       Drain  Duration                  Chest Tube 01/22/25 1045 Tube - 1 Right Midaxillary 3 days              Peripheral Intravenous Line  Duration                  Peripheral IV - Single Lumen 01/25/25 1055 20 G 1 in Anterior;Left Forearm <1 day                  Significant Labs:    CBC/Anemia Profile:  Recent Labs   Lab 01/26/25  0551   WBC 6.20   HGB 10.2*   HCT 31.7*      MCV 80.5   RDW 16.9*        Chemistries:  No results for input(s): "NA", "K", "CL", "CO2", "BUN", "CREATININE", "CALCIUM", "ALBUMIN", "PROT", "BILITOT", "ALKPHOS", "ALT", "AST", "GLUCOSE", "MG", "PHOS" in the last 48 hours.    Recent Lab Results  (Last 5 results in the past 24 hours)        01/26/25  0551   01/25/25  2202   01/25/25  1645   01/25/25  1134   01/25/25  0746        Baso # 0.02               Basophil % 0.3               Differential Method Auto               Eos # 0.07               Eos % 1.1               Hematocrit 31.7               Hemoglobin 10.2               Immature Grans (Abs) 0.03               Immature Granulocytes 0.5               " "Lymph # 1.03               Lymph % 16.6               MCH 25.9               MCHC 32.2               MCV 80.5               Mono # 0.79               Mono % 12.7               MPV 10.3               Neutrophils, Abs 4.26               Neutrophils Relative 68.8               nRBC 0.0               NUCLEATED RBC ABSOLUTE 0.00               Platelet Count 196               POC Glucose   166   192   166   97       RBC 3.94               RDW 16.9               WBC 6.20                                      Significant Imaging:  I have reviewed all pertinent imaging results/findings within the past 24 hours.    ABG  No results for input(s): "PH", "PO2", "PCO2", "HCO3", "BE" in the last 168 hours.  Assessment/Plan:     Pulmonary  * Acute hypoxic respiratory failure  Really resolved    Pleural effusion  Patient with a loculated pleural effusion, confirmed with bedside ultrasound.  Patient's anticoagulation has now been held for 48 hours.  She is now a candidate for chest tube placement +/-instillation of tPA/dornase pending pleural fluid analysis.  Differential is broad at this time and includes parapneumonic effusion, transudative with volume overload and/or malignant effusion.     Plan for pigtail chest tube insertion discussed with the family.  Risks of insertion explained to patient as well as his family.  These risks include but are not limited to bleeding, infection, injury to the plan, liver, kidneys and/or pneumothorax.  Patient and family agree.    It looks like only 100 cc drained out of chest tube today will discuss with Dr. Porter when he wants remove chest tube    Cough  No complaints of cough this morning    Other  Obstructive sleep apnea  Patient is brought home CPAP machine to use here as appropriate             Yonny Mckee MD  Critical Care Medicine  [facility]  "

## 2025-01-27 VITALS
HEART RATE: 63 BPM | OXYGEN SATURATION: 98 % | TEMPERATURE: 98 F | BODY MASS INDEX: 30.64 KG/M2 | DIASTOLIC BLOOD PRESSURE: 76 MMHG | WEIGHT: 214 LBS | RESPIRATION RATE: 16 BRPM | SYSTOLIC BLOOD PRESSURE: 135 MMHG | HEIGHT: 70 IN

## 2025-01-27 LAB
GLUCOSE SERPL-MCNC: 185 MG/DL (ref 70–105)
GLUCOSE SERPL-MCNC: 92 MG/DL (ref 70–105)

## 2025-01-27 PROCEDURE — 25000242 PHARM REV CODE 250 ALT 637 W/ HCPCS: Performed by: INTERNAL MEDICINE

## 2025-01-27 PROCEDURE — 82962 GLUCOSE BLOOD TEST: CPT

## 2025-01-27 PROCEDURE — 94761 N-INVAS EAR/PLS OXIMETRY MLT: CPT

## 2025-01-27 PROCEDURE — 63600175 PHARM REV CODE 636 W HCPCS: Performed by: STUDENT IN AN ORGANIZED HEALTH CARE EDUCATION/TRAINING PROGRAM

## 2025-01-27 PROCEDURE — 25000003 PHARM REV CODE 250: Performed by: STUDENT IN AN ORGANIZED HEALTH CARE EDUCATION/TRAINING PROGRAM

## 2025-01-27 PROCEDURE — 99239 HOSP IP/OBS DSCHRG MGMT >30: CPT | Mod: ,,, | Performed by: STUDENT IN AN ORGANIZED HEALTH CARE EDUCATION/TRAINING PROGRAM

## 2025-01-27 PROCEDURE — 27200667 HC PACEMAKER, TEMPORARY MONITORING, PER SHIFT

## 2025-01-27 PROCEDURE — 94640 AIRWAY INHALATION TREATMENT: CPT

## 2025-01-27 PROCEDURE — 25000003 PHARM REV CODE 250: Performed by: HOSPITALIST

## 2025-01-27 RX ADMIN — ALLOPURINOL 100 MG: 100 TABLET ORAL at 08:01

## 2025-01-27 RX ADMIN — ISOSORBIDE MONONITRATE 30 MG: 30 TABLET, EXTENDED RELEASE ORAL at 08:01

## 2025-01-27 RX ADMIN — PANTOPRAZOLE SODIUM 40 MG: 40 TABLET, DELAYED RELEASE ORAL at 08:01

## 2025-01-27 RX ADMIN — BUDESONIDE INHALATION 0.5 MG: 0.5 SUSPENSION RESPIRATORY (INHALATION) at 07:01

## 2025-01-27 RX ADMIN — ENOXAPARIN SODIUM 100 MG: 100 INJECTION SUBCUTANEOUS at 08:01

## 2025-01-27 RX ADMIN — ASPIRIN 81 MG: 81 TABLET, COATED ORAL at 08:01

## 2025-01-27 RX ADMIN — GLIMEPIRIDE 2 MG: 2 TABLET ORAL at 07:01

## 2025-01-27 RX ADMIN — POLYETHYLENE GLYCOL 3350 17 G: 17 POWDER, FOR SOLUTION ORAL at 08:01

## 2025-01-27 RX ADMIN — FUROSEMIDE 80 MG: 40 TABLET ORAL at 08:01

## 2025-01-27 RX ADMIN — HYDRALAZINE HYDROCHLORIDE 50 MG: 50 TABLET ORAL at 01:01

## 2025-01-27 RX ADMIN — HYDRALAZINE HYDROCHLORIDE 50 MG: 50 TABLET ORAL at 05:01

## 2025-01-27 RX ADMIN — AMLODIPINE BESYLATE 10 MG: 10 TABLET ORAL at 08:01

## 2025-01-27 NOTE — NURSING
Lying in bed with HOB elevated.  Skin warm et dry.  Resp even et unlabored.  Denies pain or discomfort at this time.  Discharge instructions reviewed with pt and family member and copy provided in writing.  Denies add'l questions or concerns at this time.  Awaiting transport home with HH via private vehicle.  No s/s of acute distress noted.  Safety measures in place et maintained.

## 2025-01-27 NOTE — ASSESSMENT & PLAN NOTE
Patient's blood pressure range in the last 24 hours was: BP  Min: 119/68  Max: 153/77.The patient's inpatient anti-hypertensive regimen is listed below:  Current Antihypertensives  amLODIPine tablet 10 mg, Daily, Oral  isosorbide mononitrate 24 hr tablet 30 mg, Daily, Oral  hydrALAZINE tablet 50 mg, Every 8 hours, Oral  furosemide tablet 80 mg, Daily, Oral    Plan  BP is controlled   Follow

## 2025-01-27 NOTE — DISCHARGE SUMMARY
[facility] Hospital Medicine  Discharge Summary      Patient Name: Pam Ortega  MRN: [id number]  JAIDEN: [id number]  Patient Class: IP- Inpatient  Admission Date: 1/19/2025  Hospital Length of Stay: 8 days  Discharge Date and Time:  01/27/2025 12:33 PM  Attending Physician: Robert Gramajo DO   Discharging Provider: Robert Gramajo DO  Primary Care Provider: Janice Velez FNP    Primary Care Team: Networked reference to record PCT     HPI:        79-year-old female with anemia chronic disease, AFib, CKD, hypertension, bronchitis, CAD, type 2 diabetes, hyperlipidemia, dilated cardiomyopathy, heart failure with preserved ejection fraction, obesity, peripheral vascular disease presents to the ED with progressively worsening shortness of breath.  She has had pleural effusion chronically and follow up with Dr. Mckee.  She recently saw him in clinic and had her inhalers adjusted.  Recently saw Dr. Dutta and had her diuretics increased.  Even with this for shortness of breath progressively worsened over the last several days which prompted her presentation to the ED.  CT reveals loculated pleural effusion.  Pulmonology consulted.  She denies any chest pain.  Denies any fever, chills, palpitations, nausea vomiting diarrhea dysuria.  Review of systems otherwise negative           * No surgery found *      Hospital Course:   1/20 breathing significantly improved today.  Continue diuresis    01/21 Record reviewed. Progressive worse SOB. Pulmonary consulted   PMHx: anemia chronic disease, AFib, CKD, hypertension, bronchitis, CAD, PPM, type 2 diabetes, hyperlipidemia, dilated cardiomyopathy, heart failure with preserved ejection fraction, obesity, peripheral vascular disease with symptomatic pleural effusion.    Seen DR Dutta outpt for renal dx.   Had vascular surgery 11/2024 follow arterial thrombus right leg.   Holding xarelto and plan placement CXR in am. C/O some constipation  01/22 Chest  tube placed and pt says feels much better. No other complaints. Had BM. BP and BS OK.  01/23 feels better.  BP and BS slight up  01/24 BP and BS both mildly up. C/O constipation otherwise seems to be doing well. No bleeding and restart at least DVT prophylaxis. Restart eliquis when OK with pulm. .   01/25 BP and BS OK. Still constipation. Lovenox until pulm says OK to start eliquis.   1/26 looks remarkably better  1/27- chest tube removed, discussed with pulm stable for dc. Follow up pcp, follow up pulm     Goals of Care Treatment Preferences:  Code Status: Full Code      SDOH Screening:  The patient was screened for utility difficulties, food insecurity, transport difficulties, housing insecurity, and interpersonal safety and there were no concerns identified this admission.     Consults:   Consults (From admission, onward)          Status Ordering Provider     Inpatient consult to Social Work  Once        Provider:  (Not yet assigned)    Completed PEPE VARGAS     Inpatient consult to Social Work/Case Management  Once        Provider:  (Not yet assigned)    Completed JOHN WALTERS     Inpatient consult to Registered Dietitian/Nutritionist  Once        Provider:  (Not yet assigned)    Completed JOHN WALTERS     Inpatient consult to Pulmonology  Once        Provider:  Yonny Mckee MD    Acknowledged JOHN WALTERS            * Acute hypoxic respiratory failure  Patient with Hypoxic Respiratory failure which is Acute.  she is not on home oxygen. Supplemental oxygen was provided and noted-      .   Signs/symptoms of respiratory failure include- tachypnea, increased work of breathing, and respiratory distress. Contributing diagnoses includes - Pleural effusion and Pneumonia Labs and images were reviewed. Patient Has not had a recent ABG. Will treat underlying causes and adjust management of respiratory failure as follows-     Nebs   Diuresis   Pulmonology consult   Discussed case with Dr. Mckee  "today  Marked improvement  Resolved, pulm follow up    Constipation    Treat. This long standing problem Only 1 BM every three days    01/25 Continue to work on constipation.    Presence of cardiac pacemaker    Will get additional details    Acute on chronic heart failure with preserved ejection fraction  Patient has Diastolic (HFpEF) heart failure that is Acute on chronic. On presentation their CHF was decompensated. Evidence of decompensated CHF on presentation includes:  Pleural effusion. The etiology of their decompensation is likely dietary indiscretion. Most recent BNP and echo results are listed below.  No results for input(s): "BNP" in the last 72 hours.  Latest ECHO  Results for orders placed during the hospital encounter of 11/17/24    Echo Saline Bubble? Yes    Interpretation Summary    Left Ventricle: The left ventricle is mildly dilated. Mildly increased wall thickness. Normal wall motion. Septal motion is consistent with pacing. There is diastolic dysfunction.    Right Ventricle: Moderate right ventricular enlargement.    Left Atrium: Left atrium is mildly dilated.    Right Atrium: Right atrium is severely dilated.    Aortic Valve: The aortic valve is a trileaflet valve. Mildly calcified cusps.    Mitral Valve: There is mild bileaflet sclerosis. Mildly thickened leaflets. There is mild to moderate regurgitation with an eccentric jet.    Tricuspid Valve: There is moderate to severe regurgitation with an eccentrically directed jet.    Pulmonic Valve: There is mild to moderate regurgitation.    IVC/SVC: Elevated venous pressure at 15 mmHg.    Current Heart Failure Medications  hydrALAZINE tablet 50 mg, Every 8 hours, Oral  furosemide tablet 80 mg, Daily, Oral    Plan  - Monitor strict I&Os and daily weights.    - Place on telemetry  - Low sodium diet  - Place on fluid restriction of 1.5 L.   - Cardiology has not been consulted  - The patient's volume status is improving but not at their baseline as " indicated by shortness of breath  - follow  Continue diuresis  Transition to oral home regimen    Pleural effusion  Patient found to have large pleural effusion on imaging. I have personally reviewed and interpreted the following imaging: Xray. A thoracentesis was deferred. Most likely etiology includes Congestive Heart Failure and Pneumonia. Management to include Diuresis  Continue diuresis    01/21 Chest tube planned in am  01/22 Transdate pleural effusion removed and pt feels breathing much better.  1/26 marked improvement.  Chest tube per pulmonology.    Ct removed, pulm follow up. Respiratory status looks great    Diabetic nephropathy  Glucose control      Peripheral vascular disease  Aspirin statin      Arterial occlusion  Vascular surgery right leg Nov 2024      CKD stage 4 secondary to hypertension  Creatine stable for now. BMP reviewed- noted Estimated Creatinine Clearance: 30.3 mL/min (A) (based on SCr of 1.96 mg/dL (H)). according to latest data. Based on current GFR, CKD stage is stage 4 - GFR 15-29.  Monitor UOP and serial BMP and adjust therapy as needed. Renally dose meds. Avoid nephrotoxic medications and procedures.    Cough  Better      Essential hypertension  Patient's blood pressure range in the last 24 hours was: BP  Min: 119/68  Max: 153/77.The patient's inpatient anti-hypertensive regimen is listed below:  Current Antihypertensives  amLODIPine tablet 10 mg, Daily, Oral  isosorbide mononitrate 24 hr tablet 30 mg, Daily, Oral  hydrALAZINE tablet 50 mg, Every 8 hours, Oral  furosemide tablet 80 mg, Daily, Oral    Plan  BP is controlled   Follow    Dyslipidemia associated with type 2 diabetes mellitus  Statin   Glucose control      Obstructive sleep apnea    BiPAP at night.  Wear CPAP at home.  Given BiPAP due to respiratory distress to support overnight    Obese  Body mass index is 30.71 kg/m². Morbid obesity complicates all aspects of disease management from diagnostic modalities to treatment.  "Weight loss encouraged and health benefits explained to patient.         Dyslipidemia  Statin      Chronic a-fib  Patient has persistent (7 days or more) atrial fibrillation. Patient is currently in atrial fibrillation. [id number] Score: 5. The patients heart rate in the last 24 hours is as follows:  Pulse  Min: 61  Max: 69     Antiarrhythmics       Anticoagulants  enoxaparin injection 100 mg, Every 12 hours, Subcutaneous    Plan  - Replete lytes with a goal of K>4, Mg >2  - Patient is not anticoagulated due to holding anticoagulation (Xarelto) in anticipation of procedure  - Patient's afib is currently controlled  - follow    xarelto    Cardiomyopathy  Diurese  euvolemic    Diabetes mellitus without complication  Glucose 157 High  (1 d ago) 157 High  75 Low  117 High  131 High  94 104 178 High        Patient's FSGs are controlled on current medication regimen.  Last A1c reviewed-   Lab Results   Component Value Date    HGBA1C 6.2 08/23/2024     Most recent fingerstick glucose reviewed- No results for input(s): "POCTGLUCOSE" in the last 24 hours.  Current correctional scale  Low  Maintain anti-hyperglycemic dose as follows-   Antihyperglycemics (From admission, onward)      Start     Stop Route Frequency Ordered    01/24/25 0745  glimepiride tablet 2 mg         -- Oral With breakfast 01/23/25 2324    01/19/25 1805  insulin aspart U-100 injection 0-5 Units         -- SubQ Before meals & nightly PRN 01/19/25 1712          Hold Oral hypoglycemics while patient is in the hospital.    Benign hypertensive CKD, stage 1-4 or unspecified chronic kidney disease  Creatine stable for now. BMP reviewed- noted Estimated Creatinine Clearance: 30.3 mL/min (A) (based on SCr of 1.96 mg/dL (H)). according to latest data. Based on current GFR, CKD stage is stage 4 - GFR 15-29.  Monitor UOP and serial BMP and adjust therapy as needed. Renally dose meds. Avoid nephrotoxic medications and procedures.    Coronary atherosclerosis  Patient " with known CAD s/p  unclear , which is controlled Will continue ASA and Statin and monitor for S/Sx of angina/ACS. Continue to monitor on telemetry.     Hyperlipidemia  Statin      Hypertension  Vitals:    01/25/25 2358 01/26/25 0611 01/26/25 0753 01/26/25 1143   BP: (!) 145/69 (!) 142/70 (!) 148/81 138/71    01/26/25 1648 01/26/25 2000 01/27/25 0000 01/27/25 0415   BP: (!) 145/62 128/70 125/68 119/68    01/27/25 0721 01/27/25 1125   BP: (!) 153/77 135/76         Patient's blood pressure range in the last 24 hours was: BP  Min: 119/68  Max: 153/77.The patient's inpatient anti-hypertensive regimen is listed below:  Current Antihypertensives  amLODIPine tablet 10 mg, Daily, Oral  isosorbide mononitrate 24 hr tablet 30 mg, Daily, Oral  hydrALAZINE tablet 50 mg, Every 8 hours, Oral  furosemide tablet 80 mg, Daily, Oral    Plan  - BP is controlled, no changes needed to their regimen  - follow    History of CVA (cerebrovascular accident)  Aspirin statin        Final Active Diagnoses:    Diagnosis Date Noted POA    PRINCIPAL PROBLEM:  Acute hypoxic respiratory failure [J96.01] 01/19/2025 Yes     Chronic    Constipation [K59.00] 01/24/2025 Yes    Presence of cardiac pacemaker [Z95.0] 01/21/2025 Yes    Acute on chronic heart failure with preserved ejection fraction [I50.33] 01/20/2025 Yes    Peripheral vascular disease [I73.9] 01/19/2025 Yes    Diabetic nephropathy [E11.21] 01/19/2025 Yes    Pleural effusion [J90] 01/19/2025 Yes    Arterial occlusion [I70.90] 11/17/2024 Yes    CKD stage 4 secondary to hypertension [I12.9, N18.4] 08/23/2024 Yes    Cough [R05.9] 12/22/2023 Yes    Cardiomyopathy [I42.9] 06/14/2022 Yes    Chronic a-fib [I48.20] 06/14/2022 Yes    Dyslipidemia [E78.5] 06/14/2022 Yes    Obese [E66.9] 06/14/2022 Yes    Obstructive sleep apnea [G47.33] 06/14/2022 Yes    Dyslipidemia associated with type 2 diabetes mellitus [E11.69, E78.5] 06/14/2022 Yes    Essential hypertension [I10] 06/14/2022 Yes    History of CVA  (cerebrovascular accident) [Z86.73]  Not Applicable    Hypertension [I10]  Yes    Hyperlipidemia [E78.5]  Yes    Coronary atherosclerosis [I25.10]  Yes    Benign hypertensive CKD, stage 1-4 or unspecified chronic kidney disease [I12.9]  Yes    Diabetes mellitus without complication [E11.9]  Yes      Problems Resolved During this Admission:    Diagnosis Date Noted Date Resolved POA    Stage 3b chronic kidney disease [N18.32] 07/05/2023 01/20/2025 Yes       Discharged Condition: good    Disposition: Home or Self Care    Follow Up:   Contact information for follow-up providers       Janice Velez FNP. Schedule an appointment as soon as possible for a visit in 1 week(s).    Specialties: Family Medicine, Emergency Medicine  Contact information:  [address]  [facility]  [facility] [address]  [phone]                       Contact information for after-discharge care       Home Medical Care       [facility] .    Service: Home Health Services  Contact information:  [address]  [address]  [facility] [address]  [phone]                                 Patient Instructions:      Call MD for:  temperature >100.4     Call MD for:  persistent nausea and vomiting or diarrhea     Call MD for:  severe uncontrolled pain     Call MD for:  redness, tenderness, or signs of infection (pain, swelling, redness, odor or green/yellow discharge around incision site)     Call MD for:  difficulty breathing or increased cough     Call MD for:  severe persistent headache     Call MD for:  worsening rash     Call MD for:  persistent dizziness, light-headedness, or visual disturbances     Call MD for:  increased confusion or weakness       Significant Diagnostic Studies: Labs: All labs within the past 24 hours have been reviewed    Pending Diagnostic Studies:       Procedure Component Value Units Date/Time    LDH, Body Fluid [[id number]]     Order Status: Sent Lab Status: No result     Specimen: Body  Fluid from Pleural Fluid     X-Ray Chest AP Portable [[id number]] Resulted: 01/27/25 1120    Order Status: Sent Lab Status: In process Updated: 01/27/25 1120           Medications:  Reconciled Home Medications:      Medication List        CONTINUE taking these medications      alendronate 70 MG tablet  Commonly known as: FOSAMAX  Take 1 tablet (70 mg total) by mouth every 7 days.     amLODIPine 10 MG tablet  Commonly known as: NORVASC  Take 1 tablet (10 mg total) by mouth once daily.     aspirin 81 MG EC tablet  Commonly known as: ECOTRIN  Take 81 mg by mouth once daily.     cholecalciferol (vitamin D3) 50 mcg (2,000 unit) Cap capsule  Commonly known as: VITAMIN D3  Take 1 capsule by mouth once daily.     colchicine 0.6 mg tablet  Commonly known as: COLCRYS  Take 1 tablet (0.6 mg total) by mouth once daily. for gout     fluticasone-salmeterol 250-50 mcg/dose 250-50 mcg/dose diskus inhaler  Commonly known as: ADVAIR  Inhale 1 puff into the lungs 2 (two) times daily. Controller Inhaler     furosemide 80 MG tablet  Commonly known as: LASIX  Take 1 tablet (80 mg total) by mouth every morning.     glimepiride 4 MG tablet  Commonly known as: AMARYL  Take 1 tablet (4 mg total) by mouth daily with breakfast.     hydrALAZINE 50 MG tablet  Commonly known as: APRESOLINE  Take 1 tablet (50 mg total) by mouth 2 (two) times a day.     isosorbide mononitrate 30 MG 24 hr tablet  Commonly known as: IMDUR  Take 1 tablet (30 mg total) by mouth once daily.     lovastatin 20 MG tablet  Commonly known as: MEVACOR  Take 1 tablet (20 mg total) by mouth every night     metoprolol tartrate 50 MG tablet  Commonly known as: LOPRESSOR  Take 1 tablet (50 mg total) by mouth 2 (two) times a day.     omeprazole 20 MG capsule  Commonly known as: PRILOSEC  Take 1 capsule (20 mg total) by mouth once daily.     OZEMPIC 1 mg/dose (4 mg/3 mL)  Generic drug: semaglutide  Inject 1 mg into the skin every 7 days.     potassium chloride 10 MEQ Cpsr  Commonly  known as: MICRO-K  Take 1 capsule (10 mEq total) by mouth once daily.     spironolactone 25 MG tablet  Commonly known as: ALDACTONE  Take 1 tablet (25 mg total) by mouth once daily.     SYMBICORT 160-4.5 mcg/actuation Hfaa  Generic drug: budesonide-formoterol 160-4.5 mcg  Inhale 2 puffs into the lungs every 12 (twelve) hours. Controller Inhaler     VENTOLIN HFA 90 mcg/actuation inhaler  Generic drug: albuterol  Inhale 2 puffs into the lungs every 6 (six) hours as needed for Wheezing. Rescue Inhaler     XARELTO 15 mg Tab  Generic drug: rivaroxaban  Take 1 tablet (15 mg total) by mouth once daily.              Indwelling Lines/Drains at time of discharge:   Lines/Drains/Airways       None                   Time spent on the discharge of patient: 35 minutes         Robert Gramajo DO  Department of Hospital Medicine  [facility]

## 2025-01-27 NOTE — ASSESSMENT & PLAN NOTE
Vitals:    01/25/25 2358 01/26/25 0611 01/26/25 0753 01/26/25 1143   BP: (!) 145/69 (!) 142/70 (!) 148/81 138/71    01/26/25 1648 01/26/25 2000 01/27/25 0000 01/27/25 0415   BP: (!) 145/62 128/70 125/68 119/68    01/27/25 0721 01/27/25 1125   BP: (!) 153/77 135/76         Patient's blood pressure range in the last 24 hours was: BP  Min: 119/68  Max: 153/77.The patient's inpatient anti-hypertensive regimen is listed below:  Current Antihypertensives  amLODIPine tablet 10 mg, Daily, Oral  isosorbide mononitrate 24 hr tablet 30 mg, Daily, Oral  hydrALAZINE tablet 50 mg, Every 8 hours, Oral  furosemide tablet 80 mg, Daily, Oral    Plan  - BP is controlled, no changes needed to their regimen  - follow

## 2025-01-27 NOTE — ASSESSMENT & PLAN NOTE
Patient has persistent (7 days or more) atrial fibrillation. Patient is currently in atrial fibrillation. [id number] Score: 5. The patients heart rate in the last 24 hours is as follows:  Pulse  Min: 61  Max: 69     Antiarrhythmics       Anticoagulants  enoxaparin injection 100 mg, Every 12 hours, Subcutaneous    Plan  - Replete lytes with a goal of K>4, Mg >2  - Patient is not anticoagulated due to holding anticoagulation (Xarelto) in anticipation of procedure  - Patient's afib is currently controlled  - follow    xarelto

## 2025-01-27 NOTE — ASSESSMENT & PLAN NOTE
Patient found to have large pleural effusion on imaging. I have personally reviewed and interpreted the following imaging: Xray. A thoracentesis was deferred. Most likely etiology includes Congestive Heart Failure and Pneumonia. Management to include Diuresis  Continue diuresis    01/21 Chest tube planned in am  01/22 Transdate pleural effusion removed and pt feels breathing much better.  1/26 marked improvement.  Chest tube per pulmonology.    Ct removed, pulm follow up. Respiratory status looks great

## 2025-01-27 NOTE — ASSESSMENT & PLAN NOTE
"Patient has Diastolic (HFpEF) heart failure that is Acute on chronic. On presentation their CHF was decompensated. Evidence of decompensated CHF on presentation includes:  Pleural effusion. The etiology of their decompensation is likely dietary indiscretion. Most recent BNP and echo results are listed below.  No results for input(s): "BNP" in the last 72 hours.  Latest ECHO  Results for orders placed during the hospital encounter of 11/17/24    Echo Saline Bubble? Yes    Interpretation Summary    Left Ventricle: The left ventricle is mildly dilated. Mildly increased wall thickness. Normal wall motion. Septal motion is consistent with pacing. There is diastolic dysfunction.    Right Ventricle: Moderate right ventricular enlargement.    Left Atrium: Left atrium is mildly dilated.    Right Atrium: Right atrium is severely dilated.    Aortic Valve: The aortic valve is a trileaflet valve. Mildly calcified cusps.    Mitral Valve: There is mild bileaflet sclerosis. Mildly thickened leaflets. There is mild to moderate regurgitation with an eccentric jet.    Tricuspid Valve: There is moderate to severe regurgitation with an eccentrically directed jet.    Pulmonic Valve: There is mild to moderate regurgitation.    IVC/SVC: Elevated venous pressure at 15 mmHg.    Current Heart Failure Medications  hydrALAZINE tablet 50 mg, Every 8 hours, Oral  furosemide tablet 80 mg, Daily, Oral    Plan  - Monitor strict I&Os and daily weights.    - Place on telemetry  - Low sodium diet  - Place on fluid restriction of 1.5 L.   - Cardiology has not been consulted  - The patient's volume status is improving but not at their baseline as indicated by shortness of breath  - follow  Continue diuresis  Transition to oral home regimen  "

## 2025-01-27 NOTE — ASSESSMENT & PLAN NOTE
Patient with Hypoxic Respiratory failure which is Acute.  she is not on home oxygen. Supplemental oxygen was provided and noted-      .   Signs/symptoms of respiratory failure include- tachypnea, increased work of breathing, and respiratory distress. Contributing diagnoses includes - Pleural effusion and Pneumonia Labs and images were reviewed. Patient Has not had a recent ABG. Will treat underlying causes and adjust management of respiratory failure as follows-     Nebs   Diuresis   Pulmonology consult   Discussed case with Dr. Mckee today  Marked improvement  Resolved, pulm follow up

## 2025-01-27 NOTE — PLAN OF CARE
[facility]  Discharge Final Note    Primary Care Provider: Janice Velez FNP    Expected Discharge Date: 1/27/2025    Final Discharge Note (most recent)       Final Note - 01/27/25 1404          Final Note    Anticipated Discharge Disposition Home-Health Care [facility]     What phone number can be called within the next 1-3 days to see how you are doing after discharge? [phone]        Post-Acute Status    Post-Acute Authorization Home Health     Home Health Status Set-up Complete/Auth obtained     Coverage Medicare     Patient choice form signed by patient/caregiver List with quality metrics by geographic area provided;List from System Post-Acute Care;List from CMS Compare     Discharge Delays None known at this time                     Important Message from Medicare  Important Message from Medicare regarding Discharge Appeal Rights: Given to patient/caregiver, Explained to patient/caregiver, Signed/date by patient/caregiver     Date IMM was signed: 01/27/25  Time IMM was signed: 1015     Follow-up providers       Janice Velez FNP   Specialty: Family Medicine, Emergency Medicine    [address] [facility] [place] [place] [address]   Phone: [phone]       Next Steps: Follow up in 1 week(s)    Instructions: Please follow up on January 31 at 3:00 p.m              After-discharge care                Home Medical Care       [facility]   Service: Home Health Services    [address] [place] [id number]   Phone: [phone]                             Pt dc home with family and hh through [employer]. Dc paperwork faxed and [facility] notified of pt's dc. IM updated. No further needs noted.

## 2025-01-28 ENCOUNTER — PATIENT OUTREACH (OUTPATIENT)
Dept: ADMINISTRATIVE | Facility: CLINIC | Age: 80
End: 2025-01-28
Payer: MEDICARE

## 2025-01-28 NOTE — PROGRESS NOTES
C3 nurse attempted to contact Pam Ortega  for a TCC post hospital discharge follow up call. No answer. Left voicemail with callback information. The patient has a scheduled HOSFU appointment with Janice Velez FNP  on 1/31/25 @ 300.

## 2025-01-29 NOTE — PROGRESS NOTES
C3 nurse spoke with Pam Ortega  for a TCC post hospital discharge follow up call. The patient has a scheduled [facility] appointment with Janice Velez FNP  on 1/31/25 @ 300.

## 2025-01-31 ENCOUNTER — EXTERNAL CHRONIC CARE MANAGEMENT (OUTPATIENT)
Dept: PRIMARY CARE CLINIC | Facility: CLINIC | Age: 80
End: 2025-01-31
Payer: MEDICARE

## 2025-01-31 PROCEDURE — G0511 CCM/BHI BY RHC/FQHC 20MIN MO: HCPCS | Mod: ,,, | Performed by: NURSE PRACTITIONER

## 2025-02-03 ENCOUNTER — HOSPITAL ENCOUNTER (OUTPATIENT)
Dept: RADIOLOGY | Facility: HOSPITAL | Age: 80
Discharge: HOME | End: 2025-02-03
Attending: INTERNAL MEDICINE
Payer: MEDICARE

## 2025-02-03 ENCOUNTER — OFFICE VISIT (OUTPATIENT)
Dept: PULMONOLOGY | Facility: CLINIC | Age: 80
End: 2025-02-03
Payer: MEDICARE

## 2025-02-03 VITALS
HEIGHT: 70 IN | HEART RATE: 66 BPM | BODY MASS INDEX: 31.78 KG/M2 | OXYGEN SATURATION: 99 % | SYSTOLIC BLOOD PRESSURE: 110 MMHG | WEIGHT: 222 LBS | DIASTOLIC BLOOD PRESSURE: 68 MMHG

## 2025-02-03 DIAGNOSIS — J90 PLEURAL EFFUSION: ICD-10-CM

## 2025-02-03 DIAGNOSIS — R06.02 SOB (SHORTNESS OF BREATH): Primary | ICD-10-CM

## 2025-02-03 DIAGNOSIS — R06.02 SOB (SHORTNESS OF BREATH): ICD-10-CM

## 2025-02-03 DIAGNOSIS — R05.9 COUGH, UNSPECIFIED TYPE: ICD-10-CM

## 2025-02-03 PROCEDURE — 71046 X-RAY EXAM CHEST 2 VIEWS: CPT | Mod: TC

## 2025-02-03 PROCEDURE — 99999 PR PBB SHADOW E&M-EST. PATIENT-LVL V: CPT | Mod: PBBFAC,,, | Performed by: INTERNAL MEDICINE

## 2025-02-03 PROCEDURE — 71046 X-RAY EXAM CHEST 2 VIEWS: CPT | Mod: 26,,, | Performed by: RADIOLOGY

## 2025-02-03 PROCEDURE — 99215 OFFICE O/P EST HI 40 MIN: CPT | Mod: PBBFAC,25 | Performed by: INTERNAL MEDICINE

## 2025-02-03 PROCEDURE — 99214 OFFICE O/P EST MOD 30 MIN: CPT | Mod: S$PBB,,, | Performed by: INTERNAL MEDICINE

## 2025-02-04 NOTE — ASSESSMENT & PLAN NOTE
Pleural effusion, no recurrence on chest x-ray was a transudate she has been diurese seems to be getting better follows up with Cardiology soon

## 2025-02-04 NOTE — PROGRESS NOTES
Subjective:       Patient ID: Pam Ortega is a 79 y.o. female.    Chief Complaint: Follow-up (Follow up- CXR done a week ago , has fluid in the abdominal area and legs. Her lasix was increased to 80mg. Weighed 219 yesterday, weighing at 222 today )    Follow-up  This is a chronic problem. The current episode started more than 1 month ago. The problem has been unchanged. Pertinent negatives include no abdominal pain, arthralgias, chest pain, chills, congestion, headaches or rash.     Past Medical History:   Diagnosis Date    Anemia of chronic disease     Atrial fibrillation     Benign hypertensive CKD, stage 1-4 or unspecified chronic kidney disease     Bronchitis 12/22/2023    Carotid artery occlusion     CKD (chronic kidney disease)     Coronary atherosclerosis     Cough 12/22/2023    COVID-19 ruled out 03/20/2024    Diabetes mellitus, type 2     DJD (degenerative joint disease)     History of CVA (cerebrovascular accident)     HTN (hypertension)     Hyperlipidemia     Nonischemic dilated cardiomyopathy     Obesity     Osteopenia     Paroxysmal atrial fibrillation     Presence of cardiac pacemaker     PVD (peripheral vascular disease)     Upper respiratory tract infection 12/22/2023    Vitamin D deficiency      Past Surgical History:   Procedure Laterality Date    BREAST BIOPSY      COLONOSCOPY  12/04/2018    repeat in 5 years    EMBOLECTOMY OR THROMBECTOMY, ARTERY, AORTOILIAC, FEMORAL, OR POPLITEAL Right 11/17/2024    Procedure: EMBOLECTOMY OR THROMBECTOMY, ARTERY, AORTOILIAC, FEMORAL, OR POPLITEAL;  Surgeon: Jordana Guillermo MD;  Location: [facility];  Service: Vascular;  Laterality: Right;    pace maker      VAGINAL DELIVERY      x 3     Family History   Problem Relation Name Age of Onset    Hypertension Mother      Hypertension Father      Hypertension Sister      Stroke Sister      Cancer Brother      Stroke Brother      Hypertension Brother      Alzheimer's disease Maternal Grandmother      Breast  cancer Maternal Grandmother      Hypertension Maternal Grandmother      Hyperlipidemia Daughter      Breast cancer Daughter      Diabetes Daughter      Hypertension Daughter      Diabetes Daughter      Hypertension Daughter      Hypertension Daughter      Clotting disorder Daughter       Review of patient's allergies indicates:   Allergen Reactions    Ace inhibitors Edema    Losartan Swelling      Social History     Tobacco Use    Smoking status: Never     Passive exposure: Never    Smokeless tobacco: Never   Substance Use Topics    Alcohol use: Not Currently    Drug use: Never      Review of Systems   Constitutional:  Negative for chills, activity change and night sweats.   HENT:  Negative for congestion and ear pain.    Eyes:  Negative for redness and itching.   Cardiovascular:  Negative for chest pain and palpitations.   Musculoskeletal:  Negative for arthralgias and back pain.   Skin:  Negative for rash.   Gastrointestinal:  Negative for abdominal pain and abdominal distention.   Neurological:  Negative for dizziness and headaches.   Hematological:  Negative for adenopathy. Does not bruise/bleed easily.   Psychiatric/Behavioral:  Negative for confusion. The patient is not nervous/anxious.        Objective:      Physical Exam   Constitutional: She is oriented to person, place, and time. She appears well-developed and well-nourished.   HENT:   Head: Normocephalic.   Nose: Nose normal.   Mouth/Throat: Oropharynx is clear and moist.   Neck: No JVD present. No thyromegaly present.   Cardiovascular: Normal rate, regular rhythm, normal heart sounds and intact distal pulses.   Pulmonary/Chest: Normal expansion, hyperinflation, symmetric chest wall expansion, effort normal and breath sounds normal.   Abdominal: Soft. Bowel sounds are normal.   Musculoskeletal:         General: Normal range of motion.      Cervical back: Normal range of motion and neck supple.   Lymphadenopathy: No supraclavicular adenopathy is present.  "    She has no cervical adenopathy.   Neurological: She is alert and oriented to person, place, and time. She has normal reflexes.   Skin: Skin is warm and dry.   Psychiatric: She has a normal mood and affect. Her behavior is normal.     Personal Diagnostic Review  none pertinent        2/3/2025     1:37 PM 1/27/2025    11:25 AM 1/27/2025     7:23 AM 1/27/2025     7:21 AM 1/27/2025     4:15 AM 1/27/2025    12:00 AM 1/26/2025     8:38 PM   Pulmonary Function Tests   SpO2 99 % 98 % 98 % 98 % 95 % 98 % 97 %   Height 5' 10" (1.778 m)         Weight 100.7 kg (222 lb)         BMI (Calculated) 31.9               Assessment:       1. SOB (shortness of breath)    2. Pleural effusion    3. Cough, unspecified type        Outpatient Encounter Medications as of 2/3/2025   Medication Sig Dispense Refill    albuterol (VENTOLIN HFA) 90 mcg/actuation inhaler Inhale 2 puffs into the lungs every 6 (six) hours as needed for Wheezing. Rescue Inhaler 18 g 5    alendronate (FOSAMAX) 70 MG tablet Take 1 tablet (70 mg total) by mouth every 7 days. 12 tablet 3    amLODIPine (NORVASC) 10 MG tablet Take 1 tablet (10 mg total) by mouth once daily. 90 tablet 3    aspirin (ECOTRIN) 81 MG EC tablet Take 81 mg by mouth once daily.      budesonide-formoterol 160-4.5 mcg (SYMBICORT) 160-4.5 mcg/actuation HFAA Inhale 2 puffs into the lungs every 12 (twelve) hours. Controller Inhaler 10.2 g 5    cholecalciferol, vitamin D3, (VITAMIN D3) 50 mcg (2,000 unit) Cap capsule Take 1 capsule by mouth once daily.      colchicine (COLCRYS) 0.6 mg tablet Take 1 tablet (0.6 mg total) by mouth once daily. for gout 90 tablet 3    fluticasone-salmeterol diskus inhaler 250-50 mcg Inhale 1 puff into the lungs 2 (two) times daily. Controller Inhaler 60 each 5    furosemide (LASIX) 80 MG tablet Take 1 tablet (80 mg total) by mouth every morning. 90 tablet 11    glimepiride (AMARYL) 4 MG tablet Take 1 tablet (4 mg total) by mouth daily with breakfast. 90 tablet 3    " hydrALAZINE (APRESOLINE) 50 MG tablet Take 1 tablet (50 mg total) by mouth 2 (two) times a day. 180 tablet 3    isosorbide mononitrate (IMDUR) 30 MG 24 hr tablet Take 1 tablet (30 mg total) by mouth once daily. 90 tablet 3    lovastatin (MEVACOR) 20 MG tablet Take 1 tablet (20 mg total) by mouth every night 90 tablet 3    metoprolol tartrate (LOPRESSOR) 50 MG tablet Take 1 tablet (50 mg total) by mouth 2 (two) times a day. 180 tablet 3    omeprazole (PRILOSEC) 20 MG capsule Take 1 capsule (20 mg total) by mouth once daily. 90 capsule 3    potassium chloride (MICRO-K) 10 MEQ CpSR Take 1 capsule (10 mEq total) by mouth once daily. 90 capsule 3    rivaroxaban (XARELTO) 15 mg Tab Take 1 tablet (15 mg total) by mouth once daily. 90 tablet 2    rivaroxaban (XARELTO) 15 mg Tab Take 1 tablet (15 mg total) by mouth once daily. 90 tablet 4    semaglutide (OZEMPIC) 1 mg/dose (4 mg/3 mL) Inject 1 mg into the skin every 7 days. 9 mL 3    spironolactone (ALDACTONE) 25 MG tablet Take 1 tablet (25 mg total) by mouth once daily. 90 tablet 3    [DISCONTINUED] acetaminophen tablet 650 mg       [DISCONTINUED] allopurinoL tablet 100 mg       [DISCONTINUED] amLODIPine tablet 10 mg       [DISCONTINUED] aspirin EC tablet 81 mg       [DISCONTINUED] atorvastatin tablet 40 mg       [DISCONTINUED] atorvastatin tablet 40 mg       [DISCONTINUED] bisacodyL suppository 10 mg       [DISCONTINUED] budesonide nebulizer solution 0.5 mg       [DISCONTINUED] colchicine capsule/tablet 0.6 mg       [DISCONTINUED] colchicine capsule/tablet 0.6 mg       [DISCONTINUED] dextrose 50% injection 12.5 g       [DISCONTINUED] dextrose 50% injection 25 g       [DISCONTINUED] enoxaparin injection 100 mg       [DISCONTINUED] enoxaparin injection 30 mg       [DISCONTINUED] furosemide injection 80 mg       [DISCONTINUED] furosemide tablet 80 mg       [DISCONTINUED] glimepiride tablet 2 mg       [DISCONTINUED] glucagon (human recombinant) injection 1 mg        [DISCONTINUED] glucose chewable tablet 16 g       [DISCONTINUED] glucose chewable tablet 24 g       [DISCONTINUED] hydrALAZINE tablet 50 mg       [DISCONTINUED] hydrALAZINE tablet 50 mg       [DISCONTINUED] insulin aspart U-100 injection 0-5 Units       [DISCONTINUED] isosorbide mononitrate 24 hr tablet 30 mg       [DISCONTINUED] naloxone 0.4 mg/mL injection 0.02 mg       [DISCONTINUED] ondansetron disintegrating tablet 4 mg       [DISCONTINUED] oxyCODONE immediate release tablet 5 mg       [DISCONTINUED] oxyCODONE immediate release tablet 5 mg       [DISCONTINUED] pantoprazole EC tablet 40 mg       [DISCONTINUED] polyethylene glycol packet 17 g       [DISCONTINUED] polyethylene glycol packet 17 g       [DISCONTINUED] sodium chloride 0.9% flush 10 mL       [DISCONTINUED] spironolactone tablet 25 mg       [DISCONTINUED] traZODone tablet 100 mg        No facility-administered encounter medications on file as of 2/3/2025.     Orders Placed This Encounter   Procedures    X-Ray Chest PA And Lateral     Standing Status:   Future     Number of Occurrences:   1     Standing Expiration Date:   2/3/2026     Order Specific Question:   May the Radiologist modify the order per protocol to meet the clinical needs of the patient?     Answer:   Yes     Order Specific Question:   Release to patient     Answer:   Immediate       Plan:       Problem List Items Addressed This Visit          Pulmonary    Cough     Ongoing issue restart Symbicort Ventolin treat reflux         Pleural effusion     Pleural effusion, no recurrence on chest x-ray was a transudate she has been diurese seems to be getting better follows up with Cardiology soon          Other Visit Diagnoses       SOB (shortness of breath)    -  Primary    Relevant Orders    X-Ray Chest PA And Lateral

## 2025-02-21 ENCOUNTER — TELEPHONE (OUTPATIENT)
Dept: GASTROENTEROLOGY | Facility: HOSPITAL | Age: 80
End: 2025-02-21
Payer: MEDICARE

## 2025-02-21 ENCOUNTER — HOSPITAL ENCOUNTER (OUTPATIENT)
Dept: RADIOLOGY | Facility: HOSPITAL | Age: 80
Discharge: HOME | End: 2025-02-21
Attending: INTERNAL MEDICINE
Payer: MEDICARE

## 2025-02-21 ENCOUNTER — OFFICE VISIT (OUTPATIENT)
Dept: PULMONOLOGY | Facility: CLINIC | Age: 80
End: 2025-02-21
Payer: MEDICARE

## 2025-02-21 VITALS
HEART RATE: 77 BPM | OXYGEN SATURATION: 98 % | RESPIRATION RATE: 16 BRPM | SYSTOLIC BLOOD PRESSURE: 120 MMHG | DIASTOLIC BLOOD PRESSURE: 74 MMHG | HEIGHT: 70 IN | WEIGHT: 220.44 LBS | BODY MASS INDEX: 31.56 KG/M2

## 2025-02-21 DIAGNOSIS — I82.B22 CHRONIC EMBOLISM AND THROMBOSIS OF LEFT SUBCLAVIAN VEIN: ICD-10-CM

## 2025-02-21 DIAGNOSIS — R06.02 SOB (SHORTNESS OF BREATH): Primary | ICD-10-CM

## 2025-02-21 DIAGNOSIS — R06.02 SHORTNESS OF BREATH: ICD-10-CM

## 2025-02-21 DIAGNOSIS — J90 PLEURAL EFFUSION: Primary | ICD-10-CM

## 2025-02-21 DIAGNOSIS — R06.02 SOB (SHORTNESS OF BREATH): ICD-10-CM

## 2025-02-21 PROCEDURE — 99215 OFFICE O/P EST HI 40 MIN: CPT | Mod: PBBFAC | Performed by: INTERNAL MEDICINE

## 2025-02-21 PROCEDURE — 93970 EXTREMITY STUDY: CPT | Mod: 26,,, | Performed by: RADIOLOGY

## 2025-02-21 PROCEDURE — 93970 EXTREMITY STUDY: CPT | Mod: TC

## 2025-02-21 PROCEDURE — 71046 X-RAY EXAM CHEST 2 VIEWS: CPT | Mod: 26,,, | Performed by: RADIOLOGY

## 2025-02-21 PROCEDURE — 71046 X-RAY EXAM CHEST 2 VIEWS: CPT | Mod: TC

## 2025-02-21 NOTE — ASSESSMENT & PLAN NOTE
Patient's follow up being in the hospital she had cough and shortness of breath generally she has been better she is more short of breath today she is using Symbicort and Ventolin her last x-ray showed no evidence of a pleural effusion will repeat that x-ray today but her biggest complaint is pain in her left leg and swelling she had a thrombectomy done on the right but had lots of a different type of pain and no pulses she has pulses in his dorsalis pedis and posterior tibialis arteries on the left I am going to go ahead and send her for Dopplers look for a clot she is on Xarelto she developed a clot on Xarelto she will need heparin and then possibly Coumadin as outpatient treatment.  I think probably the issue was she had several day she was off his Xarelto in the hospital but received Lovenox while her chest tube was in.  We will probably need to give heparin for several days and sent home on Eliquis instead of Xarelto.  If positive for clot

## 2025-02-21 NOTE — TELEPHONE ENCOUNTER
Spoke with patient concerning upcoming Thoracentesis procedure scheduled for 02.27.25. Confirmed appt time of 0800 and arrival time of 0730. Confirmed address of facility. Pt's daughter confirmed last dose of Xarelto will be on 02.24.25 per Dr. Rocha verbal instructions. Patient confirmed having a  present for procedure. Answered all questions and concerns at this time.

## 2025-02-21 NOTE — PROGRESS NOTES
Subjective:       Patient ID: Pam Ortega is a 79 y.o. female.    Chief Complaint: Follow-up (1mo follow up. C/o of swelling in her L leg, denies any pain. This has been swollen on/off since out of hospital in January. Still havinga  lot of SOB.)    Follow-up  This is a chronic problem. The current episode started more than 1 month ago. The problem has been unchanged. Pertinent negatives include no abdominal pain, arthralgias, chest pain, chills, congestion, headaches or rash. The symptoms are aggravated by exertion.     Past Medical History:   Diagnosis Date    Anemia of chronic disease     Atrial fibrillation     Benign hypertensive CKD, stage 1-4 or unspecified chronic kidney disease     Bronchitis 12/22/2023    Carotid artery occlusion     CKD (chronic kidney disease)     Coronary atherosclerosis     Cough 12/22/2023    COVID-19 ruled out 03/20/2024    Diabetes mellitus, type 2     DJD (degenerative joint disease)     History of CVA (cerebrovascular accident)     HTN (hypertension)     Hyperlipidemia     Nonischemic dilated cardiomyopathy     Obesity     Osteopenia     Paroxysmal atrial fibrillation     Presence of cardiac pacemaker     PVD (peripheral vascular disease)     Upper respiratory tract infection 12/22/2023    Vitamin D deficiency      Past Surgical History:   Procedure Laterality Date    BREAST BIOPSY      COLONOSCOPY  12/04/2018    repeat in 5 years    EMBOLECTOMY OR THROMBECTOMY, ARTERY, AORTOILIAC, FEMORAL, OR POPLITEAL Right 11/17/2024    Procedure: EMBOLECTOMY OR THROMBECTOMY, ARTERY, AORTOILIAC, FEMORAL, OR POPLITEAL;  Surgeon: Jordana Guillermo MD;  Location: [facility];  Service: Vascular;  Laterality: Right;    pace maker      VAGINAL DELIVERY      x 3     Family History   Problem Relation Name Age of Onset    Hypertension Mother      Hypertension Father      Hypertension Sister      Stroke Sister      Cancer Brother      Stroke Brother      Hypertension Brother      Alzheimer's  disease Maternal Grandmother      Breast cancer Maternal Grandmother      Hypertension Maternal Grandmother      Hyperlipidemia Daughter      Breast cancer Daughter      Diabetes Daughter      Hypertension Daughter      Diabetes Daughter      Hypertension Daughter      Hypertension Daughter      Clotting disorder Daughter       Review of patient's allergies indicates:   Allergen Reactions    Ace inhibitors Edema    Losartan Swelling      Social History[1]   Review of Systems   Constitutional:  Negative for chills, activity change and night sweats.   HENT:  Negative for congestion and ear pain.    Eyes:  Negative for redness and itching.   Cardiovascular:  Negative for chest pain and palpitations.   Musculoskeletal:  Negative for arthralgias and back pain.   Skin:  Negative for rash.   Gastrointestinal:  Negative for abdominal pain and abdominal distention.   Neurological:  Negative for dizziness and headaches.   Hematological:  Negative for adenopathy. Does not bruise/bleed easily.   Psychiatric/Behavioral:  Negative for confusion. The patient is not nervous/anxious.        Objective:      Physical Exam   Constitutional: She is oriented to person, place, and time. She appears well-developed and well-nourished.   HENT:   Head: Normocephalic.   Nose: Nose normal.   Mouth/Throat: Oropharynx is clear and moist.   Neck: No JVD present. No thyromegaly present.   Cardiovascular: Normal rate, regular rhythm, normal heart sounds and intact distal pulses.   Pulmonary/Chest: Normal expansion, hyperinflation, symmetric chest wall expansion, effort normal and breath sounds normal.   Abdominal: Soft. Bowel sounds are normal.   Musculoskeletal:         General: Normal range of motion.      Cervical back: Normal range of motion and neck supple.   Lymphadenopathy: No supraclavicular adenopathy is present.     She has no cervical adenopathy.   Neurological: She is alert and oriented to person, place, and time. She has normal  "reflexes.   Skin: Skin is warm and dry.   Psychiatric: She has a normal mood and affect. Her behavior is normal.     Personal Diagnostic Review  none pertinent        2/21/2025    10:30 AM 2/3/2025     1:37 PM 1/27/2025    11:25 AM 1/27/2025     7:23 AM 1/27/2025     7:21 AM 1/27/2025     4:15 AM 1/27/2025    12:00 AM   Pulmonary Function Tests   SpO2 98 % 99 % 98 % 98 % 98 % 95 % 98 %   Height 5' 10" (1.778 m) 5' 10" (1.778 m)        Weight 100 kg (220 lb 7.4 oz) 100.7 kg (222 lb)        BMI (Calculated) 31.6 31.9              Assessment:       1. SOB (shortness of breath)    2. 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 Shortness of breath        Encounter Medications[2]  Orders Placed This Encounter   Procedures    X-Ray Chest PA And Lateral     Standing Status:   Future     Expected Date:   2/21/2025     Expiration Date:   2/21/2026     May the Radiologist modify the order per protocol to meet the clinical needs of the patient?:   Yes    US Lower Extremity Veins Bilateral     Standing Status:   Future     Expected Date:   2/21/2025     Expiration Date:   2/21/2026     May the Radiologist modify the order per protocol to meet the clinical needs of the patient?:   Yes     Release to patient:   Immediate    US ARTERIAL DOPPLER EXAM     Standing Status:   Future     Expected Date:   2/21/2025     Expiration Date:   2/21/2026     Reason for Exam::   Decreased pulses left leg     May the Radiologist modify the order per protocol to meet the clinical needs of the patient?:   Yes     Release to patient:   Immediate       Plan:       Problem List Items Addressed This Visit          Pulmonary    Shortness of breath    Patient's follow up being in the hospital she had cough and shortness of breath generally she has been better she is more short of breath today she is using Symbicort and Ventolin her last x-ray showed no evidence of a pleural effusion will repeat that x-ray today but her biggest complaint " is pain in her left leg and swelling she had a thrombectomy done on the right but had lots of a different type of pain and no pulses she has pulses in his dorsalis pedis and posterior tibialis arteries on the left I am going to go ahead and send her for Dopplers look for a clot she is on Xarelto she developed a clot on Xarelto she will need heparin and then possibly Coumadin as outpatient treatment.  I think probably the issue was she had several day she was off his Xarelto in the hospital but received Lovenox while her chest tube was in.  We will probably need to give heparin for several days and sent home on Eliquis instead of Xarelto.  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 Rescue Inhaler 18 g 5    alendronate (FOSAMAX) 70 MG tablet Take 1 tablet (70 mg total) by mouth every 7 days. 12 tablet 3    amLODIPine (NORVASC) 10 MG tablet Take 1 tablet (10 mg total) by mouth once daily. 90 tablet 3    aspirin (ECOTRIN) 81 MG EC tablet Take 81 mg by mouth once daily.      budesonide-formoterol 160-4.5 mcg (SYMBICORT) 160-4.5 mcg/actuation HFAA Inhale 2 puffs into the lungs every 12 (twelve) hours. Controller Inhaler 10.2 g 5    cholecalciferol, vitamin D3, (VITAMIN D3) 50 mcg (2,000 unit) Cap capsule Take 1  capsule by mouth once daily.      colchicine (COLCRYS) 0.6 mg tablet Take 1 tablet (0.6 mg total) by mouth once daily. for gout 90 tablet 3    fluticasone-salmeterol diskus inhaler 250-50 mcg Inhale 1 puff into the lungs 2 (two) times daily. Controller Inhaler 60 each 5    furosemide (LASIX) 80 MG tablet Take 1 tablet (80 mg total) by mouth every morning. 90 tablet 11    glimepiride (AMARYL) 4 MG tablet Take 1 tablet (4 mg total) by mouth daily with breakfast. 90 tablet 3    hydrALAZINE (APRESOLINE) 50 MG tablet Take 1 tablet (50 mg total) by mouth 2 (two) times a day. 180 tablet 3    isosorbide mononitrate (IMDUR) 30 MG 24 hr tablet Take 1 tablet (30 mg total) by mouth once daily. 90 tablet 3    lovastatin (MEVACOR) 20 MG tablet Take 1 tablet (20 mg total) by mouth every night 90 tablet 3    metoprolol tartrate (LOPRESSOR) 50 MG tablet Take 1 tablet (50 mg total) by mouth 2 (two) times a day. 180 tablet 3    omeprazole (PRILOSEC) 20 MG capsule Take 1 capsule (20 mg total) by mouth once daily. 90 capsule 3    potassium chloride (MICRO-K) 10 MEQ CpSR Take 1 capsule (10 mEq total) by mouth once daily. 90 capsule 3    rivaroxaban (XARELTO) 15 mg Tab Take 1 tablet (15 mg total) by mouth once daily. 90 tablet 2    rivaroxaban (XARELTO) 15 mg Tab Take 1 tablet (15 mg total) by mouth once daily. 90 tablet 4    semaglutide (OZEMPIC) 1 mg/dose (4 mg/3 mL) Inject 1 mg into the skin every 7 days. 9 mL 3    spironolactone (ALDACTONE) 25 MG tablet Take 1 tablet (25 mg total) by mouth once daily. 90 tablet 3     No facility-administered encounter medications on file as of 2/21/2025.

## 2025-02-25 ENCOUNTER — EXTERNAL HOME HEALTH (OUTPATIENT)
Dept: HOME HEALTH SERVICES | Facility: HOSPITAL | Age: 80
End: 2025-02-25
Payer: MEDICARE

## 2025-02-28 ENCOUNTER — HOSPITAL ENCOUNTER (OUTPATIENT)
Dept: GASTROENTEROLOGY | Facility: HOSPITAL | Age: 80
Discharge: HOME | End: 2025-02-28
Attending: STUDENT IN AN ORGANIZED HEALTH CARE EDUCATION/TRAINING PROGRAM
Payer: MEDICARE

## 2025-02-28 ENCOUNTER — HOSPITAL ENCOUNTER (OUTPATIENT)
Dept: RADIOLOGY | Facility: HOSPITAL | Age: 80
Discharge: HOME | End: 2025-02-28
Attending: STUDENT IN AN ORGANIZED HEALTH CARE EDUCATION/TRAINING PROGRAM
Payer: MEDICARE

## 2025-02-28 ENCOUNTER — EXTERNAL CHRONIC CARE MANAGEMENT (OUTPATIENT)
Dept: PRIMARY CARE CLINIC | Facility: CLINIC | Age: 80
End: 2025-02-28
Payer: MEDICARE

## 2025-02-28 VITALS
RESPIRATION RATE: 12 BRPM | HEART RATE: 61 BPM | OXYGEN SATURATION: 99 % | SYSTOLIC BLOOD PRESSURE: 128 MMHG | DIASTOLIC BLOOD PRESSURE: 69 MMHG

## 2025-02-28 DIAGNOSIS — J90 PLEURAL EFFUSION: ICD-10-CM

## 2025-02-28 LAB
ALBUMIN FLD-MCNC: 1.8 G/DL
CHOLEST FLD-MCNC: 25 MG/DL
CLARITY FLD: ABNORMAL
COLOR FLD: ABNORMAL
GLUCOSE FLD-MCNC: 54 MG/DL
GRANULOCYTES NFR FLD MANUAL: 1 % (ref 0–25)
LDH FLD-CCNC: 95 U/L
LYMPHOCYTES NFR FLD MANUAL: 60 %
MESOTHL CELL NFR FLD MANUAL: 2 %
MONOCYTES NFR FLD MANUAL: 37 %
PROT FLD-MCNC: 2.9 G/DL
RBC # FLD MANUAL: ABNORMAL /CUMM
WBC # FLD MANUAL: 429 /CUMM

## 2025-02-28 PROCEDURE — 89050 BODY FLUID CELL COUNT: CPT | Performed by: STUDENT IN AN ORGANIZED HEALTH CARE EDUCATION/TRAINING PROGRAM

## 2025-02-28 PROCEDURE — 71045 X-RAY EXAM CHEST 1 VIEW: CPT | Mod: 26,,, | Performed by: RADIOLOGY

## 2025-02-28 PROCEDURE — 71045 X-RAY EXAM CHEST 1 VIEW: CPT | Mod: TC

## 2025-02-28 PROCEDURE — 88341 IMHCHEM/IMCYTCHM EA ADD ANTB: CPT | Mod: 26,,, | Performed by: PATHOLOGY

## 2025-02-28 PROCEDURE — 87070 CULTURE OTHR SPECIMN AEROBIC: CPT | Performed by: STUDENT IN AN ORGANIZED HEALTH CARE EDUCATION/TRAINING PROGRAM

## 2025-02-28 PROCEDURE — 88342 IMHCHEM/IMCYTCHM 1ST ANTB: CPT | Mod: 26,,, | Performed by: PATHOLOGY

## 2025-02-28 PROCEDURE — 88112 CYTOPATH CELL ENHANCE TECH: CPT | Mod: 26,,, | Performed by: PATHOLOGY

## 2025-02-28 PROCEDURE — 32555 ASPIRATE PLEURA W/ IMAGING: CPT | Mod: RT,,, | Performed by: STUDENT IN AN ORGANIZED HEALTH CARE EDUCATION/TRAINING PROGRAM

## 2025-02-28 PROCEDURE — G0511 CCM/BHI BY RHC/FQHC 20MIN MO: HCPCS | Mod: ,,, | Performed by: NURSE PRACTITIONER

## 2025-02-28 PROCEDURE — 84157 ASSAY OF PROTEIN OTHER: CPT | Performed by: STUDENT IN AN ORGANIZED HEALTH CARE EDUCATION/TRAINING PROGRAM

## 2025-02-28 PROCEDURE — 88305 TISSUE EXAM BY PATHOLOGIST: CPT | Mod: 26,,, | Performed by: PATHOLOGY

## 2025-02-28 PROCEDURE — 88112 CYTOPATH CELL ENHANCE TECH: CPT | Mod: TC,MCY | Performed by: STUDENT IN AN ORGANIZED HEALTH CARE EDUCATION/TRAINING PROGRAM

## 2025-02-28 PROCEDURE — 82042 OTHER SOURCE ALBUMIN QUAN EA: CPT | Performed by: STUDENT IN AN ORGANIZED HEALTH CARE EDUCATION/TRAINING PROGRAM

## 2025-02-28 PROCEDURE — 32555 ASPIRATE PLEURA W/ IMAGING: CPT | Mod: RT | Performed by: STUDENT IN AN ORGANIZED HEALTH CARE EDUCATION/TRAINING PROGRAM

## 2025-03-01 LAB — BACTERIA SPEC BFLD CULT: NORMAL

## 2025-03-02 LAB — BACTERIA SPEC BFLD CULT: NORMAL

## 2025-03-03 LAB
GLUCOSE SERPL-MCNC: 60 MG/DL (ref 70–105)
INSULIN SERPL-ACNC: NORMAL U[IU]/ML
LAB AP COMMENTS: NORMAL
LAB AP GROSS DESCRIPTION: NORMAL
LAB AP LABORATORY NOTES: NORMAL
LAB AP SPECIMEN A NON-GYN GENERAL CATEGORIZATION: NEGATIVE
LAB AP SPECIMEN A NON-GYN INTERPETATION: NORMAL

## 2025-03-13 ENCOUNTER — PATIENT MESSAGE (OUTPATIENT)
Dept: PULMONOLOGY | Facility: CLINIC | Age: 80
End: 2025-03-13
Payer: MEDICARE

## 2025-03-14 ENCOUNTER — HOSPITAL ENCOUNTER (OUTPATIENT)
Dept: RADIOLOGY | Facility: HOSPITAL | Age: 80
Discharge: HOME | End: 2025-03-14
Attending: STUDENT IN AN ORGANIZED HEALTH CARE EDUCATION/TRAINING PROGRAM
Payer: MEDICARE

## 2025-03-14 DIAGNOSIS — J90 PLEURAL EFFUSION: ICD-10-CM

## 2025-03-14 PROCEDURE — 71046 X-RAY EXAM CHEST 2 VIEWS: CPT | Mod: 26,,, | Performed by: RADIOLOGY

## 2025-03-14 PROCEDURE — 71046 X-RAY EXAM CHEST 2 VIEWS: CPT | Mod: TC

## 2025-03-17 ENCOUNTER — RESULTS FOLLOW-UP (OUTPATIENT)
Dept: PULMONOLOGY | Facility: CLINIC | Age: 80
End: 2025-03-17

## 2025-03-17 ENCOUNTER — TELEPHONE (OUTPATIENT)
Dept: PULMONOLOGY | Facility: CLINIC | Age: 80
End: 2025-03-17
Payer: MEDICARE

## 2025-03-17 ENCOUNTER — OFFICE VISIT (OUTPATIENT)
Dept: PULMONOLOGY | Facility: CLINIC | Age: 80
End: 2025-03-17
Payer: MEDICARE

## 2025-03-17 VITALS
OXYGEN SATURATION: 97 % | WEIGHT: 220.44 LBS | BODY MASS INDEX: 31.56 KG/M2 | HEART RATE: 86 BPM | DIASTOLIC BLOOD PRESSURE: 70 MMHG | HEIGHT: 70 IN | SYSTOLIC BLOOD PRESSURE: 110 MMHG | RESPIRATION RATE: 16 BRPM

## 2025-03-17 DIAGNOSIS — R06.02 SOB (SHORTNESS OF BREATH): ICD-10-CM

## 2025-03-17 DIAGNOSIS — I12.9 CKD STAGE 4 SECONDARY TO HYPERTENSION: ICD-10-CM

## 2025-03-17 DIAGNOSIS — R06.02 SHORTNESS OF BREATH: ICD-10-CM

## 2025-03-17 DIAGNOSIS — J90 PLEURAL EFFUSION: Primary | ICD-10-CM

## 2025-03-17 DIAGNOSIS — N18.4 CKD STAGE 4 SECONDARY TO HYPERTENSION: ICD-10-CM

## 2025-03-17 PROCEDURE — 99214 OFFICE O/P EST MOD 30 MIN: CPT | Mod: S$PBB,,, | Performed by: STUDENT IN AN ORGANIZED HEALTH CARE EDUCATION/TRAINING PROGRAM

## 2025-03-17 PROCEDURE — 99215 OFFICE O/P EST HI 40 MIN: CPT | Mod: PBBFAC | Performed by: STUDENT IN AN ORGANIZED HEALTH CARE EDUCATION/TRAINING PROGRAM

## 2025-03-17 PROCEDURE — 99999 PR PBB SHADOW E&M-EST. PATIENT-LVL V: CPT | Mod: PBBFAC,,, | Performed by: STUDENT IN AN ORGANIZED HEALTH CARE EDUCATION/TRAINING PROGRAM

## 2025-03-17 NOTE — PROGRESS NOTES
Patient Name: Pam Ortega   Primary Care Provider: Janice Velez FNP   Date of Service: 03/17/2025   Reason for Referral:  Follow up thoracentesis    Chief Complaint: Follow-up (Follow up CXR and pleural effusion.)      Subjective:      Pam Ortega is 79 y.o. female with  past medical history significant fibrillation, chronic kidney disease and history of pleural effusions presents to me after having a thoracentesis done recently.      Initial clinic visit with me 3/17/25   She has had 2 thoracentesis done.  Her second 1 was on 3/2/25 with drainage of a total of 1450 cc of clear yellow fluid.  This was a transudative effusion.  Patient's symptomatically feels fine.  I reviewed her chest x-ray from today which shows some return right-sided effusion.  I have repeated echocardiogram, ultrasound liver, pro BNP, hepatic function panel and follow up with me in about 3-4 weeks.  If she is symptomatic she can come and see me sooner.          Assessment and Plan      Recurrent right-sided pleural effusion  Dyspnea on exertion   Chronic kidney disease      Assessment:  The differential at this time is broad, cultures have thus far been negative.  Transudative effusion.  Pleural effusion x2 negative for malignancy.  Given the location of the pleural fluid, hepatic hydrothorax is on the differential .  She tells me that she has some swelling in her bilateral midthigh area (trace-1+ pitting edema).  She is already on high dose of Lasix and I had offered to speak with the patient's nephrologist, the patient and her family said that they will communicate with the nephrologist to see if her Lasix dose needs to be increased significantly.      Plan  Ordered lab work including hepatic function panel, pro BNP   Echocardiogram   Return to clinic with chest x-ray, after above testing is performed  Counseled to call the clinic or go to the emergency department/call 911 in the event of worsening symptoms or any other  red flag signs/symptoms as explained to the patient in detail        Follow-up   Follow-up  after testing as above    Tanvir Rocha MD  Interventional Pulmonary and Critical Care  [facility]          Problem List Items Addressed This Visit    None          Past Medical History:   Diagnosis Date    Anemia of chronic disease     Atrial fibrillation     Benign hypertensive CKD, stage 1-4 or unspecified chronic kidney disease     Bronchitis 12/22/2023    Carotid artery occlusion     CKD (chronic kidney disease)     Coronary atherosclerosis     Cough 12/22/2023    COVID-19 ruled out 03/20/2024    Diabetes mellitus, type 2     DJD (degenerative joint disease)     History of CVA (cerebrovascular accident)     HTN (hypertension)     Hyperlipidemia     Nonischemic dilated cardiomyopathy     Obesity     Osteopenia     Paroxysmal atrial fibrillation     Presence of cardiac pacemaker     PVD (peripheral vascular disease)     Upper respiratory tract infection 12/22/2023    Vitamin D deficiency       Past Surgical History:   Procedure Laterality Date    BREAST BIOPSY      COLONOSCOPY  12/04/2018    repeat in 5 years    EMBOLECTOMY OR THROMBECTOMY, ARTERY, AORTOILIAC, FEMORAL, OR POPLITEAL Right 11/17/2024    Procedure: EMBOLECTOMY OR THROMBECTOMY, ARTERY, AORTOILIAC, FEMORAL, OR POPLITEAL;  Surgeon: Jordana Guillermo MD;  Location: [facility];  Service: Vascular;  Laterality: Right;    pace maker      VAGINAL DELIVERY      x 3     Family History   Problem Relation Name Age of Onset    Hypertension Mother      Hypertension Father      Hypertension Sister      Stroke Sister      Cancer Brother      Stroke Brother      Hypertension Brother      Alzheimer's disease Maternal Grandmother      Breast cancer Maternal Grandmother      Hypertension Maternal Grandmother      Hyperlipidemia Daughter      Breast cancer Daughter      Diabetes Daughter      Hypertension Daughter      Diabetes Daughter      Hypertension Daughter    "   Hypertension Daughter      Clotting disorder Daughter       Review of patient's allergies indicates:   Allergen Reactions    Ace inhibitors Edema    Losartan Swelling      Social History[1]   Review of Systems       Objective:      Physical Exam  Constitutional:       General: She is not in acute distress.     Appearance: Normal appearance. She is normal weight. She is not ill-appearing or diaphoretic.   HENT:      Head: Normocephalic and atraumatic.      Nose: No congestion or rhinorrhea.      Mouth/Throat:      Mouth: Mucous membranes are moist.   Cardiovascular:      Rate and Rhythm: Normal rate and regular rhythm.      Pulses: Normal pulses.      Heart sounds: Normal heart sounds.   Pulmonary:      Effort: Pulmonary effort is normal. No respiratory distress.      Breath sounds: No stridor. No wheezing, rhonchi or rales.      Comments: Mildly decreased breath sounds right base  Chest:      Chest wall: No tenderness.   Abdominal:      General: Abdomen is flat.   Musculoskeletal:      Cervical back: Normal range of motion. No rigidity.      Comments: Patient with compression stockings on, some trace-1+ pitting edema present at the level of the mid thigh.  No erythema, tenderness in bilateral lower extremities   Skin:     General: Skin is warm.      Findings: No erythema.   Neurological:      General: No focal deficit present.      Mental Status: She is alert and oriented to person, place, and time. Mental status is at baseline.   Psychiatric:         Mood and Affect: Mood normal.         Behavior: Behavior normal.         Thought Content: Thought content normal.                3/17/2025    12:57 PM 2/28/2025     8:39 AM 2/28/2025     8:38 AM 2/28/2025     8:37 AM 2/28/2025     8:36 AM 2/28/2025     8:35 AM 2/28/2025     8:34 AM   Pulmonary Function Tests   SpO2 97 % 99 % 98 % 99 % 98 % 97 % 98 %   Height 5' 10" (1.778 m)         Weight 100 kg (220 lb 7.4 oz)         BMI (Calculated) 31.6               Encounter " Medications[2]    Assessment & Plan    As above                                          No orders of the defined types were placed in this encounter.                       [1]   Social History  Tobacco Use    Smoking status: Never     Passive exposure: Never    Smokeless tobacco: Never   Substance Use Topics    Alcohol use: Not Currently    Drug use: Never   [2]   Outpatient Encounter Medications as of 3/17/2025   Medication Sig Dispense Refill    albuterol (VENTOLIN HFA) 90 mcg/actuation inhaler Inhale 2 puffs into the lungs every 6 (six) hours as needed for Wheezing. Rescue Inhaler 18 g 5    alendronate (FOSAMAX) 70 MG tablet Take 1 tablet (70 mg total) by mouth every 7 days. 12 tablet 3    amLODIPine (NORVASC) 10 MG tablet Take 1 tablet (10 mg total) by mouth once daily. 90 tablet 3    aspirin (ECOTRIN) 81 MG EC tablet Take 81 mg by mouth once daily.      budesonide-formoterol 160-4.5 mcg (SYMBICORT) 160-4.5 mcg/actuation HFAA Inhale 2 puffs into the lungs every 12 (twelve) hours. Controller Inhaler 10.2 g 5    cholecalciferol, vitamin D3, (VITAMIN D3) 50 mcg (2,000 unit) Cap capsule Take 1 capsule by mouth once daily.      colchicine (COLCRYS) 0.6 mg tablet Take 1 tablet (0.6 mg total) by mouth once daily. for gout 90 tablet 3    fluticasone-salmeterol diskus inhaler 250-50 mcg Inhale 1 puff into the lungs 2 (two) times daily. Controller Inhaler 60 each 5    furosemide (LASIX) 80 MG tablet Take 1 tablet (80 mg total) by mouth every morning. 90 tablet 11    glimepiride (AMARYL) 4 MG tablet Take 1 tablet (4 mg total) by mouth daily with breakfast. 90 tablet 3    hydrALAZINE (APRESOLINE) 50 MG tablet Take 1 tablet (50 mg total) by mouth 2 (two) times a day. 180 tablet 3    isosorbide mononitrate (IMDUR) 30 MG 24 hr tablet Take 1 tablet (30 mg total) by mouth once daily. 90 tablet 3    lovastatin (MEVACOR) 20 MG tablet Take 1 tablet (20 mg total) by mouth every night 90 tablet 3    metoprolol tartrate (LOPRESSOR)  50 MG tablet Take 1 tablet (50 mg total) by mouth 2 (two) times a day. 180 tablet 3    omeprazole (PRILOSEC) 20 MG capsule Take 1 capsule (20 mg total) by mouth once daily. 90 capsule 3    potassium chloride (MICRO-K) 10 MEQ CpSR Take 1 capsule (10 mEq total) by mouth once daily. 90 capsule 3    rivaroxaban (XARELTO) 15 mg Tab Take 1 tablet (15 mg total) by mouth once daily. 90 tablet 2    rivaroxaban (XARELTO) 15 mg Tab Take 1 tablet (15 mg total) by mouth once daily. 90 tablet 4    semaglutide (OZEMPIC) 1 mg/dose (4 mg/3 mL) Inject 1 mg into the skin every 7 days. 9 mL 3    spironolactone (ALDACTONE) 25 MG tablet Take 1 tablet (25 mg total) by mouth once daily. 90 tablet 3     No facility-administered encounter medications on file as of 3/17/2025.

## 2025-03-17 NOTE — TELEPHONE ENCOUNTER
----- Message from Nicole sent at 3/17/2025  8:25 AM CDT -----  Regarding: Consult/Advisory  Contact: Madhavi ( Daughter )  CONSULT/ADVISORYName of Caller:  Madhavi ( Daughter )Contact Preference:   [phone] of Call:  Pts daughter is requesting to see if pt can come around 11am for her appt today instead of 1pm?  No earlier appts.  Please call.

## 2025-03-20 ENCOUNTER — RESULTS FOLLOW-UP (OUTPATIENT)
Dept: PULMONOLOGY | Facility: CLINIC | Age: 80
End: 2025-03-20

## 2025-03-20 ENCOUNTER — TELEPHONE (OUTPATIENT)
Dept: PULMONOLOGY | Facility: CLINIC | Age: 80
End: 2025-03-20
Payer: MEDICARE

## 2025-03-20 NOTE — PROGRESS NOTES
Please call and let her know that her pro BNP is elevated, however this is similar to what we had seen 2 months ago.  If she has any increased orthopnea, please have her come in sooner to get an x-ray.  Also, her bilirubin is mildly improved than what it was 1 month ago, no acute hepatic dysfunction noted.

## 2025-03-20 NOTE — TELEPHONE ENCOUNTER
----- Message from Tanvir Rocha MD sent at 3/20/2025  2:04 PM CDT -----  Please call and let her know that her pro BNP is elevated, however this is similar to what we had seen 2 months ago.  If she has any increased orthopnea, please have her come in sooner to get an   x-ray.  Also, her bilirubin is mildly improved than what it was 1 month ago, no acute hepatic dysfunction noted.  ----- Message -----  From: Lab, Background User  Sent: 3/17/2025   3:19 PM CDT  To: Tanvir Rocha MD

## 2025-03-25 ENCOUNTER — PATIENT MESSAGE (OUTPATIENT)
Dept: PULMONOLOGY | Facility: CLINIC | Age: 80
End: 2025-03-25
Payer: MEDICARE

## 2025-03-31 ENCOUNTER — EXTERNAL CHRONIC CARE MANAGEMENT (OUTPATIENT)
Dept: PRIMARY CARE CLINIC | Facility: CLINIC | Age: 80
End: 2025-03-31
Payer: MEDICARE

## 2025-03-31 PROCEDURE — G0511 CCM/BHI BY RHC/FQHC 20MIN MO: HCPCS | Mod: ,,, | Performed by: NURSE PRACTITIONER

## 2025-04-03 ENCOUNTER — HOSPITAL ENCOUNTER (OUTPATIENT)
Dept: RADIOLOGY | Facility: HOSPITAL | Age: 80
Discharge: HOME | End: 2025-04-03
Attending: STUDENT IN AN ORGANIZED HEALTH CARE EDUCATION/TRAINING PROGRAM
Payer: MEDICARE

## 2025-04-03 DIAGNOSIS — J90 PLEURAL EFFUSION: ICD-10-CM

## 2025-04-03 PROCEDURE — 71046 X-RAY EXAM CHEST 2 VIEWS: CPT | Mod: TC

## 2025-04-03 PROCEDURE — 71046 X-RAY EXAM CHEST 2 VIEWS: CPT | Mod: 26,,, | Performed by: RADIOLOGY

## 2025-04-04 ENCOUNTER — HOSPITAL ENCOUNTER (OUTPATIENT)
Dept: RADIOLOGY | Facility: HOSPITAL | Age: 80
Discharge: HOME | End: 2025-04-04
Attending: STUDENT IN AN ORGANIZED HEALTH CARE EDUCATION/TRAINING PROGRAM
Payer: MEDICARE

## 2025-04-04 ENCOUNTER — DOCUMENT SCAN (OUTPATIENT)
Dept: HOME HEALTH SERVICES | Facility: HOSPITAL | Age: 80
End: 2025-04-04
Payer: MEDICARE

## 2025-04-04 ENCOUNTER — TELEPHONE (OUTPATIENT)
Dept: GASTROENTEROLOGY | Facility: HOSPITAL | Age: 80
End: 2025-04-04
Payer: MEDICARE

## 2025-04-04 ENCOUNTER — HOSPITAL ENCOUNTER (OUTPATIENT)
Dept: CARDIOLOGY | Facility: HOSPITAL | Age: 80
Discharge: HOME | End: 2025-04-04
Attending: STUDENT IN AN ORGANIZED HEALTH CARE EDUCATION/TRAINING PROGRAM
Payer: MEDICARE

## 2025-04-04 DIAGNOSIS — J90 PLEURAL EFFUSION: Primary | ICD-10-CM

## 2025-04-04 DIAGNOSIS — J90 PLEURAL EFFUSION: ICD-10-CM

## 2025-04-04 PROCEDURE — 76705 ECHO EXAM OF ABDOMEN: CPT | Mod: 26,,, | Performed by: RADIOLOGY

## 2025-04-04 PROCEDURE — 93306 TTE W/DOPPLER COMPLETE: CPT

## 2025-04-04 PROCEDURE — 93306 TTE W/DOPPLER COMPLETE: CPT | Mod: 26,,, | Performed by: STUDENT IN AN ORGANIZED HEALTH CARE EDUCATION/TRAINING PROGRAM

## 2025-04-04 PROCEDURE — 76705 ECHO EXAM OF ABDOMEN: CPT | Mod: TC

## 2025-04-04 NOTE — PROGRESS NOTES
Reviewed the chest x-ray, spoke with the daughter Madhavi.  The patient is mildly more symptomatic.  We have decided on performing a thoracentesis early next week at which time we will send out the samples into RPMI for flow cytometry as well.

## 2025-04-04 NOTE — TELEPHONE ENCOUNTER
Spoke with patient's daughter, Madhavi, concerning upcoming thoracentesis procedure scheduled for 04.08.25. Confirmed appt time of 0800 and arrival time of 0730. Confirmed address of facility. Patient's daughter expressed understanding of all pre-procedure instructions. Patient's daughter confirmed last dose of Eliquis will be on 04.05.25 per Dr. Rocha's verbal instructions. Confirmed having a  present for procedure. Discussed a tentative schedule of times for the day of the procedure. Answered all questions and concerns at this time.

## 2025-04-05 LAB
AORTIC ROOT ANNULUS: 2.79 CM
AORTIC VALVE CUSP SEPERATION: 1.95 CM
AV INDEX (PROSTH): 0.61
AV MEAN GRADIENT: 2 MMHG
AV PEAK GRADIENT: 5 MMHG
AV VALVE AREA BY VELOCITY RATIO: 1.8 CM²
AV VALVE AREA: 1.7 CM²
AV VELOCITY RATIO: 0.64
CV ECHO LV RWT: 0.36 CM
DOP CALC AO PEAK VEL: 1.1 M/S
DOP CALC AO VTI: 19.7 CM
DOP CALC LVOT AREA: 2.8 CM2
DOP CALC LVOT DIAMETER: 1.9 CM
DOP CALC LVOT PEAK VEL: 0.7 M/S
DOP CALC LVOT STROKE VOLUME: 34 CM3
DOP CALCLVOT PEAK VEL VTI: 12 CM
E/E' RATIO: 13 M/S
ECHO LV POSTERIOR WALL: 1 CM (ref 0.6–1.1)
FRACTIONAL SHORTENING: 18.2 % (ref 28–44)
INTERVENTRICULAR SEPTUM: 1.4 CM (ref 0.6–1.1)
IVC DIAMETER: 2.81 CM
LEFT ATRIUM AREA SYSTOLIC (APICAL 2 CHAMBER): 26.9 CM2
LEFT ATRIUM AREA SYSTOLIC (APICAL 4 CHAMBER): 34.81 CM2
LEFT ATRIUM VOLUME MOD: 100 ML
LEFT INTERNAL DIMENSION IN SYSTOLE: 4.5 CM (ref 2.1–4)
LEFT VENTRICLE DIASTOLIC VOLUME: 112 ML
LEFT VENTRICLE END SYSTOLIC VOLUME APICAL 2 CHAMBER: 79.21 ML
LEFT VENTRICLE END SYSTOLIC VOLUME APICAL 4 CHAMBER: 123.5 ML
LEFT VENTRICLE SYSTOLIC VOLUME: 93 ML
LEFT VENTRICULAR INTERNAL DIMENSION IN DIASTOLE: 5.5 CM (ref 3.5–6)
LEFT VENTRICULAR MASS: 272.4 G
LV LATERAL E/E' RATIO: 8.3 M/S
LV SEPTAL E/E' RATIO: 25 M/S
LVED V (TEICH): 112.03 ML
LVES V (TEICH): 92.51 ML
LVOT MG: 0.89 MMHG
LVOT MV: 0.45 CM/S
MV PEAK E VEL: 1 M/S
OHS LV EJECTION FRACTION SIMPSONS BIPLANE MOD: 35 %
PISA MRMAX VEL: 2.75 M/S
PISA TR MAX VEL: 2.8 M/S
PV PEAK GRADIENT: 23 MMHG
PV PEAK VELOCITY: 2.39 M/S
RA PRESSURE ESTIMATED: 8 MMHG
RA VOL SYS: 43.79 ML
RIGHT ATRIAL AREA: 18.2 CM2
RIGHT ATRIUM VOLUME AREA LENGTH APICAL 4 CHAMBER: 42.26 ML
RIGHT VENTRICLE DIASTOLIC LENGTH: 7.7 CM
RV MID DIAMA: 2.09 CM
RV TB RVSP: 11 MMHG
TDI LATERAL: 0.12 M/S
TDI SEPTAL: 0.04 M/S
TDI: 0.08 M/S
TR MAX PG: 30 MMHG
TRICUSPID ANNULAR PLANE SYSTOLIC EXCURSION: 1.1 CM
TV REST PULMONARY ARTERY PRESSURE: 39 MMHG

## 2025-04-07 ENCOUNTER — OFFICE VISIT (OUTPATIENT)
Dept: PULMONOLOGY | Facility: CLINIC | Age: 80
End: 2025-04-07
Payer: MEDICARE

## 2025-04-07 VITALS
OXYGEN SATURATION: 99 % | HEIGHT: 70 IN | SYSTOLIC BLOOD PRESSURE: 124 MMHG | BODY MASS INDEX: 31.56 KG/M2 | RESPIRATION RATE: 16 BRPM | DIASTOLIC BLOOD PRESSURE: 80 MMHG | HEART RATE: 72 BPM | WEIGHT: 220.44 LBS

## 2025-04-07 DIAGNOSIS — J90 PLEURAL EFFUSION: Primary | ICD-10-CM

## 2025-04-07 DIAGNOSIS — R06.02 SOB (SHORTNESS OF BREATH): ICD-10-CM

## 2025-04-07 DIAGNOSIS — I12.9 CKD STAGE 4 SECONDARY TO HYPERTENSION: ICD-10-CM

## 2025-04-07 DIAGNOSIS — N18.4 CKD STAGE 4 SECONDARY TO HYPERTENSION: ICD-10-CM

## 2025-04-07 DIAGNOSIS — R18.8 OTHER ASCITES: ICD-10-CM

## 2025-04-07 PROCEDURE — 99215 OFFICE O/P EST HI 40 MIN: CPT | Mod: PBBFAC | Performed by: STUDENT IN AN ORGANIZED HEALTH CARE EDUCATION/TRAINING PROGRAM

## 2025-04-07 PROCEDURE — 99214 OFFICE O/P EST MOD 30 MIN: CPT | Mod: S$PBB,,, | Performed by: STUDENT IN AN ORGANIZED HEALTH CARE EDUCATION/TRAINING PROGRAM

## 2025-04-07 PROCEDURE — 99999 PR PBB SHADOW E&M-EST. PATIENT-LVL V: CPT | Mod: PBBFAC,,, | Performed by: STUDENT IN AN ORGANIZED HEALTH CARE EDUCATION/TRAINING PROGRAM

## 2025-04-07 RX ORDER — ALBUTEROL SULFATE 90 UG/1
2 INHALANT RESPIRATORY (INHALATION) EVERY 6 HOURS PRN
Qty: 18 G | Refills: 5 | Status: SHIPPED | OUTPATIENT
Start: 2025-04-07

## 2025-04-07 NOTE — PROGRESS NOTES
Patient Name: Pam Ortega   Primary Care Provider: Janice Velez FNP   Date of Service: 04/7/2025   Reason for Referral:  Follow up thoracentesis    Chief Complaint: Pleural Effusion (Follow up/CXR. C/o SOB and some R sided rib cage pain.)      Subjective:      Pam Ortega is 79 y.o. female with  past medical history significant fibrillation, chronic kidney disease and history of pleural effusions presents to me after having a thoracentesis done recently.        Follow up clinic visit 4/7/25  Ascites on her echocardiogram  New EF reduction, does see Dr. Loo as an outpatient, they will follow up with him as soon as possible for workup of known or new onset heart failure   Referral to Dr. Lobato from  for evaluation of patient's ascites   Thoracentesis tomorrow   Repeat chest x-ray in 2-3 weeks with follow up with me at that time  Personally reviewed her chest x-ray which shows a return of her pleural effusion      Initial clinic visit with me 3/17/25   She has had 2 thoracentesis done.  Her second 1 was on 3/2/25 with drainage of a total of 1450 cc of clear yellow fluid.  This was a transudative effusion.  Patient's symptomatically feels fine.  I reviewed her chest x-ray from today which shows some return right-sided effusion.  I have repeated echocardiogram, ultrasound liver, pro BNP, hepatic function panel and follow up with me in about 3-4 weeks.  If she is symptomatic she can come and see me sooner.          Assessment and Plan      Recurrent right-sided pleural effusion  Dyspnea on exertion   Chronic kidney disease  New onset systolic heart failure   Ascites      Assessment:  The differential at this time is broad, cultures have thus far been negative.  Transudative effusion.  Pleural effusion x2 negative for malignancy.  Given the location of the pleural fluid, hepatic hydrothorax is on the differential .  She tells me that she has some swelling in her bilateral midthigh area  (trace-1+ pitting edema).  She is already on high dose of Lasix and I had offered to speak with the patient's nephrologist, the patient and her family said that they will communicate with the nephrologist to see if her Lasix dose needs to be increased significantly.      She follows with an outside hospital cardiologist and I have encouraged her to follow up with him regarding her reduction in ejection fraction, offering to reach out to him as well.  Patient's daughter and other family members we will get in touch with cardiologist for an appointment as soon as possible and we will inform me if any challenges.  We will also refer to gastroenterology for ascites.    Patient's effusion appears to be secondary to volume overload.  Comfortable today.    Plan  Referral to GI   Thoracentesis with plans to send sample into RPMI  Follow up with Cardiology  Counseled to call the clinic or go to the emergency department/call 911 in the event of worsening symptoms or any other red flag signs/symptoms as explained to the patient in detail            Tanvir Rocha MD  Interventional Pulmonary and Critical Care  [facility]          Problem List Items Addressed This Visit          Pulmonary    Pleural effusion - Primary    Relevant Orders    X-Ray Chest PA And Lateral    Ambulatory referral/consult to Gastroenterology           Past Medical History:   Diagnosis Date    Anemia of chronic disease     Atrial fibrillation     Benign hypertensive CKD, stage 1-4 or unspecified chronic kidney disease     Bronchitis 12/22/2023    Carotid artery occlusion     CKD (chronic kidney disease)     Coronary atherosclerosis     Cough 12/22/2023    COVID-19 ruled out 03/20/2024    Diabetes mellitus, type 2     DJD (degenerative joint disease)     History of CVA (cerebrovascular accident)     HTN (hypertension)     Hyperlipidemia     Nonischemic dilated cardiomyopathy     Obesity     Osteopenia     Paroxysmal atrial fibrillation      Presence of cardiac pacemaker     PVD (peripheral vascular disease)     Upper respiratory tract infection 12/22/2023    Vitamin D deficiency       Past Surgical History:   Procedure Laterality Date    BREAST BIOPSY      COLONOSCOPY  12/04/2018    repeat in 5 years    EMBOLECTOMY OR THROMBECTOMY, ARTERY, AORTOILIAC, FEMORAL, OR POPLITEAL Right 11/17/2024    Procedure: EMBOLECTOMY OR THROMBECTOMY, ARTERY, AORTOILIAC, FEMORAL, OR POPLITEAL;  Surgeon: Jordana Guillermo MD;  Location: [facility];  Service: Vascular;  Laterality: Right;    pace maker      VAGINAL DELIVERY      x 3     Family History   Problem Relation Name Age of Onset    Hypertension Mother      Hypertension Father      Hypertension Sister      Stroke Sister      Cancer Brother      Stroke Brother      Hypertension Brother      Alzheimer's disease Maternal Grandmother      Breast cancer Maternal Grandmother      Hypertension Maternal Grandmother      Hyperlipidemia Daughter      Breast cancer Daughter      Diabetes Daughter      Hypertension Daughter      Diabetes Daughter      Hypertension Daughter      Hypertension Daughter      Clotting disorder Daughter       Review of patient's allergies indicates:   Allergen Reactions    Ace inhibitors Edema    Losartan Swelling      Social History[1]   Review of Systems       Objective:      Physical Exam  Constitutional:       General: She is not in acute distress.     Appearance: Normal appearance. She is normal weight. She is not ill-appearing or diaphoretic.   HENT:      Head: Normocephalic and atraumatic.      Nose: No congestion or rhinorrhea.      Mouth/Throat:      Mouth: Mucous membranes are moist.   Cardiovascular:      Rate and Rhythm: Normal rate and regular rhythm.      Pulses: Normal pulses.      Heart sounds: Normal heart sounds.   Pulmonary:      Effort: Pulmonary effort is normal. No respiratory distress.      Breath sounds: No stridor. No wheezing, rhonchi or rales.      Comments: Mildly  "decreased breath sounds right base  Chest:      Chest wall: No tenderness.   Abdominal:      General: Abdomen is flat.   Musculoskeletal:      Cervical back: Normal range of motion. No rigidity.      Comments: Patient with compression stockings on, some trace-1+ pitting edema present at the level of the mid thigh.  No erythema, tenderness in bilateral lower extremities   Skin:     General: Skin is warm.      Findings: No erythema.   Neurological:      General: No focal deficit present.      Mental Status: She is alert and oriented to person, place, and time. Mental status is at baseline.   Psychiatric:         Mood and Affect: Mood normal.         Behavior: Behavior normal.         Thought Content: Thought content normal.                4/16/2025     9:09 AM 4/16/2025     9:00 AM 4/9/2025    10:14 AM 4/8/2025     8:59 AM 4/8/2025     8:58 AM 4/8/2025     8:57 AM 4/8/2025     8:56 AM   Pulmonary Function Tests   SpO2 100 % 100 % 98 % 100 % 99 % 100 % 100 %   Height   5' 9" (1.753 m)       Weight   97.5 kg (215 lb)       BMI (Calculated)   31.7             Encounter Medications[2]    Assessment & Plan    As above                                          Orders Placed This Encounter   Procedures    X-Ray Chest PA And Lateral     Standing Status:   Future     Expected Date:   4/21/2025     Expiration Date:   4/7/2026     May the Radiologist modify the order per protocol to meet the clinical needs of the patient?:   Yes     Release to patient:   Immediate    Ambulatory referral/consult to Gastroenterology     Standing Status:   Future     Expected Date:   4/8/2025     Expiration Date:   5/7/2026     Referral Priority:   Urgent     Referral Type:   Consultation     Referral Reason:   Specialty Services Required     Requested Specialty:   Gastroenterology     Number of Visits Requested:   1                        [1]   Social History  Tobacco Use    Smoking status: Never     Passive exposure: Never    Smokeless tobacco: " Never   Substance Use Topics    Alcohol use: Not Currently    Drug use: Never   [2]   Outpatient Encounter Medications as of 4/7/2025   Medication Sig Dispense Refill    alendronate (FOSAMAX) 70 MG tablet Take 1 tablet (70 mg total) by mouth every 7 days. 12 tablet 3    amLODIPine (NORVASC) 10 MG tablet Take 1 tablet (10 mg total) by mouth once daily. 90 tablet 3    aspirin (ECOTRIN) 81 MG EC tablet Take 81 mg by mouth once daily.      budesonide-formoterol 160-4.5 mcg (SYMBICORT) 160-4.5 mcg/actuation HFAA Inhale 2 puffs into the lungs every 12 (twelve) hours. Controller Inhaler 10.2 g 5    cholecalciferol, vitamin D3, (VITAMIN D3) 50 mcg (2,000 unit) Cap capsule Take 1 capsule by mouth once daily.      colchicine (COLCRYS) 0.6 mg tablet Take 1 tablet (0.6 mg total) by mouth once daily. for gout 90 tablet 3    fluticasone-salmeterol diskus inhaler 250-50 mcg Inhale 1 puff into the lungs 2 (two) times daily. Controller Inhaler 60 each 5    furosemide (LASIX) 80 MG tablet Take 1 tablet (80 mg total) by mouth every morning. 90 tablet 11    glimepiride (AMARYL) 4 MG tablet Take 1 tablet (4 mg total) by mouth daily with breakfast. 90 tablet 3    hydrALAZINE (APRESOLINE) 50 MG tablet Take 1 tablet (50 mg total) by mouth 2 (two) times a day. 180 tablet 3    isosorbide mononitrate (IMDUR) 30 MG 24 hr tablet Take 1 tablet (30 mg total) by mouth once daily. 90 tablet 3    lovastatin (MEVACOR) 20 MG tablet Take 1 tablet (20 mg total) by mouth every night 90 tablet 3    metoprolol tartrate (LOPRESSOR) 50 MG tablet Take 1 tablet (50 mg total) by mouth 2 (two) times a day. 180 tablet 3    omeprazole (PRILOSEC) 20 MG capsule Take 1 capsule (20 mg total) by mouth once daily. 90 capsule 3    potassium chloride (MICRO-K) 10 MEQ CpSR Take 1 capsule (10 mEq total) by mouth once daily. 90 capsule 3    rivaroxaban (XARELTO) 15 mg Tab Take 1 tablet (15 mg total) by mouth once daily. 90 tablet 2    rivaroxaban (XARELTO) 15 mg Tab Take 1  tablet (15 mg total) by mouth once daily. 90 tablet 4    semaglutide (OZEMPIC) 1 mg/dose (4 mg/3 mL) Inject 1 mg into the skin every 7 days. 9 mL 3    spironolactone (ALDACTONE) 25 MG tablet Take 1 tablet (25 mg total) by mouth once daily. 90 tablet 3    [DISCONTINUED] albuterol (VENTOLIN HFA) 90 mcg/actuation inhaler Inhale 2 puffs into the lungs every 6 (six) hours as needed for Wheezing. Rescue Inhaler 18 g 5    albuterol (VENTOLIN HFA) 90 mcg/actuation inhaler Inhale 2 puffs into the lungs every 6 (six) hours as needed for Wheezing. Rescue Inhaler 18 g 5     No facility-administered encounter medications on file as of 4/7/2025.

## 2025-04-08 ENCOUNTER — HOSPITAL ENCOUNTER (OUTPATIENT)
Dept: RADIOLOGY | Facility: HOSPITAL | Age: 80
Discharge: HOME | End: 2025-04-08
Attending: STUDENT IN AN ORGANIZED HEALTH CARE EDUCATION/TRAINING PROGRAM
Payer: MEDICARE

## 2025-04-08 ENCOUNTER — HOSPITAL ENCOUNTER (OUTPATIENT)
Dept: GASTROENTEROLOGY | Facility: HOSPITAL | Age: 80
Discharge: HOME | End: 2025-04-08
Attending: STUDENT IN AN ORGANIZED HEALTH CARE EDUCATION/TRAINING PROGRAM
Payer: MEDICARE

## 2025-04-08 VITALS
BODY MASS INDEX: 31.55 KG/M2 | SYSTOLIC BLOOD PRESSURE: 138 MMHG | RESPIRATION RATE: 13 BRPM | HEART RATE: 60 BPM | OXYGEN SATURATION: 100 % | HEIGHT: 69 IN | TEMPERATURE: 98 F | WEIGHT: 213 LBS | DIASTOLIC BLOOD PRESSURE: 76 MMHG

## 2025-04-08 DIAGNOSIS — J90 PLEURAL EFFUSION: ICD-10-CM

## 2025-04-08 LAB
ALBUMIN FLD-MCNC: 1.6 G/DL
CHOLEST FLD-MCNC: 17 MG/DL
CLARITY FLD: ABNORMAL
COLOR FLD: ABNORMAL
GLUCOSE FLD-MCNC: 155 MG/DL
GLUCOSE SERPL-MCNC: 129 MG/DL (ref 70–105)
GRANULOCYTES NFR FLD MANUAL: 13 % (ref 0–25)
LDH FLD-CCNC: 104 U/L
LYMPHOCYTES NFR FLD MANUAL: 45 %
MESOTHL CELL NFR FLD MANUAL: 3 %
MONOCYTES NFR FLD MANUAL: 39 %
PROT FLD-MCNC: 2.8 G/DL
RBC # FLD MANUAL: ABNORMAL /CUMM
WBC # FLD MANUAL: 302 /CUMM

## 2025-04-08 PROCEDURE — 88305 TISSUE EXAM BY PATHOLOGIST: CPT | Mod: 26,,, | Performed by: PATHOLOGY

## 2025-04-08 PROCEDURE — 88112 CYTOPATH CELL ENHANCE TECH: CPT | Mod: TC,59,MCY | Performed by: STUDENT IN AN ORGANIZED HEALTH CARE EDUCATION/TRAINING PROGRAM

## 2025-04-08 PROCEDURE — 87070 CULTURE OTHR SPECIMN AEROBIC: CPT | Performed by: STUDENT IN AN ORGANIZED HEALTH CARE EDUCATION/TRAINING PROGRAM

## 2025-04-08 PROCEDURE — 89050 BODY FLUID CELL COUNT: CPT | Performed by: STUDENT IN AN ORGANIZED HEALTH CARE EDUCATION/TRAINING PROGRAM

## 2025-04-08 PROCEDURE — 84157 ASSAY OF PROTEIN OTHER: CPT | Performed by: STUDENT IN AN ORGANIZED HEALTH CARE EDUCATION/TRAINING PROGRAM

## 2025-04-08 PROCEDURE — 88112 CYTOPATH CELL ENHANCE TECH: CPT | Mod: 26,,, | Performed by: PATHOLOGY

## 2025-04-08 PROCEDURE — 82042 OTHER SOURCE ALBUMIN QUAN EA: CPT | Performed by: STUDENT IN AN ORGANIZED HEALTH CARE EDUCATION/TRAINING PROGRAM

## 2025-04-08 PROCEDURE — 82962 GLUCOSE BLOOD TEST: CPT

## 2025-04-08 PROCEDURE — 71045 X-RAY EXAM CHEST 1 VIEW: CPT | Mod: TC

## 2025-04-08 PROCEDURE — 71045 X-RAY EXAM CHEST 1 VIEW: CPT | Mod: 26,,, | Performed by: RADIOLOGY

## 2025-04-09 ENCOUNTER — RESULTS FOLLOW-UP (OUTPATIENT)
Dept: GASTROENTEROLOGY | Facility: CLINIC | Age: 80
End: 2025-04-09

## 2025-04-09 ENCOUNTER — OFFICE VISIT (OUTPATIENT)
Dept: GASTROENTEROLOGY | Facility: CLINIC | Age: 80
End: 2025-04-09
Payer: MEDICARE

## 2025-04-09 VITALS
HEIGHT: 69 IN | HEART RATE: 82 BPM | DIASTOLIC BLOOD PRESSURE: 80 MMHG | WEIGHT: 215 LBS | OXYGEN SATURATION: 98 % | BODY MASS INDEX: 31.84 KG/M2 | SYSTOLIC BLOOD PRESSURE: 135 MMHG

## 2025-04-09 DIAGNOSIS — R18.8 OTHER ASCITES: Primary | ICD-10-CM

## 2025-04-09 DIAGNOSIS — R17 ELEVATED BILIRUBIN: ICD-10-CM

## 2025-04-09 LAB
INSULIN SERPL-ACNC: NORMAL U[IU]/ML
LAB AP GROSS DESCRIPTION: NORMAL
LAB AP LABORATORY NOTES: NORMAL
LAB AP SPECIMEN A NON-GYN GENERAL CATEGORIZATION: NEGATIVE
LAB AP SPECIMEN A NON-GYN INTERPETATION: NORMAL

## 2025-04-09 PROCEDURE — 85610 PROTHROMBIN TIME: CPT | Performed by: NURSE PRACTITIONER

## 2025-04-09 PROCEDURE — 99999 PR PBB SHADOW E&M-EST. PATIENT-LVL V: CPT | Mod: PBBFAC,,, | Performed by: NURSE PRACTITIONER

## 2025-04-09 PROCEDURE — 99215 OFFICE O/P EST HI 40 MIN: CPT | Mod: PBBFAC | Performed by: NURSE PRACTITIONER

## 2025-04-09 NOTE — PROGRESS NOTES
Pam Ortega is a 79 y.o. female here for hospitals Care        PCP: Janice Velez  Referring Provider: Carole Meade, Néstor  [address] [place] [address],  [place] [id number]     HPI:  Presents due to abdominal ascites.  Patient has had ongoing shortness of breath with recurrent pleural effusions requiring thoracentesis.  She has had 3 thoracentesis due to pleural effusion.  The last thoracentesis was performed on yesterday.  She has been followed by Dr. Rocha.  Patient had CT chest abdomen and pelvis on 01/19/2025,The liver, spleen, and adrenal glands have a grossly unremarkable noncontrast appearance. The stomach is nondistended, no gastric wall thickening.  No significant abdominal lymphadenopathy.There is a small focus of suspected fat infarct along the left lateral aspect of the descending colon.  The terminal ileum and appendix are unremarkable.  The small bowel is grossly unremarkable.  There are several scattered shotty periaortic, pericaval, and mesenteric lymph nodes.  There is atherosclerotic calcification of the aorta and its branches. There are uterine leiomyoma.  The adnexa is unremarkable.  No significant free fluid in the pelvis.  Abdominal ultrasound on 04/04/2025, report reviewed, moderate amount of abdominal ascites.  The patient is currently taking Xarelto due to Afib.  Patient started back on Xarelto today following thoracentesis yesterday.  She is also being followed by Cardiology, Dr. Loo.  Patient had echocardiogram on 04/04/2025 that did show a quantitative EF of 35%.  Right atrium was moderately dilated.  Severe regurg in the tricuspid and moderate to severe regurg pulmonic.  We are going to reach out to Dr. Loo's office today to see if we can get the patient's appointment moved up to see Cardiology.  We will schedule paracentesis with fluid evaluation.  We will calculate SAAG on day of paracentesis.  Discussed with the family and patient that abdominal  ascites may also result from heart failure and that SAAG calculation will help us differentiate source of ascites.  Labs from 03/17/2025, liver enzymes are normal, bilirubin elevated at 2.0, direct bilirubin 1.1.  Patient's appetite is decreased due to early satiety and abdominal fullness.  No dysphagia.  Is reporting increased nausea.  Despite thoracentesis yesterday.  The patient is reporting shortness of breath.  States that she is having orthopnea.  Last colonoscopy 2018 per Dr. Siegel.          ROS:  Review of Systems   Constitutional:  Positive for appetite change and fatigue. Negative for fever and unexpected weight change.   Respiratory:  Positive for shortness of breath.    Cardiovascular:  Negative for chest pain.   Gastrointestinal:  Positive for abdominal distention and nausea. Negative for abdominal pain, blood in stool, change in bowel habit, constipation, diarrhea, vomiting and reflux.   Integumentary:  Negative for pallor.          PMHX:  has a past medical history of Anemia of chronic disease, Atrial fibrillation, Benign hypertensive CKD, stage 1-4 or unspecified chronic kidney disease, Bronchitis (12/22/2023), Carotid artery occlusion, CKD (chronic kidney disease), Coronary atherosclerosis, Cough (12/22/2023), COVID-19 ruled out (03/20/2024), Diabetes mellitus, type 2, DJD (degenerative joint disease), History of CVA (cerebrovascular accident), HTN (hypertension), Hyperlipidemia, Nonischemic dilated cardiomyopathy, Obesity, Osteopenia, Paroxysmal atrial fibrillation, Presence of cardiac pacemaker, PVD (peripheral vascular disease), Upper respiratory tract infection (12/22/2023), and Vitamin D deficiency.    PSHX:  has a past surgical history that includes Breast biopsy; Colonoscopy (12/04/2018); pace maker; Vaginal delivery; and embolectomy or thrombectomy, artery, aortoiliac, femoral, or popliteal (Right, 11/17/2024).    PFHX: family history includes Alzheimer's disease in her maternal  grandmother; Breast cancer in her daughter and maternal grandmother; Cancer in her brother; Clotting disorder in her daughter; Diabetes in her daughter and daughter; Hyperlipidemia in her daughter; Hypertension in her brother, daughter, daughter, daughter, father, maternal grandmother, mother, and sister; Stroke in her brother and sister.    PSlHX:  reports that she has never smoked. She has never been exposed to tobacco smoke. She has never used smokeless tobacco. She reports that she does not currently use alcohol. She reports that she does not use drugs.        Review of patient's allergies indicates:   Allergen Reactions    Ace inhibitors Edema    Losartan Swelling       Medication List with Changes/Refills   Current Medications    ALBUTEROL (VENTOLIN HFA) 90 MCG/ACTUATION INHALER    Inhale 2 puffs into the lungs every 6 (six) hours as needed for Wheezing. Rescue Inhaler    ALENDRONATE (FOSAMAX) 70 MG TABLET    Take 1 tablet (70 mg total) by mouth every 7 days.    AMLODIPINE (NORVASC) 10 MG TABLET    Take 1 tablet (10 mg total) by mouth once daily.    ASPIRIN (ECOTRIN) 81 MG EC TABLET    Take 81 mg by mouth once daily.    BUDESONIDE-FORMOTEROL 160-4.5 MCG (SYMBICORT) 160-4.5 MCG/ACTUATION HFAA    Inhale 2 puffs into the lungs every 12 (twelve) hours. Controller Inhaler    CHOLECALCIFEROL, VITAMIN D3, (VITAMIN D3) 50 MCG (2,000 UNIT) CAP CAPSULE    Take 1 capsule by mouth once daily.    COLCHICINE (COLCRYS) 0.6 MG TABLET    Take 1 tablet (0.6 mg total) by mouth once daily. for gout    FLUTICASONE-SALMETEROL DISKUS INHALER 250-50 MCG    Inhale 1 puff into the lungs 2 (two) times daily. Controller Inhaler    FUROSEMIDE (LASIX) 80 MG TABLET    Take 1 tablet (80 mg total) by mouth every morning.    GLIMEPIRIDE (AMARYL) 4 MG TABLET    Take 1 tablet (4 mg total) by mouth daily with breakfast.    HYDRALAZINE (APRESOLINE) 50 MG TABLET    Take 1 tablet (50 mg total) by mouth 2 (two) times a day.    ISOSORBIDE MONONITRATE  "(IMDUR) 30 MG 24 HR TABLET    Take 1 tablet (30 mg total) by mouth once daily.    LOVASTATIN (MEVACOR) 20 MG TABLET    Take 1 tablet (20 mg total) by mouth every night    METOPROLOL TARTRATE (LOPRESSOR) 50 MG TABLET    Take 1 tablet (50 mg total) by mouth 2 (two) times a day.    OMEPRAZOLE (PRILOSEC) 20 MG CAPSULE    Take 1 capsule (20 mg total) by mouth once daily.    POTASSIUM CHLORIDE (MICRO-K) 10 MEQ CPSR    Take 1 capsule (10 mEq total) by mouth once daily.    RIVAROXABAN (XARELTO) 15 MG TAB    Take 1 tablet (15 mg total) by mouth once daily.    RIVAROXABAN (XARELTO) 15 MG TAB    Take 1 tablet (15 mg total) by mouth once daily.    SEMAGLUTIDE (OZEMPIC) 1 MG/DOSE (4 MG/3 ML)    Inject 1 mg into the skin every 7 days.    SPIRONOLACTONE (ALDACTONE) 25 MG TABLET    Take 1 tablet (25 mg total) by mouth once daily.        Objective Findings:  Vital Signs:  /80 (BP Location: Left arm, Patient Position: Sitting)   Pulse 82   Ht 5' 9" (1.753 m)   Wt 97.5 kg (215 lb)   SpO2 98%   BMI 31.75 kg/m²  Body mass index is 31.75 kg/m².    Physical Exam:  Physical Exam  Vitals and nursing note reviewed.   Constitutional:       General: She is not in acute distress.     Appearance: Normal appearance.   HENT:      Mouth/Throat:      Mouth: Mucous membranes are moist.   Cardiovascular:      Rate and Rhythm: Normal rate.   Pulmonary:      Effort: Pulmonary effort is normal.   Abdominal:      General: Abdomen is flat. Bowel sounds are normal. There is distension.      Palpations: Abdomen is soft. There is no mass.      Tenderness: There is no abdominal tenderness.   Skin:     General: Skin is warm and dry.      Coloration: Skin is not jaundiced or pale.   Neurological:      Mental Status: She is alert and oriented to person, place, and time.   Psychiatric:         Mood and Affect: Mood normal.          Labs:  Lab Results   Component Value Date    WBC 6.20 01/26/2025    HGB 10.2 (L) 01/26/2025    HCT 31.7 (L) 01/26/2025    " MCV 80.5 01/26/2025    RDW 16.9 (H) 01/26/2025     01/26/2025    LYMPH 16.6 (L) 01/26/2025    LYMPH 1.03 01/26/2025    MONO 12.7 (H) 01/26/2025    EOS 0.07 01/26/2025    BASO 0.02 01/26/2025     Lab Results   Component Value Date     (L) 01/26/2025    K 4.2 01/26/2025    CL 98 01/26/2025    CO2 21 (L) 01/26/2025     (H) 01/26/2025    BUN 39 (H) 01/26/2025    CREATININE 1.96 (H) 01/26/2025    CALCIUM 8.7 01/26/2025    PROT 6.7 03/17/2025    ALBUMIN 3.5 03/17/2025    BILITOT 2.0 (H) 03/17/2025    ALKPHOS 86 03/17/2025    AST 24 03/17/2025    ALT 10 03/17/2025         Imaging: X-Ray Chest AP Portable  Result Date: 4/8/2025  EXAMINATION: XR CHEST AP PORTABLE CLINICAL HISTORY: post thoracentesis; TECHNIQUE: Single frontal view of the chest was performed. COMPARISON: Radiographs 04/03/2025. FINDINGS: Left chest wall pacemaker with lead projecting in similar position.  Cardiac monitoring leads project over the bilateral hemithoraces.  Mediastinal structures are midline. Calcified mediastinal and left hilar lymph nodes.  Atherosclerotic calcification of the aorta.  Cardiac silhouette remains enlarged.  Lung volumes are normal and symmetric.  No consolidation.  Decreased volume right pleural effusion status post thoracentesis.  No pneumothorax.  No free air beneath the diaphragm. No acute osseous abnormalities.  Degenerative changes of the spine and shoulders.     Status post right thoracentesis.  No pneumothorax. Stable enlargement of the cardiac silhouette. Electronically signed by: Mann Daley MD Date:    04/08/2025 Time:    09:29    US Abdomen Limited_Liver  Result Date: 4/7/2025  EXAMINATION: US ABDOMEN LIMITED_LIVER CLINICAL HISTORY: . Pleural effusion, not elsewhere classified TECHNIQUE: Limited ultrasound of the right upper quadrant of the abdomen including pancreas, liver, gallbladder, common bile duct was performed. COMPARISON: 12/12/2018. FINDINGS: Liver: Normal in size, measuring 14.1 cm.  Homogeneous echotexture. No focal hepatic lesions. Gallbladder: Gallbladder is nondistended.  There is mild gallbladder wall thickening.  Note made of punctate echogenic focus within the wall with comet tail artifact, perhaps adenomyomatosis.  No hyperemia or sonographic Haynes sign. Biliary system: The common duct is not dilated, measuring 2 mm.  No intrahepatic ductal dilatation. Spleen: Normal in size with a homogeneous echotexture, measuring 9.1 cm. Pancreas: The visualized portions of pancreas appear normal. Miscellaneous: Moderate ascites noted.     1. Gallbladder wall thickening may be secondary to systemic factors or nondistention with cholecystitis less likely.  Mural echogenic focus with comet tail artifact suspicious for adenomyomatosis. 2. Moderate ascites. Electronically signed by: Wes Stover MD Date:    04/07/2025 Time:    08:19    Echo Saline Bubble? No  Addendum Date: 4/5/2025    Left Ventricle: The left ventricle is mildly dilated. Increased ventricular mass. Normal wall thickness. There is eccentric hypertrophy. Septal motion is consistent with pacing. There is moderately reduced systolic function with a visually estimated ejection fraction of 30 - 40%. Quantitated ejection fraction is 35%. There is diastolic dysfunction.   Right Ventricle: The right ventricle has mild enlargement. Systolic function is moderately reduced. Pacemaker lead present in the ventricle.   Left Atrium: Severely dilated   Right Atrium: Right atrium is moderately dilated.   Mitral Valve: There is at least moderate regurgitation with an eccentrically posterolateral directed jet. MR jet is only well-visualized on parasternal long axis views, remaining views w/ supoptimal assessment of MR as MR jet is not adequately captured.   Tricuspid Valve: There is severe regurgitation.   Pulmonic Valve: There is moderate to severe regurgitation.   Pulmonary Artery: The estimated pulmonary artery systolic pressure is 39 mmHg.   IVC/SVC:  Intermediate venous pressure at 8 mmHg.   Pericardium: Right pleural effusion.   Compared to prior ECHO from 11/204 (which was also personally reviewed): LV size and LVEF are unchanged. LV remains mildy dilated with a moderately reduced LVEF. Improvement in RA and RV size noted on current study (previously severely dilated). MR is better visualized on previous study and at least moderate MR previously- appears largely unchanged however, there are technical limitations with MR assessment on current study as detailed above.     Result Date: 4/5/2025    Left Ventricle: The left ventricle is mildly dilated. Increased ventricular mass. Normal wall thickness. There is eccentric hypertrophy. Septal motion is consistent with pacing. There is moderately reduced systolic function with a visually estimated ejection fraction of 35 - 40%. Quantitated ejection fraction is 38%. There is diastolic dysfunction.   Right Ventricle: The right ventricle has mild enlargement. Systolic function is moderately reduced. Pacemaker lead present in the ventricle.   Left Atrium: Severely dilated   Right Atrium: Right atrium is moderately dilated.   Mitral Valve: There is at least moderate regurgitation with an eccentrically posterolateral directed jet. MR jet is only well-visualized on parasternal long axis views, remaining views w/ supoptimal assessment of MR as MR jet is not adequately captured.   Tricuspid Valve: There is severe regurgitation.   Pulmonic Valve: There is moderate to severe regurgitation.   Pulmonary Artery: The estimated pulmonary artery systolic pressure is 39 mmHg.   IVC/SVC: Intermediate venous pressure at 8 mmHg.   Pericardium: Right pleural effusion.   Compared to prior ECHO from 11/204 (which was also personally reviewed): LV size and LVEF are unchanged. LV remains mildy dilated with a moderately reduced LVEF. Improvement in RA and RV size noted on current study (previously severely dilated). MR is better visualized on  previous study and at least moderate MR previously- appears largely unchanged however, there are technical limitations with MR assessment on current study as detailed above.     X-Ray Chest PA And Lateral  Result Date: 4/3/2025  EXAMINATION: XR CHEST PA AND LATERAL CLINICAL HISTORY: Pleural effusion, not elsewhere classified TECHNIQUE: PA and lateral views of the chest were performed. COMPARISON: 03/14/2025 FINDINGS: There is a right pleural effusion.  There is no pneumothorax.  There is bibasilar atelectasis.     There is a right pleural effusion. Electronically signed by: Sophia Godoy MD Date:    04/03/2025 Time:    15:55    X-Ray Chest PA And Lateral  Result Date: 3/16/2025  EXAMINATION: XR CHEST, 2 VIEWS CLINICAL HISTORY: Pleural effusion. TECHNIQUE: PA, lateral views of thorax obtained. COMPARISON: CXRs 02/28/2025, 02/21/2025, 02/03/2025, 01/27/2025, 09/21/2016. FINDINGS: Moderate generalized cardiomegaly unchanged. Left subclavian transvenous right ventricular pacemaker. Central pulmonary vascular prominence. Atherosclerotic calcification of thoracoabdominal aorta. Calcified lymph nodes at left hilum and subcarinal mediastinum. Moderate pleural effusion at lower right thorax with a small amount of fluid in minor fissure. Atelectasis at posteroinferior right lower lobe. Mild chronic elevation of right diaphragm. Left lung is clear. Tiny amount of pleural fluid at left posterior costophrenic sulcus. Degenerative changes of thoracic spine. Hypertrophic change at the left anterior 1st rib end.     1.  Mild right basilar atelectasis with small right pleural effusion mildly increased since 02/28/2025 and less than observed on 02/21/2025. 2.  Moderate cardiomegaly and central pulmonary vascular prominence. 3.  Atherosclerosis of aorta. 4.  Chronic granulomatous changes of thorax. 5.  Mild thoracic spondylosis. Electronically signed by: Husam Coleman Date:    03/16/2025 Time:    23:20        Assessment:  Pam DANG  "Shannon is a 79 y.o. female here with:  1. Other ascites    2. Elevated bilirubin          Recommendations:  1. Liver labs  2. Schedule paracentesis ultrasound-guided, patient will need to hold Xarelto prior to paracentesis  3. Call Dr. Loo's office to see if patient appointment can be moved up due to recent echo, poor EF and recurrent pleural effusion.  4. Call Dr. Dutta nephrology for patient follow up    Portions of this note may have been created with voice recognition software.  Occasional wrong word or "sound a like substitutions may have occurred due to inherent limitations of voice recognition software.  Please read the note carefully and recognize using contexts, where substitutions have occurred.    Diagnosis, risks, benefits, and side effects of any medications and treatment plan were discussed with the patient.  All questions were answered to the satisfaction of the patient, and patient verbalized understanding and agreement to the treatment plan.         No follow-ups on file.      Order summary:  Orders Placed This Encounter    Culture, Body Fluid    Ceruloplasmin    Hepatitis B Core Antibody, IgM    ANGEL EIA w/ Reflex to dsDNA/SONIA    Alpha-1-Antitrypsin    Hepatitis C Antibody    Hepatitis B Surface Ab, Qualitative    Hepatitis B Surface Antigen    Protein Electrophoresis, Serum with Reflex RUTHIE    CBC Auto Differential    Comprehensive Metabolic Panel    Protime-INR    Antimitochondrial Antibody    Anti-Smooth Muscle Antibody    IgG    IgM    IgA    Comprehensive Metabolic Panel    Albumin, Body Fluid    Cell Count w/ Diff, Fluid    Amylase    Lipase    Protein, Body Fluid    LDH, Body Fluid    Non-Gyn Cytology       Thank you for allowing me to participate in the care of Pam Ortega.      Carole Meade, LEXIS          "

## 2025-04-10 ENCOUNTER — TELEPHONE (OUTPATIENT)
Dept: GASTROENTEROLOGY | Facility: CLINIC | Age: 80
End: 2025-04-10
Payer: MEDICARE

## 2025-04-10 DIAGNOSIS — D64.9 ANEMIA, UNSPECIFIED TYPE: Primary | ICD-10-CM

## 2025-04-10 DIAGNOSIS — R18.8 OTHER ASCITES: Primary | ICD-10-CM

## 2025-04-10 DIAGNOSIS — R17 ELEVATED BILIRUBIN: ICD-10-CM

## 2025-04-10 LAB — BACTERIA SPEC BFLD CULT: NORMAL

## 2025-04-10 NOTE — TELEPHONE ENCOUNTER
Attempted to return call to Melchor. Left message to call me back.          ----- Message from Juanita sent at 4/10/2025 12:01 PM CDT -----  Regarding: RETURN CALL  Who Called: MELCHOR AT DR ODESSA OLIVERA'S OFFICE    [phone] Left Message for Patient:Does the patient know what this is regarding?:Preferred Method of Contact: Phone CallPatient's Preferred Phone Number on File:Best Call Back Number, if different:Additional Information: RETURNING PHONE CALL TO PENNY

## 2025-04-10 NOTE — TELEPHONE ENCOUNTER
Results and recommendations called to patients daughter. She did let me know that Dr. Loo is going to see her on next Thursday. Will have PTT drawn before paracentesis as well.           ----- Message from CAROL Coe sent at 4/10/2025  7:58 AM CDT -----  Also follow-up again with Dr. Loo's office to see if they have moved up patient's appointment.  ----- Message -----  From: Lab, Background User  Sent: 4/9/2025  12:01 PM CDT  To: CAROL Cueva

## 2025-04-10 NOTE — TELEPHONE ENCOUNTER
Left message for Dr. Dutta's nurse to give me a call back to update on patients recent labs and care. Will also fax CMP to Dr. Weinstein office when I speak with the nurse and receive the fax number.

## 2025-04-11 ENCOUNTER — TELEPHONE (OUTPATIENT)
Dept: GASTROENTEROLOGY | Facility: CLINIC | Age: 80
End: 2025-04-11
Payer: MEDICARE

## 2025-04-11 NOTE — TELEPHONE ENCOUNTER
Attempted to call daughter. Left detailed message with results and recommendations. Daughter is a nurse here at [facility]. Instructed to give me a call back.                ----- Message from CAROL Coe sent at 4/10/2025  2:41 PM CDT -----  Patient is iron deficient. Recommend oral iron 325 mg daily. Discussed with Dr. Sawant. EGD with cardiac clearance for sedation and to hold Xarelto. Patient should go to ER if she has changes   including rectal bleeding, melena, increased SOB, or abdominal pain. Notify Madhavi.  ----- Message -----  From: Lab, Background User  Sent: 4/9/2025  12:01 PM CDT  To: CAROL Cueva

## 2025-04-16 ENCOUNTER — TELEPHONE (OUTPATIENT)
Dept: GASTROENTEROLOGY | Facility: CLINIC | Age: 80
End: 2025-04-16
Payer: MEDICARE

## 2025-04-16 ENCOUNTER — HOSPITAL ENCOUNTER (OUTPATIENT)
Dept: RADIOLOGY | Facility: HOSPITAL | Age: 80
Discharge: HOME | End: 2025-04-16
Attending: NURSE PRACTITIONER
Payer: MEDICARE

## 2025-04-16 VITALS
OXYGEN SATURATION: 100 % | SYSTOLIC BLOOD PRESSURE: 135 MMHG | DIASTOLIC BLOOD PRESSURE: 79 MMHG | RESPIRATION RATE: 18 BRPM | HEART RATE: 65 BPM

## 2025-04-16 DIAGNOSIS — R18.8 OTHER ASCITES: Primary | ICD-10-CM

## 2025-04-16 DIAGNOSIS — R14.0 ABDOMINAL BLOATING: ICD-10-CM

## 2025-04-16 DIAGNOSIS — R17 ELEVATED BILIRUBIN: ICD-10-CM

## 2025-04-16 DIAGNOSIS — R18.8 OTHER ASCITES: ICD-10-CM

## 2025-04-16 DIAGNOSIS — R11.2 NAUSEA AND VOMITING, UNSPECIFIED VOMITING TYPE: ICD-10-CM

## 2025-04-16 LAB
ALBUMIN FLD-MCNC: 1.9 G/DL
AMYLASE FLD-CCNC: 30 U/L
CLARITY FLD: ABNORMAL
COLOR FLD: ABNORMAL
GRANULOCYTES NFR FLD MANUAL: 13 % (ref 0–25)
LDH FLD-CCNC: 222 U/L
LIPASE FLD-CCNC: 16 U/L
LYMPHOCYTES NFR FLD MANUAL: 63 %
MESOTHL CELL NFR FLD MANUAL: 12 %
MONOCYTES NFR FLD MANUAL: 12 %
PROT FLD-MCNC: 3.5 G/DL
RBC # FLD MANUAL: ABNORMAL /CUMM
WBC # FLD MANUAL: 799 /CUMM

## 2025-04-16 PROCEDURE — 88112 CYTOPATH CELL ENHANCE TECH: CPT | Mod: 26,,, | Performed by: PATHOLOGY

## 2025-04-16 PROCEDURE — 87070 CULTURE OTHR SPECIMN AEROBIC: CPT | Performed by: NURSE PRACTITIONER

## 2025-04-16 PROCEDURE — 88305 TISSUE EXAM BY PATHOLOGIST: CPT | Mod: TC,59,MCY | Performed by: NURSE PRACTITIONER

## 2025-04-16 PROCEDURE — 82042 OTHER SOURCE ALBUMIN QUAN EA: CPT | Performed by: NURSE PRACTITIONER

## 2025-04-16 PROCEDURE — 89050 BODY FLUID CELL COUNT: CPT | Performed by: NURSE PRACTITIONER

## 2025-04-16 PROCEDURE — 27202032 US GUIDED PARACENTESIS INC IMAGING

## 2025-04-16 PROCEDURE — 84157 ASSAY OF PROTEIN OTHER: CPT | Performed by: NURSE PRACTITIONER

## 2025-04-16 PROCEDURE — 88305 TISSUE EXAM BY PATHOLOGIST: CPT | Mod: 26,,, | Performed by: PATHOLOGY

## 2025-04-16 NOTE — TELEPHONE ENCOUNTER
Called patients daughter with appt for EGD for 4/22/25 at 8:45am. Instructs verbally discussed with daughter who is a nurse here at [facility]. Verbalized understanding. MRI scheduled for 4/17/25 at 12:15am . Instructed to be NPO for 6hrs before. Verbalized understanding. Canceled office visit for tomorrow.      ----- Message from CAROL Coe sent at 4/16/2025 10:46 AM CDT -----  Add on EGD for Tuesday per VCD. Cancel office visit for tomorrow. Schedule MRI ASAP. Follow up office visit day or so after MRI. Call Madhavi [phone]

## 2025-04-16 NOTE — PROGRESS NOTES
Paracentesis  Performed by A Snehal RRA  Consent obtained for paracentesis.  A formal timeout was called all staff present agree to patient and procedure.  The right lower quadrant of the abdomen was prepped with ChloraPrep and sterile field was established.  1% lidocaine was used as local anesthetic.  Today's ultrasound images show very little ascites.  Under ultrasound guidance an utilizing trocar technique a 6 Mal[place]ian centesis catheter was placed into the visualized small pocket of ascites in the lower right quadrant.  50 cc gold colored ascites was removed.  This will be sent for laboratory evaluation.  The catheter was then removed and the puncture site held until hemostasis was obtained.  The puncture site was then cleaned and bandaged.  The patient tolerated the procedure well and there were no immediate postprocedure complications.

## 2025-04-16 NOTE — TELEPHONE ENCOUNTER
Called patients daughter to let her know that the imaging center said they needed patient to bring pacemaker card, and to also speak with her cardiologist office to make sure it is MRI compatible. Patient has appt with her cardiologist, Dr. Loo tomorrow morning before her MRI appt. Verbalized understanding. I let MRI know the name of her cardiologist and the office number.

## 2025-04-17 ENCOUNTER — TELEPHONE (OUTPATIENT)
Dept: GASTROENTEROLOGY | Facility: CLINIC | Age: 80
End: 2025-04-17
Payer: MEDICARE

## 2025-04-17 LAB
INSULIN SERPL-ACNC: NORMAL U[IU]/ML
LAB AP CLINICAL INFORMATION: NORMAL
LAB AP GROSS DESCRIPTION: NORMAL
LAB AP LABORATORY NOTES: NORMAL
LAB AP SPECIMEN A NON-GYN GENERAL CATEGORIZATION: NEGATIVE
LAB AP SPECIMEN A NON-GYN INTERPETATION: NORMAL

## 2025-04-17 NOTE — TELEPHONE ENCOUNTER
Called patients daughter to notify her that the patients pacemaker was not MRI compatible, and the Carole Meade NP discussed with Dr. Sawant, and he recommends keeping the appointment for the EGD and depending on what it showed, we would consider scheduling a CT scan. Verbalized understanding. Daughter also states they just left Dr. Loo's office and he said he is going to speak with Dr. Sawant about what he and Dr. Dutta have decided on medication adjustments, and that he was okay with her proceeding with the EGD.

## 2025-04-18 LAB — BACTERIA SPEC BFLD CULT: NORMAL

## 2025-04-22 ENCOUNTER — ANESTHESIA (OUTPATIENT)
Dept: GASTROENTEROLOGY | Facility: HOSPITAL | Age: 80
End: 2025-04-22
Payer: MEDICARE

## 2025-04-22 ENCOUNTER — ANESTHESIA EVENT (OUTPATIENT)
Dept: GASTROENTEROLOGY | Facility: HOSPITAL | Age: 80
End: 2025-04-22
Payer: MEDICARE

## 2025-04-22 ENCOUNTER — HOSPITAL ENCOUNTER (OUTPATIENT)
Dept: RADIOLOGY | Facility: HOSPITAL | Age: 80
Discharge: HOME | End: 2025-04-22
Attending: STUDENT IN AN ORGANIZED HEALTH CARE EDUCATION/TRAINING PROGRAM | Admitting: INTERNAL MEDICINE
Payer: MEDICARE

## 2025-04-22 ENCOUNTER — HOSPITAL ENCOUNTER (OUTPATIENT)
Dept: GASTROENTEROLOGY | Facility: HOSPITAL | Age: 80
Discharge: HOME | End: 2025-04-22
Attending: NURSE PRACTITIONER | Admitting: INTERNAL MEDICINE
Payer: MEDICARE

## 2025-04-22 VITALS
OXYGEN SATURATION: 96 % | DIASTOLIC BLOOD PRESSURE: 79 MMHG | HEIGHT: 69 IN | TEMPERATURE: 97 F | RESPIRATION RATE: 24 BRPM | WEIGHT: 188 LBS | BODY MASS INDEX: 27.85 KG/M2 | SYSTOLIC BLOOD PRESSURE: 130 MMHG | HEART RATE: 61 BPM

## 2025-04-22 DIAGNOSIS — N28.9 RENAL INSUFFICIENCY: ICD-10-CM

## 2025-04-22 DIAGNOSIS — R63.4 WEIGHT LOSS: Primary | ICD-10-CM

## 2025-04-22 DIAGNOSIS — R17 ELEVATED BILIRUBIN: ICD-10-CM

## 2025-04-22 DIAGNOSIS — R14.0 ABDOMINAL BLOATING: ICD-10-CM

## 2025-04-22 DIAGNOSIS — R18.8 OTHER ASCITES: ICD-10-CM

## 2025-04-22 DIAGNOSIS — R11.2 NAUSEA AND VOMITING, UNSPECIFIED VOMITING TYPE: ICD-10-CM

## 2025-04-22 DIAGNOSIS — K29.60 EROSIVE GASTRITIS: ICD-10-CM

## 2025-04-22 DIAGNOSIS — K44.9 HH (HIATUS HERNIA): ICD-10-CM

## 2025-04-22 DIAGNOSIS — J90 PLEURAL EFFUSION: ICD-10-CM

## 2025-04-22 LAB
GLUCOSE SERPL-MCNC: 116 MG/DL (ref 70–105)
GLUCOSE SERPL-MCNC: 117 MG/DL (ref 70–105)

## 2025-04-22 PROCEDURE — 71046 X-RAY EXAM CHEST 2 VIEWS: CPT | Mod: 26,,, | Performed by: RADIOLOGY

## 2025-04-22 PROCEDURE — 71046 X-RAY EXAM CHEST 2 VIEWS: CPT | Mod: TC

## 2025-04-22 PROCEDURE — D9220A PRA ANESTHESIA: Mod: ,,, | Performed by: ANESTHESIOLOGY

## 2025-04-22 PROCEDURE — 63600175 PHARM REV CODE 636 W HCPCS: Performed by: ANESTHESIOLOGY

## 2025-04-22 PROCEDURE — 43239 EGD BIOPSY SINGLE/MULTIPLE: CPT | Mod: ,,, | Performed by: INTERNAL MEDICINE

## 2025-04-22 PROCEDURE — 88342 IMHCHEM/IMCYTCHM 1ST ANTB: CPT | Mod: 26,,, | Performed by: PATHOLOGY

## 2025-04-22 PROCEDURE — 37000009 HC ANESTHESIA EA ADD 15 MINS

## 2025-04-22 PROCEDURE — 88305 TISSUE EXAM BY PATHOLOGIST: CPT | Mod: TC,SUR | Performed by: INTERNAL MEDICINE

## 2025-04-22 PROCEDURE — 43239 EGD BIOPSY SINGLE/MULTIPLE: CPT | Performed by: INTERNAL MEDICINE

## 2025-04-22 PROCEDURE — 82962 GLUCOSE BLOOD TEST: CPT

## 2025-04-22 PROCEDURE — 37000008 HC ANESTHESIA 1ST 15 MINUTES

## 2025-04-22 PROCEDURE — 88305 TISSUE EXAM BY PATHOLOGIST: CPT | Mod: 26,,, | Performed by: PATHOLOGY

## 2025-04-22 PROCEDURE — 27201423 OPTIME MED/SURG SUP & DEVICES STERILE SUPPLY

## 2025-04-22 RX ORDER — PROPOFOL 10 MG/ML
VIAL (ML) INTRAVENOUS
Status: DISCONTINUED | OUTPATIENT
Start: 2025-04-22 | End: 2025-04-22

## 2025-04-22 RX ORDER — LIDOCAINE HYDROCHLORIDE 20 MG/ML
INJECTION, SOLUTION EPIDURAL; INFILTRATION; INTRACAUDAL; PERINEURAL
Status: DISCONTINUED | OUTPATIENT
Start: 2025-04-22 | End: 2025-04-22

## 2025-04-22 RX ORDER — SODIUM CHLORIDE 0.9 % (FLUSH) 0.9 %
10 SYRINGE (ML) INJECTION EVERY 6 HOURS PRN
Status: DISCONTINUED | OUTPATIENT
Start: 2025-04-22 | End: 2025-04-23 | Stop reason: HOSPADM

## 2025-04-22 RX ORDER — SODIUM CHLORIDE, SODIUM LACTATE, POTASSIUM CHLORIDE, CALCIUM CHLORIDE 600; 310; 30; 20 MG/100ML; MG/100ML; MG/100ML; MG/100ML
INJECTION, SOLUTION INTRAVENOUS CONTINUOUS
Status: DISCONTINUED | OUTPATIENT
Start: 2025-04-22 | End: 2025-04-23 | Stop reason: HOSPADM

## 2025-04-22 RX ADMIN — LIDOCAINE HYDROCHLORIDE 50 MG: 20 INJECTION, SOLUTION EPIDURAL; INFILTRATION; INTRACAUDAL; PERINEURAL at 11:04

## 2025-04-22 RX ADMIN — PROPOFOL 50 MG: 10 INJECTION, EMULSION INTRAVENOUS at 11:04

## 2025-04-22 RX ADMIN — PROPOFOL 25 MG: 10 INJECTION, EMULSION INTRAVENOUS at 11:04

## 2025-04-22 NOTE — ANESTHESIA POSTPROCEDURE EVALUATION
Anesthesia Post Evaluation    Patient: Pam Ortega    Procedure(s) Performed: egd *    Final Anesthesia Type: MAC      Patient location during evaluation: GI PACU  Patient participation: Yes- Able to Participate  Level of consciousness: awake and alert, oriented and awake  Post-procedure vital signs: reviewed and stable  Pain management: adequate  Airway patency: patent    PONV status at discharge: No PONV  Anesthetic complications: no      Cardiovascular status: blood pressure returned to baseline, hemodynamically stable and stable  Respiratory status: unassisted and spontaneous ventilation  Hydration status: euvolemic  Follow-up not needed.              Vitals Value Taken Time   /79 04/22/25 11:58   Temp 36.1 °C (97 °F) 04/22/25 11:17   Pulse 61 04/22/25 11:58   Resp 24 04/22/25 11:58   SpO2 96 % 04/22/25 11:58         Event Time   Out of Recovery 12:05:00         Pain/Ochoa Score: Ochoa Score: 10 (4/22/2025 12:00 PM)

## 2025-04-22 NOTE — TRANSFER OF CARE
"Anesthesia Transfer of Care Note    Patient: Pam Ortega    Procedure(s) Performed: *EGD *    Patient location: GI    Anesthesia Type: MAC    Transport from OR: Transported from OR on room air with adequate spontaneous ventilation. Continuous ECG monitoring in transport. Continuous SpO2 monitoring in transport    Post pain: adequate analgesia    Post assessment: no apparent anesthetic complications    Post vital signs: stable    Level of consciousness: awake and responds to stimulation    Nausea/Vomiting: no nausea/vomiting    Complications: none    Transfer of care protocol was followed      Last vitals: Visit Vitals  /63 (BP Location: Left arm, Patient Position: Lying)   Pulse 61   Temp 36.1 °C (97 °F)   Resp 17   Ht 5' 9" (1.753 m)   Wt 85.3 kg (188 lb)   SpO2 98%   Breastfeeding No   BMI 27.76 kg/m²     "

## 2025-04-22 NOTE — LETTER
April 22, 2025    Pam Ortega  [address]  [place] [address]             [facility]  Gastroenterology  [address] [place] [address] [place] [address]  Phone: [phone]  Fax: [phone]   April 22, 2025     Patient: Pam Ortega   YOB: 1945   Date of Visit: 4/22/2025       To Whom it May Concern:    Pam Ortega was seen on 4/22/2025. Her daughter Queta Ledezma was with her and may return back to work on 4/23/25 .    Please excuse her from any work missed.    If you have any questions or concerns, please don't hesitate to call.    Sincerely,         David Sawant MD

## 2025-04-22 NOTE — DISCHARGE INSTRUCTIONS
Procedure Date  4/22/25     Impression  Overall Impression:   Performed forceps biopsies in the stomach  Small hiatal hernia  Erythematous mucosa with erosion in the fundus of the stomach and antrum; performed cold forceps biopsy  Mucosa of the esophagus was normal without varices.  A 2 cm hiatus hernia was noted.  The stomach had mild gastritis with 1 antral erosion.  Biopsies of the stomach were obtained from the antrum and fundus.  The pyloric channel was patent.  The mucosa of the duodenal was normal-appearing.     Recommendation  Await pathology results, due: 4/24/2025      Disposition:  Discharged to home in stable condition.  Low-sodium diet.  Follow up pathology results in 2 days.  Avoid nonsteroidal anti-inflammatories.      Continue omeprazole.      Schedule CT chest and abdomen /pelvis with oral contrast only and follow up in GI clinic. - May 1, 2025 @ 9:00 am    No driving today, no operating heavy machinery, no signing any legal documents until tomorrow.    Drink lots of fluids, resume regular diet.  Take your normal medications.     Please call our office for any nausea, vomiting or abdominal pain.

## 2025-04-22 NOTE — ANESTHESIA PREPROCEDURE EVALUATION
04/22/2025  Pam Ortega is a 79 y.o., female.      Pre-op Assessment    I have reviewed the Patient Summary Reports.     I have reviewed the Nursing Notes. I have reviewed the NPO Status.   I have reviewed the Medications.     Review of Systems  Anesthesia Hx:  No problems with previous Anesthesia             Denies Family Hx of Anesthesia complications.    Denies Personal Hx of Anesthesia complications.                    Social:  Non-Smoker, No Alcohol Use       Cardiovascular:     Hypertension   CAD    Dysrhythmias atrial fibrillation  CHF   PVD     EF 35%     Coronary Artery Disease:                 Chronic Venous Insufficiency  Peripheral Arterial Disease             Hypertension         Pulmonary:        Sleep Apnea     Obstructive Sleep Apnea (RENEE).           Renal/:  Chronic Renal Disease, CKD        Kidney Function/Disease             Musculoskeletal:  Arthritis               Neurological:   CVA                                    Endocrine:  Diabetes, well controlled, type 2    Diabetes                          Physical Exam  General: Well nourished, Cooperative, Alert and Oriented    Airway:  Mallampati: II / II  Mouth Opening: Normal  TM Distance: Normal  Neck ROM: Normal ROM    Dental:  Intact    Chest/Lungs:  Clear to auscultation    Heart:  Rate: Normal  Rhythm: Regular Rhythm  Sounds: Normal        Chemistry        Component Value Date/Time     04/16/2025 0739    K 4.6 04/16/2025 0739     04/16/2025 0739    CO2 27 04/16/2025 0739    BUN 38 (H) 04/16/2025 0739    CREATININE 2.82 (H) 04/16/2025 0739    GLU 51 (L) 04/16/2025 0739        Component Value Date/Time    CALCIUM 9.3 04/16/2025 0739    ALKPHOS 76 04/16/2025 0739    AST 29 04/16/2025 0739    ALT 8 04/16/2025 0739    BILITOT 3.1 (H) 04/16/2025 0739    ESTGFRAFRICA 33 (L) 10/06/2021 1022    EGFRNONAA 31 (L) 06/14/2022  0910        Lab Results   Component Value Date    WBC 5.34 04/09/2025    HGB 9.8 (L) 04/09/2025    HCT 32.2 (L) 04/09/2025     04/09/2025     Results for orders placed or performed during the hospital encounter of 01/19/25   EKG 12-lead    Collection Time: 01/19/25  1:09 PM   Result Value Ref Range    QRS Duration 150 ms    [facility] QTC Calculation 454 ms    Narrative    Test Reason : R06.02,    Vent. Rate :  67 BPM     Atrial Rate :    BPM     P-R Int :   0 ms          QRS Dur : 150 ms      QT Int : 442 ms       P-R-T Axes :   0 150 -39 degrees    QTcB Int : 454 ms    Ventricular pacing  Pacemaker rhythm   Abnormal ECG    Confirmed by Darinel Mcgarry ([id number]) on 1/21/2025 4:55:52 PM    Referred By: AAAREFERRAL SELF           Confirmed By: Adriant Zeferino         Anesthesia Plan  Type of Anesthesia, risks & benefits discussed:    Anesthesia Type: MAC  Intra-op Monitoring Plan: Standard ASA Monitors  Post Op Pain Control Plan: multimodal analgesia  Induction:  IV and rapid sequence  Airway Plan: Direct  Informed Consent: Informed consent signed with the Patient and all parties understand the risks and agree with anesthesia plan.  All questions answered.   ASA Score: 3  Day of Surgery Review of History & Physical: H&P Update referred to the surgeon/provider.I have interviewed and examined the patient. I have reviewed the patient's H&P dated: There are no significant changes.   Anesthesia Plan Notes: NPO at 1330    Ready For Surgery From Anesthesia Perspective.     .

## 2025-04-22 NOTE — H&P
[facility]  Gastroenterology  H&P    Patient Name: Pam Ortega  MRN: [id number]  Admission Date: 4/22/2025  Code Status: Prior    Attending Provider: Carole Meade FNP   Primary Care Physician: Janice Velez FNP  Principal Problem:<principal problem not specified>    Subjective:     History of Present Illness:  This patient has renal insufficiency, portal hypertension, weight loss, nausea and vomiting.  She presents for EGD.    Past Medical History:   Diagnosis Date    Anemia of chronic disease     Atrial fibrillation     Benign hypertensive CKD, stage 1-4 or unspecified chronic kidney disease     Bronchitis 12/22/2023    Carotid artery occlusion     CKD (chronic kidney disease)     Coronary atherosclerosis     Cough 12/22/2023    COVID-19 ruled out 03/20/2024    Diabetes mellitus, type 2     DJD (degenerative joint disease)     History of CVA (cerebrovascular accident)     HTN (hypertension)     Hyperlipidemia     Nonischemic dilated cardiomyopathy     Obesity     Osteopenia     Paroxysmal atrial fibrillation     Presence of cardiac pacemaker     PVD (peripheral vascular disease)     Upper respiratory tract infection 12/22/2023    Vitamin D deficiency        Past Surgical History:   Procedure Laterality Date    BREAST BIOPSY      COLONOSCOPY  12/04/2018    repeat in 5 years    EMBOLECTOMY OR THROMBECTOMY, ARTERY, AORTOILIAC, FEMORAL, OR POPLITEAL Right 11/17/2024    Procedure: EMBOLECTOMY OR THROMBECTOMY, ARTERY, AORTOILIAC, FEMORAL, OR POPLITEAL;  Surgeon: Jordana Guillermo MD;  Location: [facility];  Service: Vascular;  Laterality: Right;    pace maker      VAGINAL DELIVERY      x 3       Review of patient's allergies indicates:   Allergen Reactions    Ace inhibitors Edema    Losartan Swelling     Family History       Problem Relation (Age of Onset)    Alzheimer's disease Maternal Grandmother    Breast cancer Maternal Grandmother, Daughter    Cancer Brother    Clotting disorder  Daughter    Diabetes Daughter, Daughter    Hyperlipidemia Daughter    Hypertension Mother, Father, Sister, Brother, Maternal Grandmother, Daughter, Daughter, Daughter    Stroke Sister, Brother          Tobacco Use    Smoking status: Never     Passive exposure: Never    Smokeless tobacco: Never   Substance and Sexual Activity    Alcohol use: Not Currently    Drug use: Never    Sexual activity: Not Currently     Review of Systems   Constitutional:  Positive for unexpected weight change.   Respiratory: Negative.     Cardiovascular: Negative.    Gastrointestinal:  Positive for abdominal distention and nausea.   Neurological:  Positive for weakness.     Objective:     Vital Signs (Most Recent):  Temp: 97.3 °F (36.3 °C) (04/22/25 1001)  Pulse: 61 (04/22/25 1001)  Resp: 17 (04/22/25 1001)  BP: 130/63 (04/22/25 1001)  SpO2: 98 % (04/22/25 1001) Vital Signs (24h Range):  Temp:  [97.3 °F (36.3 °C)] 97.3 °F (36.3 °C)  Pulse:  [61] 61  Resp:  [17] 17  SpO2:  [98 %] 98 %  BP: (130)/(63) 130/63     Weight: 85.3 kg (188 lb) (04/22/25 1001)  Body mass index is 27.76 kg/m².    No intake or output data in the 24 hours ending 04/22/25 1056    Lines/Drains/Airways       Peripheral Intravenous Line  Duration                  Peripheral IV - Single Lumen 04/22/25 1001 22 G Right Antecubital <1 day                    Physical Exam  Vitals reviewed.   Constitutional:       General: She is not in acute distress.     Appearance: Normal appearance. She is well-developed. She is ill-appearing.   HENT:      Head: Normocephalic and atraumatic.      Nose: Nose normal.   Eyes:      Pupils: Pupils are equal, round, and reactive to light.   Cardiovascular:      Rate and Rhythm: Normal rate and regular rhythm.   Pulmonary:      Effort: Pulmonary effort is normal.      Breath sounds: Normal breath sounds. No wheezing.   Abdominal:      General: Abdomen is flat. Bowel sounds are normal. There is no distension.      Palpations: Abdomen is soft.       "Tenderness: There is no abdominal tenderness. There is no guarding.   Skin:     General: Skin is warm and dry.      Coloration: Skin is not jaundiced.   Neurological:      Mental Status: She is alert.   Psychiatric:         Attention and Perception: Attention normal.         Mood and Affect: Affect normal.         Speech: Speech normal.         Behavior: Behavior is cooperative.      Comments: Pt was calm while speaking.         Significant Labs:  CBC: No results for input(s): "WBC", "HGB", "HCT", "PLT" in the last 48 hours.  CMP: No results for input(s): "GLU", "CALCIUM", "ALBUMIN", "PROT", "NA", "K", "CO2", "CL", "BUN", "CREATININE", "ALKPHOS", "ALT", "AST", "BILITOT" in the last 48 hours.    Significant Imaging:  Imaging results within the past 24 hours have been reviewed.    Assessment/Plan:     There are no hospital problems to display for this patient.        Impression:  Weight loss, portal hypertension, renal insufficiency, nausea with vomiting  Plan: EGD today    David Sawant MD  Gastroenterology  [facility]  "

## 2025-04-22 NOTE — PLAN OF CARE
At 1125 pt is drowsy, I tried to sternal rub pt and she is still drowsy, thereafter I used a cold towel to wash her face, Dr. Sawant is here to speak to her daughter Marianela RN and Queta who are at bedside, at 1127 accu chek is done and it is 116, pt became more aroused after, vs are stable.

## 2025-04-23 ENCOUNTER — RESULTS FOLLOW-UP (OUTPATIENT)
Dept: GASTROENTEROLOGY | Facility: CLINIC | Age: 80
End: 2025-04-23

## 2025-04-23 LAB
DHEA SERPL-MCNC: NORMAL
ESTROGEN SERPL-MCNC: NORMAL PG/ML
INSULIN SERPL-ACNC: NORMAL U[IU]/ML
LAB AP GROSS DESCRIPTION: NORMAL
LAB AP LABORATORY NOTES: NORMAL
T3RU NFR SERPL: NORMAL %

## 2025-04-23 NOTE — PROGRESS NOTES
The EGD biopsy showed mild chronic gastritis without H pylori.  Recommendation: Avoid nonsteroidal anti-inflammatories, continue Prilosec, keep appointment for CT scans, consider colonoscopy.

## 2025-04-25 ENCOUNTER — OFFICE VISIT (OUTPATIENT)
Dept: PULMONOLOGY | Facility: CLINIC | Age: 80
End: 2025-04-25
Payer: MEDICARE

## 2025-04-25 VITALS
DIASTOLIC BLOOD PRESSURE: 72 MMHG | HEIGHT: 69 IN | HEART RATE: 64 BPM | OXYGEN SATURATION: 98 % | BODY MASS INDEX: 27.75 KG/M2 | RESPIRATION RATE: 16 BRPM | SYSTOLIC BLOOD PRESSURE: 118 MMHG | WEIGHT: 187.38 LBS

## 2025-04-25 DIAGNOSIS — I12.9 CKD STAGE 4 SECONDARY TO HYPERTENSION: ICD-10-CM

## 2025-04-25 DIAGNOSIS — J90 PLEURAL EFFUSION: Primary | ICD-10-CM

## 2025-04-25 DIAGNOSIS — N18.4 CKD STAGE 4 SECONDARY TO HYPERTENSION: ICD-10-CM

## 2025-04-25 DIAGNOSIS — R06.02 SOB (SHORTNESS OF BREATH): ICD-10-CM

## 2025-04-25 DIAGNOSIS — I50.9 CONGESTIVE HEART FAILURE, UNSPECIFIED HF CHRONICITY, UNSPECIFIED HEART FAILURE TYPE: ICD-10-CM

## 2025-04-25 PROCEDURE — 99214 OFFICE O/P EST MOD 30 MIN: CPT | Mod: S$PBB,,, | Performed by: STUDENT IN AN ORGANIZED HEALTH CARE EDUCATION/TRAINING PROGRAM

## 2025-04-25 PROCEDURE — 99215 OFFICE O/P EST HI 40 MIN: CPT | Mod: PBBFAC | Performed by: STUDENT IN AN ORGANIZED HEALTH CARE EDUCATION/TRAINING PROGRAM

## 2025-04-25 PROCEDURE — 99999 PR PBB SHADOW E&M-EST. PATIENT-LVL V: CPT | Mod: PBBFAC,,, | Performed by: STUDENT IN AN ORGANIZED HEALTH CARE EDUCATION/TRAINING PROGRAM

## 2025-04-25 NOTE — PROGRESS NOTES
Patient Name: Pam Ortega   Primary Care Provider: Janice Velez FNP   Date of Service: 04/7/2025   Reason for Referral:  Follow up thoracentesis    Chief Complaint: Pleural Effusion (3wk follow up pleural effusion. CXR done 22APR.)      Subjective:      Pam Ortega is 79 y.o. female with  past medical history significant fibrillation, chronic kidney disease and history of pleural effusions presents to me after having a thoracentesis done recently.      Follow up clinic visit 4/25/25    Patient presents for a follow-up visit to review recent chest XR results and discuss management of pleural effusion. Patient has had pleural effusion on the right side of her chest for an extended period. She recently received 2 doses of a diuretic called Zeroxilan, which has significantly improved her condition. She reports feeling better following this treatment. She has lost approximately 40 lbs since the 7th of the month, likely related to the reduction in fluid volume. Prior to the diuretic treatment, she underwent fluid tapping procedures to manage the symptoms of pleural effusion.  She has been under the care of multiple specialists. Dr. Campbell from Cardiology and Dr. Michael Dutta from Nephrology. She denies significant swelling in her legs.  I reviewed her chest x-ray personally without significant evidence of pleural effusion.       .         Reviewed x-ray with no evidence of significant right-sided pleural effusion          Follow up clinic visit 4/7/25  Ascites on her echocardiogram  New EF reduction, does see Dr. Loo as an outpatient, they will follow up with him as soon as possible for workup of known or new onset heart failure   Referral to Dr. Lobato from GI for evaluation of patient's ascites   Thoracentesis tomorrow   Repeat chest x-ray in 2-3 weeks with follow up with me at that time  Personally reviewed her chest x-ray which shows a return of her pleural effusion      Initial clinic visit  with me 3/17/25   She has had 2 thoracentesis done.  Her second 1 was on 3/2/25 with drainage of a total of 1450 cc of clear yellow fluid.  This was a transudative effusion.  Patient's symptomatically feels fine.  I reviewed her chest x-ray from today which shows some return right-sided effusion.  I have repeated echocardiogram, ultrasound liver, pro BNP, hepatic function panel and follow up with me in about 3-4 weeks.  If she is symptomatic she can come and see me sooner.          Assessment and Plan      Recurrent right-sided pleural effusion, improved   Total body volume overload, now improved  Dyspnea on exertion   Chronic kidney disease  Systolic heart failure  Ascites        IMPRESSION:  XR from 3 days ago showed no evidence of pleural effusion on the right side, likely due to diuretic administration.  Lost approximately 40 lbs since the 7th, indicating significant volume reduction.  Current exam reveals improved condition: SpO2 98% on room air, normal BP and heart rate.  I personally discussed the case with Dr. Michael Dutta.  Suspect total body fluid overload as the underlying cause of previous symptoms.  Considered potential changes to pacemaker based on upcoming echo results (to be determined by Dr. Campbell).    Pleural effusion, improved   Explained that previous fluid tapping was symptomatic treatment, not curative and that her diuretic management by her nephrologist and cardiologist have improved her symptoms and total body volume overload significant.   Reviewed chest XR images with patient, demonstrating improvement in fluid levels over time.   Ordered XR Chest for next follow-up visit.        FOLLOW-UP:   Follow up in 6 months.            This note was generated with the assistance of ambient listening technology. Verbal consent was obtained by the patient and accompanying visitor(s) for the recording of patient appointment to facilitate this note. I attest to having reviewed and edited the generated  note for accuracy, though some syntax or spelling errors may persist. Please contact the author of this note for any clarification            Tanvir Rocha MD  Interventional Pulmonary and Critical Care  [facility]          Problem List Items Addressed This Visit       Recurrent pleural effusion - Primary           Past Medical History:   Diagnosis Date    Abdominal bloating 04/22/2025    Anemia of chronic disease     Atrial fibrillation     Benign hypertensive CKD, stage 1-4 or unspecified chronic kidney disease     Bronchitis 12/22/2023    Carotid artery occlusion     Cellulitis 12/09/2024    CKD (chronic kidney disease)     Constipation 01/24/2025    Coronary atherosclerosis     Cough 12/22/2023    Cough 12/22/2023    COVID-19 ruled out 03/20/2024    Diabetes mellitus, type 2     DJD (degenerative joint disease)     History of CVA (cerebrovascular accident)     HTN (hypertension)     Hyperlipidemia     Nausea and vomiting 04/22/2025    Nonischemic dilated cardiomyopathy     Obesity     Osteopenia     Other ascites 04/09/2025    Paroxysmal atrial fibrillation     Presence of cardiac pacemaker     PVD (peripheral vascular disease)     Skin ulcer of female breast 12/30/2024    Upper respiratory tract infection 12/22/2023    Vaginal bleeding 04/26/2025    Vitamin D deficiency       Past Surgical History:   Procedure Laterality Date    BREAST BIOPSY      COLONOSCOPY  12/04/2018    repeat in 5 years    EMBOLECTOMY OR THROMBECTOMY, ARTERY, AORTOILIAC, FEMORAL, OR POPLITEAL Right 11/17/2024    Procedure: EMBOLECTOMY OR THROMBECTOMY, ARTERY, AORTOILIAC, FEMORAL, OR POPLITEAL;  Surgeon: Jordana Guillermo MD;  Location: [facility];  Service: Vascular;  Laterality: Right;    pace maker      VAGINAL DELIVERY      x 3     Family History   Problem Relation Name Age of Onset    Hypertension Mother      Hypertension Father      Hypertension Sister      Stroke Sister      Cancer Brother      Stroke Brother       Hypertension Brother      Alzheimer's disease Maternal Grandmother      Breast cancer Maternal Grandmother      Hypertension Maternal Grandmother      Hyperlipidemia Daughter      Breast cancer Daughter      Diabetes Daughter      Hypertension Daughter      Diabetes Daughter      Hypertension Daughter      Hypertension Daughter      Clotting disorder Daughter       Review of patient's allergies indicates:   Allergen Reactions    Ace inhibitors Edema    Losartan Swelling      Social History[1]   Review of Systems       Objective:      Physical Exam  Constitutional:       General: She is not in acute distress.     Appearance: Normal appearance. She is normal weight. She is not ill-appearing or diaphoretic.   HENT:      Head: Normocephalic and atraumatic.      Nose: No congestion or rhinorrhea.      Mouth/Throat:      Mouth: Mucous membranes are moist.   Cardiovascular:      Rate and Rhythm: Normal rate and regular rhythm.      Pulses: Normal pulses.      Heart sounds: Normal heart sounds.   Pulmonary:      Effort: Pulmonary effort is normal. No respiratory distress.      Breath sounds: No stridor. No wheezing, rhonchi or rales.      Comments: Mildly decreased breath sounds right base  Chest:      Chest wall: No tenderness.   Abdominal:      General: Abdomen is flat.   Musculoskeletal:      Cervical back: Normal range of motion. No rigidity.      Comments: Patient with compression stockings on, some trace-1+ pitting edema present at the level of the mid thigh.  No erythema, tenderness in bilateral lower extremities   Skin:     General: Skin is warm.      Findings: No erythema.   Neurological:      General: No focal deficit present.      Mental Status: She is alert and oriented to person, place, and time. Mental status is at baseline.   Psychiatric:         Mood and Affect: Mood normal.         Behavior: Behavior normal.         Thought Content: Thought content normal.                5/5/2025     2:08 PM 5/1/2025      "9:57 AM 4/28/2025     3:46 PM 4/28/2025    11:50 AM 4/28/2025     8:19 AM 4/28/2025     4:28 AM 4/28/2025    12:09 AM   Pulmonary Function Tests   SpO2 98 %   97 % 100 % 96 % 96 %   Height 5' 10" (1.778 m)         Weight 86 kg (189 lb 9.6 oz) 81.2 kg (179 lb) 81.2 kg (179 lb)       BMI (Calculated) 27.2  25.7             Encounter Medications[2]    Assessment & Plan    As above                                          Orders Placed This Encounter   Procedures    X-Ray Chest PA And Lateral     Standing Status:   Future     Expected Date:   10/25/2025     Expiration Date:   4/25/2026     May the Radiologist modify the order per protocol to meet the clinical needs of the patient?:   Yes     Release to patient:   Immediate                        [1]   Social History  Tobacco Use    Smoking status: Never     Passive exposure: Never    Smokeless tobacco: Never   Substance Use Topics    Alcohol use: Not Currently    Drug use: Never   [2]   Outpatient Encounter Medications as of 4/25/2025   Medication Sig Dispense Refill    albuterol (VENTOLIN HFA) 90 mcg/actuation inhaler Inhale 2 puffs into the lungs every 6 (six) hours as needed for Wheezing. Rescue Inhaler 18 g 5    amLODIPine (NORVASC) 10 MG tablet Take 1 tablet (10 mg total) by mouth once daily. 90 tablet 3    cholecalciferol, vitamin D3, (VITAMIN D3) 50 mcg (2,000 unit) Cap capsule Take 1 capsule by mouth once daily.      dapagliflozin propanediol (FARXIGA) 10 mg tablet Take 1 tablet (10 mg total) by mouth once daily. 30 tablet 12    fluticasone-salmeterol diskus inhaler 250-50 mcg Inhale 1 puff into the lungs 2 (two) times daily. Controller Inhaler 60 each 5    hydrALAZINE (APRESOLINE) 50 MG tablet Take 1 tablet (50 mg total) by mouth 2 (two) times a day. 180 tablet 3    isosorbide mononitrate (IMDUR) 30 MG 24 hr tablet Take 1 tablet (30 mg total) by mouth once daily. 90 tablet 3    lovastatin (MEVACOR) 20 MG tablet Take 1 tablet (20 mg total) by mouth every night " 90 tablet 3    metoprolol tartrate (LOPRESSOR) 50 MG tablet Take 1 tablet (50 mg total) by mouth 2 (two) times a day. 180 tablet 3    omeprazole (PRILOSEC) 20 MG capsule Take 1 capsule (20 mg total) by mouth once daily. 90 capsule 3    potassium chloride (MICRO-K) 10 MEQ CpSR Take 1 capsule (10 mEq total) by mouth once daily. 90 capsule 3    semaglutide (OZEMPIC) 1 mg/dose (4 mg/3 mL) Inject 1 mg into the skin every 7 days. 9 mL 3    [DISCONTINUED] alendronate (FOSAMAX) 70 MG tablet Take 1 tablet (70 mg total) by mouth every 7 days. 12 tablet 3    [DISCONTINUED] aspirin (ECOTRIN) 81 MG EC tablet Take 81 mg by mouth once daily.      [DISCONTINUED] colchicine (COLCRYS) 0.6 mg tablet Take 1 tablet (0.6 mg total) by mouth once daily. for gout 90 tablet 3    [DISCONTINUED] furosemide (LASIX) 80 MG tablet Take 1 tablet (80 mg total) by mouth every morning. 90 tablet 11    [DISCONTINUED] glimepiride (AMARYL) 4 MG tablet Take 1 tablet (4 mg total) by mouth daily with breakfast. 90 tablet 3    [DISCONTINUED] metOLazone (ZAROXOLYN) 5 MG tablet Take 2 tablets (10 mg) by mouth before the next dose of furosemide and repeat with next dose in 72 hours 4 tablet 1    [DISCONTINUED] rivaroxaban (XARELTO) 15 mg Tab Take 1 tablet (15 mg total) by mouth once daily. 90 tablet 2    [DISCONTINUED] rivaroxaban (XARELTO) 15 mg Tab Take 1 tablet (15 mg total) by mouth once daily. 90 tablet 4    [DISCONTINUED] spironolactone (ALDACTONE) 25 MG tablet Take 1 tablet (25 mg total) by mouth once daily. 90 tablet 3    [DISCONTINUED] albuterol (VENTOLIN HFA) 90 mcg/actuation inhaler Inhale 2 puffs into the lungs every 6 (six) hours as needed for Wheezing. Rescue Inhaler 18 g 5    [DISCONTINUED] budesonide-formoterol 160-4.5 mcg (SYMBICORT) 160-4.5 mcg/actuation HFAA Inhale 2 puffs into the lungs every 12 (twelve) hours. Controller Inhaler 10.2 g 5    [DISCONTINUED] lactated ringers infusion       [DISCONTINUED] sodium chloride 0.9% flush 10 mL         No facility-administered encounter medications on file as of 4/25/2025.

## 2025-04-26 ENCOUNTER — HOSPITAL ENCOUNTER (INPATIENT)
Facility: HOSPITAL | Age: 80
LOS: 1 days | Discharge: HOME HEALTH | DRG: 683 | End: 2025-04-28
Attending: EMERGENCY MEDICINE | Admitting: INTERNAL MEDICINE
Payer: MEDICARE

## 2025-04-26 DIAGNOSIS — N17.9 AKI (ACUTE KIDNEY INJURY): Primary | ICD-10-CM

## 2025-04-26 DIAGNOSIS — N93.9 VAGINAL BLEEDING: ICD-10-CM

## 2025-04-26 DIAGNOSIS — R07.9 CHEST PAIN: ICD-10-CM

## 2025-04-26 DIAGNOSIS — D50.9 MICROCYTIC ANEMIA: ICD-10-CM

## 2025-04-26 DIAGNOSIS — R31.29 MICROSCOPIC HEMATURIA: ICD-10-CM

## 2025-04-26 DIAGNOSIS — N30.91 HEMORRHAGIC CYSTITIS: ICD-10-CM

## 2025-04-26 DIAGNOSIS — N17.9 ACUTE KIDNEY INJURY SUPERIMPOSED ON STAGE 4 CHRONIC KIDNEY DISEASE: ICD-10-CM

## 2025-04-26 DIAGNOSIS — N18.4 ACUTE KIDNEY INJURY SUPERIMPOSED ON STAGE 4 CHRONIC KIDNEY DISEASE: ICD-10-CM

## 2025-04-26 PROBLEM — E11.9 TYPE 2 DIABETES MELLITUS, WITHOUT LONG-TERM CURRENT USE OF INSULIN: Status: ACTIVE | Noted: 2022-06-14

## 2025-04-26 PROBLEM — I50.32 CHRONIC HEART FAILURE WITH PRESERVED EJECTION FRACTION: Status: ACTIVE | Noted: 2025-01-20

## 2025-04-26 LAB
ALBUMIN SERPL BCP-MCNC: 3.8 G/DL (ref 3.4–4.8)
ALBUMIN/GLOB SERPL: 0.8 {RATIO}
ALP SERPL-CCNC: 88 U/L (ref 40–150)
ALT SERPL W P-5'-P-CCNC: 8 U/L
ANION GAP SERPL CALCULATED.3IONS-SCNC: 18 MMOL/L (ref 7–16)
ANISOCYTOSIS BLD QL SMEAR: ABNORMAL
AST SERPL W P-5'-P-CCNC: 30 U/L (ref 11–45)
BACTERIA #/AREA URNS HPF: ABNORMAL /HPF
BASOPHILS # BLD AUTO: 0.03 K/UL (ref 0–0.2)
BASOPHILS NFR BLD AUTO: 0.4 % (ref 0–1)
BILIRUB SERPL-MCNC: 3.1 MG/DL
BILIRUB UR QL STRIP: NEGATIVE
BUN SERPL-MCNC: 75 MG/DL (ref 10–20)
BUN/CREAT SERPL: 19 (ref 6–20)
CALCIUM SERPL-MCNC: 10 MG/DL (ref 8.4–10.2)
CHLORIDE SERPL-SCNC: 90 MMOL/L (ref 98–107)
CK SERPL-CCNC: 35 U/L (ref 29–168)
CLARITY UR: CLEAR
CO2 SERPL-SCNC: 28 MMOL/L (ref 23–31)
COLOR UR: ABNORMAL
CREAT SERPL-MCNC: 3.93 MG/DL (ref 0.55–1.02)
CRENATED CELLS: ABNORMAL
DIFFERENTIAL METHOD BLD: ABNORMAL
EGFR (NO RACE VARIABLE) (RUSH/TITUS): 11 ML/MIN/1.73M2
EOSINOPHIL # BLD AUTO: 0.04 K/UL (ref 0–0.5)
EOSINOPHIL NFR BLD AUTO: 0.6 % (ref 1–4)
ERYTHROCYTE [DISTWIDTH] IN BLOOD BY AUTOMATED COUNT: 22.5 % (ref 11.5–14.5)
GLOBULIN SER-MCNC: 4.9 G/DL (ref 2–4)
GLUCOSE SERPL-MCNC: 194 MG/DL (ref 82–115)
GLUCOSE SERPL-MCNC: 233 MG/DL (ref 70–105)
GLUCOSE UR STRIP-MCNC: 300 MG/DL
HCT VFR BLD AUTO: 33.3 % (ref 38–47)
HGB BLD-MCNC: 10.6 G/DL (ref 12–16)
IMM GRANULOCYTES # BLD AUTO: 0.03 K/UL (ref 0–0.04)
IMM GRANULOCYTES NFR BLD: 0.4 % (ref 0–0.4)
INDIRECT COOMBS: NORMAL
KETONES UR STRIP-SCNC: NEGATIVE MG/DL
LEUKOCYTE ESTERASE UR QL STRIP: NEGATIVE
LYMPHOCYTES # BLD AUTO: 2.14 K/UL (ref 1–4.8)
LYMPHOCYTES NFR BLD AUTO: 30.9 % (ref 27–41)
MCH RBC QN AUTO: 23.9 PG (ref 27–31)
MCHC RBC AUTO-ENTMCNC: 31.8 G/DL (ref 32–36)
MCV RBC AUTO: 75 FL (ref 80–96)
MONOCYTES # BLD AUTO: 0.88 K/UL (ref 0–0.8)
MONOCYTES NFR BLD AUTO: 12.7 % (ref 2–6)
MPC BLD CALC-MCNC: 10.5 FL (ref 9.4–12.4)
NEUTROPHILS # BLD AUTO: 3.8 K/UL (ref 1.8–7.7)
NEUTROPHILS NFR BLD AUTO: 55 % (ref 53–65)
NITRITE UR QL STRIP: NEGATIVE
NRBC # BLD AUTO: 0 X10E3/UL
NRBC, AUTO (.00): 0 %
OVALOCYTES BLD QL SMEAR: ABNORMAL
PH UR STRIP: 7.5 PH UNITS
PLATELET # BLD AUTO: 192 K/UL (ref 150–400)
PLATELET MORPHOLOGY: ABNORMAL
POC OCCULT BLOOD STOOL: NEGATIVE
POIKILOCYTOSIS BLD QL SMEAR: ABNORMAL
POLYCHROMASIA BLD QL SMEAR: ABNORMAL
POTASSIUM SERPL-SCNC: 4.9 MMOL/L (ref 3.5–5.1)
PROT SERPL-MCNC: 8.7 G/DL (ref 5.8–7.6)
PROT UR QL STRIP: NEGATIVE
RBC # BLD AUTO: 4.44 M/UL (ref 4.2–5.4)
RBC # UR STRIP: ABNORMAL /UL
RBC #/AREA URNS HPF: >182 /HPF
RH BLD: NORMAL
SODIUM SERPL-SCNC: 131 MMOL/L (ref 136–145)
SP GR UR STRIP: 1.01
SPECIMEN OUTDATE: NORMAL
SQUAMOUS #/AREA URNS LPF: ABNORMAL /HPF
TARGETS BLD QL SMEAR: ABNORMAL
UROBILINOGEN UR STRIP-ACNC: 4 MG/DL
WBC # BLD AUTO: 6.92 K/UL (ref 4.5–11)
WBC #/AREA URNS HPF: 6 /HPF

## 2025-04-26 PROCEDURE — 99285 EMERGENCY DEPT VISIT HI MDM: CPT | Mod: 25

## 2025-04-26 PROCEDURE — 80053 COMPREHEN METABOLIC PANEL: CPT | Performed by: EMERGENCY MEDICINE

## 2025-04-26 PROCEDURE — 82272 OCCULT BLD FECES 1-3 TESTS: CPT

## 2025-04-26 PROCEDURE — 96372 THER/PROPH/DIAG INJ SC/IM: CPT | Performed by: INTERNAL MEDICINE

## 2025-04-26 PROCEDURE — G0378 HOSPITAL OBSERVATION PER HR: HCPCS

## 2025-04-26 PROCEDURE — 63600175 PHARM REV CODE 636 W HCPCS: Performed by: INTERNAL MEDICINE

## 2025-04-26 PROCEDURE — 99223 1ST HOSP IP/OBS HIGH 75: CPT | Mod: AI,,, | Performed by: INTERNAL MEDICINE

## 2025-04-26 PROCEDURE — 86850 RBC ANTIBODY SCREEN: CPT | Performed by: EMERGENCY MEDICINE

## 2025-04-26 PROCEDURE — 85025 COMPLETE CBC W/AUTO DIFF WBC: CPT | Performed by: EMERGENCY MEDICINE

## 2025-04-26 PROCEDURE — 81001 URINALYSIS AUTO W/SCOPE: CPT | Performed by: EMERGENCY MEDICINE

## 2025-04-26 PROCEDURE — 82550 ASSAY OF CK (CPK): CPT | Performed by: INTERNAL MEDICINE

## 2025-04-26 PROCEDURE — 25000003 PHARM REV CODE 250: Performed by: INTERNAL MEDICINE

## 2025-04-26 PROCEDURE — 82962 GLUCOSE BLOOD TEST: CPT

## 2025-04-26 RX ORDER — IBUPROFEN 200 MG
16 TABLET ORAL
Status: DISCONTINUED | OUTPATIENT
Start: 2025-04-26 | End: 2025-04-28 | Stop reason: HOSPADM

## 2025-04-26 RX ORDER — ACETAMINOPHEN 325 MG/1
650 TABLET ORAL EVERY 4 HOURS PRN
Status: DISCONTINUED | OUTPATIENT
Start: 2025-04-26 | End: 2025-04-28 | Stop reason: HOSPADM

## 2025-04-26 RX ORDER — SODIUM CHLORIDE 0.9 % (FLUSH) 0.9 %
10 SYRINGE (ML) INJECTION EVERY 12 HOURS PRN
Status: DISCONTINUED | OUTPATIENT
Start: 2025-04-26 | End: 2025-04-28 | Stop reason: HOSPADM

## 2025-04-26 RX ORDER — PANTOPRAZOLE SODIUM 40 MG/1
40 TABLET, DELAYED RELEASE ORAL DAILY
Status: DISCONTINUED | OUTPATIENT
Start: 2025-04-27 | End: 2025-04-28 | Stop reason: HOSPADM

## 2025-04-26 RX ORDER — GLUCAGON 1 MG
1 KIT INJECTION
Status: DISCONTINUED | OUTPATIENT
Start: 2025-04-26 | End: 2025-04-28 | Stop reason: HOSPADM

## 2025-04-26 RX ORDER — ONDANSETRON HYDROCHLORIDE 2 MG/ML
4 INJECTION, SOLUTION INTRAVENOUS EVERY 8 HOURS PRN
Status: DISCONTINUED | OUTPATIENT
Start: 2025-04-26 | End: 2025-04-28 | Stop reason: HOSPADM

## 2025-04-26 RX ORDER — METOPROLOL TARTRATE 50 MG/1
50 TABLET ORAL 2 TIMES DAILY
Status: DISCONTINUED | OUTPATIENT
Start: 2025-04-26 | End: 2025-04-28 | Stop reason: HOSPADM

## 2025-04-26 RX ORDER — AMLODIPINE BESYLATE 10 MG/1
10 TABLET ORAL DAILY
Status: DISCONTINUED | OUTPATIENT
Start: 2025-04-27 | End: 2025-04-28 | Stop reason: HOSPADM

## 2025-04-26 RX ORDER — PRAVASTATIN SODIUM 10 MG/1
20 TABLET ORAL NIGHTLY
Status: DISCONTINUED | OUTPATIENT
Start: 2025-04-26 | End: 2025-04-28 | Stop reason: HOSPADM

## 2025-04-26 RX ORDER — ISOSORBIDE MONONITRATE 30 MG/1
30 TABLET, EXTENDED RELEASE ORAL DAILY
Status: DISCONTINUED | OUTPATIENT
Start: 2025-04-27 | End: 2025-04-28 | Stop reason: HOSPADM

## 2025-04-26 RX ORDER — ALUMINUM HYDROXIDE, MAGNESIUM HYDROXIDE, AND SIMETHICONE 1200; 120; 1200 MG/30ML; MG/30ML; MG/30ML
30 SUSPENSION ORAL 4 TIMES DAILY PRN
Status: DISCONTINUED | OUTPATIENT
Start: 2025-04-26 | End: 2025-04-28 | Stop reason: HOSPADM

## 2025-04-26 RX ORDER — IBUPROFEN 200 MG
24 TABLET ORAL
Status: DISCONTINUED | OUTPATIENT
Start: 2025-04-26 | End: 2025-04-28 | Stop reason: HOSPADM

## 2025-04-26 RX ORDER — COLCHICINE 0.6 MG/1
0.6 TABLET, FILM COATED ORAL DAILY
Status: DISCONTINUED | OUTPATIENT
Start: 2025-04-27 | End: 2025-04-28

## 2025-04-26 RX ORDER — INSULIN ASPART 100 [IU]/ML
0-5 INJECTION, SOLUTION INTRAVENOUS; SUBCUTANEOUS
Status: DISCONTINUED | OUTPATIENT
Start: 2025-04-26 | End: 2025-04-28 | Stop reason: HOSPADM

## 2025-04-26 RX ORDER — CHOLECALCIFEROL (VITAMIN D3) 25 MCG
2000 TABLET ORAL DAILY
Status: DISCONTINUED | OUTPATIENT
Start: 2025-04-27 | End: 2025-04-28 | Stop reason: HOSPADM

## 2025-04-26 RX ORDER — CEFTRIAXONE 1 G/1
1 INJECTION, POWDER, FOR SOLUTION INTRAMUSCULAR; INTRAVENOUS
Status: DISCONTINUED | OUTPATIENT
Start: 2025-04-26 | End: 2025-04-27

## 2025-04-26 RX ORDER — TALC
6 POWDER (GRAM) TOPICAL NIGHTLY PRN
Status: DISCONTINUED | OUTPATIENT
Start: 2025-04-26 | End: 2025-04-28 | Stop reason: HOSPADM

## 2025-04-26 RX ORDER — NALOXONE HCL 0.4 MG/ML
0.02 VIAL (ML) INJECTION
Status: DISCONTINUED | OUTPATIENT
Start: 2025-04-26 | End: 2025-04-28 | Stop reason: HOSPADM

## 2025-04-26 RX ORDER — SODIUM CHLORIDE 9 MG/ML
INJECTION, SOLUTION INTRAVENOUS CONTINUOUS
Status: DISPENSED | OUTPATIENT
Start: 2025-04-26 | End: 2025-04-27

## 2025-04-26 RX ORDER — HYDRALAZINE HYDROCHLORIDE 50 MG/1
50 TABLET, FILM COATED ORAL 2 TIMES DAILY
Status: DISCONTINUED | OUTPATIENT
Start: 2025-04-26 | End: 2025-04-28 | Stop reason: HOSPADM

## 2025-04-26 RX ORDER — ALBUTEROL SULFATE 0.83 MG/ML
2.5 SOLUTION RESPIRATORY (INHALATION) EVERY 6 HOURS PRN
Status: DISCONTINUED | OUTPATIENT
Start: 2025-04-26 | End: 2025-04-28 | Stop reason: HOSPADM

## 2025-04-26 RX ADMIN — HYDRALAZINE HYDROCHLORIDE 50 MG: 50 TABLET ORAL at 09:04

## 2025-04-26 RX ADMIN — METOPROLOL TARTRATE 50 MG: 50 TABLET, FILM COATED ORAL at 09:04

## 2025-04-26 RX ADMIN — PRAVASTATIN SODIUM 20 MG: 10 TABLET ORAL at 09:04

## 2025-04-26 RX ADMIN — ONDANSETRON 4 MG: 2 INJECTION INTRAMUSCULAR; INTRAVENOUS at 11:04

## 2025-04-26 RX ADMIN — INSULIN ASPART 1 UNITS: 100 INJECTION, SOLUTION INTRAVENOUS; SUBCUTANEOUS at 09:04

## 2025-04-26 RX ADMIN — CEFTRIAXONE SODIUM 1 G: 1 INJECTION, POWDER, FOR SOLUTION INTRAMUSCULAR; INTRAVENOUS at 04:04

## 2025-04-26 RX ADMIN — SODIUM CHLORIDE: 9 INJECTION, SOLUTION INTRAVENOUS at 03:04

## 2025-04-26 NOTE — ASSESSMENT & PLAN NOTE
Follows with Dr. Guillermo.      Palliative Care  attempted support call to patient.  No answer.  Message left offering support and encouraging call back.  -RAMONM

## 2025-04-26 NOTE — ED PROVIDER NOTES
Encounter Date: 4/26/2025    SCRIBE #1 NOTE: I, Mallika Dominguez, am scribing for, and in the presence of,  Vamshi Trejo MD.       History     Chief Complaint   Patient presents with    Vaginal Bleeding     Pt presents to ED complaining of possible vaginal bleeding v GI bleeding. Daughter states that she noticed some blood on her mothers pad this morning and believes it to be coming vaginally, denies any clots. Followed by Dr. Loo and had GI scope by Dr. Sawant recently.      This 79 y.o. female pt presents to the ED with c/o Vaginal Bleeding. The pt's relative reports this morning pt urinated in the toilet and wiped and saw a little blood. Pt does take xarelto and had Mhx of CHF. Pt's relative also reports pt has had procedure to remove 40 lbs of fluid taken off just recently. Pt's H&H was low and an upper scope was done just recent and she has a scheduled lower scope to be done soon. Pt said she has been feeling weak. There are not other issue to report with this pt at this time.    The history is provided by the patient and a relative. No  was used.     Review of patient's allergies indicates:   Allergen Reactions    Ace inhibitors Edema    Losartan Swelling     Past Medical History:   Diagnosis Date    Anemia of chronic disease     Atrial fibrillation     Benign hypertensive CKD, stage 1-4 or unspecified chronic kidney disease     Bronchitis 12/22/2023    Carotid artery occlusion     CKD (chronic kidney disease)     Coronary atherosclerosis     Cough 12/22/2023    COVID-19 ruled out 03/20/2024    Diabetes mellitus, type 2     DJD (degenerative joint disease)     History of CVA (cerebrovascular accident)     HTN (hypertension)     Hyperlipidemia     Nonischemic dilated cardiomyopathy     Obesity     Osteopenia     Paroxysmal atrial fibrillation     Presence of cardiac pacemaker     PVD (peripheral vascular disease)     Upper respiratory tract infection 12/22/2023    Vitamin D deficiency       Past Surgical History:   Procedure Laterality Date    BREAST BIOPSY      COLONOSCOPY  12/04/2018    repeat in 5 years    EMBOLECTOMY OR THROMBECTOMY, ARTERY, AORTOILIAC, FEMORAL, OR POPLITEAL Right 11/17/2024    Procedure: EMBOLECTOMY OR THROMBECTOMY, ARTERY, AORTOILIAC, FEMORAL, OR POPLITEAL;  Surgeon: Jordana Guillermo MD;  Location: [facility];  Service: Vascular;  Laterality: Right;    pace maker      VAGINAL DELIVERY      x 3     Family History   Problem Relation Name Age of Onset    Hypertension Mother      Hypertension Father      Hypertension Sister      Stroke Sister      Cancer Brother      Stroke Brother      Hypertension Brother      Alzheimer's disease Maternal Grandmother      Breast cancer Maternal Grandmother      Hypertension Maternal Grandmother      Hyperlipidemia Daughter      Breast cancer Daughter      Diabetes Daughter      Hypertension Daughter      Diabetes Daughter      Hypertension Daughter      Hypertension Daughter      Clotting disorder Daughter       Social History[1]  Review of Systems   Constitutional:  Negative for activity change, appetite change, chills, diaphoresis and fever.   HENT:  Negative for congestion, drooling, ear pain, mouth sores, rhinorrhea, sinus pain, sore throat and trouble swallowing.    Eyes:  Negative for pain and discharge.   Respiratory:  Negative for cough, chest tightness, shortness of breath, wheezing and stridor.    Cardiovascular:  Negative for chest pain, palpitations and leg swelling.   Gastrointestinal:  Negative for abdominal distention, abdominal pain, blood in stool, constipation, diarrhea, nausea and vomiting.   Genitourinary:  Positive for vaginal bleeding. Negative for difficulty urinating, dysuria, flank pain, frequency, hematuria and urgency.   Musculoskeletal:  Negative for arthralgias, back pain and myalgias.   Skin:  Negative for pallor, rash and wound.   Neurological:  Positive for weakness. Negative for dizziness, syncope,  light-headedness and numbness.   All other systems reviewed and are negative.      Physical Exam     Initial Vitals [04/26/25 1054]   BP Pulse Resp Temp SpO2   128/71 61 18 97.8 °F (36.6 °C) 98 %      MAP       --         Physical Exam    Nursing note and vitals reviewed.  HENT:   Head: Normocephalic and atraumatic. Mouth/Throat: Oropharynx is clear and moist.   Eyes: Pupils are equal, round, and reactive to light.   Neck: Neck supple.   Normal range of motion.  Cardiovascular:  Normal rate and regular rhythm.           Pulmonary/Chest: Effort normal and breath sounds normal.   Abdominal: Abdomen is soft. She exhibits no distension.   Musculoskeletal:         General: Normal range of motion.      Cervical back: Normal range of motion and neck supple.     Neurological: She is alert.   Skin: Skin is warm. Capillary refill takes less than 2 seconds.   Psychiatric: She has a normal mood and affect.         ED Course   Procedures  Labs Reviewed   COMPREHENSIVE METABOLIC PANEL - Abnormal       Result Value    Sodium 131 (*)     Potassium 4.9      Chloride 90 (*)     CO2 28      Anion Gap 18 (*)     Glucose 194 (*)     BUN 75 (*)     Creatinine 3.93 (*)     BUN/Creatinine Ratio 19      Calcium 10.0      Total Protein 8.7 (*)     Albumin 3.8      Globulin 4.9 (*)     A/G Ratio 0.8      Bilirubin, Total 3.1 (*)     Alk Phos 88      ALT 8      AST 30      eGFR 11 (*)    URINALYSIS, REFLEX TO URINE CULTURE - Abnormal    Color, UA Light Yellow      Clarity, UA Clear      pH, UA 7.5      Leukocytes, UA Negative      Nitrites, UA Negative      Protein, UA Negative      Glucose,  (*)     Ketones, UA Negative      Urobilinogen, UA 4 (*)     Bilirubin, UA Negative      Blood, UA Large (*)     Specific Gravity, UA 1.008     CBC WITH DIFFERENTIAL - Abnormal    WBC 6.92      RBC 4.44      Hemoglobin 10.6 (*)     Hematocrit 33.3 (*)     MCV 75.0 (*)     MCH 23.9 (*)     MCHC 31.8 (*)     RDW 22.5 (*)     Platelet Count 192       MPV 10.5      Neutrophils % 55.0      Lymphocytes % 30.9      Monocytes % 12.7 (*)     Eosinophils % 0.6 (*)     Basophils % 0.4      Immature Granulocytes % 0.4      nRBC, Auto 0.0      Neutrophils, Abs 3.80      Lymphocytes, Absolute 2.14      Monocytes, Absolute 0.88 (*)     Eosinophils, Absolute 0.04      Basophils, Absolute 0.03      Immature Granulocytes, Absolute 0.03      nRBC, Absolute 0.00      Diff Type Scan Smear     URINALYSIS, MICROSCOPIC - Abnormal    WBC, UA 6 (*)     RBC, UA >182 (*)     Bacteria, UA Occasional (*)     Squamous Epithelial Cells, UA Occasional (*)    CBC MORPHOLOGY - Abnormal    Platelet Morphology Few Large Platelets (*)     Anisocytosis 1+      Poikilocytosis Few      Target Cells Few      Crenated Cells Few      Ovalocytes Few      Polychromasia Few     CBC W/ AUTO DIFFERENTIAL    Narrative:     The following orders were created for panel order CBC auto differential.  Procedure                               Abnormality         Status                     ---------                               -----------         ------                     CBC with Differential[[id number]]       Abnormal            Final result                 Please view results for these tests on the individual orders.   TYPE & SCREEN    Specimen Outdate 04/29/2025 23:59      Group & Rh O NEG      Indirect Melvin NEG     POCT OCCULT BLOOD (STOOL)    Fecal Occult Blood Negative            Imaging Results               CT Renal Stone Study ABD Pelvis WO (Final result)  Result time 04/26/25 13:27:05      Final result by Danie Agustin MD (04/26/25 13:27:05)                   Impression:      This report was flagged in Epic as abnormal.    1. Small right pleural effusion, trace left pleural effusion.  Tree-in-bud type nodules along the periphery of the left lower lobe could reflect developing infectious or inflammatory process versus chronic change of the same.  Follow-up as warranted.  2. No findings to suggest  obstructive uropathy.  3. Colonic diverticulosis without diverticulitis.  4. Leiomyomatous uterus.  5. Please see above for several additional findings.      Electronically signed by: Danie Agustin MD  Date:    04/26/2025  Time:    13:27               Narrative:    EXAMINATION:  CT RENAL STONE STUDY ABD PELVIS WO    CLINICAL HISTORY:  Hematuria;    TECHNIQUE:  Low dose axial images, sagittal and coronal reformations were obtained from the lung bases to the pubic symphysis.  Contrast was not administered.    COMPARISON:  CT chest abdomen and pelvis 01/19/2025    FINDINGS:  Images of the lower thorax are remarkable for a small right pleural effusion and trace left pleural effusion.  There are a few scattered tree-in-bud type nodules along the periphery of the left lower lobe.  Motion artifact limits evaluation.  The heart is prominent.  Pacer wire noted.    The liver, spleen and adrenal glands are grossly unremarkable.  There is fatty atrophy of the pancreas without pancreatic ductal dilation.  The gallbladder is remarkable for a punctate focus of calcification along the lateral wall.  The stomach is nondistended, no gastric wall thickening.  No significant abdominal lymphadenopathy.    The kidneys have a grossly unremarkable noncontrast appearance without hydronephrosis or nephrolithiasis.  The bilateral ureters are unremarkable without calculi seen.  The urinary bladder is unremarkable.  The uterus has a lobular contour suggesting underlying leiomyoma.  The adnexa is unremarkable.  No significant free fluid in the pelvis.    There are several scattered colonic diverticula without surrounding inflammation to suggest diverticulitis.  The terminal ileum is unremarkable.  The appendix is unremarkable.  The small bowel is grossly unremarkable.  There are a few scattered shotty periaortic, pericaval, and mesenteric lymph nodes.  There is atherosclerotic calcification of the aorta and its branches.  No focal organized  pelvic fluid collection.    There is osteopenia.  There are degenerative changes of the spine.  No significant inguinal lymphadenopathy.                                       Medications - No data to display  Medical Decision Making  Amount and/or Complexity of Data Reviewed  Labs: ordered.  Radiology: ordered.              Attending Attestation:           Physician Attestation for Scribe:  Physician Attestation Statement for Scribe #1: I, Jay Trejo MD, reviewed documentation, as scribed by Mallika Dominguez in my presence, and it is both accurate and complete.             ED Course as of 04/26/25 1406   Sat Apr 26, 2025   1102 Medical decision-making:  Differential diagnosis includes vaginal bleeding, rectal bleeding, UTI, hemorrhagic cystitis, anemia.  All testing ordered and interpreted by me. [BB]   1214 Stool is Hemoccult negative.  Urinalysis shows 6 white cells, greater than 182 red cells, occasional bacteria. [BB]   1215 CMP shows elevated BUN and creatinine significantly worse than baseline. [BB]   1215 CBC is normal except anemia which is around baseline. [BB]   1230 I made the decision to admit patient and discussed case with internal medicine hospitalist on-call who agrees with admission. [BB]   1247 Hospitalist requests renal colic CT to rule out any stones or issues that may require urology consultation. [BB]   1359 Renal colic CT shows no obstruction or stones. [BB]      ED Course User Index  [BB] Jay Trejo MD                           Clinical Impression:  Final diagnoses:  [N17.9] JONAS (acute kidney injury) (Primary)  [N30.91] Hemorrhagic cystitis          ED Disposition Condition    Observation                   Jay Trejo MD  04/26/25 1242       [1]   Social History  Tobacco Use    Smoking status: Never     Passive exposure: Never    Smokeless tobacco: Never   Substance Use Topics    Alcohol use: Not Currently    Drug use: Never        Jay Trejo MD  04/26/25 1406

## 2025-04-26 NOTE — ASSESSMENT & PLAN NOTE
Evaluation underway.  Recently had paracentesis on 4/7/25- SAAG > 1.1 suggestive of portal HTN however liver imaging without evidence of cirrhosis.  Continue outpatient follow up, abdominal MRI planned per GI.   Will workup for Gyn related issues as above.

## 2025-04-26 NOTE — ASSESSMENT & PLAN NOTE
CT noted to have enlarged and leiomyomatus uterus.  Will check pelvic ultrasound for further evaluation.  May require Gyn consultation.  Hgb stable at 10, vitals stable currently.  Hold Xarelto.

## 2025-04-26 NOTE — ASSESSMENT & PLAN NOTE
JONAS is likely due to pre-renal azotemia due to intravascular volume depletion. Baseline creatinine is ~ 2.5. Most recent creatinine and eGFR are listed below.  Recent Labs     04/26/25  1134   CREATININE 3.93*   EGFRNORACEVR 11*      Plan  - JONAS is worsening. Will continue current treatment  - Avoid nephrotoxins and renally dose meds for GFR listed above  - Monitor urine output, serial BMP, and adjust therapy as needed  - IV fluids started.  - Renal imaging without obstructive pathology.

## 2025-04-26 NOTE — ASSESSMENT & PLAN NOTE
Likely from vaginal bleeding.  CT renal stone without obstructive pathology/stones or hydronephrosis.   Urine culture pending as cystitis can also be a possibility although I believe her UA shows RBCs due to vaginal bleeding.  Empiric ceftriaxone, discontinue if culture negative.

## 2025-04-26 NOTE — H&P
[facility] Medicine  History & Physical    Patient Name: Pam Ortega  MRN: [id number]  Patient Class: OP- Observation  Admission Date: 4/26/2025  Attending Physician: Geronimo Villatoro MD   Primary Care Provider: Janice Velez FNP         Patient information was obtained from patient, relative(s), past medical records, and ER records.     Subjective:     Principal Problem:Acute kidney injury superimposed on stage 4 chronic kidney disease    Chief Complaint:   Chief Complaint   Patient presents with    Vaginal Bleeding     Pt presents to ED complaining of possible vaginal bleeding v GI bleeding. Daughter states that she noticed some blood on her mothers pad this morning and believes it to be coming vaginally, denies any clots. Followed by Dr. Loo and had GI scope by Dr. Sawant recently.         HPI: Ms. Ortega is a 79 Y O female with PMH of CAD/ CHF, CKD, Afib, DM and HTN who presented to ED today with possible vaginal bleeding. History was mostly obtained from the daughters who reported that when they were cleaning the patient they noted some blood on the pads and when they wiped her vaginal area there was a lot of fresh blood. Daughter seems to think it is coming from her vagina but she is unsure as her urine seems to be mixed with blood as well. No abdominal pain or pelvic pain, weight loss (other than water weight) reported. Patient reported one prior history of vaginal bleeding about a year ago but it self resolved. When she was younger, she had heavy periods but was not aware of any tumors or fibroids. She denies chest pain, shortness of breath. Lately, patient has noted mild abdominal distention and is being worked up for new onset ascites.     Past Medical History:   Diagnosis Date    Anemia of chronic disease     Atrial fibrillation     Benign hypertensive CKD, stage 1-4 or unspecified chronic kidney disease     Bronchitis 12/22/2023    Carotid artery  occlusion     CKD (chronic kidney disease)     Coronary atherosclerosis     Cough 12/22/2023    COVID-19 ruled out 03/20/2024    Diabetes mellitus, type 2     DJD (degenerative joint disease)     History of CVA (cerebrovascular accident)     HTN (hypertension)     Hyperlipidemia     Nonischemic dilated cardiomyopathy     Obesity     Osteopenia     Paroxysmal atrial fibrillation     Presence of cardiac pacemaker     PVD (peripheral vascular disease)     Upper respiratory tract infection 12/22/2023    Vitamin D deficiency        Past Surgical History:   Procedure Laterality Date    BREAST BIOPSY      COLONOSCOPY  12/04/2018    repeat in 5 years    EMBOLECTOMY OR THROMBECTOMY, ARTERY, AORTOILIAC, FEMORAL, OR POPLITEAL Right 11/17/2024    Procedure: EMBOLECTOMY OR THROMBECTOMY, ARTERY, AORTOILIAC, FEMORAL, OR POPLITEAL;  Surgeon: Jordana Guillermo MD;  Location: [facility];  Service: Vascular;  Laterality: Right;    pace maker      VAGINAL DELIVERY      x 3       Review of patient's allergies indicates:   Allergen Reactions    Ace inhibitors Edema    Losartan Swelling       No current facility-administered medications on file prior to encounter.     Current Outpatient Medications on File Prior to Encounter   Medication Sig    albuterol (VENTOLIN HFA) 90 mcg/actuation inhaler Inhale 2 puffs into the lungs every 6 (six) hours as needed for Wheezing. Rescue Inhaler    alendronate (FOSAMAX) 70 MG tablet Take 1 tablet (70 mg total) by mouth every 7 days.    amLODIPine (NORVASC) 10 MG tablet Take 1 tablet (10 mg total) by mouth once daily.    aspirin (ECOTRIN) 81 MG EC tablet Take 81 mg by mouth once daily.    budesonide-formoterol 160-4.5 mcg (SYMBICORT) 160-4.5 mcg/actuation HFAA Inhale 2 puffs into the lungs every 12 (twelve) hours. Controller Inhaler    cholecalciferol, vitamin D3, (VITAMIN D3) 50 mcg (2,000 unit) Cap capsule Take 1 capsule by mouth once daily.    dapagliflozin propanediol (FARXIGA) 10 mg tablet Take 1  tablet (10 mg total) by mouth once daily.    furosemide (LASIX) 80 MG tablet Take 1 tablet (80 mg total) by mouth every morning.    glimepiride (AMARYL) 4 MG tablet Take 1 tablet (4 mg total) by mouth daily with breakfast.    hydrALAZINE (APRESOLINE) 50 MG tablet Take 1 tablet (50 mg total) by mouth 2 (two) times a day.    isosorbide mononitrate (IMDUR) 30 MG 24 hr tablet Take 1 tablet (30 mg total) by mouth once daily.    lovastatin (MEVACOR) 20 MG tablet Take 1 tablet (20 mg total) by mouth every night    metOLazone (ZAROXOLYN) 5 MG tablet Take 2 tablets (10 mg) by mouth before the next dose of furosemide and repeat with next dose in 72 hours    metoprolol tartrate (LOPRESSOR) 50 MG tablet Take 1 tablet (50 mg total) by mouth 2 (two) times a day.    omeprazole (PRILOSEC) 20 MG capsule Take 1 capsule (20 mg total) by mouth once daily.    potassium chloride (MICRO-K) 10 MEQ CpSR Take 1 capsule (10 mEq total) by mouth once daily.    rivaroxaban (XARELTO) 15 mg Tab Take 1 tablet (15 mg total) by mouth once daily.    semaglutide (OZEMPIC) 1 mg/dose (4 mg/3 mL) Inject 1 mg into the skin every 7 days.    spironolactone (ALDACTONE) 25 MG tablet Take 1 tablet (25 mg total) by mouth once daily.    colchicine (COLCRYS) 0.6 mg tablet Take 1 tablet (0.6 mg total) by mouth once daily. for gout    fluticasone-salmeterol diskus inhaler 250-50 mcg Inhale 1 puff into the lungs 2 (two) times daily. Controller Inhaler    [DISCONTINUED] rivaroxaban (XARELTO) 15 mg Tab Take 1 tablet (15 mg total) by mouth once daily.     Family History       Problem Relation (Age of Onset)    Alzheimer's disease Maternal Grandmother    Breast cancer Maternal Grandmother, Daughter    Cancer Brother    Clotting disorder Daughter    Diabetes Daughter, Daughter    Hyperlipidemia Daughter    Hypertension Mother, Father, Sister, Brother, Maternal Grandmother, Daughter, Daughter, Daughter    Stroke Sister, Brother          Tobacco Use    Smoking status: Never      Passive exposure: Never    Smokeless tobacco: Never   Substance and Sexual Activity    Alcohol use: Not Currently    Drug use: Never    Sexual activity: Not Currently     Review of Systems   Genitourinary:  Positive for vaginal bleeding.   All other systems reviewed and are negative.    Objective:     Vital Signs (Most Recent):  Temp: 97.5 °F (36.4 °C) (04/26/25 1442)  Pulse: 64 (04/26/25 1442)  Resp: 16 (04/26/25 1442)  BP: (!) 130/55 (04/26/25 1442)  SpO2: 99 % (04/26/25 1442) Vital Signs (24h Range):  Temp:  [97.5 °F (36.4 °C)-97.8 °F (36.6 °C)] 97.5 °F (36.4 °C)  Pulse:  [61-64] 64  Resp:  [16-18] 16  SpO2:  [98 %-99 %] 99 %  BP: (128-130)/(55-71) 130/55     Weight: 81.6 kg (179 lb 14.3 oz)  Body mass index is 25.81 kg/m².     Physical Exam  Vitals and nursing note reviewed.   Constitutional:       Appearance: Normal appearance.   HENT:      Head: Normocephalic and atraumatic.      Mouth/Throat:      Mouth: Mucous membranes are moist.   Eyes:      Extraocular Movements: Extraocular movements intact.      Pupils: Pupils are equal, round, and reactive to light.   Cardiovascular:      Rate and Rhythm: Normal rate and regular rhythm.   Pulmonary:      Effort: Pulmonary effort is normal.      Breath sounds: Normal breath sounds.   Abdominal:      General: Bowel sounds are normal.      Palpations: Abdomen is soft.   Musculoskeletal:         General: Normal range of motion.      Cervical back: Normal range of motion and neck supple.   Neurological:      General: No focal deficit present.      Mental Status: She is alert and oriented to person, place, and time. Mental status is at baseline.   Psychiatric:         Mood and Affect: Mood normal.         Behavior: Behavior normal.              CRANIAL NERVES     CN III, IV, VI   Pupils are equal, round, and reactive to light.       Significant Labs: All pertinent labs within the past 24 hours have been reviewed.    Significant Imaging: I have reviewed all pertinent  "imaging results/findings within the past 24 hours.  Assessment/Plan:     Assessment & Plan  Acute kidney injury superimposed on stage 4 chronic kidney disease  JONAS is likely due to pre-renal azotemia due to intravascular volume depletion. Baseline creatinine is  ~ 2.5 . Most recent creatinine and eGFR are listed below.  Recent Labs     04/26/25  1134   CREATININE 3.93*   EGFRNORACEVR 11*      Plan  - JONAS is worsening. Will continue current treatment  - Avoid nephrotoxins and renally dose meds for GFR listed above  - Monitor urine output, serial BMP, and adjust therapy as needed  - IV fluids started.  - Renal imaging without obstructive pathology.   Vaginal bleeding  CT noted to have enlarged and leiomyomatus uterus.  Will check pelvic ultrasound for further evaluation.  May require Gyn consultation.  Hgb stable at 10, vitals stable currently.  Hold Xarelto.     Microscopic hematuria  Likely from vaginal bleeding.  CT renal stone without obstructive pathology/stones or hydronephrosis.   Urine culture pending as cystitis can also be a possibility although I believe her UA shows RBCs due to vaginal bleeding.  Empiric ceftriaxone, discontinue if culture negative.     History of CVA (cerebrovascular accident)  No residual deficits.    Coronary atherosclerosis  Patient with known CAD s/p stent placement, which is controlled Will continue  hold AC/AP agents for now  and monitor for S/Sx of angina/ACS. Continue to monitor on telemetry.   Dyslipidemia  Resume home statin.     Obstructive sleep apnea  CPAP QHS.     Type 2 diabetes mellitus, without long-term current use of insulin  Patient's FSGs are controlled on current medication regimen.  Last A1c reviewed-   Lab Results   Component Value Date    HGBA1C 6.2 08/23/2024     Most recent fingerstick glucose reviewed- No results for input(s): "POCTGLUCOSE" in the last 24 hours.  Current correctional scale  Low  Maintain anti-hyperglycemic dose as follows-   Antihyperglycemics " "(From admission, onward)      Start     Stop Route Frequency Ordered    04/26/25 1545  insulin aspart U-100 injection 0-5 Units         -- SubQ Before meals & nightly PRN 04/26/25 1447          Hold Oral hypoglycemics while patient is in the hospital.      Essential hypertension  Patient's blood pressure range in the last 24 hours was: BP  Min: 128/71  Max: 130/55.The patient's inpatient anti-hypertensive regimen is listed below:  Current Antihypertensives       Plan  - BP is controlled, no changes needed to their regimen  - Resume meds once verified.   Chronic atrial fibrillation  Patient has persistent (7 days or more) atrial fibrillation. Patient is currently in atrial fibrillation. [id number] Score: 5. The patients heart rate in the last 24 hours is as follows:  Pulse  Min: 61  Max: 64     Antiarrhythmics       Anticoagulants       Plan  - Replete lytes with a goal of K>4, Mg >2  - Patient is not anticoagulated due to vaginal bleeding.  - Patient's afib is currently controlled.       Peripheral vascular disease  Follows with Dr. Guillermo.     Recurrent pleural effusion  Follows with Dr. Rocha.   S/p thoracentesis x 2 with transudate effusion, possibly from CHF or hepatic hydrothorax.   Currently has small right effusion and she has no symptoms of dyspnea, chest pain, oxygenation is stable on room air.       Chronic heart failure with preserved ejection fraction  Patient has Combined Systolic and Diastolic heart failure that is Chronic. On presentation their CHF was well compensated. Most recent BNP and echo results are listed below.  No results for input(s): "BNP" in the last 72 hours.  Latest ECHO  Results for orders placed during the hospital encounter of 04/04/25    Echo Saline Bubble? No    Interpretation Summary    Left Ventricle: The left ventricle is mildly dilated. Increased ventricular mass. Normal wall thickness. There is eccentric hypertrophy. Septal motion is consistent with pacing. There is moderately " reduced systolic function with a visually estimated ejection fraction of 30 - 40%. Quantitated ejection fraction is 35%. There is diastolic dysfunction.    Right Ventricle: The right ventricle has mild enlargement. Systolic function is moderately reduced. Pacemaker lead present in the ventricle.    Left Atrium: Severely dilated    Right Atrium: Right atrium is moderately dilated.    Mitral Valve: There is at least moderate regurgitation with an eccentrically posterolateral directed jet. MR jet is only well-visualized on parasternal long axis views, remaining views w/ supoptimal assessment of MR as MR jet is not adequately captured.    Tricuspid Valve: There is severe regurgitation.    Pulmonic Valve: There is moderate to severe regurgitation.    Pulmonary Artery: The estimated pulmonary artery systolic pressure is 39 mmHg.    IVC/SVC: Intermediate venous pressure at 8 mmHg.    Pericardium: Right pleural effusion.    Compared to prior ECHO from 11/204 (which was also personally reviewed): LV size and LVEF are unchanged. LV remains mildy dilated with a moderately reduced LVEF. Improvement in RA and RV size noted on current study (previously severely dilated). MR is better visualized on previous study and at least moderate MR previously- appears largely unchanged however, there are technical limitations with MR assessment on current study as detailed above.    Current Heart Failure Medications       Plan  - Monitor strict I&Os and daily weights.    - Place on telemetry  - Low sodium diet  - Place on fluid restriction of 2 L.   - Cardiology has not been consulted  - The patient's volume status is at their baseline  - GDMT resumption as able other than nephrotoxic drugs.          Presence of cardiac pacemaker  Noted.     Other ascites  Evaluation underway.  Recently had paracentesis on 4/7/25- SAAG > 1.1 suggestive of portal HTN however liver imaging without evidence of cirrhosis.  Continue outpatient follow up,  abdominal MRI planned per GI.   Will workup for Gyn related issues as above.     VTE Risk Mitigation (From admission, onward)           Ordered     IP VTE HIGH RISK PATIENT  Once         04/26/25 1447     Place sequential compression device  Until discontinued         04/26/25 1447                       On 04/26/2025, patient should be placed in hospital observation services under my care.             BARB PELAYO MD  Department of Hospital Medicine  [facility]

## 2025-04-26 NOTE — PHARMACY MED REC
"Admission Medication History     The home medication history was taken by Maggie Bustillo.    You may go to "Admission" then "Reconcile Home Medications" tabs to review and/or act upon these items.     The home medication list has been updated by the Pharmacy department.   Please read ALL comments highlighted in yellow.   Please address this information as you see fit.    Feel free to contact us if you have any questions or require assistance.        Medications listed below were obtained from: Patient/family, Analytic software- [employer], and Medical records  PTA Medications   Medication Sig    albuterol (VENTOLIN HFA) 90 mcg/actuation inhaler Inhale 2 puffs into the lungs every 6 (six) hours as needed for Wheezing. Rescue Inhaler    alendronate (FOSAMAX) 70 MG tablet Take 1 tablet (70 mg total) by mouth every 7 days.    amLODIPine (NORVASC) 10 MG tablet Take 1 tablet (10 mg total) by mouth once daily.    aspirin (ECOTRIN) 81 MG EC tablet Take 81 mg by mouth once daily.    budesonide-formoterol 160-4.5 mcg (SYMBICORT) 160-4.5 mcg/actuation HFAA Inhale 2 puffs into the lungs every 12 (twelve) hours. Controller Inhaler    cholecalciferol, vitamin D3, (VITAMIN D3) 50 mcg (2,000 unit) Cap capsule Take 1 capsule by mouth once daily.    dapagliflozin propanediol (FARXIGA) 10 mg tablet Take 1 tablet (10 mg total) by mouth once daily.    furosemide (LASIX) 80 MG tablet Take 1 tablet (80 mg total) by mouth every morning.    glimepiride (AMARYL) 4 MG tablet Take 1 tablet (4 mg total) by mouth daily with breakfast.    hydrALAZINE (APRESOLINE) 50 MG tablet Take 1 tablet (50 mg total) by mouth 2 (two) times a day.    isosorbide mononitrate (IMDUR) 30 MG 24 hr tablet Take 1 tablet (30 mg total) by mouth once daily.    lovastatin (MEVACOR) 20 MG tablet Take 1 tablet (20 mg total) by mouth every night    metOLazone (ZAROXOLYN) 5 MG tablet Take 2 tablets (10 mg) by mouth before the next dose of furosemide and repeat with next dose in " 72 hours    metoprolol tartrate (LOPRESSOR) 50 MG tablet Take 1 tablet (50 mg total) by mouth 2 (two) times a day.    omeprazole (PRILOSEC) 20 MG capsule Take 1 capsule (20 mg total) by mouth once daily.    potassium chloride (MICRO-K) 10 MEQ CpSR Take 1 capsule (10 mEq total) by mouth once daily.    rivaroxaban (XARELTO) 15 mg Tab Take 1 tablet (15 mg total) by mouth once daily.    semaglutide (OZEMPIC) 1 mg/dose (4 mg/3 mL) Inject 1 mg into the skin every 7 days.    spironolactone (ALDACTONE) 25 MG tablet Take 1 tablet (25 mg total) by mouth once daily.    colchicine (COLCRYS) 0.6 mg tablet Take 1 tablet (0.6 mg total) by mouth once daily. for gout    fluticasone-salmeterol diskus inhaler 250-50 mcg Inhale 1 puff into the lungs 2 (two) times daily. Controller Inhaler         Current Outpatient Medications on File Prior to Encounter   Medication Sig Dispense Refill Last Dose/Taking    albuterol (VENTOLIN HFA) 90 mcg/actuation inhaler Inhale 2 puffs into the lungs every 6 (six) hours as needed for Wheezing. Rescue Inhaler 18 g 5 Taking As Needed    alendronate (FOSAMAX) 70 MG tablet Take 1 tablet (70 mg total) by mouth every 7 days. 12 tablet 3 Taking    amLODIPine (NORVASC) 10 MG tablet Take 1 tablet (10 mg total) by mouth once daily. 90 tablet 3 4/26/2025    aspirin (ECOTRIN) 81 MG EC tablet Take 81 mg by mouth once daily.   4/26/2025    budesonide-formoterol 160-4.5 mcg (SYMBICORT) 160-4.5 mcg/actuation HFAA Inhale 2 puffs into the lungs every 12 (twelve) hours. Controller Inhaler 10.2 g 5 Taking    cholecalciferol, vitamin D3, (VITAMIN D3) 50 mcg (2,000 unit) Cap capsule Take 1 capsule by mouth once daily.   4/25/2025    dapagliflozin propanediol (FARXIGA) 10 mg tablet Take 1 tablet (10 mg total) by mouth once daily. 30 tablet 12 4/26/2025    furosemide (LASIX) 80 MG tablet Take 1 tablet (80 mg total) by mouth every morning. 90 tablet 11 4/26/2025    glimepiride (AMARYL) 4 MG tablet Take 1 tablet (4 mg total) by  mouth daily with breakfast. 90 tablet 3 4/26/2025    hydrALAZINE (APRESOLINE) 50 MG tablet Take 1 tablet (50 mg total) by mouth 2 (two) times a day. 180 tablet 3 4/26/2025    isosorbide mononitrate (IMDUR) 30 MG 24 hr tablet Take 1 tablet (30 mg total) by mouth once daily. 90 tablet 3 4/26/2025    lovastatin (MEVACOR) 20 MG tablet Take 1 tablet (20 mg total) by mouth every night 90 tablet 3 4/25/2025    metOLazone (ZAROXOLYN) 5 MG tablet Take 2 tablets (10 mg) by mouth before the next dose of furosemide and repeat with next dose in 72 hours 4 tablet 1 Taking    metoprolol tartrate (LOPRESSOR) 50 MG tablet Take 1 tablet (50 mg total) by mouth 2 (two) times a day. 180 tablet 3 4/26/2025    omeprazole (PRILOSEC) 20 MG capsule Take 1 capsule (20 mg total) by mouth once daily. 90 capsule 3 4/26/2025    potassium chloride (MICRO-K) 10 MEQ CpSR Take 1 capsule (10 mEq total) by mouth once daily. 90 capsule 3 4/26/2025    rivaroxaban (XARELTO) 15 mg Tab Take 1 tablet (15 mg total) by mouth once daily. 90 tablet 4 4/25/2025    semaglutide (OZEMPIC) 1 mg/dose (4 mg/3 mL) Inject 1 mg into the skin every 7 days. 9 mL 3 Taking    spironolactone (ALDACTONE) 25 MG tablet Take 1 tablet (25 mg total) by mouth once daily. 90 tablet 3 4/25/2025    colchicine (COLCRYS) 0.6 mg tablet Take 1 tablet (0.6 mg total) by mouth once daily. for gout 90 tablet 3 More than a month    fluticasone-salmeterol diskus inhaler 250-50 mcg Inhale 1 puff into the lungs 2 (two) times daily. Controller Inhaler 60 each 5 Unknown    [DISCONTINUED] rivaroxaban (XARELTO) 15 mg Tab Take 1 tablet (15 mg total) by mouth once daily. 90 tablet 2        Potential issues to be addressed PRIOR TO DISCHARGE  N/A    Maggie Bustillo  EXT [id number]                 .

## 2025-04-26 NOTE — ASSESSMENT & PLAN NOTE
Mom called back, relayed Dr. Strauss's message.  Mom verbalizes thanks and understanding.    Order for PPCC entered, also entered vitamin D level recheck for Spring 2018.   No residual deficits.

## 2025-04-26 NOTE — SUBJECTIVE & OBJECTIVE
Past Medical History:   Diagnosis Date    Anemia of chronic disease     Atrial fibrillation     Benign hypertensive CKD, stage 1-4 or unspecified chronic kidney disease     Bronchitis 12/22/2023    Carotid artery occlusion     CKD (chronic kidney disease)     Coronary atherosclerosis     Cough 12/22/2023    COVID-19 ruled out 03/20/2024    Diabetes mellitus, type 2     DJD (degenerative joint disease)     History of CVA (cerebrovascular accident)     HTN (hypertension)     Hyperlipidemia     Nonischemic dilated cardiomyopathy     Obesity     Osteopenia     Paroxysmal atrial fibrillation     Presence of cardiac pacemaker     PVD (peripheral vascular disease)     Upper respiratory tract infection 12/22/2023    Vitamin D deficiency        Past Surgical History:   Procedure Laterality Date    BREAST BIOPSY      COLONOSCOPY  12/04/2018    repeat in 5 years    EMBOLECTOMY OR THROMBECTOMY, ARTERY, AORTOILIAC, FEMORAL, OR POPLITEAL Right 11/17/2024    Procedure: EMBOLECTOMY OR THROMBECTOMY, ARTERY, AORTOILIAC, FEMORAL, OR POPLITEAL;  Surgeon: Jordana Guillermo MD;  Location: [facility];  Service: Vascular;  Laterality: Right;    pace maker      VAGINAL DELIVERY      x 3       Review of patient's allergies indicates:   Allergen Reactions    Ace inhibitors Edema    Losartan Swelling       No current facility-administered medications on file prior to encounter.     Current Outpatient Medications on File Prior to Encounter   Medication Sig    albuterol (VENTOLIN HFA) 90 mcg/actuation inhaler Inhale 2 puffs into the lungs every 6 (six) hours as needed for Wheezing. Rescue Inhaler    alendronate (FOSAMAX) 70 MG tablet Take 1 tablet (70 mg total) by mouth every 7 days.    amLODIPine (NORVASC) 10 MG tablet Take 1 tablet (10 mg total) by mouth once daily.    aspirin (ECOTRIN) 81 MG EC tablet Take 81 mg by mouth once daily.    budesonide-formoterol 160-4.5 mcg (SYMBICORT) 160-4.5 mcg/actuation HFAA Inhale 2 puffs into the lungs  every 12 (twelve) hours. Controller Inhaler    cholecalciferol, vitamin D3, (VITAMIN D3) 50 mcg (2,000 unit) Cap capsule Take 1 capsule by mouth once daily.    dapagliflozin propanediol (FARXIGA) 10 mg tablet Take 1 tablet (10 mg total) by mouth once daily.    furosemide (LASIX) 80 MG tablet Take 1 tablet (80 mg total) by mouth every morning.    glimepiride (AMARYL) 4 MG tablet Take 1 tablet (4 mg total) by mouth daily with breakfast.    hydrALAZINE (APRESOLINE) 50 MG tablet Take 1 tablet (50 mg total) by mouth 2 (two) times a day.    isosorbide mononitrate (IMDUR) 30 MG 24 hr tablet Take 1 tablet (30 mg total) by mouth once daily.    lovastatin (MEVACOR) 20 MG tablet Take 1 tablet (20 mg total) by mouth every night    metOLazone (ZAROXOLYN) 5 MG tablet Take 2 tablets (10 mg) by mouth before the next dose of furosemide and repeat with next dose in 72 hours    metoprolol tartrate (LOPRESSOR) 50 MG tablet Take 1 tablet (50 mg total) by mouth 2 (two) times a day.    omeprazole (PRILOSEC) 20 MG capsule Take 1 capsule (20 mg total) by mouth once daily.    potassium chloride (MICRO-K) 10 MEQ CpSR Take 1 capsule (10 mEq total) by mouth once daily.    rivaroxaban (XARELTO) 15 mg Tab Take 1 tablet (15 mg total) by mouth once daily.    semaglutide (OZEMPIC) 1 mg/dose (4 mg/3 mL) Inject 1 mg into the skin every 7 days.    spironolactone (ALDACTONE) 25 MG tablet Take 1 tablet (25 mg total) by mouth once daily.    colchicine (COLCRYS) 0.6 mg tablet Take 1 tablet (0.6 mg total) by mouth once daily. for gout    fluticasone-salmeterol diskus inhaler 250-50 mcg Inhale 1 puff into the lungs 2 (two) times daily. Controller Inhaler    [DISCONTINUED] rivaroxaban (XARELTO) 15 mg Tab Take 1 tablet (15 mg total) by mouth once daily.     Family History       Problem Relation (Age of Onset)    Alzheimer's disease Maternal Grandmother    Breast cancer Maternal Grandmother, Daughter    Cancer Brother    Clotting disorder Daughter    Diabetes  Daughter, Daughter    Hyperlipidemia Daughter    Hypertension Mother, Father, Sister, Brother, Maternal Grandmother, Daughter, Daughter, Daughter    Stroke Sister, Brother          Tobacco Use    Smoking status: Never     Passive exposure: Never    Smokeless tobacco: Never   Substance and Sexual Activity    Alcohol use: Not Currently    Drug use: Never    Sexual activity: Not Currently     Review of Systems   Genitourinary:  Positive for vaginal bleeding.   All other systems reviewed and are negative.    Objective:     Vital Signs (Most Recent):  Temp: 97.5 °F (36.4 °C) (04/26/25 1442)  Pulse: 64 (04/26/25 1442)  Resp: 16 (04/26/25 1442)  BP: (!) 130/55 (04/26/25 1442)  SpO2: 99 % (04/26/25 1442) Vital Signs (24h Range):  Temp:  [97.5 °F (36.4 °C)-97.8 °F (36.6 °C)] 97.5 °F (36.4 °C)  Pulse:  [61-64] 64  Resp:  [16-18] 16  SpO2:  [98 %-99 %] 99 %  BP: (128-130)/(55-71) 130/55     Weight: 81.6 kg (179 lb 14.3 oz)  Body mass index is 25.81 kg/m².     Physical Exam  Vitals and nursing note reviewed.   Constitutional:       Appearance: Normal appearance.   HENT:      Head: Normocephalic and atraumatic.      Mouth/Throat:      Mouth: Mucous membranes are moist.   Eyes:      Extraocular Movements: Extraocular movements intact.      Pupils: Pupils are equal, round, and reactive to light.   Cardiovascular:      Rate and Rhythm: Normal rate and regular rhythm.   Pulmonary:      Effort: Pulmonary effort is normal.      Breath sounds: Normal breath sounds.   Abdominal:      General: Bowel sounds are normal.      Palpations: Abdomen is soft.   Musculoskeletal:         General: Normal range of motion.      Cervical back: Normal range of motion and neck supple.   Neurological:      General: No focal deficit present.      Mental Status: She is alert and oriented to person, place, and time. Mental status is at baseline.   Psychiatric:         Mood and Affect: Mood normal.         Behavior: Behavior normal.              CRANIAL  NERVES     CN III, IV, VI   Pupils are equal, round, and reactive to light.       Significant Labs: All pertinent labs within the past 24 hours have been reviewed.    Significant Imaging: I have reviewed all pertinent imaging results/findings within the past 24 hours.

## 2025-04-26 NOTE — ASSESSMENT & PLAN NOTE
Patient's blood pressure range in the last 24 hours was: BP  Min: 128/71  Max: 130/55.The patient's inpatient anti-hypertensive regimen is listed below:  Current Antihypertensives       Plan  - BP is controlled, no changes needed to their regimen  - Resume meds once verified.

## 2025-04-26 NOTE — ASSESSMENT & PLAN NOTE
"Patient's FSGs are controlled on current medication regimen.  Last A1c reviewed-   Lab Results   Component Value Date    HGBA1C 6.2 08/23/2024     Most recent fingerstick glucose reviewed- No results for input(s): "POCTGLUCOSE" in the last 24 hours.  Current correctional scale  Low  Maintain anti-hyperglycemic dose as follows-   Antihyperglycemics (From admission, onward)      Start     Stop Route Frequency Ordered    04/26/25 1545  insulin aspart U-100 injection 0-5 Units         -- SubQ Before meals & nightly PRN 04/26/25 1447          Hold Oral hypoglycemics while patient is in the hospital.      "

## 2025-04-26 NOTE — ASSESSMENT & PLAN NOTE
Follows with Dr. Rocha.   S/p thoracentesis x 2 with transudate effusion, possibly from CHF or hepatic hydrothorax.   Currently has small right effusion and she has no symptoms of dyspnea, chest pain, oxygenation is stable on room air.

## 2025-04-26 NOTE — ASSESSMENT & PLAN NOTE
Patient with known CAD s/p stent placement, which is controlled Will continue hold AC/AP agents for now and monitor for S/Sx of angina/ACS. Continue to monitor on telemetry.

## 2025-04-26 NOTE — ASSESSMENT & PLAN NOTE
"Patient has Combined Systolic and Diastolic heart failure that is Chronic. On presentation their CHF was well compensated. Most recent BNP and echo results are listed below.  No results for input(s): "BNP" in the last 72 hours.  Latest ECHO  Results for orders placed during the hospital encounter of 04/04/25    Echo Saline Bubble? No    Interpretation Summary    Left Ventricle: The left ventricle is mildly dilated. Increased ventricular mass. Normal wall thickness. There is eccentric hypertrophy. Septal motion is consistent with pacing. There is moderately reduced systolic function with a visually estimated ejection fraction of 30 - 40%. Quantitated ejection fraction is 35%. There is diastolic dysfunction.    Right Ventricle: The right ventricle has mild enlargement. Systolic function is moderately reduced. Pacemaker lead present in the ventricle.    Left Atrium: Severely dilated    Right Atrium: Right atrium is moderately dilated.    Mitral Valve: There is at least moderate regurgitation with an eccentrically posterolateral directed jet. MR jet is only well-visualized on parasternal long axis views, remaining views w/ supoptimal assessment of MR as MR jet is not adequately captured.    Tricuspid Valve: There is severe regurgitation.    Pulmonic Valve: There is moderate to severe regurgitation.    Pulmonary Artery: The estimated pulmonary artery systolic pressure is 39 mmHg.    IVC/SVC: Intermediate venous pressure at 8 mmHg.    Pericardium: Right pleural effusion.    Compared to prior ECHO from 11/204 (which was also personally reviewed): LV size and LVEF are unchanged. LV remains mildy dilated with a moderately reduced LVEF. Improvement in RA and RV size noted on current study (previously severely dilated). MR is better visualized on previous study and at least moderate MR previously- appears largely unchanged however, there are technical limitations with MR assessment on current study as detailed " above.    Current Heart Failure Medications       Plan  - Monitor strict I&Os and daily weights.    - Place on telemetry  - Low sodium diet  - Place on fluid restriction of 2 L.   - Cardiology has not been consulted  - The patient's volume status is at their baseline  - GDMT resumption as able other than nephrotoxic drugs.

## 2025-04-26 NOTE — HPI
Ms. Ortega is a 79 Y O female with PMH of CAD/ CHF, CKD, Afib, DM and HTN who presented to ED today with possible vaginal bleeding. History was mostly obtained from the daughters who reported that when they were cleaning the patient they noted some blood on the pads and when they wiped her vaginal area there was a lot of fresh blood. Daughter seems to think it is coming from her vagina but she is unsure as her urine seems to be mixed with blood as well. No abdominal pain or pelvic pain, weight loss (other than water weight) reported. Patient reported one prior history of vaginal bleeding about a year ago but it self resolved. When she was younger, she had heavy periods but was not aware of any tumors or fibroids. She denies chest pain, shortness of breath. Lately, patient has noted mild abdominal distention and is being worked up for new onset ascites.

## 2025-04-26 NOTE — ASSESSMENT & PLAN NOTE
Patient has persistent (7 days or more) atrial fibrillation. Patient is currently in atrial fibrillation. [id number] Score: 5. The patients heart rate in the last 24 hours is as follows:  Pulse  Min: 61  Max: 64     Antiarrhythmics       Anticoagulants       Plan  - Replete lytes with a goal of K>4, Mg >2  - Patient is not anticoagulated due to vaginal bleeding.  - Patient's afib is currently controlled.

## 2025-04-27 PROBLEM — D50.9 MICROCYTIC ANEMIA: Status: ACTIVE | Noted: 2025-04-27

## 2025-04-27 LAB
ALBUMIN SERPL BCP-MCNC: 3.3 G/DL (ref 3.4–4.8)
ALBUMIN/GLOB SERPL: 0.8 {RATIO}
ALP SERPL-CCNC: 72 U/L (ref 40–150)
ALT SERPL W P-5'-P-CCNC: 7 U/L
ANION GAP SERPL CALCULATED.3IONS-SCNC: 17 MMOL/L (ref 7–16)
ANISOCYTOSIS BLD QL SMEAR: ABNORMAL
AST SERPL W P-5'-P-CCNC: 21 U/L (ref 11–45)
BASOPHILS # BLD AUTO: 0.03 K/UL (ref 0–0.2)
BASOPHILS NFR BLD AUTO: 0.5 % (ref 0–1)
BILIRUB SERPL-MCNC: 2.5 MG/DL
BUN SERPL-MCNC: 72 MG/DL (ref 10–20)
BUN/CREAT SERPL: 20 (ref 6–20)
CALCIUM SERPL-MCNC: 9.1 MG/DL (ref 8.4–10.2)
CHLORIDE SERPL-SCNC: 93 MMOL/L (ref 98–107)
CO2 SERPL-SCNC: 27 MMOL/L (ref 23–31)
CREAT SERPL-MCNC: 3.61 MG/DL (ref 0.55–1.02)
DIFFERENTIAL METHOD BLD: ABNORMAL
EGFR (NO RACE VARIABLE) (RUSH/TITUS): 12 ML/MIN/1.73M2
EOSINOPHIL # BLD AUTO: 0.06 K/UL (ref 0–0.5)
EOSINOPHIL NFR BLD AUTO: 1 % (ref 1–4)
ERYTHROCYTE [DISTWIDTH] IN BLOOD BY AUTOMATED COUNT: 22.1 % (ref 11.5–14.5)
GLOBULIN SER-MCNC: 4.3 G/DL (ref 2–4)
GLUCOSE SERPL-MCNC: 154 MG/DL (ref 82–115)
GLUCOSE SERPL-MCNC: 182 MG/DL (ref 70–105)
GLUCOSE SERPL-MCNC: 233 MG/DL (ref 70–105)
GLUCOSE SERPL-MCNC: 240 MG/DL (ref 70–105)
GLUCOSE SERPL-MCNC: 294 MG/DL (ref 70–105)
HCT VFR BLD AUTO: 31 % (ref 38–47)
HGB BLD-MCNC: 9.8 G/DL (ref 12–16)
HYPOCHROMIA BLD QL SMEAR: ABNORMAL
IMM GRANULOCYTES # BLD AUTO: 0.03 K/UL (ref 0–0.04)
IMM GRANULOCYTES NFR BLD: 0.5 % (ref 0–0.4)
LYMPHOCYTES # BLD AUTO: 1.7 K/UL (ref 1–4.8)
LYMPHOCYTES NFR BLD AUTO: 27.9 % (ref 27–41)
MAGNESIUM SERPL-MCNC: 2.7 MG/DL (ref 1.6–2.6)
MCH RBC QN AUTO: 23.8 PG (ref 27–31)
MCHC RBC AUTO-ENTMCNC: 31.6 G/DL (ref 32–36)
MCV RBC AUTO: 75.2 FL (ref 80–96)
MICROCYTES BLD QL SMEAR: ABNORMAL
MONOCYTES # BLD AUTO: 0.73 K/UL (ref 0–0.8)
MONOCYTES NFR BLD AUTO: 12 % (ref 2–6)
MPC BLD CALC-MCNC: 10.7 FL (ref 9.4–12.4)
NEUTROPHILS # BLD AUTO: 3.54 K/UL (ref 1.8–7.7)
NEUTROPHILS NFR BLD AUTO: 58.1 % (ref 53–65)
NRBC # BLD AUTO: 0 X10E3/UL
NRBC, AUTO (.00): 0 %
OVALOCYTES BLD QL SMEAR: ABNORMAL
PLATELET # BLD AUTO: 179 K/UL (ref 150–400)
PLATELET MORPHOLOGY: ABNORMAL
POIKILOCYTOSIS BLD QL SMEAR: ABNORMAL
POLYCHROMASIA BLD QL SMEAR: ABNORMAL
POTASSIUM SERPL-SCNC: 4.4 MMOL/L (ref 3.5–5.1)
PROT SERPL-MCNC: 7.6 G/DL (ref 5.8–7.6)
RBC # BLD AUTO: 4.12 M/UL (ref 4.2–5.4)
SCHISTOCYTES BLD QL AUTO: ABNORMAL
SODIUM SERPL-SCNC: 133 MMOL/L (ref 136–145)
TARGETS BLD QL SMEAR: ABNORMAL
WBC # BLD AUTO: 6.09 K/UL (ref 4.5–11)

## 2025-04-27 PROCEDURE — 80053 COMPREHEN METABOLIC PANEL: CPT | Performed by: INTERNAL MEDICINE

## 2025-04-27 PROCEDURE — 63600175 PHARM REV CODE 636 W HCPCS: Performed by: INTERNAL MEDICINE

## 2025-04-27 PROCEDURE — 11000001 HC ACUTE MED/SURG PRIVATE ROOM

## 2025-04-27 PROCEDURE — G0378 HOSPITAL OBSERVATION PER HR: HCPCS

## 2025-04-27 PROCEDURE — 36415 COLL VENOUS BLD VENIPUNCTURE: CPT | Performed by: INTERNAL MEDICINE

## 2025-04-27 PROCEDURE — 83735 ASSAY OF MAGNESIUM: CPT | Performed by: INTERNAL MEDICINE

## 2025-04-27 PROCEDURE — 82962 GLUCOSE BLOOD TEST: CPT

## 2025-04-27 PROCEDURE — 25000003 PHARM REV CODE 250: Performed by: INTERNAL MEDICINE

## 2025-04-27 PROCEDURE — 99233 SBSQ HOSP IP/OBS HIGH 50: CPT | Mod: ,,, | Performed by: INTERNAL MEDICINE

## 2025-04-27 PROCEDURE — 85025 COMPLETE CBC W/AUTO DIFF WBC: CPT | Performed by: INTERNAL MEDICINE

## 2025-04-27 RX ADMIN — Medication 2000 UNITS: at 08:04

## 2025-04-27 RX ADMIN — INSULIN ASPART 1 UNITS: 100 INJECTION, SOLUTION INTRAVENOUS; SUBCUTANEOUS at 08:04

## 2025-04-27 RX ADMIN — SODIUM CHLORIDE 125 MG: 9 INJECTION, SOLUTION INTRAVENOUS at 02:04

## 2025-04-27 RX ADMIN — PRAVASTATIN SODIUM 20 MG: 10 TABLET ORAL at 08:04

## 2025-04-27 RX ADMIN — METOPROLOL TARTRATE 50 MG: 50 TABLET, FILM COATED ORAL at 08:04

## 2025-04-27 RX ADMIN — ISOSORBIDE MONONITRATE 30 MG: 30 TABLET, EXTENDED RELEASE ORAL at 08:04

## 2025-04-27 RX ADMIN — HYDRALAZINE HYDROCHLORIDE 50 MG: 50 TABLET ORAL at 08:04

## 2025-04-27 RX ADMIN — COLCHICINE 0.6 MG: 0.6 TABLET ORAL at 08:04

## 2025-04-27 RX ADMIN — PANTOPRAZOLE SODIUM 40 MG: 40 TABLET, DELAYED RELEASE ORAL at 08:04

## 2025-04-27 RX ADMIN — AMLODIPINE BESYLATE 10 MG: 10 TABLET ORAL at 08:04

## 2025-04-27 RX ADMIN — INSULIN ASPART 2 UNITS: 100 INJECTION, SOLUTION INTRAVENOUS; SUBCUTANEOUS at 02:04

## 2025-04-27 RX ADMIN — SODIUM CHLORIDE: 9 INJECTION, SOLUTION INTRAVENOUS at 05:04

## 2025-04-27 RX ADMIN — INSULIN ASPART 2 UNITS: 100 INJECTION, SOLUTION INTRAVENOUS; SUBCUTANEOUS at 05:04

## 2025-04-27 NOTE — ASSESSMENT & PLAN NOTE
CT noted to have enlarged and leiomyomatus uterus.  Pelvic ultrasound with thickened endometrium for age noting heterogeneous material in the endometrial canal concerning for blood products.  Hgb stable at 10, vitals stable currently.  Hold Xarelto.   Gyn consulted; case discussed with Dr. Up (OB Hospitalist), recommend endometrial biopsy (inpatient vs outpatient, depending on equipment availability).

## 2025-04-27 NOTE — PROGRESS NOTES
[facility] Hospital Medicine  Progress Note    Patient Name: Pam Ortega  MRN: [id number]  Patient Class: IP- Inpatient   Admission Date: 4/26/2025  Length of Stay: 0 days  Attending Physician: Geronimo Villatoro MD  Primary Care Provider: Janice Velez FNP        Subjective     Principal Problem:Acute kidney injury superimposed on stage 4 chronic kidney disease        HPI:  Ms. Ortega is a 79 Y O female with PMH of CAD/ CHF, CKD, Afib, DM and HTN who presented to ED today with possible vaginal bleeding. History was mostly obtained from the daughters who reported that when they were cleaning the patient they noted some blood on the pads and when they wiped her vaginal area there was a lot of fresh blood. Daughter seems to think it is coming from her vagina but she is unsure as her urine seems to be mixed with blood as well. No abdominal pain or pelvic pain, weight loss (other than water weight) reported. Patient reported one prior history of vaginal bleeding about a year ago but it self resolved. When she was younger, she had heavy periods but was not aware of any tumors or fibroids. She denies chest pain, shortness of breath. Lately, patient has noted mild abdominal distention and is being worked up for new onset ascites.     Overview/Hospital Course:  4/27- Gyn consulted, recommend endometrial biopsy (inpatient vs outpatient). Hgb stable. Renal function improving.     Interval History: Patient seen and examined at the bedside, reports having continuous vaginal bleeding. Otherwise no complaints.     Review of Systems   Genitourinary:  Positive for vaginal bleeding.   All other systems reviewed and are negative.    Objective:     Vital Signs (Most Recent):  Temp: 98.1 °F (36.7 °C) (04/27/25 0756)  Pulse: 62 (04/27/25 0836)  Resp: 16 (04/27/25 0756)  BP: 130/72 (04/27/25 0836)  SpO2: 97 % (04/27/25 0756) Vital Signs (24h Range):  Temp:  [97.4 °F (36.3 °C)-98.1 °F (36.7 °C)] 98.1  °F (36.7 °C)  Pulse:  [56-64] 62  Resp:  [16-18] 16  SpO2:  [92 %-99 %] 97 %  BP: (115-130)/(55-72) 130/72     Weight: 81.6 kg (179 lb 14.3 oz)  Body mass index is 25.81 kg/m².    Intake/Output Summary (Last 24 hours) at 4/27/2025 1123  Last data filed at 4/27/2025 0622  Gross per 24 hour   Intake 1085.79 ml   Output --   Net 1085.79 ml         Physical Exam  Vitals and nursing note reviewed.   Constitutional:       Appearance: Normal appearance.   HENT:      Head: Normocephalic and atraumatic.      Mouth/Throat:      Mouth: Mucous membranes are moist.   Eyes:      Extraocular Movements: Extraocular movements intact.      Pupils: Pupils are equal, round, and reactive to light.   Cardiovascular:      Rate and Rhythm: Normal rate and regular rhythm.   Pulmonary:      Effort: Pulmonary effort is normal.      Breath sounds: Normal breath sounds.   Abdominal:      General: Bowel sounds are normal.      Palpations: Abdomen is soft.   Musculoskeletal:         General: Normal range of motion.      Cervical back: Normal range of motion and neck supple.   Neurological:      General: No focal deficit present.      Mental Status: She is alert and oriented to person, place, and time. Mental status is at baseline.   Psychiatric:         Mood and Affect: Mood normal.         Behavior: Behavior normal.               Significant Labs: All pertinent labs within the past 24 hours have been reviewed.    Significant Imaging: I have reviewed all pertinent imaging results/findings within the past 24 hours.      Assessment & Plan  Acute kidney injury superimposed on stage 4 chronic kidney disease  JONAS is likely due to pre-renal azotemia due to intravascular volume depletion. Baseline creatinine is  ~ 2.5 . Most recent creatinine and eGFR are listed below.  Recent Labs     04/26/25  1134 04/27/25  0648   CREATININE 3.93* 3.61*   EGFRNORACEVR 11* 12*      Plan  - JONAS is improving.   - Avoid nephrotoxins and renally dose meds for GFR listed  "above  - Monitor urine output, serial BMP, and adjust therapy as needed  - IV fluids started, will let it complete today and monitor renal function.  - Renal imaging without obstructive pathology.     Vaginal bleeding  CT noted to have enlarged and leiomyomatus uterus.  Pelvic ultrasound with thickened endometrium for age noting heterogeneous material in the endometrial canal concerning for blood products.  Hgb stable at 10, vitals stable currently.  Hold Xarelto.   Gyn consulted; case discussed with Dr. Up (OB Hospitalist), recommend endometrial biopsy (inpatient vs outpatient, depending on equipment availability).     Microscopic hematuria  Likely from vaginal bleeding.  CT renal stone without obstructive pathology/stones or hydronephrosis.   Urine culture pending as cystitis can also be a possibility although I believe her UA shows RBCs due to vaginal bleeding.  No symptoms of UTI, discontinued antibiotics.     History of CVA (cerebrovascular accident)  No residual deficits.    Coronary atherosclerosis  Patient with known CAD s/p stent placement, which is controlled Will continue  hold AC/AP agents for now  and monitor for S/Sx of angina/ACS. Continue to monitor on telemetry.   Dyslipidemia  Resume home statin.     Obstructive sleep apnea  CPAP QHS.     Type 2 diabetes mellitus, without long-term current use of insulin  Patient's FSGs are controlled on current medication regimen.  Last A1c reviewed-   Lab Results   Component Value Date    HGBA1C 6.2 08/23/2024     Most recent fingerstick glucose reviewed- No results for input(s): "POCTGLUCOSE" in the last 24 hours.  Current correctional scale  Low  Maintain anti-hyperglycemic dose as follows-   Antihyperglycemics (From admission, onward)      Start     Stop Route Frequency Ordered    04/26/25 1545  insulin aspart U-100 injection 0-5 Units         -- SubQ Before meals & nightly PRN 04/26/25 1447          Hold Oral hypoglycemics while patient is in the " "[facility].    Essential hypertension  Patient's blood pressure range in the last 24 hours was: BP  Min: 115/65  Max: 130/72.The patient's inpatient anti-hypertensive regimen is listed below:  Current Antihypertensives  amLODIPine tablet 10 mg, Daily, Oral  hydrALAZINE tablet 50 mg, 2 times daily, Oral  isosorbide mononitrate 24 hr tablet 30 mg, Daily, Oral  metoprolol tartrate (LOPRESSOR) tablet 50 mg, 2 times daily, Oral    Plan  - BP is controlled, no changes needed to their regimen    Chronic atrial fibrillation  Patient has persistent (7 days or more) atrial fibrillation. Patient is currently in atrial fibrillation. [id number] Score: 5. The patients heart rate in the last 24 hours is as follows:  Pulse  Min: 56  Max: 64     Antiarrhythmics  metoprolol tartrate (LOPRESSOR) tablet 50 mg, 2 times daily, Oral    Anticoagulants       Plan  - Replete lytes with a goal of K>4, Mg >2  - Patient is not anticoagulated due to vaginal bleeding.  - Patient's afib is currently controlled.   - Hold Xarelto given vaginal bleeding.     Peripheral vascular disease  Follows with Dr. Guillermo.     Recurrent pleural effusion  Follows with Dr. Rocha.   S/p thoracentesis x 2 with transudate effusion, possibly from CHF or hepatic hydrothorax.   Currently has small right effusion and she has no symptoms of dyspnea, chest pain, oxygenation is stable on room air.     Chronic heart failure with preserved ejection fraction  Patient has Combined Systolic and Diastolic heart failure that is Chronic. On presentation their CHF was well compensated. Most recent BNP and echo results are listed below.  No results for input(s): "BNP" in the last 72 hours.  Latest ECHO  Results for orders placed during the hospital encounter of 04/04/25    Echo Saline Bubble? No    Interpretation Summary    Left Ventricle: The left ventricle is mildly dilated. Increased ventricular mass. Normal wall thickness. There is eccentric hypertrophy. Septal motion is consistent " with pacing. There is moderately reduced systolic function with a visually estimated ejection fraction of 30 - 40%. Quantitated ejection fraction is 35%. There is diastolic dysfunction.    Right Ventricle: The right ventricle has mild enlargement. Systolic function is moderately reduced. Pacemaker lead present in the ventricle.    Left Atrium: Severely dilated    Right Atrium: Right atrium is moderately dilated.    Mitral Valve: There is at least moderate regurgitation with an eccentrically posterolateral directed jet. MR jet is only well-visualized on parasternal long axis views, remaining views w/ supoptimal assessment of MR as MR jet is not adequately captured.    Tricuspid Valve: There is severe regurgitation.    Pulmonic Valve: There is moderate to severe regurgitation.    Pulmonary Artery: The estimated pulmonary artery systolic pressure is 39 mmHg.    IVC/SVC: Intermediate venous pressure at 8 mmHg.    Pericardium: Right pleural effusion.    Compared to prior ECHO from 11/204 (which was also personally reviewed): LV size and LVEF are unchanged. LV remains mildy dilated with a moderately reduced LVEF. Improvement in RA and RV size noted on current study (previously severely dilated). MR is better visualized on previous study and at least moderate MR previously- appears largely unchanged however, there are technical limitations with MR assessment on current study as detailed above.    Current Heart Failure Medications  hydrALAZINE tablet 50 mg, 2 times daily, Oral    Plan  - Monitor strict I&Os and daily weights.    - Place on telemetry  - Low sodium diet  - Place on fluid restriction of 2 L.   - Cardiology has not been consulted  - The patient's volume status is at their baseline  - GDMT resumption as able other than nephrotoxic drugs.     Presence of cardiac pacemaker  Noted.     Other ascites  Evaluation underway.  Recently had paracentesis on 4/7/25- SAAG > 1.1 suggestive of portal HTN however liver imaging  without evidence of cirrhosis.  Continue outpatient follow up, abdominal MRI planned per GI.   Will workup for Gyn related issues as above.     Microcytic anemia  Anemia is likely due to Iron deficiency plus in the setting of vaginal bleeding. Most recent hemoglobin and hematocrit are listed below.  Recent Labs     04/26/25  1134 04/27/25  0648   HGB 10.6* 9.8*   HCT 33.3* 31.0*     Plan  - Monitor serial CBC: Daily  - Transfuse PRBC if patient becomes hemodynamically unstable, symptomatic or H/H drops below 7/21.  - Patient has not received any PRBC transfusions to date  - Patient's anemia is currently stable  - IV iron ordered.    VTE Risk Mitigation (From admission, onward)           Ordered     IP VTE HIGH RISK PATIENT  Once         04/26/25 1447     Place sequential compression device  Until discontinued         04/26/25 1447                    Discharge Planning   HERBERT: 4/28/2025     Code Status: Full Code   Medical Readiness for Discharge Date:                            BARB PELAYO MD  Department of Hospital Medicine   [facility]

## 2025-04-27 NOTE — ASSESSMENT & PLAN NOTE
Anemia is likely due to Iron deficiency plus in the setting of vaginal bleeding. Most recent hemoglobin and hematocrit are listed below.  Recent Labs     04/26/25  1134 04/27/25  0648   HGB 10.6* 9.8*   HCT 33.3* 31.0*     Plan  - Monitor serial CBC: Daily  - Transfuse PRBC if patient becomes hemodynamically unstable, symptomatic or H/H drops below 7/21.  - Patient has not received any PRBC transfusions to date  - Patient's anemia is currently stable  - IV iron ordered.

## 2025-04-27 NOTE — ASSESSMENT & PLAN NOTE
Patient's blood pressure range in the last 24 hours was: BP  Min: 115/65  Max: 130/72.The patient's inpatient anti-hypertensive regimen is listed below:  Current Antihypertensives  amLODIPine tablet 10 mg, Daily, Oral  hydrALAZINE tablet 50 mg, 2 times daily, Oral  isosorbide mononitrate 24 hr tablet 30 mg, Daily, Oral  metoprolol tartrate (LOPRESSOR) tablet 50 mg, 2 times daily, Oral    Plan  - BP is controlled, no changes needed to their regimen

## 2025-04-27 NOTE — CONSULTS
I was consulted on this patient because of postmenopausal bleeding.  Patient is a 79-year-old  3 para 3 with multiple medical history who presented with vaginal bleeding.  She had an ultrasound that indicated fluid collection in the endometrial cavity.  I spoke with the patient and her daughter.  I explained to the patient and her daughter that the patient needs endometrial biopsy due to postmenopausal bleeding.  Pipelle and speculum and Gynecology equipment is not available on the floor at this time.  I did not want to move the patient at this time.  Since the patient does not have heavy bleeding my suggestion to the patient and her daughter is to find a gynecologist were a Pipelle can be used to perform endometrial sampling at the office.  If the patient is in the hospital for extended period this can be arranged to perform the procedure while she is in the hospital.  Thank you for the consultation.  
N/A

## 2025-04-27 NOTE — ASSESSMENT & PLAN NOTE
JONAS is likely due to pre-renal azotemia due to intravascular volume depletion. Baseline creatinine is ~ 2.5. Most recent creatinine and eGFR are listed below.  Recent Labs     04/26/25  1134 04/27/25  0648   CREATININE 3.93* 3.61*   EGFRNORACEVR 11* 12*      Plan  - JONAS is improving.   - Avoid nephrotoxins and renally dose meds for GFR listed above  - Monitor urine output, serial BMP, and adjust therapy as needed  - IV fluids started, will let it complete today and monitor renal function.  - Renal imaging without obstructive pathology.

## 2025-04-27 NOTE — HOSPITAL COURSE
4/27- Gyn consulted, recommend endometrial biopsy (inpatient vs outpatient). Hgb stable. Renal function improving.     4/28- Renal function stable, not back to baseline though. Will have patient see primary nephrologist Dr. Dutta in the office. She has adequate urine output and no indication for renal replacement therapy. We will adjust home Lasix dose and hold home Aldactone, Metolazone for now. Close follow up with PCP and nephrology. Gyn recommends outpatient endometrial biopsy- appointment arranged for today with Dr. Bermudez.

## 2025-04-27 NOTE — PLAN OF CARE
Problem: Adult Inpatient Plan of Care  Goal: Plan of Care Review  Outcome: Progressing  Goal: Readiness for Transition of Care  Outcome: Progressing     Problem: Wound  Goal: Absence of Infection Signs and Symptoms  Outcome: Progressing  Goal: Optimal Pain Control and Function  Outcome: Progressing     Problem: Acute Kidney Injury/Impairment  Goal: Fluid and Electrolyte Balance  Outcome: Progressing  Goal: Improved Oral Intake  Outcome: Progressing

## 2025-04-27 NOTE — ASSESSMENT & PLAN NOTE
Patient has persistent (7 days or more) atrial fibrillation. Patient is currently in atrial fibrillation. [id number] Score: 5. The patients heart rate in the last 24 hours is as follows:  Pulse  Min: 56  Max: 64     Antiarrhythmics  metoprolol tartrate (LOPRESSOR) tablet 50 mg, 2 times daily, Oral    Anticoagulants       Plan  - Replete lytes with a goal of K>4, Mg >2  - Patient is not anticoagulated due to vaginal bleeding.  - Patient's afib is currently controlled.   - Hold Xarelto given vaginal bleeding.

## 2025-04-27 NOTE — PLAN OF CARE
[facility]  Initial [facility]charge Assessment       Primary Care Provider: Janice Velez FNP    Admission Diagnosis: Hemorrhagic cystitis [N30.91]  JONAS (acute kidney injury) [N17.9]    Admission Date: 4/26/2025  Expected Discharge Date: 4/28/2025    Transition of Care Barriers: (P) None    Payor: MEDICARE / Plan: MEDICARE PART A & B / Product Type: Government /     Extended Emergency Contact Information  Primary Emergency Contact: CarolinajimMadhavi  Mobile Phone: [phone]  Relation: Daughter  Preferred language: English   needed? No  Secondary Emergency Contact: JYOTHI SENIOR  Mobile Phone: [phone]  Relation: Grandchild  Preferred language: English   needed? No    Discharge Plan A: (P) Home with family, Home Health  Discharge Plan B: (P) Home with family, Home Health, Long-term acute care facility (LTAC), Rehab, Skilled Nursing Facility      [facility]  [address] [place] [id number]  Phone: [phone] Fax: [phone]      Initial Assessment (most recent)       Adult Discharge Assessment - 04/27/25 1422          Discharge Assessment    Assessment Type Discharge Planning Assessment     Confirmed/corrected address, phone number and insurance Yes     Confirmed Demographics Correct on Facesheet     Source of Information patient;family     Communicated HERBERT with patient/caregiver Date not available/Unable to determine     Reason For Admission Pt's daughter states, "She was discharging blood."     People in Home child(supriya), adult     Do you expect to return to your current living situation? Yes     Do you have help at home or someone to help you manage your care at home? Yes     Who are your caregiver(s) and their phone number(s)? Madhavi Shannon (Daughter) [phone]; Jyothi Senior (Grandchild) [phone]; Queta Ledezma (Daughter) [phone]     Prior to hospitilization cognitive status: Unable to Assess     Current " cognitive status: Alert/Oriented     Walking or Climbing Stairs Difficulty yes     Walking or Climbing Stairs ambulation difficulty, requires equipment     Mobility Management Quad Cane     Dressing/Bathing Difficulty no     Do you have any problems with: --   No problems reported    Home Layout Able to live on 1st floor     Equipment Currently Used at Home CPAP;cane, quad     Readmission within 30 days? No     Patient currently being followed by outpatient case management? No     Do you currently have service(s) that help you manage your care at home? Yes     How Many hours does patient receive services 1     Name and Contact number of agency --   Pt and daughter are uncertain of name of home health provider    Is the pt/caregiver preference to resume services with current agency Yes     Do you take prescription medications? Yes     Do you have prescription coverage? Yes     Coverage Medicare     Do you have any problems affording any of your prescribed medications? No     Is the patient taking medications as prescribed? yes     Who is going to help you get home at discharge? Family     How do you get to doctors appointments? family or friend will provide     Are you on dialysis? No     Do you take coumadin? No     Discharge Plan A Home with family;Home Health (P)      Discharge Plan B Home with family;Home Health;Long-term acute care facility (LTAC);Rehab;Skilled Nursing Facility (P)      DME Needed Upon Discharge  none (P)      Discharge Plan discussed with: Adult children;Patient (P)      Transition of Care Barriers None (P)         Physical Activity    On average, how many days per week do you engage in moderate to strenuous exercise (like a brisk walk)? 0 days (P)      On average, how many minutes do you engage in exercise at this level? 0 min (P)         Financial Resource Strain    How hard is it for you to pay for the very basics like food, housing, medical care, and heating? Not very hard (P)          Housing Stability    In the last 12 months, was there a time when you were not able to pay the mortgage or rent on time? No (P)      In the past 12 months, how many times have you moved where you were living? 0 (P)      At any time in the past 12 months, were you homeless or living in a shelter (including now)? No (P)         Transportation Needs    In the past 12 months, has lack of transportation kept you from medical appointments or from getting medications? No (P)      In the past 12 months, has lack of transportation kept you from meetings, work, or from getting things needed for daily living? No (P)         Food Insecurity    Within the past 12 months, you worried that your food would run out before you got the money to buy more. Never true (P)      Within the past 12 months, the food you bought just didn't last and you didn't have money to get more. Never true (P)         Stress    Do you feel stress - tense, restless, nervous, or anxious, or unable to sleep at night because your mind is troubled all the time - these days? Not at all (P)         Social Isolation    How often do you feel lonely or isolated from those around you?  Never (P)         Alcohol Use    Q1: How often do you have a drink containing alcohol? Never (P)      Q2: How many drinks containing alcohol do you have on a typical day when you are drinking? Patient does not drink (P)      Q3: How often do you have six or more drinks on one occasion? Never (P)         Utilities    In the past 12 months has the electric, gas, oil, or water company threatened to shut off services in your home? No (P)         Health Literacy    How often do you need to have someone help you when you read instructions, pamphlets, or other written material from your doctor or pharmacy? Never (P)         OTHER    Name(s) of People in Home Madhavi Ortega (Daughter) (P)                    SW spoke with pt with daughter, Queta, at bedside to obtain information for pt's  initial discharge assessment.  Pt lives at home with her daughter, Madhavi.  She is current with home health but is uncertain of the name of the home health provider.  Pt uses a quad cane and a CPAP.  Per pt and daughter, discharge plans are to return back home and resume home health services at discharge.  SDOH questions completed and IM obtained.  SS following for discharge needs as they arise.

## 2025-04-27 NOTE — SUBJECTIVE & OBJECTIVE
Interval History: Patient seen and examined at the bedside, reports having continuous vaginal bleeding. Otherwise no complaints.     Review of Systems   Genitourinary:  Positive for vaginal bleeding.   All other systems reviewed and are negative.    Objective:     Vital Signs (Most Recent):  Temp: 98.1 °F (36.7 °C) (04/27/25 0756)  Pulse: 62 (04/27/25 0836)  Resp: 16 (04/27/25 0756)  BP: 130/72 (04/27/25 0836)  SpO2: 97 % (04/27/25 0756) Vital Signs (24h Range):  Temp:  [97.4 °F (36.3 °C)-98.1 °F (36.7 °C)] 98.1 °F (36.7 °C)  Pulse:  [56-64] 62  Resp:  [16-18] 16  SpO2:  [92 %-99 %] 97 %  BP: (115-130)/(55-72) 130/72     Weight: 81.6 kg (179 lb 14.3 oz)  Body mass index is 25.81 kg/m².    Intake/Output Summary (Last 24 hours) at 4/27/2025 1123  Last data filed at 4/27/2025 0622  Gross per 24 hour   Intake 1085.79 ml   Output --   Net 1085.79 ml         Physical Exam  Vitals and nursing note reviewed.   Constitutional:       Appearance: Normal appearance.   HENT:      Head: Normocephalic and atraumatic.      Mouth/Throat:      Mouth: Mucous membranes are moist.   Eyes:      Extraocular Movements: Extraocular movements intact.      Pupils: Pupils are equal, round, and reactive to light.   Cardiovascular:      Rate and Rhythm: Normal rate and regular rhythm.   Pulmonary:      Effort: Pulmonary effort is normal.      Breath sounds: Normal breath sounds.   Abdominal:      General: Bowel sounds are normal.      Palpations: Abdomen is soft.   Musculoskeletal:         General: Normal range of motion.      Cervical back: Normal range of motion and neck supple.   Neurological:      General: No focal deficit present.      Mental Status: She is alert and oriented to person, place, and time. Mental status is at baseline.   Psychiatric:         Mood and Affect: Mood normal.         Behavior: Behavior normal.               Significant Labs: All pertinent labs within the past 24 hours have been reviewed.    Significant Imaging: I  have reviewed all pertinent imaging results/findings within the past 24 hours.

## 2025-04-27 NOTE — ASSESSMENT & PLAN NOTE
"Patient has Combined Systolic and Diastolic heart failure that is Chronic. On presentation their CHF was well compensated. Most recent BNP and echo results are listed below.  No results for input(s): "BNP" in the last 72 hours.  Latest ECHO  Results for orders placed during the hospital encounter of 04/04/25    Echo Saline Bubble? No    Interpretation Summary    Left Ventricle: The left ventricle is mildly dilated. Increased ventricular mass. Normal wall thickness. There is eccentric hypertrophy. Septal motion is consistent with pacing. There is moderately reduced systolic function with a visually estimated ejection fraction of 30 - 40%. Quantitated ejection fraction is 35%. There is diastolic dysfunction.    Right Ventricle: The right ventricle has mild enlargement. Systolic function is moderately reduced. Pacemaker lead present in the ventricle.    Left Atrium: Severely dilated    Right Atrium: Right atrium is moderately dilated.    Mitral Valve: There is at least moderate regurgitation with an eccentrically posterolateral directed jet. MR jet is only well-visualized on parasternal long axis views, remaining views w/ supoptimal assessment of MR as MR jet is not adequately captured.    Tricuspid Valve: There is severe regurgitation.    Pulmonic Valve: There is moderate to severe regurgitation.    Pulmonary Artery: The estimated pulmonary artery systolic pressure is 39 mmHg.    IVC/SVC: Intermediate venous pressure at 8 mmHg.    Pericardium: Right pleural effusion.    Compared to prior ECHO from 11/204 (which was also personally reviewed): LV size and LVEF are unchanged. LV remains mildy dilated with a moderately reduced LVEF. Improvement in RA and RV size noted on current study (previously severely dilated). MR is better visualized on previous study and at least moderate MR previously- appears largely unchanged however, there are technical limitations with MR assessment on current study as detailed " above.    Current Heart Failure Medications  hydrALAZINE tablet 50 mg, 2 times daily, Oral    Plan  - Monitor strict I&Os and daily weights.    - Place on telemetry  - Low sodium diet  - Place on fluid restriction of 2 L.   - Cardiology has not been consulted  - The patient's volume status is at their baseline  - GDMT resumption as able other than nephrotoxic drugs.

## 2025-04-27 NOTE — ASSESSMENT & PLAN NOTE
Likely from vaginal bleeding.  CT renal stone without obstructive pathology/stones or hydronephrosis.   Urine culture pending as cystitis can also be a possibility although I believe her UA shows RBCs due to vaginal bleeding.  No symptoms of UTI, discontinued antibiotics.

## 2025-04-28 ENCOUNTER — PROCEDURE VISIT (OUTPATIENT)
Dept: OBSTETRICS AND GYNECOLOGY | Facility: CLINIC | Age: 80
DRG: 683 | End: 2025-04-28
Payer: MEDICARE

## 2025-04-28 VITALS
BODY MASS INDEX: 25.68 KG/M2 | DIASTOLIC BLOOD PRESSURE: 60 MMHG | HEART RATE: 61 BPM | WEIGHT: 179 LBS | SYSTOLIC BLOOD PRESSURE: 114 MMHG

## 2025-04-28 VITALS
WEIGHT: 179.88 LBS | HEIGHT: 70 IN | RESPIRATION RATE: 16 BRPM | HEART RATE: 63 BPM | BODY MASS INDEX: 25.75 KG/M2 | DIASTOLIC BLOOD PRESSURE: 74 MMHG | SYSTOLIC BLOOD PRESSURE: 139 MMHG | TEMPERATURE: 97 F | OXYGEN SATURATION: 97 %

## 2025-04-28 DIAGNOSIS — N95.0 POST-MENOPAUSAL BLEEDING: Primary | ICD-10-CM

## 2025-04-28 LAB
ALBUMIN SERPL BCP-MCNC: 3.5 G/DL (ref 3.4–4.8)
ALBUMIN/GLOB SERPL: 0.8 {RATIO}
ALP SERPL-CCNC: 77 U/L (ref 40–150)
ALT SERPL W P-5'-P-CCNC: 7 U/L
ANION GAP SERPL CALCULATED.3IONS-SCNC: 19 MMOL/L (ref 7–16)
ANISOCYTOSIS BLD QL SMEAR: ABNORMAL
AST SERPL W P-5'-P-CCNC: 21 U/L (ref 11–45)
BASOPHILS # BLD AUTO: 0.02 K/UL (ref 0–0.2)
BASOPHILS NFR BLD AUTO: 0.3 % (ref 0–1)
BILIRUB SERPL-MCNC: 2.4 MG/DL
BUN SERPL-MCNC: 72 MG/DL (ref 10–20)
BUN/CREAT SERPL: 19 (ref 6–20)
CALCIUM SERPL-MCNC: 9.1 MG/DL (ref 8.4–10.2)
CHLORIDE SERPL-SCNC: 97 MMOL/L (ref 98–107)
CO2 SERPL-SCNC: 23 MMOL/L (ref 23–31)
CREAT SERPL-MCNC: 3.79 MG/DL (ref 0.55–1.02)
DIFFERENTIAL METHOD BLD: ABNORMAL
EGFR (NO RACE VARIABLE) (RUSH/TITUS): 12 ML/MIN/1.73M2
EOSINOPHIL # BLD AUTO: 0.05 K/UL (ref 0–0.5)
EOSINOPHIL NFR BLD AUTO: 0.7 % (ref 1–4)
ERYTHROCYTE [DISTWIDTH] IN BLOOD BY AUTOMATED COUNT: 22.9 % (ref 11.5–14.5)
GLOBULIN SER-MCNC: 4.3 G/DL (ref 2–4)
GLUCOSE SERPL-MCNC: 142 MG/DL (ref 82–115)
GLUCOSE SERPL-MCNC: 160 MG/DL (ref 70–105)
GLUCOSE SERPL-MCNC: 177 MG/DL (ref 70–105)
HCT VFR BLD AUTO: 32.7 % (ref 38–47)
HGB BLD-MCNC: 9.9 G/DL (ref 12–16)
HYPOCHROMIA BLD QL SMEAR: ABNORMAL
IMM GRANULOCYTES # BLD AUTO: 0.02 K/UL (ref 0–0.04)
IMM GRANULOCYTES NFR BLD: 0.3 % (ref 0–0.4)
LYMPHOCYTES # BLD AUTO: 1.63 K/UL (ref 1–4.8)
LYMPHOCYTES NFR BLD AUTO: 23.3 % (ref 27–41)
MAGNESIUM SERPL-MCNC: 2.9 MG/DL (ref 1.6–2.6)
MCH RBC QN AUTO: 23.1 PG (ref 27–31)
MCHC RBC AUTO-ENTMCNC: 30.3 G/DL (ref 32–36)
MCV RBC AUTO: 76.4 FL (ref 80–96)
MONOCYTES # BLD AUTO: 0.89 K/UL (ref 0–0.8)
MONOCYTES NFR BLD AUTO: 12.7 % (ref 2–6)
MPC BLD CALC-MCNC: 10.5 FL (ref 9.4–12.4)
NEUTROPHILS # BLD AUTO: 4.4 K/UL (ref 1.8–7.7)
NEUTROPHILS NFR BLD AUTO: 62.7 % (ref 53–65)
NRBC # BLD AUTO: 0.02 X10E3/UL
NRBC, AUTO (.00): 0.3 %
OVALOCYTES BLD QL SMEAR: ABNORMAL
PLATELET # BLD AUTO: 190 K/UL (ref 150–400)
PLATELET MORPHOLOGY: ABNORMAL
POIKILOCYTOSIS BLD QL SMEAR: ABNORMAL
POLYCHROMASIA BLD QL SMEAR: ABNORMAL
POTASSIUM SERPL-SCNC: 4.2 MMOL/L (ref 3.5–5.1)
PROT SERPL-MCNC: 7.8 G/DL (ref 5.8–7.6)
RBC # BLD AUTO: 4.28 M/UL (ref 4.2–5.4)
SCHISTOCYTES BLD QL AUTO: ABNORMAL
SODIUM SERPL-SCNC: 135 MMOL/L (ref 136–145)
TARGETS BLD QL SMEAR: ABNORMAL
WBC # BLD AUTO: 7.01 K/UL (ref 4.5–11)

## 2025-04-28 PROCEDURE — 58100 BIOPSY OF UTERUS LINING: CPT | Mod: PBBFAC | Performed by: STUDENT IN AN ORGANIZED HEALTH CARE EDUCATION/TRAINING PROGRAM

## 2025-04-28 PROCEDURE — 83735 ASSAY OF MAGNESIUM: CPT | Performed by: INTERNAL MEDICINE

## 2025-04-28 PROCEDURE — 36415 COLL VENOUS BLD VENIPUNCTURE: CPT | Performed by: INTERNAL MEDICINE

## 2025-04-28 PROCEDURE — 88305 TISSUE EXAM BY PATHOLOGIST: CPT | Mod: 26,,, | Performed by: PATHOLOGY

## 2025-04-28 PROCEDURE — 25000003 PHARM REV CODE 250: Performed by: INTERNAL MEDICINE

## 2025-04-28 PROCEDURE — 0UDB7ZX EXTRACTION OF ENDOMETRIUM, VIA NATURAL OR ARTIFICIAL OPENING, DIAGNOSTIC: ICD-10-PCS | Performed by: STUDENT IN AN ORGANIZED HEALTH CARE EDUCATION/TRAINING PROGRAM

## 2025-04-28 PROCEDURE — 80053 COMPREHEN METABOLIC PANEL: CPT | Performed by: INTERNAL MEDICINE

## 2025-04-28 PROCEDURE — 82962 GLUCOSE BLOOD TEST: CPT

## 2025-04-28 PROCEDURE — 88305 TISSUE EXAM BY PATHOLOGIST: CPT | Mod: TC,SUR | Performed by: STUDENT IN AN ORGANIZED HEALTH CARE EDUCATION/TRAINING PROGRAM

## 2025-04-28 PROCEDURE — 99203 OFFICE O/P NEW LOW 30 MIN: CPT | Mod: S$PBB,25,, | Performed by: STUDENT IN AN ORGANIZED HEALTH CARE EDUCATION/TRAINING PROGRAM

## 2025-04-28 PROCEDURE — 99239 HOSP IP/OBS DSCHRG MGMT >30: CPT | Mod: ,,, | Performed by: INTERNAL MEDICINE

## 2025-04-28 PROCEDURE — 85025 COMPLETE CBC W/AUTO DIFF WBC: CPT | Performed by: INTERNAL MEDICINE

## 2025-04-28 RX ORDER — COLCHICINE 0.6 MG/1
0.6 TABLET, FILM COATED ORAL EVERY OTHER DAY
Status: DISCONTINUED | OUTPATIENT
Start: 2025-04-29 | End: 2025-04-28 | Stop reason: HOSPADM

## 2025-04-28 RX ORDER — FUROSEMIDE 80 MG/1
40 TABLET ORAL EVERY MORNING
Qty: 45 TABLET | Refills: 0 | Status: SHIPPED | OUTPATIENT
Start: 2025-04-28

## 2025-04-28 RX ORDER — COLCHICINE 0.6 MG/1
0.6 TABLET ORAL EVERY OTHER DAY
Qty: 45 TABLET | Refills: 0 | Status: SHIPPED | OUTPATIENT
Start: 2025-04-28 | End: 2025-07-27

## 2025-04-28 RX ADMIN — HYDRALAZINE HYDROCHLORIDE 50 MG: 50 TABLET ORAL at 11:04

## 2025-04-28 RX ADMIN — ISOSORBIDE MONONITRATE 30 MG: 30 TABLET, EXTENDED RELEASE ORAL at 11:04

## 2025-04-28 RX ADMIN — AMLODIPINE BESYLATE 10 MG: 10 TABLET ORAL at 11:04

## 2025-04-28 RX ADMIN — Medication 2000 UNITS: at 11:04

## 2025-04-28 RX ADMIN — METOPROLOL TARTRATE 50 MG: 50 TABLET, FILM COATED ORAL at 11:04

## 2025-04-28 RX ADMIN — PANTOPRAZOLE SODIUM 40 MG: 40 TABLET, DELAYED RELEASE ORAL at 11:04

## 2025-04-28 NOTE — PLAN OF CARE
Per consult, ordered rollator walker for patient from [facility].  Faxed documents.  Will be delivered to patient room.

## 2025-04-28 NOTE — PROGRESS NOTES
[facility]  Wound Care    Patient Name:  Pam Ortega   MRN:  [id number]  Date: 4/28/2025  Diagnosis: Acute kidney injury superimposed on stage 4 chronic kidney disease    History:     Past Medical History:   Diagnosis Date    Anemia of chronic disease     Atrial fibrillation     Benign hypertensive CKD, stage 1-4 or unspecified chronic kidney disease     Bronchitis 12/22/2023    Carotid artery occlusion     CKD (chronic kidney disease)     Coronary atherosclerosis     Cough 12/22/2023    COVID-19 ruled out 03/20/2024    Diabetes mellitus, type 2     DJD (degenerative joint disease)     History of CVA (cerebrovascular accident)     HTN (hypertension)     Hyperlipidemia     Nonischemic dilated cardiomyopathy     Obesity     Osteopenia     Paroxysmal atrial fibrillation     Presence of cardiac pacemaker     PVD (peripheral vascular disease)     Upper respiratory tract infection 12/22/2023    Vitamin D deficiency        Social History[1]    Precautions:     Allergies as of 04/26/2025 - Reviewed 04/26/2025   Allergen Reaction Noted    Ace inhibitors Edema 04/05/2021    Losartan Swelling 04/05/2021       WOC Assessment Details/Treatment        04/28/25 1115   WOCN Assessment   WOCN Total Time (mins) 60   Visit Date 04/28/25   Visit Time 1115   Consult Type New   WOCN Speciality Wound   Wound venous   Number of Wounds 1   Intervention assessed;applied;chart review;coordination of care   Teaching on-going        Wound 04/28/25 1115 Venous Ulcer Left Leg   Date First Assessed/Time First Assessed: 04/28/25 1115   Present on Original Admission: Yes  Primary Wound Type: Venous Ulcer  Side: Left  Location: Leg   Wound Image     Dressing Appearance Open to air   Drainage Amount None   Appearance Red;Moist   Tissue loss description Full thickness   Black (%), Wound Tissue Color 0 %   Red (%), Wound Tissue Color 100 %   Yellow (%), Wound Tissue Color 0 %   Periwound Area Intact;Dry   Wound Edges  Open;Undefined   Wound Length (cm) 1.8 cm   Wound Width (cm) 1.6 cm   Wound Depth (cm) 0.1 cm   Wound Volume (cm^3) 0.151 cm^3   Wound Surface Area (cm^2) 2.26 cm^2   Care Cleansed with:;Antimicrobial agent;Applied:;Moisturizing agent   Dressing Silver;Hydrofiber;Other (comment);Rolled gauze  (4x4)   Dressing Change Due 05/01/25         WOC Team consulted for left leg wound    Narrative: pt sitting up in chair with family@ bedside. Resp even and nonlabored, NADN, pt tolerated wound care well.     Active Wounds and Recommendations: Clean with vashe. Apply aquacel Ag, secure with 4x4, wrap with kerlix Change every 3 days AND AS NEEDED for drainage/soilage.     Goals for Wound Healing: Apply antimicrobial, Reduce pain, Reduce bioburden, and Educate on proper wound management post D/C     Barriers to Wound Healing: diabetes advanced age fragile skin no protective sensation infection    Orders placed.    Thank you for the consult.     We will continue to follow. See additional note under Notes Tab for tentative f/u plan/dates.        04/28/2025       [1]   Social History  Socioeconomic History    Marital status:    Tobacco Use    Smoking status: Never     Passive exposure: Never    Smokeless tobacco: Never   Substance and Sexual Activity    Alcohol use: Not Currently    Drug use: Never    Sexual activity: Not Currently     Social Drivers of Health     Financial Resource Strain: Low Risk  (4/27/2025)    Overall Financial Resource Strain (CARDIA)     Difficulty of Paying Living Expenses: Not very hard   Food Insecurity: No Food Insecurity (4/27/2025)    Hunger Vital Sign     Worried About Running Out of Food in the Last Year: Never true     Ran Out of Food in the Last Year: Never true   Transportation Needs: No Transportation Needs (4/27/2025)    PRAPARE - Transportation     Lack of Transportation (Medical): No     Lack of Transportation (Non-Medical): No   Physical Activity: Inactive (4/27/2025)    Exercise Vital  Sign     Days of Exercise per Week: 0 days     Minutes of Exercise per Session: 0 min   Stress: No Stress Concern Present (4/27/2025)    [employer] - Occupational Stress Questionnaire     Feeling of Stress : Not at all   Housing Stability: Low Risk  (4/27/2025)    Housing Stability Vital Sign     Unable to Pay for Housing in the Last Year: No     Number of Times Moved in the Last Year: 0     Homeless in the Last Year: No

## 2025-04-28 NOTE — PLAN OF CARE
[place] that patient is being followed by [facility] but is uncertain as to which agency.  Upon inquiry, learned that patient is being followed by [facility].  Documents faxed to [facility].  Patient will continue to be followed upon dc.  Will continue to follow for anticipated dc needs.

## 2025-04-28 NOTE — DISCHARGE SUMMARY
[facility] Hospital Medicine  Discharge Summary      Patient Name: Pam Ortega  MRN: [id number]  JAIDEN: [id number]  Patient Class: IP- Inpatient  Admission Date: 4/26/2025  Hospital Length of Stay: 1 days  Discharge Date and Time: 04/28/2025 11:59 AM  Attending Physician: Geronimo Villatoro MD   Discharging Provider: GERONIMO VILLATORO MD  Primary Care Provider: Janice Velez FNP    Primary Care Team: Networked reference to record PCT     HPI:   Ms. Ortega is a 79 Y O female with PMH of CAD/ CHF, CKD, Afib, DM and HTN who presented to ED today with possible vaginal bleeding. History was mostly obtained from the daughters who reported that when they were cleaning the patient they noted some blood on the pads and when they wiped her vaginal area there was a lot of fresh blood. Daughter seems to think it is coming from her vagina but she is unsure as her urine seems to be mixed with blood as well. No abdominal pain or pelvic pain, weight loss (other than water weight) reported. Patient reported one prior history of vaginal bleeding about a year ago but it self resolved. When she was younger, she had heavy periods but was not aware of any tumors or fibroids. She denies chest pain, shortness of breath. Lately, patient has noted mild abdominal distention and is being worked up for new onset ascites.     * No surgery found *      Hospital Course:   4/27- Gyn consulted, recommend endometrial biopsy (inpatient vs outpatient). Hgb stable. Renal function improving.     4/28- Renal function stable, not back to baseline though. Will have patient see primary nephrologist Dr. Dutta in the office. She has adequate urine output and no indication for renal replacement therapy. We will adjust home Lasix dose and hold home Aldactone, Metolazone for now. Close follow up with PCP and nephrology. Gyn recommends outpatient endometrial biopsy- appointment arranged for today with Dr. Bermudez.      Goals of  Care Treatment Preferences:  Code Status: Full Code      SDOH Screening:  The patient was screened for utility difficulties, food insecurity, transport difficulties, housing insecurity, and interpersonal safety and there were no concerns identified this admission.     Consults:   Consults (From admission, onward)          Status Ordering Provider     Inpatient consult to Social Work  Once        Provider:  (Not yet assigned)    Completed BARB PELAYO     Inpatient consult to Gynecology  Once        Provider:  Félix Up MD    Acknowledged BARB PELAYO            Assessment & Plan  Acute kidney injury superimposed on stage 4 chronic kidney disease  JONAS is likely due to pre-renal azotemia due to intravascular volume depletion. Baseline creatinine is  ~ 2.5 . Most recent creatinine and eGFR are listed below.  Recent Labs     04/26/25  1134 04/27/25  0648 04/28/25  0431   CREATININE 3.93* 3.61* 3.79*   EGFRNORACEVR 11* 12* 12*      Plan  - JONAS is stable.   - Avoid nephrotoxins and renally dose meds for GFR listed above  - Monitor urine output, serial BMP, and adjust therapy as needed  - IV fluids started, will let it complete today and monitor renal function.  - Renal imaging without obstructive pathology.   - Close OP nephrology follow up.   - Adjust home Lasix dose and hold Aldactone/Metolazone. Other renally excreted meds adjusted/discontionued.     Vaginal bleeding  CT noted to have enlarged and leiomyomatus uterus.  Pelvic ultrasound with thickened endometrium for age noting heterogeneous material in the endometrial canal concerning for blood products.  Hgb stable at 10, vitals stable currently.  Hold Xarelto.   Gyn consulted; case discussed with Dr. Up (OB Hospitalist), recommend endometrial biopsy as outpatient given hemodynamic stability.  Appointment with Dr. Bermudez made for today for endometrial biopsy (4/28/25) at 3:30 PM.    Microscopic hematuria  Likely from vaginal bleeding.  CT renal  "stone without obstructive pathology/stones or hydronephrosis.   No symptoms of UTI, discontinued antibiotics.     History of CVA (cerebrovascular accident)  No residual deficits.    Coronary atherosclerosis  Patient with known CAD s/p stent placement, which is controlled Will continue  hold AC/AP agents for now  and monitor for S/Sx of angina/ACS. Continue to monitor on telemetry.     Dyslipidemia  Resume home statin.     Obstructive sleep apnea  CPAP QHS.     Type 2 diabetes mellitus, without long-term current use of insulin  Patient's FSGs are controlled on current medication regimen.  Last A1c reviewed-   Lab Results   Component Value Date    HGBA1C 6.2 08/23/2024     Most recent fingerstick glucose reviewed- No results for input(s): "POCTGLUCOSE" in the last 24 hours.  Current correctional scale  Low  Maintain anti-hyperglycemic dose as follows-   Antihyperglycemics (From admission, onward)      Start     Stop Route Frequency Ordered    04/26/25 1545  insulin aspart U-100 injection 0-5 Units         -- SubQ Before meals & nightly PRN 04/26/25 1447          Hold Oral hypoglycemics while patient is in the hospital.    Essential hypertension  Patient's blood pressure range in the last 24 hours was: BP  Min: 111/69  Max: 139/74.The patient's inpatient anti-hypertensive regimen is listed below:  Current Antihypertensives  amLODIPine tablet 10 mg, Daily, Oral  hydrALAZINE tablet 50 mg, 2 times daily, Oral  isosorbide mononitrate 24 hr tablet 30 mg, Daily, Oral  metoprolol tartrate (LOPRESSOR) tablet 50 mg, 2 times daily, Oral  furosemide (LASIX) tablet, Every morning, Oral    Plan  - BP is controlled, no changes needed to their regimen    Chronic atrial fibrillation  Patient has persistent (7 days or more) atrial fibrillation. Patient is currently in atrial fibrillation. [id number] Score: 5. The patients heart rate in the last 24 hours is as follows:  Pulse  Min: 60  Max: 63     Antiarrhythmics  metoprolol tartrate " "(LOPRESSOR) tablet 50 mg, 2 times daily, Oral    Anticoagulants       Plan  - Replete lytes with a goal of K>4, Mg >2  - Patient is not anticoagulated due to vaginal bleeding.  - Patient's afib is currently controlled.   - Hold Xarelto given vaginal bleeding.     Peripheral vascular disease  Follows with Dr. Guillermo.     Recurrent pleural effusion  Follows with Dr. Rocha.   S/p thoracentesis x 2 with transudate effusion, possibly from CHF or hepatic hydrothorax.   Currently has small right effusion and she has no symptoms of dyspnea, chest pain, oxygenation is stable on room air.     Chronic heart failure with preserved ejection fraction  Patient has Combined Systolic and Diastolic heart failure that is Chronic. On presentation their CHF was well compensated. Most recent BNP and echo results are listed below.  No results for input(s): "BNP" in the last 72 hours.  Latest ECHO  Results for orders placed during the hospital encounter of 04/04/25    Echo Saline Bubble? No    Interpretation Summary    Left Ventricle: The left ventricle is mildly dilated. Increased ventricular mass. Normal wall thickness. There is eccentric hypertrophy. Septal motion is consistent with pacing. There is moderately reduced systolic function with a visually estimated ejection fraction of 30 - 40%. Quantitated ejection fraction is 35%. There is diastolic dysfunction.    Right Ventricle: The right ventricle has mild enlargement. Systolic function is moderately reduced. Pacemaker lead present in the ventricle.    Left Atrium: Severely dilated    Right Atrium: Right atrium is moderately dilated.    Mitral Valve: There is at least moderate regurgitation with an eccentrically posterolateral directed jet. MR jet is only well-visualized on parasternal long axis views, remaining views w/ supoptimal assessment of MR as MR jet is not adequately captured.    Tricuspid Valve: There is severe regurgitation.    Pulmonic Valve: There is moderate to severe " regurgitation.    Pulmonary Artery: The estimated pulmonary artery systolic pressure is 39 mmHg.    IVC/SVC: Intermediate venous pressure at 8 mmHg.    Pericardium: Right pleural effusion.    Compared to prior ECHO from 11/204 (which was also personally reviewed): LV size and LVEF are unchanged. LV remains mildy dilated with a moderately reduced LVEF. Improvement in RA and RV size noted on current study (previously severely dilated). MR is better visualized on previous study and at least moderate MR previously- appears largely unchanged however, there are technical limitations with MR assessment on current study as detailed above.    Current Heart Failure Medications  hydrALAZINE tablet 50 mg, 2 times daily, Oral  furosemide (LASIX) tablet, Every morning, Oral    Plan  - Monitor strict I&Os and daily weights.    - Place on telemetry  - Low sodium diet  - Place on fluid restriction of 2 L.   - Cardiology has not been consulted  - The patient's volume status is at their baseline  - GDMT resumption as able other than nephrotoxic drugs.     Presence of cardiac pacemaker  Noted.     Other ascites  Evaluation underway.  Recently had paracentesis on 4/7/25- SAAG > 1.1 suggestive of portal HTN however liver imaging without evidence of cirrhosis.  Continue outpatient follow up, abdominal MRI planned per GI.   Will workup for Gyn related issues as above.     Microcytic anemia  Anemia is likely due to Iron deficiency plus in the setting of vaginal bleeding. Most recent hemoglobin and hematocrit are listed below.  Recent Labs     04/26/25  1134 04/27/25  0648 04/28/25  0431   HGB 10.6* 9.8* 9.9*   HCT 33.3* 31.0* 32.7*     Plan  - Monitor serial CBC: Daily  - Transfuse PRBC if patient becomes hemodynamically unstable, symptomatic or H/H drops below 7/21.  - Patient has not received any PRBC transfusions to date  - Patient's anemia is currently stable  - s/p IV iron.     Final Active Diagnoses:    Diagnosis Date Noted POA     PRINCIPAL PROBLEM:  Acute kidney injury superimposed on stage 4 chronic kidney disease [N17.9, N18.4] 04/26/2025 Yes    Vaginal bleeding [N93.9] 04/26/2025 Yes    Microscopic hematuria [R31.29] 04/26/2025 Yes    Microcytic anemia [D50.9] 04/27/2025 Yes    Other ascites [R18.8] 04/09/2025 Yes    Presence of cardiac pacemaker [Z95.0] 01/21/2025 Yes    Chronic heart failure with preserved ejection fraction [I50.32] 01/20/2025 Yes    Peripheral vascular disease [I73.9] 01/19/2025 Yes    Recurrent pleural effusion [J90] 01/19/2025 Yes    Chronic atrial fibrillation [I48.20] 09/08/2022 Yes    Dyslipidemia [E78.5] 06/14/2022 Yes    Essential hypertension [I10] 06/14/2022 Yes    Obstructive sleep apnea [G47.33] 06/14/2022 Yes    Type 2 diabetes mellitus, without long-term current use of insulin [E11.9] 06/14/2022 Yes    Coronary atherosclerosis [I25.10]  Yes    History of CVA (cerebrovascular accident) [Z86.73]  Not Applicable      Problems Resolved During this Admission:       Discharged Condition: fair    Disposition: Home or Self Care    Follow Up:   Follow-up Information       Janice Velez FNP. Schedule an appointment as soon as possible for a visit in 1 week(s).    Specialties: Family Medicine, Emergency Medicine  Contact information:  [address]  [facility] [address]  [phone]               Al Bermudez, DO. Go today.    Specialty: Obstetrics and Gynecology  Why: hospital follow up  Contact information:  [address] [facility] [address]  [phone]               Loyd Dutta MD. Schedule an appointment as soon as possible for a visit in 2 week(s).    Specialty: Nephrology  Contact information:  [address] [facility]  [facility] [address]  [phone]                           Patient Instructions:      Basic Metabolic Panel   Standing Status: Future Standing Exp. Date: 07/27/26     Order Specific Question Answer Comments   Send normal result to authorizing  provider's In Basket if patient is active on MyChart: Yes        Significant Diagnostic Studies: Labs: BMP:   Recent Labs   Lab 04/27/25  0648 04/28/25  0431   * 142*   * 135*   K 4.4 4.2   CL 93* 97*   CO2 27 23   BUN 72* 72*   CREATININE 3.61* 3.79*   CALCIUM 9.1 9.1   MG 2.7* 2.9*    and CBC   Recent Labs   Lab 04/27/25  0648 04/28/25  0431   WBC 6.09 7.01   HGB 9.8* 9.9*   HCT 31.0* 32.7*    190       Pending Diagnostic Studies:       Procedure Component Value Units Date/Time    Hemoglobin A1c if not done in past 3 months [[id number]]     Order Status: Sent Lab Status: No result     Specimen: Blood            Medications:  Reconciled Home Medications:      Medication List        CHANGE how you take these medications      colchicine 0.6 mg tablet  Commonly known as: COLCRYS  Take 1 tablet (0.6 mg total) by mouth every other day for gout.  What changed:   when to take this  additional instructions     furosemide 80 MG tablet  Commonly known as: LASIX  Take 0.5 tablets (40 mg total) by mouth every morning.  What changed: how much to take            CONTINUE taking these medications      amLODIPine 10 MG tablet  Commonly known as: NORVASC  Take 1 tablet (10 mg total) by mouth once daily.     cholecalciferol (vitamin D3) 50 mcg (2,000 unit) Cap capsule  Commonly known as: VITAMIN D3  Take 1 capsule by mouth once daily.     FARXIGA 10 mg tablet  Generic drug: dapagliflozin propanediol  Take 1 tablet (10 mg total) by mouth once daily.     fluticasone-salmeterol 250-50 mcg/dose 250-50 mcg/dose diskus inhaler  Commonly known as: ADVAIR  Inhale 1 puff into the lungs 2 (two) times daily. Controller Inhaler     hydrALAZINE 50 MG tablet  Commonly known as: APRESOLINE  Take 1 tablet (50 mg total) by mouth 2 (two) times a day.     isosorbide mononitrate 30 MG 24 hr tablet  Commonly known as: IMDUR  Take 1 tablet (30 mg total) by mouth once daily.     lovastatin 20 MG tablet  Commonly known as: MEVACOR  Take  1 tablet (20 mg total) by mouth every night     metoprolol tartrate 50 MG tablet  Commonly known as: LOPRESSOR  Take 1 tablet (50 mg total) by mouth 2 (two) times a day.     omeprazole 20 MG capsule  Commonly known as: PRILOSEC  Take 1 capsule (20 mg total) by mouth once daily.     OZEMPIC 1 mg/dose (4 mg/3 mL)  Generic drug: semaglutide  Inject 1 mg into the skin every 7 days.     potassium chloride 10 MEQ Cpsr  Commonly known as: MICRO-K  Take 1 capsule (10 mEq total) by mouth once daily.     SYMBICORT 160-4.5 mcg/actuation Hfaa  Generic drug: budesonide-formoterol 160-4.5 mcg  Inhale 2 puffs into the lungs every 12 (twelve) hours. Controller Inhaler     VENTOLIN HFA 90 mcg/actuation inhaler  Generic drug: albuterol  Inhale 2 puffs into the lungs every 6 (six) hours as needed for Wheezing. Rescue Inhaler            STOP taking these medications      alendronate 70 MG tablet  Commonly known as: FOSAMAX     aspirin 81 MG EC tablet  Commonly known as: ECOTRIN     glimepiride 4 MG tablet  Commonly known as: AMARYL     metOLazone 5 MG tablet  Commonly known as: ZAROXOLYN     spironolactone 25 MG tablet  Commonly known as: ALDACTONE     XARELTO 15 mg Tab  Generic drug: rivaroxaban              Indwelling Lines/Drains at time of discharge:   Lines/Drains/Airways       None                   Time spent on the discharge of patient: 41 minutes         BARB PELAYO MD  Department of Hospital Medicine  [facility]

## 2025-04-28 NOTE — ASSESSMENT & PLAN NOTE
Patient's blood pressure range in the last 24 hours was: BP  Min: 111/69  Max: 139/74.The patient's inpatient anti-hypertensive regimen is listed below:  Current Antihypertensives  amLODIPine tablet 10 mg, Daily, Oral  hydrALAZINE tablet 50 mg, 2 times daily, Oral  isosorbide mononitrate 24 hr tablet 30 mg, Daily, Oral  metoprolol tartrate (LOPRESSOR) tablet 50 mg, 2 times daily, Oral  furosemide (LASIX) tablet, Every morning, Oral    Plan  - BP is controlled, no changes needed to their regimen

## 2025-04-28 NOTE — ASSESSMENT & PLAN NOTE
Patient has persistent (7 days or more) atrial fibrillation. Patient is currently in atrial fibrillation. [id number] Score: 5. The patients heart rate in the last 24 hours is as follows:  Pulse  Min: 60  Max: 63     Antiarrhythmics  metoprolol tartrate (LOPRESSOR) tablet 50 mg, 2 times daily, Oral    Anticoagulants       Plan  - Replete lytes with a goal of K>4, Mg >2  - Patient is not anticoagulated due to vaginal bleeding.  - Patient's afib is currently controlled.   - Hold Xarelto given vaginal bleeding.

## 2025-04-28 NOTE — PROGRESS NOTES
[facility]  Wound Care    Patient Name:  Pam Ortega   MRN:  [id number]  Date: 4/28/2025  Diagnosis: Acute kidney injury superimposed on stage 4 chronic kidney disease    History:     Past Medical History:   Diagnosis Date    Anemia of chronic disease     Atrial fibrillation     Benign hypertensive CKD, stage 1-4 or unspecified chronic kidney disease     Bronchitis 12/22/2023    Carotid artery occlusion     CKD (chronic kidney disease)     Coronary atherosclerosis     Cough 12/22/2023    COVID-19 ruled out 03/20/2024    Diabetes mellitus, type 2     DJD (degenerative joint disease)     History of CVA (cerebrovascular accident)     HTN (hypertension)     Hyperlipidemia     Nonischemic dilated cardiomyopathy     Obesity     Osteopenia     Paroxysmal atrial fibrillation     Presence of cardiac pacemaker     PVD (peripheral vascular disease)     Upper respiratory tract infection 12/22/2023    Vitamin D deficiency        Social History[1]    Precautions:     Allergies as of 04/26/2025 - Reviewed 04/26/2025   Allergen Reaction Noted    Ace inhibitors Edema 04/05/2021    Losartan Swelling 04/05/2021       WOC Assessment Details/Treatment     Narrative: Seen patient for initial preventative skin care measures.  Patient ambulates with cane.  No foam borders, redness, or open wounds noted to bilateral heels and sacral.  Consult wound care of any findings.      04/28/2025        [1]   Social History  Socioeconomic History    Marital status:    Tobacco Use    Smoking status: Never     Passive exposure: Never    Smokeless tobacco: Never   Substance and Sexual Activity    Alcohol use: Not Currently    Drug use: Never    Sexual activity: Not Currently     Social Drivers of Health     Financial Resource Strain: Low Risk  (4/27/2025)    Overall Financial Resource Strain (CARDIA)     Difficulty of Paying Living Expenses: Not very hard   Food Insecurity: No Food Insecurity (4/27/2025)    Hunger  Vital Sign     Worried About Running Out of Food in the Last Year: Never true     Ran Out of Food in the Last Year: Never true   Transportation Needs: No Transportation Needs (4/27/2025)    PRAPARE - Transportation     Lack of Transportation (Medical): No     Lack of Transportation (Non-Medical): No   Physical Activity: Inactive (4/27/2025)    Exercise Vital Sign     Days of Exercise per Week: 0 days     Minutes of Exercise per Session: 0 min   Stress: No Stress Concern Present (4/27/2025)    [employer] [employer] - Occupational Stress Questionnaire     Feeling of Stress : Not at all   Housing Stability: Low Risk  (4/27/2025)    Housing Stability Vital Sign     Unable to Pay for Housing in the Last Year: No     Number of Times Moved in the Last Year: 0     Homeless in the Last Year: No

## 2025-04-28 NOTE — ASSESSMENT & PLAN NOTE
JONAS is likely due to pre-renal azotemia due to intravascular volume depletion. Baseline creatinine is ~ 2.5. Most recent creatinine and eGFR are listed below.  Recent Labs     04/26/25  1134 04/27/25  0648 04/28/25  0431   CREATININE 3.93* 3.61* 3.79*   EGFRNORACEVR 11* 12* 12*      Plan  - JONAS is stable.   - Avoid nephrotoxins and renally dose meds for GFR listed above  - Monitor urine output, serial BMP, and adjust therapy as needed  - IV fluids started, will let it complete today and monitor renal function.  - Renal imaging without obstructive pathology.   - Close OP nephrology follow up.   - Adjust home Lasix dose and hold Aldactone/Metolazone. Other renally excreted meds adjusted/discontionued.

## 2025-04-28 NOTE — PROGRESS NOTES
Gynecology History and Physical    Assessment/Plan:   Problem List Items Addressed This Visit    None  Visit Diagnoses         Post-menopausal bleeding    -  Primary    Relevant Orders    Surgical Pathology    Endometrial biopsy              CC:   Chief Complaint   Patient presents with    Procedure     Pt in for a procedure and c/o vaginal bleeding. Pt stated it started Saturday      HPI: Pam Ortega is a 79 y.o. female who presents with complaints of postmenopausal bleeding. Reports bleeding started Saturday, passing clots. States bleeding has slowly improved but unresolved. She takes blood thinners. She was admitted to the hospital yesterday for bleeding, discharged today.    Last Pap smear .      Review of Systems: The following ROS was otherwise negative, except as noted in the HPI:  constitutional, HEENT, respiratory, cardiovascular, gastrointestinal, genitourinary, skin, musculoskeletal, neurological, psych    Gynecologic History:   denies history of abnormal pap smears  denies history of of STIs  Menstrual history:       Obstetrical History:  OB History          7    Para   5    Term   5            AB   2    Living             SAB        IAB        Ectopic        Multiple        Live Births                     Past Medical History:   Past Medical History:   Diagnosis Date    Anemia of chronic disease     Atrial fibrillation     Benign hypertensive CKD, stage 1-4 or unspecified chronic kidney disease     Bronchitis 2023    Carotid artery occlusion     CKD (chronic kidney disease)     Coronary atherosclerosis     Cough 2023    COVID-19 ruled out 2024    Diabetes mellitus, type 2     DJD (degenerative joint disease)     History of CVA (cerebrovascular accident)     HTN (hypertension)     Hyperlipidemia     Nonischemic dilated cardiomyopathy     Obesity     Osteopenia     Paroxysmal atrial fibrillation     Presence of cardiac pacemaker     PVD (peripheral vascular  disease)     Upper respiratory tract infection 12/22/2023    Vitamin D deficiency        Medications:  Medication List with Changes/Refills   Current Medications    ALBUTEROL (VENTOLIN HFA) 90 MCG/ACTUATION INHALER    Inhale 2 puffs into the lungs every 6 (six) hours as needed for Wheezing. Rescue Inhaler    AMLODIPINE (NORVASC) 10 MG TABLET    Take 1 tablet (10 mg total) by mouth once daily.    BUDESONIDE-FORMOTEROL 160-4.5 MCG (SYMBICORT) 160-4.5 MCG/ACTUATION HFAA    Inhale 2 puffs into the lungs every 12 (twelve) hours. Controller Inhaler    CHOLECALCIFEROL, VITAMIN D3, (VITAMIN D3) 50 MCG (2,000 UNIT) CAP CAPSULE    Take 1 capsule by mouth once daily.    COLCHICINE (COLCRYS) 0.6 MG TABLET    Take 1 tablet (0.6 mg total) by mouth every other day for gout.    DAPAGLIFLOZIN PROPANEDIOL (FARXIGA) 10 MG TABLET    Take 1 tablet (10 mg total) by mouth once daily.    FLUTICASONE-SALMETEROL DISKUS INHALER 250-50 MCG    Inhale 1 puff into the lungs 2 (two) times daily. Controller Inhaler    FUROSEMIDE (LASIX) 80 MG TABLET    Take 0.5 tablets (40 mg total) by mouth every morning.    HYDRALAZINE (APRESOLINE) 50 MG TABLET    Take 1 tablet (50 mg total) by mouth 2 (two) times a day.    ISOSORBIDE MONONITRATE (IMDUR) 30 MG 24 HR TABLET    Take 1 tablet (30 mg total) by mouth once daily.    LOVASTATIN (MEVACOR) 20 MG TABLET    Take 1 tablet (20 mg total) by mouth every night    METOPROLOL TARTRATE (LOPRESSOR) 50 MG TABLET    Take 1 tablet (50 mg total) by mouth 2 (two) times a day.    OMEPRAZOLE (PRILOSEC) 20 MG CAPSULE    Take 1 capsule (20 mg total) by mouth once daily.    POTASSIUM CHLORIDE (MICRO-K) 10 MEQ CPSR    Take 1 capsule (10 mEq total) by mouth once daily.    SEMAGLUTIDE (OZEMPIC) 1 MG/DOSE (4 MG/3 ML)    Inject 1 mg into the skin every 7 days.       Allergies:  Ace inhibitors and Losartan    Surgical History:  Past Surgical History:   Procedure Laterality Date    BREAST BIOPSY      COLONOSCOPY  12/04/2018     repeat in 5 years    EMBOLECTOMY OR THROMBECTOMY, ARTERY, AORTOILIAC, FEMORAL, OR POPLITEAL Right 11/17/2024    Procedure: EMBOLECTOMY OR THROMBECTOMY, ARTERY, AORTOILIAC, FEMORAL, OR POPLITEAL;  Surgeon: Jordana Guillermo MD;  Location: [facility];  Service: Vascular;  Laterality: Right;    pace maker      VAGINAL DELIVERY      x 3       Family History:  Family History   Problem Relation Name Age of Onset    Hypertension Mother      Hypertension Father      Hypertension Sister      Stroke Sister      Cancer Brother      Stroke Brother      Hypertension Brother      Alzheimer's disease Maternal Grandmother      Breast cancer Maternal Grandmother      Hypertension Maternal Grandmother      Hyperlipidemia Daughter      Breast cancer Daughter      Diabetes Daughter      Hypertension Daughter      Diabetes Daughter      Hypertension Daughter      Hypertension Daughter      Clotting disorder Daughter         Social History:  Social History     Substance and Sexual Activity   Alcohol Use Not Currently     Social History     Substance and Sexual Activity   Drug Use Never     Tobacco Use History[1]    Physical Exam:  /60   Pulse 61   Wt 81.2 kg (179 lb)   BMI 25.68 kg/m²     General: Alert, well appearing, no acute distress  Head: Normocephalic, atraumatic  Lungs: Unlabored respirations  Abdomen: Soft, nontender, nondistended   Pelvic:   External: normal female genitalia, no masses or lesions   Vagina: moist, pink mucosa with rugae, dark red blood present in vault  Cervix: no masses or lesions, nontender  Chaperone present  Extremities: No redness or tenderness  Skin: Well perfused, normal coloration and turgor, no lesions or rashes visualized  Neuro: Alert, oriented, normal speech, no focal deficits, moves extremities appropriately  Osteopathic: No TART changes    Labs:  Admission on 04/26/2025, Discharged on 04/28/2025   Component Date Value    Sodium 04/26/2025 131 (L)     Potassium 04/26/2025 4.9     Chloride  04/26/2025 90 (L)     CO2 04/26/2025 28     Anion Gap 04/26/2025 18 (H)     Glucose 04/26/2025 194 (H)     BUN 04/26/2025 75 (H)     Creatinine 04/26/2025 3.93 (H)     BUN/Creatinine Ratio 04/26/2025 19     Calcium 04/26/2025 10.0     Total Protein 04/26/2025 8.7 (H)     Albumin 04/26/2025 3.8     Globulin 04/26/2025 4.9 (H)     A/G Ratio 04/26/2025 0.8     Bilirubin, Total 04/26/2025 3.1 (H)     Alk Phos 04/26/2025 88     ALT 04/26/2025 8     AST 04/26/2025 30     eGFR 04/26/2025 11 (L)     Specimen Outdate 04/26/2025 04/29/2025 23:59     Group & Rh 04/26/2025 O NEG     Indirect Melvin 04/26/2025 NEG     Color, UA 04/26/2025 Light Yellow     Clarity, UA 04/26/2025 Clear     pH, UA 04/26/2025 7.5     Leukocytes, UA 04/26/2025 Negative     Nitrites, UA 04/26/2025 Negative     Protein, UA 04/26/2025 Negative     Glucose, UA 04/26/2025 300 (A)     Ketones, UA 04/26/2025 Negative     Urobilinogen, UA 04/26/2025 4 (A)     Bilirubin, UA 04/26/2025 Negative     Blood, UA 04/26/2025 Large (A)     Specific Diggs, UA 04/26/2025 1.008     Fecal Occult Blood 04/26/2025 Negative     WBC 04/26/2025 6.92     RBC 04/26/2025 4.44     Hemoglobin 04/26/2025 10.6 (L)     Hematocrit 04/26/2025 33.3 (L)     MCV 04/26/2025 75.0 (L)     MCH 04/26/2025 23.9 (L)     MCHC 04/26/2025 31.8 (L)     RDW 04/26/2025 22.5 (H)     Platelet Count 04/26/2025 192     MPV 04/26/2025 10.5     Neutrophils % 04/26/2025 55.0     Lymphocytes % 04/26/2025 30.9     Monocytes % 04/26/2025 12.7 (H)     Eosinophils % 04/26/2025 0.6 (L)     Basophils % 04/26/2025 0.4     Immature Granulocytes % 04/26/2025 0.4     nRBC, Auto 04/26/2025 0.0     Neutrophils, Abs 04/26/2025 3.80     Lymphocytes, Absolute 04/26/2025 2.14     Monocytes, Absolute 04/26/2025 0.88 (H)     Eosinophils, Absolute 04/26/2025 0.04     Basophils, Absolute 04/26/2025 0.03     Immature Granulocytes, A* 04/26/2025 0.03     nRBC, Absolute 04/26/2025 0.00     Diff Type 04/26/2025 Scan Smear     WBC,  UA 04/26/2025 6 (H)     RBC, UA 04/26/2025 >182 (H)     Bacteria, UA 04/26/2025 Occasional (A)     Squamous Epithelial Cell* 04/26/2025 Occasional (A)     Platelet Morphology 04/26/2025 Few Large Platelets (A)     Anisocytosis 04/26/2025 1+     Poikilocytosis 04/26/2025 Few     Target Cells 04/26/2025 Few     Crenated Cells 04/26/2025 Few     Ovalocytes 04/26/2025 Few     Polychromasia 04/26/2025 Few     CK 04/26/2025 35     POC Glucose 04/26/2025 233 (H)     Sodium 04/27/2025 133 (L)     Potassium 04/27/2025 4.4     Chloride 04/27/2025 93 (L)     CO2 04/27/2025 27     Anion Gap 04/27/2025 17 (H)     Glucose 04/27/2025 154 (H)     BUN 04/27/2025 72 (H)     Creatinine 04/27/2025 3.61 (H)     BUN/Creatinine Ratio 04/27/2025 20     Calcium 04/27/2025 9.1     Total Protein 04/27/2025 7.6     Albumin 04/27/2025 3.3 (L)     Globulin 04/27/2025 4.3 (H)     A/G Ratio 04/27/2025 0.8     Bilirubin, Total 04/27/2025 2.5 (H)     Alk Phos 04/27/2025 72     ALT 04/27/2025 7     AST 04/27/2025 21     eGFR 04/27/2025 12 (L)     Magnesium 04/27/2025 2.7 (H)     WBC 04/27/2025 6.09     RBC 04/27/2025 4.12 (L)     Hemoglobin 04/27/2025 9.8 (L)     Hematocrit 04/27/2025 31.0 (L)     MCV 04/27/2025 75.2 (L)     MCH 04/27/2025 23.8 (L)     MCHC 04/27/2025 31.6 (L)     RDW 04/27/2025 22.1 (H)     Platelet Count 04/27/2025 179     MPV 04/27/2025 10.7     Neutrophils % 04/27/2025 58.1     Lymphocytes % 04/27/2025 27.9     Monocytes % 04/27/2025 12.0 (H)     Eosinophils % 04/27/2025 1.0     Basophils % 04/27/2025 0.5     Immature Granulocytes % 04/27/2025 0.5 (H)     nRBC, Auto 04/27/2025 0.0     Neutrophils, Abs 04/27/2025 3.54     Lymphocytes, Absolute 04/27/2025 1.70     Monocytes, Absolute 04/27/2025 0.73     Eosinophils, Absolute 04/27/2025 0.06     Basophils, Absolute 04/27/2025 0.03     Immature Granulocytes, A* 04/27/2025 0.03     nRBC, Absolute 04/27/2025 0.00     Diff Type 04/27/2025 Scan Smear     Platelet Morphology 04/27/2025  Few Large Platelets (A)     Anisocytosis 04/27/2025 1+     Microcytosis 04/27/2025 Few     Poikilocytosis 04/27/2025 Few     Target Cells 04/27/2025 1+     Ovalocytes 04/27/2025 Few     Polychromasia 04/27/2025 Few     Hypochromic 04/27/2025 Few     Schistocytes 04/27/2025 Few     POC Glucose 04/27/2025 182 (H)     POC Glucose 04/27/2025 240 (H)     POC Glucose 04/27/2025 233 (H)     POC Glucose 04/27/2025 294 (H)     Sodium 04/28/2025 135 (L)     Potassium 04/28/2025 4.2     Chloride 04/28/2025 97 (L)     CO2 04/28/2025 23     Anion Gap 04/28/2025 19 (H)     Glucose 04/28/2025 142 (H)     BUN 04/28/2025 72 (H)     Creatinine 04/28/2025 3.79 (H)     BUN/Creatinine Ratio 04/28/2025 19     Calcium 04/28/2025 9.1     Total Protein 04/28/2025 7.8 (H)     Albumin 04/28/2025 3.5     Globulin 04/28/2025 4.3 (H)     A/G Ratio 04/28/2025 0.8     Bilirubin, Total 04/28/2025 2.4 (H)     Alk Phos 04/28/2025 77     ALT 04/28/2025 7     AST 04/28/2025 21     eGFR 04/28/2025 12 (L)     Magnesium 04/28/2025 2.9 (H)     WBC 04/28/2025 7.01     RBC 04/28/2025 4.28     Hemoglobin 04/28/2025 9.9 (L)     Hematocrit 04/28/2025 32.7 (L)     MCV 04/28/2025 76.4 (L)     MCH 04/28/2025 23.1 (L)     MCHC 04/28/2025 30.3 (L)     RDW 04/28/2025 22.9 (H)     Platelet Count 04/28/2025 190     MPV 04/28/2025 10.5     Neutrophils % 04/28/2025 62.7     Lymphocytes % 04/28/2025 23.3 (L)     Monocytes % 04/28/2025 12.7 (H)     Eosinophils % 04/28/2025 0.7 (L)     Basophils % 04/28/2025 0.3     Immature Granulocytes % 04/28/2025 0.3     nRBC, Auto 04/28/2025 0.3 (H)     Neutrophils, Abs 04/28/2025 4.40     Lymphocytes, Absolute 04/28/2025 1.63     Monocytes, Absolute 04/28/2025 0.89 (H)     Eosinophils, Absolute 04/28/2025 0.05     Basophils, Absolute 04/28/2025 0.02     Immature Granulocytes, A* 04/28/2025 0.02     nRBC, Absolute 04/28/2025 0.02 (H)     Diff Type 04/28/2025 Scan Smear     Platelet Morphology 04/28/2025 Few Large Platelets (A)      Anisocytosis 04/28/2025 1+     Poikilocytosis 04/28/2025 1+     Target Cells 04/28/2025 Few     Ovalocytes 04/28/2025 Few     Polychromasia 04/28/2025 Few     Hypochromic 04/28/2025 Few     Schistocytes 04/28/2025 Few     POC Glucose 04/28/2025 160 (H)     POC Glucose 04/28/2025 177 (H)      US reviewed  EMB performed  RTC on Thursday to discuss results       [1]   Social History  Tobacco Use   Smoking Status Never    Passive exposure: Never   Smokeless Tobacco Never

## 2025-04-28 NOTE — ASSESSMENT & PLAN NOTE
Likely from vaginal bleeding.  CT renal stone without obstructive pathology/stones or hydronephrosis.   No symptoms of UTI, discontinued antibiotics.

## 2025-04-28 NOTE — ASSESSMENT & PLAN NOTE
CT noted to have enlarged and leiomyomatus uterus.  Pelvic ultrasound with thickened endometrium for age noting heterogeneous material in the endometrial canal concerning for blood products.  Hgb stable at 10, vitals stable currently.  Hold Xarelto.   Gyn consulted; case discussed with Dr. Up (OB Hospitalist), recommend endometrial biopsy as outpatient given hemodynamic stability.  Appointment with Dr. Bermudez made for today for endometrial biopsy (4/28/25) at 3:30 PM.

## 2025-04-28 NOTE — PROCEDURES
Endometrial biopsy    Date/Time: 4/28/2025 3:30 PM    Performed by: Al Bermudez DO  Authorized by: Al Bermudez DO    Consent:     Prior to procedure the appropriate consent was completed and verified      Consent given by:  Patient    Patient questions answered: yes      Patient agrees, verbalizes understanding, and wants to proceed: yes    Indication:     Indications: Post-menopausal bleeding      Chronicity of post-menopausal bleeding:  New    Progression of post-menopausal bleeding:  Improving  Pre-procedure:     Pre-procedure timeout performed: yes    Procedure:     Procedure: endometrial biopsy with Pipelle      Cervix cleaned and prepped: yes      A paracervical block was performed: no      An intracervical block was performed: no      The cervix was dilated using cervical dilators: yes      Use of single-tooth tenaculum: no      Uterus sounded: yes      Uterus sound depth (cm):  8    Specimen collected: specimen collected and sent to pathology      Patient tolerated procedure well with no complications: yes

## 2025-04-28 NOTE — ASSESSMENT & PLAN NOTE
Anemia is likely due to Iron deficiency plus in the setting of vaginal bleeding. Most recent hemoglobin and hematocrit are listed below.  Recent Labs     04/26/25  1134 04/27/25  0648 04/28/25  0431   HGB 10.6* 9.8* 9.9*   HCT 33.3* 31.0* 32.7*     Plan  - Monitor serial CBC: Daily  - Transfuse PRBC if patient becomes hemodynamically unstable, symptomatic or H/H drops below 7/21.  - Patient has not received any PRBC transfusions to date  - Patient's anemia is currently stable  - s/p IV iron.

## 2025-04-28 NOTE — ASSESSMENT & PLAN NOTE
"Patient has Combined Systolic and Diastolic heart failure that is Chronic. On presentation their CHF was well compensated. Most recent BNP and echo results are listed below.  No results for input(s): "BNP" in the last 72 hours.  Latest ECHO  Results for orders placed during the hospital encounter of 04/04/25    Echo Saline Bubble? No    Interpretation Summary    Left Ventricle: The left ventricle is mildly dilated. Increased ventricular mass. Normal wall thickness. There is eccentric hypertrophy. Septal motion is consistent with pacing. There is moderately reduced systolic function with a visually estimated ejection fraction of 30 - 40%. Quantitated ejection fraction is 35%. There is diastolic dysfunction.    Right Ventricle: The right ventricle has mild enlargement. Systolic function is moderately reduced. Pacemaker lead present in the ventricle.    Left Atrium: Severely dilated    Right Atrium: Right atrium is moderately dilated.    Mitral Valve: There is at least moderate regurgitation with an eccentrically posterolateral directed jet. MR jet is only well-visualized on parasternal long axis views, remaining views w/ supoptimal assessment of MR as MR jet is not adequately captured.    Tricuspid Valve: There is severe regurgitation.    Pulmonic Valve: There is moderate to severe regurgitation.    Pulmonary Artery: The estimated pulmonary artery systolic pressure is 39 mmHg.    IVC/SVC: Intermediate venous pressure at 8 mmHg.    Pericardium: Right pleural effusion.    Compared to prior ECHO from 11/204 (which was also personally reviewed): LV size and LVEF are unchanged. LV remains mildy dilated with a moderately reduced LVEF. Improvement in RA and RV size noted on current study (previously severely dilated). MR is better visualized on previous study and at least moderate MR previously- appears largely unchanged however, there are technical limitations with MR assessment on current study as detailed " above.    Current Heart Failure Medications  hydrALAZINE tablet 50 mg, 2 times daily, Oral  furosemide (LASIX) tablet, Every morning, Oral    Plan  - Monitor strict I&Os and daily weights.    - Place on telemetry  - Low sodium diet  - Place on fluid restriction of 2 L.   - Cardiology has not been consulted  - The patient's volume status is at their baseline  - GDMT resumption as able other than nephrotoxic drugs.

## 2025-04-28 NOTE — PLAN OF CARE
Problem: Adult Inpatient Plan of Care  Goal: Plan of Care Review  Outcome: Progressing  Goal: Patient-Specific Goal (Individualized)  Outcome: Progressing  Goal: Absence of Hospital-Acquired Illness or Injury  Outcome: Progressing  Goal: Optimal Comfort and Wellbeing  Outcome: Progressing  Goal: Readiness for Transition of Care  Outcome: Progressing     Problem: Diabetes Comorbidity  Goal: Blood Glucose Level Within Targeted Range  Outcome: Progressing       Problem: Acute Kidney Injury/Impairment  Goal: Fluid and Electrolyte Balance  Outcome: Progressing  Goal: Improved Oral Intake  Outcome: Progressing  Goal: Effective Renal Function  Outcome: Progressing

## 2025-04-29 ENCOUNTER — PATIENT OUTREACH (OUTPATIENT)
Dept: ADMINISTRATIVE | Facility: CLINIC | Age: 80
End: 2025-04-29
Payer: MEDICARE

## 2025-04-29 NOTE — PLAN OF CARE
[facility]  Discharge Final Note    Primary Care Provider: Janice Velez FNP    Expected Discharge Date: 4/28/2025    Final Discharge Note (most recent)       Final Note - 04/29/25 0831          Final Note    Assessment Type Final Discharge Note     Anticipated Discharge Disposition Home or Self Care        Post-Acute Status    Coverage Medicare     Discharge Delays None known at this time                     Important Message from Medicare  Important Message from Medicare regarding Discharge Appeal Rights: Given to patient/caregiver, Explained to patient/caregiver, Signed/date by patient/caregiver     Date IMM was signed: 04/28/25  Time IMM was signed: 1113    Contact Info       Janice Velez FNP   Specialty: Family Medicine, Emergency Medicine   Relationship: PCP - General    [address]  [place] [id number]   Phone: [phone]       Next Steps: Schedule an appointment as soon as possible for a visit in 1 week(s)    Al Bermudez DO   Specialty: Obstetrics and Gynecology    [address]  [place] [address]   Phone: [phone]       Next Steps: Go today    Instructions: hospital follow up    Loyd Dutta MD   Specialty: Nephrology    [address] [facility] [address]  [facility] [address]   Phone: [phone]       Next Steps: Schedule an appointment as soon as possible for a visit in 2 week(s)    Instructions: Nurse will call patient with appointment        Patient was discharged home to live with her daughter.  She will continue to be followed by [facility].  Documents faxed.  Rollator walker was ordered from [facility] and delivered to patient room .  IM updated.   No further needs noted..

## 2025-04-29 NOTE — PROGRESS NOTES
C3 nurse spoke with daughter Madhavi Ortega for a TCC post hospital discharge follow up call. The patient does not have a scheduled HOSFU appointment with Janice Velez FNP  within 5-7 days post hospital discharge date 4/28/25. C3 nurse was unable to schedule HOSFU appointment in [facility].    Message sent to PCP staff requesting they contact patient and schedule follow up appointment.

## 2025-04-29 NOTE — PROGRESS NOTES
C3 nurse attempted to contact daughter to advise of hospital follow up appointment with Janice Velez FNP on 5/12/25 at 815. No answer, left detail message on daughter's voicemail.

## 2025-04-30 ENCOUNTER — EXTERNAL CHRONIC CARE MANAGEMENT (OUTPATIENT)
Dept: PRIMARY CARE CLINIC | Facility: CLINIC | Age: 80
End: 2025-04-30
Payer: MEDICARE

## 2025-04-30 LAB
DHEA SERPL-MCNC: NORMAL
ESTROGEN SERPL-MCNC: NORMAL PG/ML
INSULIN SERPL-ACNC: NORMAL U[IU]/ML
LAB AP CLINICAL INFORMATION: NORMAL
LAB AP GROSS DESCRIPTION: NORMAL
LAB AP LABORATORY NOTES: NORMAL
T3RU NFR SERPL: NORMAL %

## 2025-04-30 PROCEDURE — 99490 CHRNC CARE MGMT STAFF 1ST 20: CPT | Mod: ,,, | Performed by: NURSE PRACTITIONER

## 2025-05-01 ENCOUNTER — TELEPHONE (OUTPATIENT)
Dept: OBSTETRICS AND GYNECOLOGY | Facility: CLINIC | Age: 80
End: 2025-05-01
Payer: MEDICARE

## 2025-05-01 ENCOUNTER — HOSPITAL ENCOUNTER (OUTPATIENT)
Dept: RADIOLOGY | Facility: HOSPITAL | Age: 80
Discharge: HOME | End: 2025-05-01
Attending: INTERNAL MEDICINE
Payer: MEDICARE

## 2025-05-01 ENCOUNTER — DOCUMENT SCAN (OUTPATIENT)
Dept: HOME HEALTH SERVICES | Facility: HOSPITAL | Age: 80
End: 2025-05-01
Payer: MEDICARE

## 2025-05-01 ENCOUNTER — OFFICE VISIT (OUTPATIENT)
Dept: OBSTETRICS AND GYNECOLOGY | Facility: CLINIC | Age: 80
End: 2025-05-01
Payer: MEDICARE

## 2025-05-01 VITALS
DIASTOLIC BLOOD PRESSURE: 68 MMHG | HEART RATE: 68 BPM | SYSTOLIC BLOOD PRESSURE: 127 MMHG | BODY MASS INDEX: 25.68 KG/M2 | WEIGHT: 179 LBS

## 2025-05-01 DIAGNOSIS — R18.8 OTHER ASCITES: ICD-10-CM

## 2025-05-01 DIAGNOSIS — R63.4 WEIGHT LOSS: ICD-10-CM

## 2025-05-01 DIAGNOSIS — N95.0 POST-MENOPAUSAL BLEEDING: Primary | ICD-10-CM

## 2025-05-01 PROCEDURE — 99213 OFFICE O/P EST LOW 20 MIN: CPT | Mod: S$PBB,,, | Performed by: STUDENT IN AN ORGANIZED HEALTH CARE EDUCATION/TRAINING PROGRAM

## 2025-05-01 PROCEDURE — 99214 OFFICE O/P EST MOD 30 MIN: CPT | Mod: PBBFAC,25 | Performed by: STUDENT IN AN ORGANIZED HEALTH CARE EDUCATION/TRAINING PROGRAM

## 2025-05-01 PROCEDURE — 99999 PR PBB SHADOW E&M-EST. PATIENT-LVL IV: CPT | Mod: PBBFAC,,, | Performed by: STUDENT IN AN ORGANIZED HEALTH CARE EDUCATION/TRAINING PROGRAM

## 2025-05-01 PROCEDURE — 74176 CT ABD & PELVIS W/O CONTRAST: CPT | Mod: TC

## 2025-05-01 NOTE — PROGRESS NOTES
Return Gyn Office Visit    Assessment/Plan  Problem List Items Addressed This Visit    None  Visit Diagnoses         Post-menopausal bleeding    -  Primary              CC:   Chief Complaint   Patient presents with    Follow-up     Pt in for a f/u and denies any problems at the moment        HPI:  79 y.o. who presents to office for EMB results.    Review of Systems - The following ROS was otherwise negative, except as noted in the HPI:  constitutional, respiratory, cardiovascular, gastrointestinal, genitourinary    Objective:  /68   Pulse 68   Wt 81.2 kg (179 lb)   BMI 25.68 kg/m²   General: Alert, well appearing, no acute distress  Abdomen: Soft, nontender, nondistended   Pelvic: deferred   Extremities: No redness or tenderness, neg Kelly's sign  Osteopathic: no TART changes     Reviewed biopsy results with patient, family member  Recommend repeat sampling in 3 mo  From gyn standpoint, can restart anticoagulation  Will defer to cardiology

## 2025-05-05 ENCOUNTER — OFFICE VISIT (OUTPATIENT)
Dept: FAMILY MEDICINE | Facility: CLINIC | Age: 80
End: 2025-05-05
Payer: MEDICARE

## 2025-05-05 VITALS
HEIGHT: 70 IN | DIASTOLIC BLOOD PRESSURE: 80 MMHG | SYSTOLIC BLOOD PRESSURE: 122 MMHG | RESPIRATION RATE: 18 BRPM | HEART RATE: 69 BPM | OXYGEN SATURATION: 98 % | TEMPERATURE: 98 F | WEIGHT: 189.63 LBS | BODY MASS INDEX: 27.15 KG/M2

## 2025-05-05 DIAGNOSIS — I10 HYPERTENSION, UNSPECIFIED TYPE: Primary | ICD-10-CM

## 2025-05-05 DIAGNOSIS — N18.5 CHRONIC KIDNEY DISEASE (CKD), STAGE V: ICD-10-CM

## 2025-05-05 DIAGNOSIS — E11.69 TYPE 2 DIABETES MELLITUS WITH OTHER SPECIFIED COMPLICATION, WITHOUT LONG-TERM CURRENT USE OF INSULIN: ICD-10-CM

## 2025-05-05 DIAGNOSIS — I50.9 CONGESTIVE HEART FAILURE, UNSPECIFIED HF CHRONICITY, UNSPECIFIED HEART FAILURE TYPE: ICD-10-CM

## 2025-05-05 PROBLEM — L03.90 CELLULITIS: Status: RESOLVED | Noted: 2024-12-09 | Resolved: 2025-05-05

## 2025-05-05 PROBLEM — R18.8 OTHER ASCITES: Status: RESOLVED | Noted: 2025-04-09 | Resolved: 2025-05-05

## 2025-05-05 PROBLEM — L98.499: Status: RESOLVED | Noted: 2024-12-30 | Resolved: 2025-05-05

## 2025-05-05 PROBLEM — K59.00 CONSTIPATION: Status: RESOLVED | Noted: 2025-01-24 | Resolved: 2025-05-05

## 2025-05-05 PROBLEM — R05.9 COUGH: Status: RESOLVED | Noted: 2023-12-22 | Resolved: 2025-05-05

## 2025-05-05 PROBLEM — R14.0 ABDOMINAL BLOATING: Status: RESOLVED | Noted: 2025-04-22 | Resolved: 2025-05-05

## 2025-05-05 PROBLEM — R11.2 NAUSEA AND VOMITING: Status: RESOLVED | Noted: 2025-04-22 | Resolved: 2025-05-05

## 2025-05-05 PROBLEM — N93.9 VAGINAL BLEEDING: Status: RESOLVED | Noted: 2025-04-26 | Resolved: 2025-05-05

## 2025-05-05 PROCEDURE — 99213 OFFICE O/P EST LOW 20 MIN: CPT | Mod: ,,, | Performed by: NURSE PRACTITIONER

## 2025-05-05 RX ORDER — ASPIRIN 81 MG/1
81 TABLET ORAL DAILY
COMMUNITY

## 2025-05-05 NOTE — PROGRESS NOTES
Subjective     Patient ID: Pam Ortega is a 79 y.o. female.    Chief Complaint: Transitional Care (Hospital discharge f/u.) and Health Maintenance (Shingles Vaccine(1 of 2)declined/RSV Vaccine (Age 60+ and Pregnant patients)(1 - 1-dose 75+ series) declined/TETANUS VACCINE unknown/Hemoglobin A1c due on 02/23/2025/Diabetic Eye Exam appt scheduled with Dr Trivedi)    Pt presents for a TCC visit. Discharge meds reconciled with home meds. Family is requesting a referral to Dr. Rocha. She has been going to [facility].      Review of Systems   Constitutional:  Negative for activity change, appetite change, chills, fatigue and fever.   HENT:  Negative for nasal congestion, ear discharge, nosebleeds, postnasal drip, rhinorrhea, sinus pressure/congestion, sneezing, sore throat and tinnitus.    Eyes:  Negative for pain, discharge, redness and itching.   Respiratory:  Negative for cough, choking, chest tightness, shortness of breath and wheezing.    Cardiovascular:  Negative for chest pain.   Gastrointestinal:  Negative for abdominal distention, abdominal pain, blood in stool, change in bowel habit, constipation, diarrhea, nausea and vomiting.   Genitourinary:  Negative for decreased urine volume, dysuria, flank pain and frequency.   Musculoskeletal:  Negative for back pain and gait problem.   Integumentary:  Negative for wound, breast mass and breast discharge.   Allergic/Immunologic: Negative for immunocompromised state.   Neurological:  Negative for dizziness, light-headedness and headaches.   Psychiatric/Behavioral:  Negative for agitation, behavioral problems and hallucinations.    Breast: Negative for mass         Objective     Physical Exam  Vitals and nursing note reviewed.   Constitutional:       Appearance: Normal appearance.   Cardiovascular:      Rate and Rhythm: Normal rate and regular rhythm.      Heart sounds: Normal heart sounds.   Pulmonary:      Effort: Pulmonary effort is normal.      Breath sounds: Normal  breath sounds.   Musculoskeletal:         General: Normal range of motion.   Neurological:      Mental Status: She is alert and oriented to person, place, and time.   Psychiatric:         Mood and Affect: Mood normal.         Behavior: Behavior normal.            Assessment and Plan     1. Hypertension, unspecified type  Controlled at 122/80  Low sodium diet      2. Chronic kidney disease (CKD), stage V  Keep follow-up appts with nephrology  Avoid nsaids    3. Congestive heart failure, unspecified HF chronicity, unspecified heart failure type  -     Ambulatory referral/consult to Cardiology; Future; Expected date: 05/12/2025    4. Type 2 diabetes mellitus with other specified complication, without long-term current use of insulin  Low sugar diet  Exercise 3-5 times weekly      Labs at next visit.          Follow up in about 3 months (around 8/5/2025).

## 2025-05-07 ENCOUNTER — TELEPHONE (OUTPATIENT)
Dept: GASTROENTEROLOGY | Facility: CLINIC | Age: 80
End: 2025-05-07
Payer: MEDICARE

## 2025-05-07 ENCOUNTER — RESULTS FOLLOW-UP (OUTPATIENT)
Dept: GASTROENTEROLOGY | Facility: CLINIC | Age: 80
End: 2025-05-07

## 2025-05-07 NOTE — PROGRESS NOTES
The CT findings are consistent with right heart failure.  Other findings include a 4 mm left lower lobe nodule in the lung, colon diverticula, atherosclerosis and a hiatal hernia.  The patient should follow up with her PCP, cardiologist, and GI clinic.

## 2025-05-07 NOTE — TELEPHONE ENCOUNTER
Results and recommendations called to patients daughter. Verbalized understanding.            ----- Message from David Sawant MD sent at 5/7/2025 10:40 AM CDT -----  The CT findings are consistent with right heart failure.  Other findings include a 4 mm left lower lobe nodule in the lung, colon diverticula, atherosclerosis and a hiatal hernia.  The patient should   follow up with her PCP, cardiologist, and GI clinic.  ----- Message -----  From: Interface, Rad Results In  Sent: 5/7/2025   9:07 AM CDT  To: David Sawant MD

## 2025-05-09 NOTE — PROGRESS NOTES
"  Pam Ortega presented for a follow-up Medicare AWV today. The following components were reviewed and updated:    Medical history  Family History  Social history  Allergies and Current Medications  Health Risk Assessment  Health Maintenance  Care Team    **See Completed Assessments for Annual Wellness visit with in the encounter summary    The following assessments were completed:  Depression Screening  Cognitive function Screening  Timed Get Up Test  Whisper Test      Opioid documentation:      Patient does not have a current opioid prescription.          Vitals:    05/12/25 1505   BP: 123/79   BP Location: Left arm   Patient Position: Sitting   Pulse: 81   Resp: 18   SpO2: 98%   Weight: 88.8 kg (195 lb 12.8 oz)   Height: 5' 10" (1.778 m)     Body mass index is 28.09 kg/m².       Physical Exam  Vitals and nursing note reviewed.   Constitutional:       Appearance: Normal appearance.   Cardiovascular:      Rate and Rhythm: Normal rate and regular rhythm.      Heart sounds: Normal heart sounds.   Pulmonary:      Effort: Pulmonary effort is normal.      Breath sounds: Normal breath sounds.   Musculoskeletal:         General: Normal range of motion.   Neurological:      Mental Status: She is alert and oriented to person, place, and time.   Psychiatric:         Mood and Affect: Mood normal.       Diagnoses and health risks identified today and associated recommendations/orders:  1. Encounter for subsequent annual wellness visit (AWV) in Medicare patient  Gave pt next awv appt    2. Essential hypertension  Controlled at 123/79  Low sodium diet    3. Hyperlipidemia, unspecified hyperlipidemia type  Low cholesterol diet  Avoid fried foods    4. Chronic kidney disease (CKD), stage V  Avoid nsaids  Drink plenty of water    5. Congestive heart failure, unspecified HF chronicity, unspecified heart failure type  Follow-up with cardiology     6. Type 2 diabetes mellitus with other specified complication, without long-term " current use of insulin  Low sugar diet  - Microalbumin/Creatinine Ratio, Urine; Future  - Hemoglobin A1C; Future  - CBC Auto Differential; Future  - Comprehensive Metabolic Panel; Future  - TSH; Future  - Lipid Panel; Future  - Microalbumin/Creatinine Ratio, Urine  - Hemoglobin A1C  - CBC Auto Differential  - Comprehensive Metabolic Panel  - TSH  - Lipid Panel    7. Chronic atrial fibrillation  Pulse is 81 today    8. Dependence on other enabling machines and devices  Fall precautions    Health Maintenance Due   Topic Date Due    Shingles Vaccine (1 of 2) Never done    RSV Vaccine (Age 60+ and Pregnant patients) (1 - 1-dose 75+ series) Never done    TETANUS VACCINE  03/06/2023    Hemoglobin A1c  02/23/2025    Diabetic Eye Exam  06/14/2025     Labs ordered at today's visit  Declines vaccines     Provided Pam with a 5-10 year written screening schedule and personal prevention plan. Recommendations were developed using the USPSTF age appropriate recommendations. Education, counseling, and referrals were provided as needed.  After Visit Summary printed and given to patient which includes a list of additional screenings\tests needed.    Follow up for 1 year for Annual Wellness Visit.      CAROL Limon    I offered to discuss advanced care planning, including how to pick a person who would make decisions for you if you were unable to make them for yourself, called a health care power of , and what kind of decisions you might make such as use of life sustaining treatments such as ventilators and tube feeding when faced with a life limiting illness recorded on a living will that they will need to know. (How you want to be cared for as you near the end of your natural life)     X Patient is interested in learning more about how to make advanced directives.  I provided them paperwork and offered to discuss this with them.

## 2025-05-11 ENCOUNTER — PATIENT MESSAGE (OUTPATIENT)
Dept: FAMILY MEDICINE | Facility: CLINIC | Age: 80
End: 2025-05-11
Payer: MEDICARE

## 2025-05-12 ENCOUNTER — OFFICE VISIT (OUTPATIENT)
Dept: FAMILY MEDICINE | Facility: CLINIC | Age: 80
End: 2025-05-12
Payer: MEDICARE

## 2025-05-12 VITALS
OXYGEN SATURATION: 98 % | RESPIRATION RATE: 18 BRPM | DIASTOLIC BLOOD PRESSURE: 79 MMHG | BODY MASS INDEX: 28.03 KG/M2 | HEIGHT: 70 IN | WEIGHT: 195.81 LBS | HEART RATE: 81 BPM | SYSTOLIC BLOOD PRESSURE: 123 MMHG

## 2025-05-12 DIAGNOSIS — E11.69 TYPE 2 DIABETES MELLITUS WITH OTHER SPECIFIED COMPLICATION, WITHOUT LONG-TERM CURRENT USE OF INSULIN: ICD-10-CM

## 2025-05-12 DIAGNOSIS — N18.5 CHRONIC KIDNEY DISEASE (CKD), STAGE V: ICD-10-CM

## 2025-05-12 DIAGNOSIS — Z00.00 ENCOUNTER FOR SUBSEQUENT ANNUAL WELLNESS VISIT (AWV) IN MEDICARE PATIENT: Primary | ICD-10-CM

## 2025-05-12 DIAGNOSIS — I50.9 CONGESTIVE HEART FAILURE, UNSPECIFIED HF CHRONICITY, UNSPECIFIED HEART FAILURE TYPE: ICD-10-CM

## 2025-05-12 DIAGNOSIS — S81.802A OPEN WOUND OF LEFT LOWER LEG, INITIAL ENCOUNTER: Primary | ICD-10-CM

## 2025-05-12 DIAGNOSIS — Z99.89 DEPENDENCE ON OTHER ENABLING MACHINES AND DEVICES: ICD-10-CM

## 2025-05-12 DIAGNOSIS — E78.5 HYPERLIPIDEMIA, UNSPECIFIED HYPERLIPIDEMIA TYPE: ICD-10-CM

## 2025-05-12 DIAGNOSIS — I10 ESSENTIAL HYPERTENSION: ICD-10-CM

## 2025-05-12 DIAGNOSIS — I48.20 CHRONIC ATRIAL FIBRILLATION: ICD-10-CM

## 2025-05-12 LAB
ALBUMIN SERPL BCP-MCNC: 3.5 G/DL (ref 3.4–4.8)
ALBUMIN/GLOB SERPL: 0.9 {RATIO}
ALP SERPL-CCNC: 76 U/L (ref 40–150)
ALT SERPL W P-5'-P-CCNC: 10 U/L
ANION GAP SERPL CALCULATED.3IONS-SCNC: 18 MMOL/L (ref 7–16)
ANISOCYTOSIS BLD QL SMEAR: NORMAL
AST SERPL W P-5'-P-CCNC: 18 U/L (ref 11–45)
BASOPHILS # BLD AUTO: 0.04 K/UL (ref 0–0.2)
BASOPHILS NFR BLD AUTO: 0.7 % (ref 0–1)
BILIRUB SERPL-MCNC: 3 MG/DL
BUN SERPL-MCNC: 55 MG/DL (ref 10–20)
BUN/CREAT SERPL: 16 (ref 6–20)
CALCIUM SERPL-MCNC: 9.3 MG/DL (ref 8.4–10.2)
CHLORIDE SERPL-SCNC: 107 MMOL/L (ref 98–107)
CHOLEST SERPL-MCNC: 55 MG/DL
CHOLEST/HDLC SERPL: 3.1 {RATIO}
CO2 SERPL-SCNC: 20 MMOL/L (ref 23–31)
CREAT SERPL-MCNC: 3.39 MG/DL (ref 0.55–1.02)
CREAT UR-MCNC: 73 MG/DL (ref 15–325)
DIFFERENTIAL METHOD BLD: ABNORMAL
EGFR (NO RACE VARIABLE) (RUSH/TITUS): 13 ML/MIN/1.73M2
EOSINOPHIL # BLD AUTO: 0.1 K/UL (ref 0–0.5)
EOSINOPHIL NFR BLD AUTO: 1.8 % (ref 1–4)
ERYTHROCYTE [DISTWIDTH] IN BLOOD BY AUTOMATED COUNT: 24.2 % (ref 11.5–14.5)
EST. AVERAGE GLUCOSE BLD GHB EST-MCNC: 128 MG/DL
GLOBULIN SER-MCNC: 3.7 G/DL (ref 2–4)
GLUCOSE SERPL-MCNC: 137 MG/DL (ref 82–115)
HBA1C MFR BLD HPLC: 6.1 %
HCT VFR BLD AUTO: 32 % (ref 38–47)
HDLC SERPL-MCNC: 18 MG/DL (ref 35–60)
HGB BLD-MCNC: 9.5 G/DL (ref 12–16)
HYPOCHROMIA BLD QL SMEAR: NORMAL
IMM GRANULOCYTES # BLD AUTO: 0.01 K/UL (ref 0–0.04)
IMM GRANULOCYTES NFR BLD: 0.2 % (ref 0–0.4)
LDLC SERPL CALC-MCNC: 24 MG/DL
LDLC/HDLC SERPL: 1.3 {RATIO}
LYMPHOCYTES # BLD AUTO: 1.7 K/UL (ref 1–4.8)
LYMPHOCYTES NFR BLD AUTO: 30.7 % (ref 27–41)
MCH RBC QN AUTO: 23.8 PG (ref 27–31)
MCHC RBC AUTO-ENTMCNC: 29.7 G/DL (ref 32–36)
MCV RBC AUTO: 80.2 FL (ref 80–96)
MICROALBUMIN UR-MCNC: 6.9 MG/DL
MICROALBUMIN/CREAT RATIO PNL UR: 94.5 MG/G (ref 0–30)
MONOCYTES # BLD AUTO: 0.85 K/UL (ref 0–0.8)
MONOCYTES NFR BLD AUTO: 15.4 % (ref 2–6)
MPC BLD CALC-MCNC: 10.1 FL (ref 9.4–12.4)
NEUTROPHILS # BLD AUTO: 2.83 K/UL (ref 1.8–7.7)
NEUTROPHILS NFR BLD AUTO: 51.2 % (ref 53–65)
NONHDLC SERPL-MCNC: 37 MG/DL
NRBC # BLD AUTO: 0 X10E3/UL
NRBC, AUTO (.00): 0 %
OVALOCYTES BLD QL SMEAR: NORMAL
PLATELET # BLD AUTO: 216 K/UL (ref 150–400)
PLATELET MORPHOLOGY: NORMAL
POTASSIUM SERPL-SCNC: 4.7 MMOL/L (ref 3.5–5.1)
PROT SERPL-MCNC: 7.2 G/DL (ref 5.8–7.6)
RBC # BLD AUTO: 3.99 M/UL (ref 4.2–5.4)
SCHISTOCYTES BLD QL AUTO: NORMAL
SODIUM SERPL-SCNC: 140 MMOL/L (ref 136–145)
TARGETS BLD QL SMEAR: NORMAL
TRIGL SERPL-MCNC: 65 MG/DL (ref 37–140)
TSH SERPL DL<=0.005 MIU/L-ACNC: 3.23 UIU/ML (ref 0.35–4.94)
VLDLC SERPL-MCNC: 13 MG/DL
WBC # BLD AUTO: 5.53 K/UL (ref 4.5–11)

## 2025-05-12 PROCEDURE — 83036 HEMOGLOBIN GLYCOSYLATED A1C: CPT | Mod: ,,, | Performed by: CLINICAL MEDICAL LABORATORY

## 2025-05-12 PROCEDURE — G0439 PPPS, SUBSEQ VISIT: HCPCS | Mod: ,,, | Performed by: NURSE PRACTITIONER

## 2025-05-12 PROCEDURE — 84443 ASSAY THYROID STIM HORMONE: CPT | Mod: ,,, | Performed by: CLINICAL MEDICAL LABORATORY

## 2025-05-12 PROCEDURE — 85025 COMPLETE CBC W/AUTO DIFF WBC: CPT | Mod: ,,, | Performed by: CLINICAL MEDICAL LABORATORY

## 2025-05-12 PROCEDURE — 80053 COMPREHEN METABOLIC PANEL: CPT | Mod: ,,, | Performed by: CLINICAL MEDICAL LABORATORY

## 2025-05-12 PROCEDURE — 82043 UR ALBUMIN QUANTITATIVE: CPT | Mod: ,,, | Performed by: CLINICAL MEDICAL LABORATORY

## 2025-05-12 PROCEDURE — 80061 LIPID PANEL: CPT | Mod: ,,, | Performed by: CLINICAL MEDICAL LABORATORY

## 2025-05-12 PROCEDURE — 82570 ASSAY OF URINE CREATININE: CPT | Mod: ,,, | Performed by: CLINICAL MEDICAL LABORATORY

## 2025-05-12 RX ORDER — CLINDAMYCIN HYDROCHLORIDE 300 MG/1
300 CAPSULE ORAL 3 TIMES DAILY
Qty: 30 CAPSULE | Refills: 0 | Status: SHIPPED | OUTPATIENT
Start: 2025-05-12 | End: 2025-05-22

## 2025-05-12 NOTE — PATIENT INSTRUCTIONS
Counseling and Referral of Other Preventative  (Italic type indicates deductible and co-insurance are waived)    Patient Name: Pam Ortega  Today's Date: 5/12/2025    Health Maintenance       Date Due Completion Date    Shingles Vaccine (1 of 2) Never done ---    RSV Vaccine (Age 60+ and Pregnant patients) (1 - 1-dose 75+ series) Never done ---    TETANUS VACCINE 03/06/2023 3/6/2013    Hemoglobin A1c 02/23/2025 8/23/2024    Diabetic Eye Exam 06/14/2025 6/14/2024    Diabetes Urine Screening 08/23/2025 8/23/2024    Lipid Panel 08/23/2025 8/23/2024    Pap Smear 10/25/2025 10/25/2022    DEXA Scan 04/17/2027 4/17/2024        Orders Placed This Encounter   Procedures    Microalbumin/Creatinine Ratio, Urine    Hemoglobin A1C    CBC Auto Differential    Comprehensive Metabolic Panel    TSH    Lipid Panel     The following information is provided to all patients.  This information is to help you find resources for any of the problems found today that may be affecting your health:                  Living healthy guide: [place].gov    Understanding Diabetes: www.diabetes.org      Eating healthy: www.cdc.gov/healthyweight      CDC home safety checklist: www.cdc.gov/steadi/patient.html      Agency on Aging: [place].gov    Alcoholics anonymous (AA): www.aa.org      Physical Activity: www.renetta.nih.gov/af7bhrn      Tobacco use: [place].gov

## 2025-05-13 ENCOUNTER — PATIENT MESSAGE (OUTPATIENT)
Dept: FAMILY MEDICINE | Facility: CLINIC | Age: 80
End: 2025-05-13
Payer: MEDICARE

## 2025-05-19 ENCOUNTER — OFFICE VISIT (OUTPATIENT)
Dept: VASCULAR SURGERY | Facility: CLINIC | Age: 80
End: 2025-05-19
Payer: MEDICARE

## 2025-05-19 VITALS
BODY MASS INDEX: 28.49 KG/M2 | SYSTOLIC BLOOD PRESSURE: 109 MMHG | HEIGHT: 70 IN | HEART RATE: 73 BPM | DIASTOLIC BLOOD PRESSURE: 64 MMHG | WEIGHT: 199 LBS | RESPIRATION RATE: 17 BRPM

## 2025-05-19 DIAGNOSIS — I87.2 VENOUS INSUFFICIENCY: ICD-10-CM

## 2025-05-19 DIAGNOSIS — M79.604 BILATERAL LEG PAIN: Primary | ICD-10-CM

## 2025-05-19 DIAGNOSIS — R60.0 EDEMA, LOWER EXTREMITY: ICD-10-CM

## 2025-05-19 DIAGNOSIS — L81.9 HYPERPIGMENTATION OF SKIN: ICD-10-CM

## 2025-05-19 DIAGNOSIS — L97.929 STASIS EDEMA WITH ULCER, LEFT: ICD-10-CM

## 2025-05-19 DIAGNOSIS — I87.312 STASIS EDEMA WITH ULCER, LEFT: ICD-10-CM

## 2025-05-19 DIAGNOSIS — M79.605 BILATERAL LEG PAIN: Primary | ICD-10-CM

## 2025-05-19 PROCEDURE — 99999 PR PBB SHADOW E&M-EST. PATIENT-LVL V: CPT | Mod: PBBFAC,,, | Performed by: FAMILY MEDICINE

## 2025-05-19 PROCEDURE — 99214 OFFICE O/P EST MOD 30 MIN: CPT | Mod: S$PBB,,, | Performed by: FAMILY MEDICINE

## 2025-05-19 PROCEDURE — 99215 OFFICE O/P EST HI 40 MIN: CPT | Mod: PBBFAC | Performed by: FAMILY MEDICINE

## 2025-05-19 NOTE — PROGRESS NOTES
VEIN CENTER CLINIC NOTE    Patient ID: Pam Ortega is a 79 y.o. female.    I. HISTORY     Chief Complaint:   Chief Complaint   Patient presents with    Follow-up     WOUND ON LEFT LEG      History of Present Illness    CHIEF COMPLAINT:  Patient presents today for worsening leg swelling and wounds    HISTORY OF PRESENT ILLNESS:  She reports bilateral leg and abdominal swelling with recent weight fluctuations due to fluid retention. Her weight was 205 lbs on Friday, decreasing to 195 lbs yesterday. She previously lost 30 lbs of fluid within 2-3 weeks following medication changes. She developed wounds on her legs 1 week ago, with LLE wound noted to be weeping and dripping. She had significant edema up to her hips which has since resolved. She had discontinued use of compression socks following resolution of edema, prior to the appearance of the wounds.    RECENT MEDICAL HISTORY:  She was hospitalized at the end of April for UTI and dehydration. She has required multiple thoracenteses (3 times this year) and one paracentesis due to recurrent fluid accumulation.    VASCULAR HISTORY:  She underwent RLE thrombectomy by Dr. Guillermo in November for complete occlusion. She has chronic venous insufficiency with compromised valves in both great saphenous veins and the left small saphenous vein, confirmed by US in October. LLE is more severely affected. She experienced a stroke at age 64.    CURRENT MEDICATIONS:  Her Lasix dose was recently increased from 40mg to 80mg due to fluid retention. She was recently started on Xoroxilin. She continues Xarelto since her stroke.      ROS:  General: -fever, -chills, -fatigue, -weight gain, -weight loss  Eyes: -vision changes, -redness, -discharge  ENT: -ear pain, -nasal congestion, -sore throat  Cardiovascular: -chest pain, -palpitations, +lower extremity edema  Respiratory: -cough, -shortness of breath  Gastrointestinal: -abdominal pain, -nausea, -vomiting, -diarrhea, -constipation,  -blood in stool, +abdominal distention  Genitourinary: -dysuria, -hematuria, -frequency  Musculoskeletal: -joint pain, +muscle pain, +limb pain  Skin: -rash, +lesion  Neurological: -headache, -dizziness, -numbness, -tingling  Psychiatric: -anxiety, -depression, -sleep difficulty         Clinical Summary:  The patient initially presented on 01/17/2024 by referral from [facility] for evaluation of bilateral lower extremity leg swelling, hyperpigmentation and pain.  Symptoms are progressive, worsening and began greater than 1 years ago.  Location is bilateral lower extremities below the knees. Symptoms are worse at the end of the day.  History of venous interventions includes none.  Unsure of family history of venous disease.  The patient also recently had arterial duplex with ABIs on 01/11/2024 which were abnormal and her PCP referred to vascular surgery.  Patient also denies history of DVT or cellulitis.    The patient underwent a bilateral superficial venous reflux study today, 01/17/2024, and the results were discussed with the patient.  This study shows dilation and axial reflux of the bilateral great and left small saphenous veins.  Pulsatile flow also noted bilaterally which can be consistent with CHF.    Doppler study bilateral lower extremities on 01/11/2024 shows no evidence of DVT bilaterally.    Venous Disease Medical Necessity Documentation Initial Visit Date:  01/17/2024 Return Check Date:   04/17/2024   Have you ever had a rupture or bleed from a varicose vein in your leg(s)?              [x] Yes  [] No   [x] Yes   [] No   Have you ever been diagnosed with phlebitis, cellulitis, or inflammation in the area of the varicose veins of  your leg(s)?  [] Yes  [x] No    [x] Yes   [] No   Do you have darkened or inflamed skin on your legs?   [x] Yes   [] No   [x] Yes   [] No   Do you have leg swelling?     [x] Yes   [] No   [x] Yes   [] No   Do you have leg pain?   [x] Yes   [] No   [] Yes   [x] No   If  yes, describe the type of pain?    [x]   Stabbing []  Radiating [x]  Aching   [x]  Tightness [x]  Throbbing               [x]  Burning [x]  Cramping              Do you have leg discomfort?   [] Yes   [x] No   [] Yes   [x] No   If yes, describe the type of discomfort?    []  Heaviness []  Fullness   []  Restlessness [] Tired/Fatigued [] Itching              Have you ever worn compression hose?   [] Yes   [x] No   [x] Yes   [] No   If yes, how long?      3 MONTHS     Do you elevate your legs while sitting?   [x] Yes   [] No   [x] Yes   [] No   Does venous disease (varicose veins, ulcers, skin changes, leg pain/swelling) interfere with your daily life?  If yes, check activities you are limited or unable to do.    []  Shower  [x]   Walk  []  Exercise  [] Play with children/grandchildren  [x]  Shop [] Work [x] Stand for any period of time [x] Sleep                               [] Sitting for an extended period of time.           [x] Yes   [] No   [] Yes   [x] No   Do you exercise/have you tried to exercise (i.e.  Walk our participate in a regular exercise routine)?  [] Yes  [x] No   [x] Yes   [] No   BMI 29.6   29.50          Past Medical History:   Diagnosis Date    Abdominal bloating 04/22/2025    Anemia of chronic disease     Atrial fibrillation     Benign hypertensive CKD, stage 1-4 or unspecified chronic kidney disease     Bronchitis 12/22/2023    Carotid artery occlusion     Cellulitis 12/09/2024    CKD (chronic kidney disease)     Constipation 01/24/2025    Coronary atherosclerosis     Cough 12/22/2023    Cough 12/22/2023    COVID-19 ruled out 03/20/2024    Diabetes mellitus, type 2     DJD (degenerative joint disease)     History of CVA (cerebrovascular accident)     HTN (hypertension)     Hyperlipidemia     Nausea and vomiting 04/22/2025    Nonischemic dilated cardiomyopathy     Obesity     Osteopenia     Other ascites 04/09/2025    Paroxysmal atrial fibrillation     Presence of cardiac pacemaker     PVD  (peripheral vascular disease)     Skin ulcer of female breast 12/30/2024    Upper respiratory tract infection 12/22/2023    Vaginal bleeding 04/26/2025    Vitamin D deficiency         Past Surgical History:   Procedure Laterality Date    BREAST BIOPSY      COLONOSCOPY  12/04/2018    repeat in 5 years    EMBOLECTOMY OR THROMBECTOMY, ARTERY, AORTOILIAC, FEMORAL, OR POPLITEAL Right 11/17/2024    Procedure: EMBOLECTOMY OR THROMBECTOMY, ARTERY, AORTOILIAC, FEMORAL, OR POPLITEAL;  Surgeon: Jordana Guillermo MD;  Location: [facility];  Service: Vascular;  Laterality: Right;    pace maker      VAGINAL DELIVERY      x 3       Social History     Tobacco Use   Smoking Status Never    Passive exposure: Never   Smokeless Tobacco Never         Current Outpatient Medications:     albuterol (VENTOLIN HFA) 90 mcg/actuation inhaler, Inhale 2 puffs into the lungs every 6 (six) hours as needed for Wheezing. Rescue Inhaler, Disp: 18 g, Rfl: 5    amLODIPine (NORVASC) 10 MG tablet, Take 1 tablet (10 mg total) by mouth once daily., Disp: 90 tablet, Rfl: 3    aspirin (ECOTRIN) 81 MG EC tablet, Take 81 mg by mouth once daily., Disp: , Rfl:     cholecalciferol, vitamin D3, (VITAMIN D3) 50 mcg (2,000 unit) Cap capsule, Take 1 capsule by mouth once daily., Disp: , Rfl:     clindamycin (CLEOCIN) 300 MG capsule, Take 1 capsule (300 mg total) by mouth 3 (three) times daily. for 10 days, Disp: 30 capsule, Rfl: 0    colchicine (COLCRYS) 0.6 mg tablet, Take 1 tablet (0.6 mg total) by mouth every other day for gout., Disp: 45 tablet, Rfl: 0    dapagliflozin propanediol (FARXIGA) 10 mg tablet, Take 1 tablet (10 mg total) by mouth once daily., Disp: 30 tablet, Rfl: 12    fluticasone-salmeterol diskus inhaler 250-50 mcg, Inhale 1 puff into the lungs 2 (two) times daily. Controller Inhaler, Disp: 60 each, Rfl: 5    furosemide (LASIX) 80 MG tablet, Take 0.5 tablets (40 mg total) by mouth every morning., Disp: 45 tablet, Rfl: 0    hydrALAZINE (APRESOLINE)  50 MG tablet, Take 1 tablet (50 mg total) by mouth 2 (two) times a day., Disp: 180 tablet, Rfl: 3    isosorbide mononitrate (IMDUR) 30 MG 24 hr tablet, Take 1 tablet (30 mg total) by mouth once daily., Disp: 90 tablet, Rfl: 3    lovastatin (MEVACOR) 20 MG tablet, Take 1 tablet (20 mg total) by mouth every night, Disp: 90 tablet, Rfl: 3    metOLazone (ZAROXOLYN) 5 MG tablet, Take 1 tablet (5 mg total) by mouth daily as needed for fluid, Disp: 30 tablet, Rfl: 11    metoprolol tartrate (LOPRESSOR) 50 MG tablet, Take 1 tablet (50 mg total) by mouth 2 (two) times a day., Disp: 180 tablet, Rfl: 3    omeprazole (PRILOSEC) 20 MG capsule, Take 1 capsule (20 mg total) by mouth once daily., Disp: 90 capsule, Rfl: 3    potassium chloride (MICRO-K) 10 MEQ CpSR, Take 1 capsule (10 mEq total) by mouth once daily., Disp: 90 capsule, Rfl: 3    rivaroxaban (XARELTO) 15 mg Tab, Take 15 mg by mouth daily with dinner or evening meal., Disp: , Rfl:     semaglutide (OZEMPIC) 1 mg/dose (4 mg/3 mL), Inject 1 mg into the skin every 7 days., Disp: 9 mL, Rfl: 3    II. PHYSICAL EXAM     Physical Exam  Constitutional:       General: She is awake. She is not in acute distress.     Appearance: Normal appearance. She is overweight. She is not ill-appearing or toxic-appearing.   HENT:      Head: Normocephalic and atraumatic.   Eyes:      Extraocular Movements: Extraocular movements intact.      Conjunctiva/sclera: Conjunctivae normal.      Pupils: Pupils are equal, round, and reactive to light.   Neck:      Vascular: No carotid bruit or JVD.   Cardiovascular:      Rate and Rhythm: Normal rate and regular rhythm.      Pulses:           Dorsalis pedis pulses are detected w/ Doppler on the right side and detected w/ Doppler on the left side.        Posterior tibial pulses are detected w/ Doppler on the right side and detected w/ Doppler on the left side.      Heart sounds: No murmur heard.  Pulmonary:      Effort: Pulmonary effort is normal. No  respiratory distress.      Breath sounds: No stridor. No wheezing, rhonchi or rales.   Musculoskeletal:         General: No swelling, tenderness or deformity.      Right lower le+ Edema present.      Left lower le+ Edema present.      Comments: Hyperpigmentation and brawny edema noted bilaterally.    Skin breakdown with stasis ulceration of the left lower extremity.  See pictures for details.  No signs of active cellulitis or infection today.   Feet:      Comments: Triphasic hand-held dopplerable pulses of the bilateral dorsalis pedis and posterior tibial arteries.  Skin:     General: Skin is warm.      Capillary Refill: Capillary refill takes less than 2 seconds.      Coloration: Skin is not ashen.      Findings: No bruising, erythema, lesion, rash or wound.   Neurological:      Mental Status: She is alert and oriented to person, place, and time.      Motor: No weakness.   Psychiatric:         Speech: Speech normal.         Behavior: Behavior normal. Behavior is cooperative.         Reticular/Spider veins noted:  RLE: none  LLE: none    Varicose veins noted:  RLE: none  LLE:  posterior calf and lateral calf    CEAP Classification  Clinical Signs: Class 6 - Leg ulceration, skin changes as defined above  Etiologic Classification: Primary  Anatomic distribution: Superficial  Pathophysiologic dysfunction: Reflux               Venous Clinical Severity Score  Pain:2=Daily, moderate activity limitation, occasional analgesics  Varicose Veins: 1=Few, scattered. Branch varicose veins  Venous Edema: 2=Afternoon edema, above ankle  Pigmentation: 1=Diffuse, but limited in area and old (brown)  Inflammation: 0=None  Induration: 0=None  Number of Active Ulcers: 2=2  Active Ulceration, Duration: 1=<3 months  Active Ulcer Size: 1=<2 cm diameter  Compressive Therapy: 1=Intermittent use of stockings  Total Score: 11       III. ASSESSMENT & PLAN (MEDICAL DECISION MAKING)     1. Bilateral leg pain    2. Stasis edema with ulcer,  left    3. Venous insufficiency    4. Hyperpigmentation of skin    5. Edema, lower extremity          Assessment/Diagnosis and Plan:  Assessment & Plan    CONGESTIVE HEART FAILURE:  - Congestive heart failure with recurring fluid retention issues, requiring careful management of fluid levels and medication adjustments.  - Will review recent US results to determine if new studies are needed and discuss potential minimally invasive procedures based on current condition.    POST-THROMBOTIC SYNDROME:  - Explained post-thrombotic syndrome and its potential effects on vein function.  - Patient to wear compression socks on the right leg.    VENOUS ULCER OF LOWER LIMB:  - Ordered Jessica-boot application with calamine and zinc oxide wrap for LLE.  - Ordered home health for changing the wrap a few times per week.         Orders Placed This Encounter    US Venous Reflux Study Bilateral        Thomas Lyn,     This note was generated with the assistance of ambient listening technology. Verbal consent was obtained by the patient and accompanying visitor(s) for the recording of patient appointment to facilitate this note. I attest to having reviewed and edited the generated note for accuracy, though some syntax or spelling errors may persist. Please contact the author of this note for any clarification.        Date:2025  Patient Name:Pam Ortega  :1945  Allergies:Ace inhibitors and Losartan    Home Health Agency: [facility]    Type of Orders: Home Health    Diagnosis: Wound Care    Frequency: 2 x weekly    Days:  Twice weekly    Duration: 0 Wk(s) 3 Mo(s)     ------------------------------------------------------------------------------------------------------------------------------------------------------------------------------------------------------------------------------------------------------------------    PT Goals: Improve calf pump function, increase ankle ROM, improve LE strength, balance, and  gait.     Wound Care:     [x] Remove existing wraps/ dressing and inspect for signs of infection, wash leg and wound with mild soap (i.e. baby shampoo).     [] LATISHA: Inspect LATISHA negative pressure dressing and observe for signs of infections.   If saturated: remove dressing, wash wound with mild soap (baby shampoo), spray/wipe sierra-wound area with skin prep and replace with new dressing.   If NOT saturated: leave in place     [] Patient has a skin substitute graft in place and will not require washing.     [] TRADITIONAL WOUND VAC: Remove standard negative pressure dressing and note any signs of infection, wash wound and leg with mild soap (baby shampoo).   Spray/wipe sierra-wound area with skin prep, apply Eakins ring to sierra-wound area, re-apply standard negative pressure dressing.     [x] Re-apply dressing consisting of: TO ULCERATED AREAS      [x] Collagen with silver  [] Plain Collagen [] Alginate bolster  [] Foam Pad     [] Aqua Seal  [] Silvadene Topical  [] Santyl   [] Island Dressing     []Other:     [x] Apply Compression Wrap: [x]  Left [] Right [] Bilateral     [x] 3M Coban 2 Lite (20-30 mmHg) (Green)   [] 3M Coban 2 (30-40 mmHg) (Purple)     [x]Pink UNNA Boot (with one choice above)   [] White UNNA boot (with one choice above)     [] Compression Stockings  []Other:     [] Skincare:     [] Calmoseptine  [] Eucerin/Aquaphor Cream  [] Triamcinolone Cream        Misc:       Thomas Lyn      Any questions regarding orders, please call [phone]     [facility] ? [address] ? [place] [address] ? [phone] ? Fax: [phone]

## 2025-05-20 ENCOUNTER — HOSPITAL ENCOUNTER (INPATIENT)
Facility: HOSPITAL | Age: 80
LOS: 1 days | Discharge: HOME HEALTH | DRG: 916 | End: 2025-05-22
Attending: FAMILY MEDICINE | Admitting: FAMILY MEDICINE
Payer: MEDICARE

## 2025-05-20 DIAGNOSIS — R06.02 SHORTNESS OF BREATH: ICD-10-CM

## 2025-05-20 DIAGNOSIS — R13.19 OTHER DYSPHAGIA: ICD-10-CM

## 2025-05-20 DIAGNOSIS — T78.3XXA ANGIOEDEMA, INITIAL ENCOUNTER: Primary | ICD-10-CM

## 2025-05-20 DIAGNOSIS — I21.4 NSTEMI (NON-ST ELEVATED MYOCARDIAL INFARCTION): ICD-10-CM

## 2025-05-20 DIAGNOSIS — R07.9 CHEST PAIN: ICD-10-CM

## 2025-05-20 PROBLEM — I50.42 CHRONIC COMBINED SYSTOLIC AND DIASTOLIC CONGESTIVE HEART FAILURE: Status: ACTIVE | Noted: 2025-05-20

## 2025-05-20 PROBLEM — R13.10 DYSPHAGIA: Status: ACTIVE | Noted: 2025-05-20

## 2025-05-20 LAB
ALBUMIN SERPL BCP-MCNC: 3.3 G/DL (ref 3.4–4.8)
ALBUMIN/GLOB SERPL: 0.8 {RATIO}
ALP SERPL-CCNC: 75 U/L (ref 40–150)
ALT SERPL W P-5'-P-CCNC: 7 U/L
ANION GAP SERPL CALCULATED.3IONS-SCNC: 19 MMOL/L (ref 7–16)
ANISOCYTOSIS BLD QL SMEAR: ABNORMAL
AST SERPL W P-5'-P-CCNC: 22 U/L (ref 11–45)
BASOPHILS # BLD AUTO: 0.04 K/UL (ref 0–0.2)
BASOPHILS NFR BLD AUTO: 0.6 % (ref 0–1)
BILIRUB SERPL-MCNC: 2.8 MG/DL
BUN SERPL-MCNC: 56 MG/DL (ref 10–20)
BUN/CREAT SERPL: 17 (ref 6–20)
CALCIUM SERPL-MCNC: 9.4 MG/DL (ref 8.4–10.2)
CHLORIDE SERPL-SCNC: 102 MMOL/L (ref 98–107)
CO2 SERPL-SCNC: 18 MMOL/L (ref 23–31)
CREAT SERPL-MCNC: 3.26 MG/DL (ref 0.55–1.02)
DIFFERENTIAL METHOD BLD: ABNORMAL
EGFR (NO RACE VARIABLE) (RUSH/TITUS): 14 ML/MIN/1.73M2
EOSINOPHIL # BLD AUTO: 0.07 K/UL (ref 0–0.5)
EOSINOPHIL NFR BLD AUTO: 1.1 % (ref 1–4)
ERYTHROCYTE [DISTWIDTH] IN BLOOD BY AUTOMATED COUNT: 23.8 % (ref 11.5–14.5)
GLOBULIN SER-MCNC: 4.1 G/DL (ref 2–4)
GLUCOSE SERPL-MCNC: 153 MG/DL (ref 70–105)
GLUCOSE SERPL-MCNC: 161 MG/DL (ref 82–115)
HCT VFR BLD AUTO: 30.5 % (ref 38–47)
HGB BLD-MCNC: 9.3 G/DL (ref 12–16)
HYPOCHROMIA BLD QL SMEAR: ABNORMAL
IMM GRANULOCYTES # BLD AUTO: 0.02 K/UL (ref 0–0.04)
IMM GRANULOCYTES NFR BLD: 0.3 % (ref 0–0.4)
LYMPHOCYTES # BLD AUTO: 1.1 K/UL (ref 1–4.8)
LYMPHOCYTES NFR BLD AUTO: 17.1 % (ref 27–41)
MCH RBC QN AUTO: 23.7 PG (ref 27–31)
MCHC RBC AUTO-ENTMCNC: 30.5 G/DL (ref 32–36)
MCV RBC AUTO: 77.6 FL (ref 80–96)
MONOCYTES # BLD AUTO: 0.81 K/UL (ref 0–0.8)
MONOCYTES NFR BLD AUTO: 12.6 % (ref 2–6)
MPC BLD CALC-MCNC: 9.9 FL (ref 9.4–12.4)
NEUTROPHILS # BLD AUTO: 4.38 K/UL (ref 1.8–7.7)
NEUTROPHILS NFR BLD AUTO: 68.3 % (ref 53–65)
NRBC # BLD AUTO: 0.02 X10E3/UL
NRBC, AUTO (.00): 0.3 %
NT-PROBNP SERPL-MCNC: 8599 PG/ML (ref 1–450)
OVALOCYTES BLD QL SMEAR: ABNORMAL
PLATELET # BLD AUTO: 208 K/UL (ref 150–400)
PLATELET MORPHOLOGY: ABNORMAL
POTASSIUM SERPL-SCNC: 3.5 MMOL/L (ref 3.5–5.1)
PROT SERPL-MCNC: 7.4 G/DL (ref 5.8–7.6)
RBC # BLD AUTO: 3.93 M/UL (ref 4.2–5.4)
SARS-COV-2 RDRP RESP QL NAA+PROBE: NEGATIVE
SODIUM SERPL-SCNC: 135 MMOL/L (ref 136–145)
TROPONIN I SERPL HS-MCNC: 636 NG/L
TROPONIN I SERPL HS-MCNC: 713.1 NG/L
WBC # BLD AUTO: 6.42 K/UL (ref 4.5–11)

## 2025-05-20 PROCEDURE — 84484 ASSAY OF TROPONIN QUANT: CPT | Performed by: NURSE PRACTITIONER

## 2025-05-20 PROCEDURE — 25000003 PHARM REV CODE 250

## 2025-05-20 PROCEDURE — 36415 COLL VENOUS BLD VENIPUNCTURE: CPT

## 2025-05-20 PROCEDURE — G0378 HOSPITAL OBSERVATION PER HR: HCPCS

## 2025-05-20 PROCEDURE — 82962 GLUCOSE BLOOD TEST: CPT

## 2025-05-20 PROCEDURE — 87635 SARS-COV-2 COVID-19 AMP PRB: CPT

## 2025-05-20 PROCEDURE — 96372 THER/PROPH/DIAG INJ SC/IM: CPT

## 2025-05-20 PROCEDURE — 84484 ASSAY OF TROPONIN QUANT: CPT

## 2025-05-20 PROCEDURE — 99285 EMERGENCY DEPT VISIT HI MDM: CPT | Mod: 25

## 2025-05-20 PROCEDURE — 80053 COMPREHEN METABOLIC PANEL: CPT | Performed by: NURSE PRACTITIONER

## 2025-05-20 PROCEDURE — 94761 N-INVAS EAR/PLS OXIMETRY MLT: CPT

## 2025-05-20 PROCEDURE — 83880 ASSAY OF NATRIURETIC PEPTIDE: CPT | Performed by: NURSE PRACTITIONER

## 2025-05-20 PROCEDURE — 63600175 PHARM REV CODE 636 W HCPCS

## 2025-05-20 PROCEDURE — 85025 COMPLETE CBC W/AUTO DIFF WBC: CPT | Performed by: NURSE PRACTITIONER

## 2025-05-20 PROCEDURE — 99223 1ST HOSP IP/OBS HIGH 75: CPT | Mod: AI,GC,, | Performed by: FAMILY MEDICINE

## 2025-05-20 PROCEDURE — 36415 COLL VENOUS BLD VENIPUNCTURE: CPT | Performed by: NURSE PRACTITIONER

## 2025-05-20 PROCEDURE — 99214 OFFICE O/P EST MOD 30 MIN: CPT | Mod: ,,, | Performed by: INTERNAL MEDICINE

## 2025-05-20 PROCEDURE — 93005 ELECTROCARDIOGRAM TRACING: CPT

## 2025-05-20 PROCEDURE — 96374 THER/PROPH/DIAG INJ IV PUSH: CPT

## 2025-05-20 PROCEDURE — 63600175 PHARM REV CODE 636 W HCPCS: Mod: JZ,TB

## 2025-05-20 RX ORDER — FAMOTIDINE 20 MG/1
20 TABLET, FILM COATED ORAL DAILY
Status: DISCONTINUED | OUTPATIENT
Start: 2025-05-20 | End: 2025-05-21

## 2025-05-20 RX ORDER — METOPROLOL TARTRATE 25 MG/1
50 TABLET, FILM COATED ORAL 2 TIMES DAILY
Status: DISCONTINUED | OUTPATIENT
Start: 2025-05-20 | End: 2025-05-22 | Stop reason: HOSPADM

## 2025-05-20 RX ORDER — ALBUTEROL SULFATE 0.83 MG/ML
2.5 SOLUTION RESPIRATORY (INHALATION) EVERY 6 HOURS PRN
Status: DISCONTINUED | OUTPATIENT
Start: 2025-05-20 | End: 2025-05-22 | Stop reason: HOSPADM

## 2025-05-20 RX ORDER — SODIUM CHLORIDE 9 MG/ML
INJECTION, SOLUTION INTRAVENOUS CONTINUOUS
Status: DISCONTINUED | OUTPATIENT
Start: 2025-05-20 | End: 2025-05-21

## 2025-05-20 RX ORDER — AMLODIPINE BESYLATE 10 MG/1
10 TABLET ORAL DAILY
Status: DISCONTINUED | OUTPATIENT
Start: 2025-05-21 | End: 2025-05-22 | Stop reason: HOSPADM

## 2025-05-20 RX ORDER — MORPHINE SULFATE 2 MG/ML
1 INJECTION, SOLUTION INTRAMUSCULAR; INTRAVENOUS EVERY 6 HOURS PRN
Status: DISCONTINUED | OUTPATIENT
Start: 2025-05-20 | End: 2025-05-22 | Stop reason: HOSPADM

## 2025-05-20 RX ORDER — SODIUM CHLORIDE 0.9 % (FLUSH) 0.9 %
10 SYRINGE (ML) INJECTION EVERY 12 HOURS PRN
Status: DISCONTINUED | OUTPATIENT
Start: 2025-05-20 | End: 2025-05-22 | Stop reason: HOSPADM

## 2025-05-20 RX ORDER — IBUPROFEN 200 MG
24 TABLET ORAL
Status: DISCONTINUED | OUTPATIENT
Start: 2025-05-20 | End: 2025-05-22 | Stop reason: HOSPADM

## 2025-05-20 RX ORDER — ASPIRIN 81 MG/1
81 TABLET ORAL DAILY
Status: DISCONTINUED | OUTPATIENT
Start: 2025-05-21 | End: 2025-05-22 | Stop reason: HOSPADM

## 2025-05-20 RX ORDER — DAPAGLIFLOZIN 10 MG/1
10 TABLET, FILM COATED ORAL DAILY
Status: DISCONTINUED | OUTPATIENT
Start: 2025-05-21 | End: 2025-05-22 | Stop reason: HOSPADM

## 2025-05-20 RX ORDER — ENOXAPARIN SODIUM 100 MG/ML
30 INJECTION SUBCUTANEOUS EVERY 24 HOURS
Status: DISCONTINUED | OUTPATIENT
Start: 2025-05-20 | End: 2025-05-22 | Stop reason: HOSPADM

## 2025-05-20 RX ORDER — HYDRALAZINE HYDROCHLORIDE 50 MG/1
50 TABLET, FILM COATED ORAL 2 TIMES DAILY
Status: DISCONTINUED | OUTPATIENT
Start: 2025-05-20 | End: 2025-05-21

## 2025-05-20 RX ORDER — IBUPROFEN 200 MG
16 TABLET ORAL
Status: DISCONTINUED | OUTPATIENT
Start: 2025-05-20 | End: 2025-05-22 | Stop reason: HOSPADM

## 2025-05-20 RX ORDER — GLUCAGON 1 MG
1 KIT INJECTION
Status: DISCONTINUED | OUTPATIENT
Start: 2025-05-20 | End: 2025-05-22 | Stop reason: HOSPADM

## 2025-05-20 RX ORDER — PANTOPRAZOLE SODIUM 40 MG/1
40 TABLET, DELAYED RELEASE ORAL DAILY
Status: DISCONTINUED | OUTPATIENT
Start: 2025-05-21 | End: 2025-05-22 | Stop reason: HOSPADM

## 2025-05-20 RX ORDER — FUROSEMIDE 80 MG/1
80 TABLET ORAL DAILY
COMMUNITY

## 2025-05-20 RX ORDER — ACETAMINOPHEN 650 MG/1
650 SUPPOSITORY RECTAL EVERY 4 HOURS PRN
Status: DISCONTINUED | OUTPATIENT
Start: 2025-05-20 | End: 2025-05-20

## 2025-05-20 RX ORDER — NALOXONE HCL 0.4 MG/ML
0.02 VIAL (ML) INJECTION
Status: DISCONTINUED | OUTPATIENT
Start: 2025-05-20 | End: 2025-05-22 | Stop reason: HOSPADM

## 2025-05-20 RX ORDER — ALBUTEROL SULFATE 90 UG/1
2 INHALANT RESPIRATORY (INHALATION) EVERY 6 HOURS PRN
Status: DISCONTINUED | OUTPATIENT
Start: 2025-05-20 | End: 2025-05-20

## 2025-05-20 RX ORDER — ISOSORBIDE MONONITRATE 30 MG/1
30 TABLET, EXTENDED RELEASE ORAL DAILY
Status: DISCONTINUED | OUTPATIENT
Start: 2025-05-21 | End: 2025-05-22 | Stop reason: HOSPADM

## 2025-05-20 RX ORDER — PRAVASTATIN SODIUM 10 MG/1
20 TABLET ORAL DAILY
Status: DISCONTINUED | OUTPATIENT
Start: 2025-05-21 | End: 2025-05-22 | Stop reason: HOSPADM

## 2025-05-20 RX ORDER — CETIRIZINE HYDROCHLORIDE 10 MG/1
10 TABLET ORAL 2 TIMES DAILY
Status: DISCONTINUED | OUTPATIENT
Start: 2025-05-20 | End: 2025-05-21

## 2025-05-20 RX ORDER — DEXAMETHASONE SODIUM PHOSPHATE 4 MG/ML
4 INJECTION, SOLUTION INTRA-ARTICULAR; INTRALESIONAL; INTRAMUSCULAR; INTRAVENOUS; SOFT TISSUE
Status: COMPLETED | OUTPATIENT
Start: 2025-05-20 | End: 2025-05-20

## 2025-05-20 RX ORDER — HEPARIN SODIUM 5000 [USP'U]/ML
5000 INJECTION, SOLUTION INTRAVENOUS; SUBCUTANEOUS EVERY 8 HOURS
Status: DISCONTINUED | OUTPATIENT
Start: 2025-05-20 | End: 2025-05-20

## 2025-05-20 RX ADMIN — HYDRALAZINE HYDROCHLORIDE 50 MG: 50 TABLET ORAL at 09:05

## 2025-05-20 RX ADMIN — METOPROLOL TARTRATE 50 MG: 25 TABLET, FILM COATED ORAL at 09:05

## 2025-05-20 RX ADMIN — SODIUM CHLORIDE: 9 INJECTION, SOLUTION INTRAVENOUS at 05:05

## 2025-05-20 RX ADMIN — DEXAMETHASONE SODIUM PHOSPHATE 4 MG: 4 INJECTION, SOLUTION INTRA-ARTICULAR; INTRALESIONAL; INTRAMUSCULAR; INTRAVENOUS; SOFT TISSUE at 01:05

## 2025-05-20 RX ADMIN — METHYLPREDNISOLONE SODIUM SUCCINATE 60 MG: 40 INJECTION, POWDER, FOR SOLUTION INTRAMUSCULAR; INTRAVENOUS at 05:05

## 2025-05-20 RX ADMIN — ENOXAPARIN SODIUM 30 MG: 30 INJECTION SUBCUTANEOUS at 05:05

## 2025-05-20 NOTE — HPI
"78 yo female presented to the ED with 3 day history dysphagia, nausea, vomiting, and lip swelling. Pt has had difficulty holding anything down, throwing up most of her meds over the last several days. These symptoms started Saturday shortly after starting clindamycin for left leg cellulitis which came about after pts lower leg swelling. Pts nausea and vomiting has gotten better since but pt can only keep down small sips currently. Pt has history of angioedema with ACE/ARBs some years ago.  Pt denies chest pain, fever, chills.    CMP: Creatinine 3.26 (baseline a couple months ago was about 2), bilirubin 2.8   CBC: Hemoglobin 9.3  CXR: "mild CHF" per radiologist  Troponin: 636 to 713  BnP: 8,599    Pt to be admitted to the care of Dr. Hurtado.  Will start on solumedrol, H1, H2 for angioedema.  Consulting GI for dysphagia.  Swallow eval ordered.      PMHx: CVA, DVT, combined CHF, Afib, HTN, DM II  Last ECHO(4/4/2025): 35% EF with diastolic dysfunction, severe tricuspid regurg, moderate to severe mitral and pulmonic regurg, 39 mmHg pulmonic artery pressure  Last EGD(4/22/2025): Unremarkable aside from mucosal erosion in fundus. No strictures or dilations.  Previous troponins measured in 600s. Likely her baseline.  Chronically elevated bilirubin.  "

## 2025-05-20 NOTE — SUBJECTIVE & OBJECTIVE
Past Medical History:   Diagnosis Date    Abdominal bloating 04/22/2025    Anemia of chronic disease     Atrial fibrillation     Benign hypertensive CKD, stage 1-4 or unspecified chronic kidney disease     Bronchitis 12/22/2023    Carotid artery occlusion     Cellulitis 12/09/2024    CKD (chronic kidney disease)     Constipation 01/24/2025    Coronary atherosclerosis     Cough 12/22/2023    Cough 12/22/2023    COVID-19 ruled out 03/20/2024    Diabetes mellitus, type 2     DJD (degenerative joint disease)     History of CVA (cerebrovascular accident)     HTN (hypertension)     Hyperlipidemia     Nausea and vomiting 04/22/2025    Nonischemic dilated cardiomyopathy     Obesity     Osteopenia     Other ascites 04/09/2025    Paroxysmal atrial fibrillation     Presence of cardiac pacemaker     PVD (peripheral vascular disease)     Skin ulcer of female breast 12/30/2024    Upper respiratory tract infection 12/22/2023    Vaginal bleeding 04/26/2025    Vitamin D deficiency        Past Surgical History:   Procedure Laterality Date    BREAST BIOPSY      COLONOSCOPY  12/04/2018    repeat in 5 years    EMBOLECTOMY OR THROMBECTOMY, ARTERY, AORTOILIAC, FEMORAL, OR POPLITEAL Right 11/17/2024    Procedure: EMBOLECTOMY OR THROMBECTOMY, ARTERY, AORTOILIAC, FEMORAL, OR POPLITEAL;  Surgeon: Jordana Guillermo MD;  Location: [facility];  Service: Vascular;  Laterality: Right;    pace maker      VAGINAL DELIVERY      x 3       Review of patient's allergies indicates:   Allergen Reactions    Ace inhibitors Edema    Losartan Swelling       No current facility-administered medications on file prior to encounter.     Current Outpatient Medications on File Prior to Encounter   Medication Sig    albuterol (VENTOLIN HFA) 90 mcg/actuation inhaler Inhale 2 puffs into the lungs every 6 (six) hours as needed for Wheezing. Rescue Inhaler    amLODIPine (NORVASC) 10 MG tablet Take 1 tablet (10 mg total) by mouth once daily.    aspirin (ECOTRIN) 81 MG  EC tablet Take 81 mg by mouth once daily.    cholecalciferol, vitamin D3, (VITAMIN D3) 50 mcg (2,000 unit) Cap capsule Take 1 capsule by mouth once daily.    clindamycin (CLEOCIN) 300 MG capsule Take 1 capsule (300 mg total) by mouth 3 (three) times daily. for 10 days    colchicine (COLCRYS) 0.6 mg tablet Take 1 tablet (0.6 mg total) by mouth every other day for gout.    dapagliflozin propanediol (FARXIGA) 10 mg tablet Take 1 tablet (10 mg total) by mouth once daily.    fluticasone-salmeterol diskus inhaler 250-50 mcg Inhale 1 puff into the lungs 2 (two) times daily. Controller Inhaler    hydrALAZINE (APRESOLINE) 50 MG tablet Take 1 tablet (50 mg total) by mouth 2 (two) times a day.    isosorbide mononitrate (IMDUR) 30 MG 24 hr tablet Take 1 tablet (30 mg total) by mouth once daily.    lovastatin (MEVACOR) 20 MG tablet Take 1 tablet (20 mg total) by mouth every night    metOLazone (ZAROXOLYN) 5 MG tablet Take 1 tablet (5 mg total) by mouth daily as needed for fluid    metoprolol tartrate (LOPRESSOR) 50 MG tablet Take 1 tablet (50 mg total) by mouth 2 (two) times a day.    omeprazole (PRILOSEC) 20 MG capsule Take 1 capsule (20 mg total) by mouth once daily.    potassium chloride (MICRO-K) 10 MEQ CpSR Take 1 capsule (10 mEq total) by mouth once daily.    rivaroxaban (XARELTO) 15 mg Tab Take 15 mg by mouth daily with dinner or evening meal.    semaglutide (OZEMPIC) 1 mg/dose (4 mg/3 mL) Inject 1 mg into the skin every 7 days.    [DISCONTINUED] furosemide (LASIX) 80 MG tablet Take 0.5 tablets (40 mg total) by mouth every morning. (Patient taking differently: Take 80 mg by mouth every morning.)    furosemide (LASIX) 80 MG tablet Take 80 mg by mouth once daily.     Family History       Problem Relation (Age of Onset)    Alzheimer's disease Maternal Grandmother    Breast cancer Maternal Grandmother, Daughter    Cancer Brother    Clotting disorder Daughter    Diabetes Daughter, Daughter    Hyperlipidemia Daughter     Hypertension Mother, Father, Sister, Brother, Maternal Grandmother, Daughter, Daughter, Daughter    Stroke Sister, Brother          Tobacco Use    Smoking status: Never     Passive exposure: Never    Smokeless tobacco: Never   Substance and Sexual Activity    Alcohol use: Not Currently    Drug use: Never    Sexual activity: Not Currently     Review of Systems   Constitutional:  Negative for chills and fever.   HENT:  Positive for facial swelling and trouble swallowing. Negative for sore throat.    Respiratory:  Positive for shortness of breath. Negative for cough.    Cardiovascular:  Negative for chest pain and palpitations.   Gastrointestinal:  Positive for nausea and vomiting. Negative for abdominal pain.   Genitourinary:  Negative for dysuria.     Objective:     Vital Signs (Most Recent):  Temp: 98.1 °F (36.7 °C) (05/20/25 1628)  Pulse: 75 (05/20/25 1628)  Resp: 19 (05/20/25 1628)  BP: 115/75 (05/20/25 1628)  SpO2: 96 % (05/20/25 1628) Vital Signs (24h Range):  Temp:  [97.9 °F (36.6 °C)-98.1 °F (36.7 °C)] 98.1 °F (36.7 °C)  Pulse:  [58-75] 75  Resp:  [18-22] 19  SpO2:  [93 %-96 %] 96 %  BP: (115-130)/(70-75) 115/75     Weight: 89.4 kg (197 lb 1.5 oz)  Body mass index is 28.28 kg/m².     Physical Exam  Vitals and nursing note reviewed.   Constitutional:       General: She is not in acute distress.     Appearance: She is normal weight. She is not ill-appearing.   HENT:      Head: Normocephalic and atraumatic.      Mouth/Throat:      Comments: Upper lip swelling  lower lip, tongue, and oral mucosa without swelling  Cardiovascular:      Rate and Rhythm: Normal rate and regular rhythm.      Pulses: Normal pulses.      Heart sounds: Normal heart sounds. No murmur heard.     No friction rub. No gallop.   Pulmonary:      Effort: Pulmonary effort is normal. No respiratory distress.      Breath sounds: Normal breath sounds.   Abdominal:      General: Bowel sounds are normal.      Tenderness: There is no abdominal  tenderness.   Musculoskeletal:      Right lower leg: No edema.      Left lower leg: No edema.      Comments: Left leg with stocking on with wound wraps   Skin:     General: Skin is warm and dry.   Neurological:      General: No focal deficit present.      Mental Status: She is alert and oriented to person, place, and time.   Psychiatric:         Mood and Affect: Mood normal.         Behavior: Behavior normal.                Significant Labs: All pertinent labs within the past 24 hours have been reviewed.  Recent Lab Results         05/20/25  1356   05/20/25  1355   05/20/25  1246        Albumin/Globulin Ratio     0.8       Albumin     3.3       ALP     75       ALT     7       Anion Gap     19       Aniso     1+       AST     22       Baso #     0.04       Basophil %     0.6       BILIRUBIN TOTAL     2.8       BUN     56       BUN/CREAT RATIO     17       Calcium     9.4       Chloride     102       CO2     18       SARS COV-2 MOLECULAR   Negative         Creatinine     3.26       Differential Method     Scan Smear       eGFR     14  Comment: Estimated GFR calculated using the CKD-EPI creatinine (2021) equation.       Eos #     0.07       Eos %     1.1       Globulin, Total     4.1       Glucose     161       Hematocrit     30.5       Hemoglobin     9.3       Hypo     Few       Immature Grans (Abs)     0.02       Immature Granulocytes     0.3       Lymph #     1.10       Lymph %     17.1       MCH     23.7       MCHC     30.5       MCV     77.6       Mono #     0.81       Mono %     12.6       MPV     9.9       Neutrophils, Abs     4.38       Neutrophils Relative     68.3       nRBC     0.3       NT-proBNP     8,599       NUCLEATED RBC ABSOLUTE     0.02       Ovalocytes     Few       PLATELET MORPHOLOGY     Few Large Platelets       Platelet Count     208       Potassium     3.5       PROTEIN TOTAL     7.4       RBC     3.93       RDW     23.8       Sodium     135       Troponin I High Sensitivity 713.1  Comment:  Critical Result called and verified by verbal readback to: Hillary on 05/20/2025 at 14:57. Reported by [id number].     636.0  Comment: Critical Result called and verified by verbal readback to: JONA on 05/20/2025 at 13:45. Reported by [id number].       WBC     6.42               Significant Imaging: I have reviewed all pertinent imaging results/findings within the past 24 hours.

## 2025-05-20 NOTE — NURSING
PT to room 662 at this time. Report from Er RN. Family at bedside. Pt denies any pain at this time. Cardiac monitor applied. Will continue to monitor and review orders. No distress noted. VSS, npo at this time per orders.

## 2025-05-20 NOTE — PHARMACY MED REC
"Admission Medication History     The home medication history was taken by Perla Huddleston.    You may go to "Admission" then "Reconcile Home Medications" tabs to review and/or act upon these items.     The home medication list has been updated by the Pharmacy department.   Please read ALL comments highlighted in yellow.   Please address this information as you see fit.    Feel free to contact us if you have any questions or require assistance.  Medications Updated:  Furosemide 40 mg updated to Furosemide 80 mg  Patient has been having difficulty keeping anything down lately, so when she's been taking her medications, they've been coming right back up about two minutes later.      Medications listed below were obtained from: Patient/family and Analytic software- [employer]  (Not in a hospital admission)        Current Outpatient Medications on File Prior to Encounter   Medication Sig Dispense Refill Last Dose/Taking    albuterol (VENTOLIN HFA) 90 mcg/actuation inhaler Inhale 2 puffs into the lungs every 6 (six) hours as needed for Wheezing. Rescue Inhaler 18 g 5 Past Week    amLODIPine (NORVASC) 10 MG tablet Take 1 tablet (10 mg total) by mouth once daily. 90 tablet 3 Past Week    aspirin (ECOTRIN) 81 MG EC tablet Take 81 mg by mouth once daily.   Past Week    cholecalciferol, vitamin D3, (VITAMIN D3) 50 mcg (2,000 unit) Cap capsule Take 1 capsule by mouth once daily.   Past Week    clindamycin (CLEOCIN) 300 MG capsule Take 1 capsule (300 mg total) by mouth 3 (three) times daily. for 10 days 30 capsule 0 Past Week    colchicine (COLCRYS) 0.6 mg tablet Take 1 tablet (0.6 mg total) by mouth every other day for gout. 45 tablet 0 Past Week    dapagliflozin propanediol (FARXIGA) 10 mg tablet Take 1 tablet (10 mg total) by mouth once daily. 30 tablet 12 Past Week    fluticasone-salmeterol diskus inhaler 250-50 mcg Inhale 1 puff into the lungs 2 (two) times daily. Controller Inhaler 60 each 5 Past Week    furosemide (LASIX) 80 MG " tablet Take 0.5 tablets (40 mg total) by mouth every morning. (Patient taking differently: Take 80 mg by mouth every morning.) 45 tablet 0 Past Week    hydrALAZINE (APRESOLINE) 50 MG tablet Take 1 tablet (50 mg total) by mouth 2 (two) times a day. 180 tablet 3 Past Week    isosorbide mononitrate (IMDUR) 30 MG 24 hr tablet Take 1 tablet (30 mg total) by mouth once daily. 90 tablet 3 Past Week    lovastatin (MEVACOR) 20 MG tablet Take 1 tablet (20 mg total) by mouth every night 90 tablet 3 Past Week    metOLazone (ZAROXOLYN) 5 MG tablet Take 1 tablet (5 mg total) by mouth daily as needed for fluid 30 tablet 11 Past Week    metoprolol tartrate (LOPRESSOR) 50 MG tablet Take 1 tablet (50 mg total) by mouth 2 (two) times a day. 180 tablet 3 Past Week    omeprazole (PRILOSEC) 20 MG capsule Take 1 capsule (20 mg total) by mouth once daily. 90 capsule 3 Past Week    potassium chloride (MICRO-K) 10 MEQ CpSR Take 1 capsule (10 mEq total) by mouth once daily. 90 capsule 3 Past Week    rivaroxaban (XARELTO) 15 mg Tab Take 15 mg by mouth daily with dinner or evening meal.   Past Week    semaglutide (OZEMPIC) 1 mg/dose (4 mg/3 mL) Inject 1 mg into the skin every 7 days. 9 mL 3 Past Month         Potential issues to be addressed PRIOR TO DISCHARGE  No issues identified.    Perla Huddleston  Medication Access Specialist  EXT. [phone]    .

## 2025-05-20 NOTE — ASSESSMENT & PLAN NOTE
Acute dysphagia, tolerates small sips but intolerant to solid food.  GI consulted.  Swallow eval ordered.  Treating angioedema.

## 2025-05-20 NOTE — ASSESSMENT & PLAN NOTE
"Patient's FSGs are controlled on current medication regimen.  Last A1c reviewed-   Lab Results   Component Value Date    HGBA1C 6.1 05/12/2025     Most recent fingerstick glucose reviewed- No results for input(s): "POCTGLUCOSE" in the last 24 hours.  Current correctional scale  Will start sliding scale when NPO lifted.  Maintain anti-hyperglycemic dose as follows-   Antihyperglycemics (From admission, onward)      Start     Stop Route Frequency Ordered    05/21/25 0900  dapagliflozin propanediol (Farxiga) tablet 10 mg        Question Answer Comment   Does this patient have a diagnosis of heart failure? Yes    Does this patient have type 1 diabetes or diabetic ketoacidosis? No    Does this patient have symptomatic hypotension? No    Is the patient NPO or pending major surgery in next 3 days or less? No        -- Oral Daily 05/20/25 1621          Hold Oral hypoglycemics while patient is in the hospital.    Continuing pt's SGLT2 tomorrow. Will likely need to add on insulin as steroid treatment continues.  "

## 2025-05-20 NOTE — ED PROVIDER NOTES
Encounter Date: 5/20/2025       History     Chief Complaint   Patient presents with    Shortness of Breath     Presents to ED for complaints of shortness of breath.  Patient was taking her medication and states that they went down the wrong way.     Patient is a 79-year-old female with a past medical history of Afib, CKD, CAD, CHF, HTN and Anemia who presents to the [facility] Emergency Department for dysphagia. Patient reports for the past few days experiencing food stuck in her throat to solid foods. Associated symptoms are n/v , regurgitation of undigested food particles, lip swelling and dyspnea. Patient mentioned she tried to take her medications and pill(s) stuck in the throat. She have a history of Hiatal Hernia and EGD in the past without food bolus in April 2025. Denied any chest pain.       Review of patient's allergies indicates:   Allergen Reactions    Ace inhibitors Edema    Losartan Swelling     Past Medical History:   Diagnosis Date    Abdominal bloating 04/22/2025    Anemia of chronic disease     Atrial fibrillation     Benign hypertensive CKD, stage 1-4 or unspecified chronic kidney disease     Bronchitis 12/22/2023    Carotid artery occlusion     Cellulitis 12/09/2024    CKD (chronic kidney disease)     Constipation 01/24/2025    Coronary atherosclerosis     Cough 12/22/2023    Cough 12/22/2023    COVID-19 ruled out 03/20/2024    Diabetes mellitus, type 2     DJD (degenerative joint disease)     History of CVA (cerebrovascular accident)     HTN (hypertension)     Hyperlipidemia     Nausea and vomiting 04/22/2025    Nonischemic dilated cardiomyopathy     Obesity     Osteopenia     Other ascites 04/09/2025    Paroxysmal atrial fibrillation     Presence of cardiac pacemaker     PVD (peripheral vascular disease)     Skin ulcer of female breast 12/30/2024    Upper respiratory tract infection 12/22/2023    Vaginal bleeding 04/26/2025    Vitamin D deficiency      Past Surgical History:   Procedure Laterality  Date    BREAST BIOPSY      COLONOSCOPY  12/04/2018    repeat in 5 years    EMBOLECTOMY OR THROMBECTOMY, ARTERY, AORTOILIAC, FEMORAL, OR POPLITEAL Right 11/17/2024    Procedure: EMBOLECTOMY OR THROMBECTOMY, ARTERY, AORTOILIAC, FEMORAL, OR POPLITEAL;  Surgeon: Jordana Guillermo MD;  Location: [facility];  Service: Vascular;  Laterality: Right;    pace maker      VAGINAL DELIVERY      x 3     Family History   Problem Relation Name Age of Onset    Hypertension Mother      Hypertension Father      Hypertension Sister      Stroke Sister      Cancer Brother      Stroke Brother      Hypertension Brother      Alzheimer's disease Maternal Grandmother      Breast cancer Maternal Grandmother      Hypertension Maternal Grandmother      Hyperlipidemia Daughter      Breast cancer Daughter      Diabetes Daughter      Hypertension Daughter      Diabetes Daughter      Hypertension Daughter      Hypertension Daughter      Clotting disorder Daughter       Social History[1]  Review of Systems   Gastrointestinal:  Positive for nausea.       Physical Exam     Initial Vitals [05/20/25 1148]   BP Pulse Resp Temp SpO2   129/70 61 (!) 22 97.9 °F (36.6 °C) 96 %      MAP       --         Physical Exam    Nursing note and vitals reviewed.  Constitutional: She appears well-developed and well-nourished.   Cardiovascular:  Normal rate.     Exam reveals no gallop and no friction rub.       No murmur heard.  Pulmonary/Chest: No respiratory distress. She has no wheezes. She has no rhonchi. She has no rales.   Abdominal: Abdomen is soft. She exhibits no distension. There is no abdominal tenderness. There is no rebound and no guarding.   Musculoskeletal:         General: No edema.           Medical Screening Exam   See Full Note    ED Course   Procedures  Labs Reviewed   COMPREHENSIVE METABOLIC PANEL - Abnormal       Result Value    Sodium 135 (*)     Potassium 3.5      Chloride 102      CO2 18 (*)     Anion Gap 19 (*)     Glucose 161 (*)     BUN 56  (*)     Creatinine 3.26 (*)     BUN/Creatinine Ratio 17      Calcium 9.4      Total Protein 7.4      Albumin 3.3 (*)     Globulin 4.1 (*)     A/G Ratio 0.8      Bilirubin, Total 2.8 (*)     Alk Phos 75      ALT 7      AST 22      eGFR 14 (*)    TROPONIN I - Abnormal    Troponin I High Sensitivity 636.0 (*)    NT-PRO NATRIURETIC PEPTIDE - Abnormal    ProBNP 8,599 (*)    CBC WITH DIFFERENTIAL - Abnormal    WBC 6.42      RBC 3.93 (*)     Hemoglobin 9.3 (*)     Hematocrit 30.5 (*)     MCV 77.6 (*)     MCH 23.7 (*)     MCHC 30.5 (*)     RDW 23.8 (*)     Platelet Count 208      MPV 9.9      Neutrophils % 68.3 (*)     Lymphocytes % 17.1 (*)     Monocytes % 12.6 (*)     Eosinophils % 1.1      Basophils % 0.6      Immature Granulocytes % 0.3      nRBC, Auto 0.3 (*)     Neutrophils, Abs 4.38      Lymphocytes, Absolute 1.10      Monocytes, Absolute 0.81 (*)     Eosinophils, Absolute 0.07      Basophils, Absolute 0.04      Immature Granulocytes, Absolute 0.02      nRBC, Absolute 0.02 (*)     Diff Type Scan Smear     CBC MORPHOLOGY - Abnormal    Platelet Morphology Few Large Platelets (*)     Anisocytosis 1+      Ovalocytes Few      Hypochromic Few     CBC W/ AUTO DIFFERENTIAL    Narrative:     The following orders were created for panel order CBC auto differential.  Procedure                               Abnormality         Status                     ---------                               -----------         ------                     CBC with Differential[[id number]]       Abnormal            Final result                 Please view results for these tests on the individual orders.   SARS-COV-2 RNA AMPLIFICATION, QUAL   TROPONIN I          Imaging Results              X-Ray Chest AP Portable (Final result)  Result time 05/20/25 13:28:41      Final result by Husam Coleman MD (05/20/25 13:28:41)                   Impression:      Mild CHF.      Electronically signed by: Husam Coleman  Date:    05/20/2025  Time:    13:28                Narrative:    EXAMINATION:  XR CHEST, 1 VIEW, PORTABLE    CLINICAL HISTORY:  CHF.    TECHNIQUE:  AP portable view of thorax obtained.    COMPARISON:  CXR 04/22/2025.    FINDINGS:  Submaximal depth of inspiration.    Patient's position is mildly rotated to right.    Moderate cardiomegaly with particular prominence of right heart.    Left subclavian transvenous right ventricular pacemaker.    Atherosclerotic calcification thoracic aorta.    Pulmonary vessels appear unremarkable.    Mild interstitial edema at perihilar regions-lower lungs.    Small bilateral pleural effusions.    Bilateral calcified hilar-mediastinal lymph nodes.    Mild degenerative changes of thoracic spine.    Hypertrophic change at anterior 1st rib ends.    Monitoring electrodes project over thorax-upper abdomen.                                       Medications   dexAMETHasone injection 4 mg (4 mg Intravenous Given 5/20/25 1326)     Medical Decision Making  Patient is a 79-year-old female with a past medical history of Afib, CKD, CAD, CHF, HTN and Anemia who presents to the [facility] Emergency Department for dysphagia. Patient reports for the past few days experiencing food stuck in her throat to solid foods. Associated symptoms are n/v , regurgitation of undigested food particles, and dyspnea. Patient mentioned she tried to take her medications and pill(s) stuck in the throat. She have a history of Hiatal Hernia and EGD in the past without food bolus in April 2025. Denied any chest pain.     DDX  Dysphagia  GERD  Gastroparesis  CHF  STEMI vs NSTEMI    MDM    Patient presents for emergent evaluation of acute Dysphagia that poses a threat to life and/or bodily function.    In the ED patient found to have acute NSTEMI.    I ordered labs and personally reviewed them.  Labs significant for elevated Trop of 636 and BNP 8k; CMP showed CKD.    I ordered X-rays and personally reviewed them and reviewed the radiologist interpretation.  Xray significant for  showed mild cardiomegaly  I ordered EKG and personally reviewed it.  EKG significant for no STEMI.    I ordered CT scan and personally reviewed it and reviewed the radiologist interpretation.  CT significant for not preformed.    Critical care time:   External records reviewed: Vascular specialist notes  Additional historian: relatives     Admission MDM  I discussed the patient presentation and findings with the consultant for NSTEMI and Dysphagia  (speciality).    Patient was managed in the ED with IV .    The response to treatment was Stable..    Patient required emergent consultation to Dr. Gramajo (admitting physician) for admission.       Risk  Prescription drug management.                                      Clinical Impression:   Final diagnoses:  [R06.02] Shortness of breath  [R13.19] Other dysphagia (Primary)  [I21.4] NSTEMI (non-ST elevated myocardial infarction)        ED Disposition Condition    Admit                   Reji Anderson MD  Resident  05/20/25 1440         [1]   Social History  Tobacco Use    Smoking status: Never     Passive exposure: Never    Smokeless tobacco: Never   Substance Use Topics    Alcohol use: Not Currently    Drug use: Never        Reji Anderson MD  Resident  05/20/25 8682

## 2025-05-20 NOTE — H&P
" [facility] Medicine  History & Physical    Patient Name: Pam Ortega  MRN: [id number]  Patient Class: OP- Observation  Admission Date: 5/20/2025  Attending Physician: Husam Hurtado Jr., MD   Primary Care Provider: Janice Velez FNP         Patient information was obtained from patient, relative(s), past medical records, and ER records.     Subjective:     Principal Problem:<principal problem not specified>    Chief Complaint:   Chief Complaint   Patient presents with    Shortness of Breath     Presents to ED for complaints of shortness of breath.  Patient was taking her medication and states that they went down the wrong way.        HPI: 78 yo female presented to the ED with 3 day history dysphagia, nausea, vomiting, and lip swelling. Pt has had difficulty holding anything down, throwing up most of her meds over the last several days. These symptoms started Saturday shortly after starting clindamycin for left leg cellulitis which came about after pts lower leg swelling. Pts nausea and vomiting has gotten better since but pt can only keep down small sips currently. Pt has history of angioedema with ACE/ARBs some years ago.  Pt denies chest pain, fever, chills.    CMP: Creatinine 3.26 (baseline a couple months ago was about 2), bilirubin 2.8   CBC: Hemoglobin 9.3  CXR: "mild CHF" per radiologist  Troponin: 636 to 713  BnP: 8,599    Pt to be admitted to the care of Dr. Hurtado.  Will start on solumedrol, H1, H2 for angioedema.  Consulting GI for dysphagia.  Swallow eval ordered.      PMHx: CVA, DVT, combined CHF, Afib, HTN, DM II  Last ECHO(4/4/2025): 35% EF with diastolic dysfunction, severe tricuspid regurg, moderate to severe mitral and pulmonic regurg, 39 mmHg pulmonic artery pressure  Last EGD(4/22/2025): Unremarkable aside from mucosal erosion in fundus. No strictures or dilations.  Previous troponins measured in 600s. Likely her " baseline.  Chronically elevated bilirubin.    Past Medical History:   Diagnosis Date    Abdominal bloating 04/22/2025    Anemia of chronic disease     Atrial fibrillation     Benign hypertensive CKD, stage 1-4 or unspecified chronic kidney disease     Bronchitis 12/22/2023    Carotid artery occlusion     Cellulitis 12/09/2024    CKD (chronic kidney disease)     Constipation 01/24/2025    Coronary atherosclerosis     Cough 12/22/2023    Cough 12/22/2023    COVID-19 ruled out 03/20/2024    Diabetes mellitus, type 2     DJD (degenerative joint disease)     History of CVA (cerebrovascular accident)     HTN (hypertension)     Hyperlipidemia     Nausea and vomiting 04/22/2025    Nonischemic dilated cardiomyopathy     Obesity     Osteopenia     Other ascites 04/09/2025    Paroxysmal atrial fibrillation     Presence of cardiac pacemaker     PVD (peripheral vascular disease)     Skin ulcer of female breast 12/30/2024    Upper respiratory tract infection 12/22/2023    Vaginal bleeding 04/26/2025    Vitamin D deficiency        Past Surgical History:   Procedure Laterality Date    BREAST BIOPSY      COLONOSCOPY  12/04/2018    repeat in 5 years    EMBOLECTOMY OR THROMBECTOMY, ARTERY, AORTOILIAC, FEMORAL, OR POPLITEAL Right 11/17/2024    Procedure: EMBOLECTOMY OR THROMBECTOMY, ARTERY, AORTOILIAC, FEMORAL, OR POPLITEAL;  Surgeon: Jordana Guillermo MD;  Location: [facility];  Service: Vascular;  Laterality: Right;    pace maker      VAGINAL DELIVERY      x 3       Review of patient's allergies indicates:   Allergen Reactions    Ace inhibitors Edema    Losartan Swelling       No current facility-administered medications on file prior to encounter.     Current Outpatient Medications on File Prior to Encounter   Medication Sig    albuterol (VENTOLIN HFA) 90 mcg/actuation inhaler Inhale 2 puffs into the lungs every 6 (six) hours as needed for Wheezing. Rescue Inhaler    amLODIPine (NORVASC) 10 MG tablet Take 1 tablet (10 mg total) by  mouth once daily.    aspirin (ECOTRIN) 81 MG EC tablet Take 81 mg by mouth once daily.    cholecalciferol, vitamin D3, (VITAMIN D3) 50 mcg (2,000 unit) Cap capsule Take 1 capsule by mouth once daily.    clindamycin (CLEOCIN) 300 MG capsule Take 1 capsule (300 mg total) by mouth 3 (three) times daily. for 10 days    colchicine (COLCRYS) 0.6 mg tablet Take 1 tablet (0.6 mg total) by mouth every other day for gout.    dapagliflozin propanediol (FARXIGA) 10 mg tablet Take 1 tablet (10 mg total) by mouth once daily.    fluticasone-salmeterol diskus inhaler 250-50 mcg Inhale 1 puff into the lungs 2 (two) times daily. Controller Inhaler    hydrALAZINE (APRESOLINE) 50 MG tablet Take 1 tablet (50 mg total) by mouth 2 (two) times a day.    isosorbide mononitrate (IMDUR) 30 MG 24 hr tablet Take 1 tablet (30 mg total) by mouth once daily.    lovastatin (MEVACOR) 20 MG tablet Take 1 tablet (20 mg total) by mouth every night    metOLazone (ZAROXOLYN) 5 MG tablet Take 1 tablet (5 mg total) by mouth daily as needed for fluid    metoprolol tartrate (LOPRESSOR) 50 MG tablet Take 1 tablet (50 mg total) by mouth 2 (two) times a day.    omeprazole (PRILOSEC) 20 MG capsule Take 1 capsule (20 mg total) by mouth once daily.    potassium chloride (MICRO-K) 10 MEQ CpSR Take 1 capsule (10 mEq total) by mouth once daily.    rivaroxaban (XARELTO) 15 mg Tab Take 15 mg by mouth daily with dinner or evening meal.    semaglutide (OZEMPIC) 1 mg/dose (4 mg/3 mL) Inject 1 mg into the skin every 7 days.    [DISCONTINUED] furosemide (LASIX) 80 MG tablet Take 0.5 tablets (40 mg total) by mouth every morning. (Patient taking differently: Take 80 mg by mouth every morning.)    furosemide (LASIX) 80 MG tablet Take 80 mg by mouth once daily.     Family History       Problem Relation (Age of Onset)    Alzheimer's disease Maternal Grandmother    Breast cancer Maternal Grandmother, Daughter    Cancer Brother    Clotting disorder Daughter    Diabetes Daughter,  Daughter    Hyperlipidemia Daughter    Hypertension Mother, Father, Sister, Brother, Maternal Grandmother, Daughter, Daughter, Daughter    Stroke Sister, Brother          Tobacco Use    Smoking status: Never     Passive exposure: Never    Smokeless tobacco: Never   Substance and Sexual Activity    Alcohol use: Not Currently    Drug use: Never    Sexual activity: Not Currently     Review of Systems   Constitutional:  Negative for chills and fever.   HENT:  Positive for facial swelling and trouble swallowing. Negative for sore throat.    Respiratory:  Positive for shortness of breath. Negative for cough.    Cardiovascular:  Negative for chest pain and palpitations.   Gastrointestinal:  Positive for nausea and vomiting. Negative for abdominal pain.   Genitourinary:  Negative for dysuria.     Objective:     Vital Signs (Most Recent):  Temp: 98.1 °F (36.7 °C) (05/20/25 1628)  Pulse: 75 (05/20/25 1628)  Resp: 19 (05/20/25 1628)  BP: 115/75 (05/20/25 1628)  SpO2: 96 % (05/20/25 1628) Vital Signs (24h Range):  Temp:  [97.9 °F (36.6 °C)-98.1 °F (36.7 °C)] 98.1 °F (36.7 °C)  Pulse:  [58-75] 75  Resp:  [18-22] 19  SpO2:  [93 %-96 %] 96 %  BP: (115-130)/(70-75) 115/75     Weight: 89.4 kg (197 lb 1.5 oz)  Body mass index is 28.28 kg/m².     Physical Exam  Vitals and nursing note reviewed.   Constitutional:       General: She is not in acute distress.     Appearance: She is normal weight. She is not ill-appearing.   HENT:      Head: Normocephalic and atraumatic.      Mouth/Throat:      Comments: Upper lip swelling  lower lip, tongue, and oral mucosa without swelling  Cardiovascular:      Rate and Rhythm: Normal rate and regular rhythm.      Pulses: Normal pulses.      Heart sounds: Normal heart sounds. No murmur heard.     No friction rub. No gallop.   Pulmonary:      Effort: Pulmonary effort is normal. No respiratory distress.      Breath sounds: Normal breath sounds.   Abdominal:      General: Bowel sounds are normal.       Tenderness: There is no abdominal tenderness.   Musculoskeletal:      Right lower leg: No edema.      Left lower leg: No edema.      Comments: Left leg with stocking on with wound wraps   Skin:     General: Skin is warm and dry.   Neurological:      General: No focal deficit present.      Mental Status: She is alert and oriented to person, place, and time.   Psychiatric:         Mood and Affect: Mood normal.         Behavior: Behavior normal.                Significant Labs: All pertinent labs within the past 24 hours have been reviewed.  Recent Lab Results         05/20/25  1356   05/20/25  1355   05/20/25  1246        Albumin/Globulin Ratio     0.8       Albumin     3.3       ALP     75       ALT     7       Anion Gap     19       Aniso     1+       AST     22       Baso #     0.04       Basophil %     0.6       BILIRUBIN TOTAL     2.8       BUN     56       BUN/CREAT RATIO     17       Calcium     9.4       Chloride     102       CO2     18       SARS COV-2 MOLECULAR   Negative         Creatinine     3.26       Differential Method     Scan Smear       eGFR     14  Comment: Estimated GFR calculated using the CKD-EPI creatinine (2021) equation.       Eos #     0.07       Eos %     1.1       Globulin, Total     4.1       Glucose     161       Hematocrit     30.5       Hemoglobin     9.3       Hypo     Few       Immature Grans (Abs)     0.02       Immature Granulocytes     0.3       Lymph #     1.10       Lymph %     17.1       MCH     23.7       MCHC     30.5       MCV     77.6       Mono #     0.81       Mono %     12.6       MPV     9.9       Neutrophils, Abs     4.38       Neutrophils Relative     68.3       nRBC     0.3       NT-proBNP     8,599       NUCLEATED RBC ABSOLUTE     0.02       Ovalocytes     Few       PLATELET MORPHOLOGY     Few Large Platelets       Platelet Count     208       Potassium     3.5       PROTEIN TOTAL     7.4       RBC     3.93       RDW     23.8       Sodium     135       Troponin I  "High Sensitivity 713.1  Comment: Critical Result called and verified by verbal readback to: Hillary on 05/20/2025 at 14:57. Reported by [id number].     636.0  Comment: Critical Result called and verified by verbal readback to: JONA on 05/20/2025 at 13:45. Reported by [id number].       WBC     6.42               Significant Imaging: I have reviewed all pertinent imaging results/findings within the past 24 hours.  Assessment/Plan:     Assessment & Plan  Angioedema  Currently idiopathic but treating as allergic reaction.  Only involves upper lip on physical exam but pt complains of dysphagia.    Starting on cetrizine 10 mg BID and Pepcid 20 mg daily.  Solumedrol 60 mg q6h.  Dysphagia  Acute dysphagia, tolerates small sips but intolerant to solid food.  GI consulted.  Swallow eval ordered.  Treating angioedema.  Acute kidney injury superimposed on stage 4 chronic kidney disease  JONAS is likely due to pre-renal azotemia due to dehydration. Baseline creatinine is about 2. Most recent creatinine and eGFR are listed below.  Recent Labs     05/20/25  1246   CREATININE 3.26*   EGFRNORACEVR 14*      Plan  - JONAS is stable  - Avoid nephrotoxins and renally dose meds for GFR listed above  - Monitor urine output, serial BMP, and adjust therapy as needed    Starting on IV fluids as pt is NPO currently.  Pausing diuretic therapy for CHF currently. Will reevaluate tomorrow morning to determine volume status.  Would like to resume xeralto as kidney function improves but pt has recent history of abnormal uterine bleeding.  Diabetes mellitus without complication  Patient's FSGs are controlled on current medication regimen.  Last A1c reviewed-   Lab Results   Component Value Date    HGBA1C 6.1 05/12/2025     Most recent fingerstick glucose reviewed- No results for input(s): "POCTGLUCOSE" in the last 24 hours.  Current correctional scale  Will start sliding scale when NPO lifted.  Maintain anti-hyperglycemic dose as follows- " "  Antihyperglycemics (From admission, onward)      Start     Stop Route Frequency Ordered    05/21/25 0900  dapagliflozin propanediol (Farxiga) tablet 10 mg        Question Answer Comment   Does this patient have a diagnosis of heart failure? Yes    Does this patient have type 1 diabetes or diabetic ketoacidosis? No    Does this patient have symptomatic hypotension? No    Is the patient NPO or pending major surgery in next 3 days or less? No        -- Oral Daily 05/20/25 1621          Hold Oral hypoglycemics while patient is in the hospital.    Continuing pt's SGLT2 tomorrow. Will likely need to add on insulin as steroid treatment continues.  Hypertension  Patient's blood pressure range in the last 24 hours was: BP  Min: 115/75  Max: 130/72.The patient's inpatient anti-hypertensive regimen is listed below:  Current Antihypertensives  amLODIPine tablet 10 mg, Daily, Oral  hydrALAZINE tablet 50 mg, 2 times daily, Oral  isosorbide mononitrate 24 hr tablet 30 mg, Daily, Oral  metoprolol tartrate (LOPRESSOR) tablet 50 mg, 2 times daily, Oral  , Daily, Oral    Plan  - BP is controlled, no changes needed to their regimen      Resuming home meds.  Chronic combined systolic and diastolic congestive heart failure  Patient has Combined Systolic and Diastolic heart failure that is Chronic. On presentation their CHF was well compensated. Most recent BNP and echo results are listed below.  No results for input(s): "BNP" in the last 72 hours.  Latest ECHO  Results for orders placed during the hospital encounter of 04/04/25    Echo Saline Bubble? No    Interpretation Summary    Left Ventricle: The left ventricle is mildly dilated. Increased ventricular mass. Normal wall thickness. There is eccentric hypertrophy. Septal motion is consistent with pacing. There is moderately reduced systolic function with a visually estimated ejection fraction of 30 - 40%. Quantitated ejection fraction is 35%. There is diastolic dysfunction.    Right " Ventricle: The right ventricle has mild enlargement. Systolic function is moderately reduced. Pacemaker lead present in the ventricle.    Left Atrium: Severely dilated    Right Atrium: Right atrium is moderately dilated.    Mitral Valve: There is at least moderate regurgitation with an eccentrically posterolateral directed jet. MR jet is only well-visualized on parasternal long axis views, remaining views w/ supoptimal assessment of MR as MR jet is not adequately captured.    Tricuspid Valve: There is severe regurgitation.    Pulmonic Valve: There is moderate to severe regurgitation.    Pulmonary Artery: The estimated pulmonary artery systolic pressure is 39 mmHg.    IVC/SVC: Intermediate venous pressure at 8 mmHg.    Pericardium: Right pleural effusion.    Compared to prior ECHO from 11/204 (which was also personally reviewed): LV size and LVEF are unchanged. LV remains mildy dilated with a moderately reduced LVEF. Improvement in RA and RV size noted on current study (previously severely dilated). MR is better visualized on previous study and at least moderate MR previously- appears largely unchanged however, there are technical limitations with MR assessment on current study as detailed above.    Current Heart Failure Medications  hydrALAZINE tablet 50 mg, 2 times daily, Oral  dapagliflozin propanediol (Farxiga) tablet 10 mg, Daily, Oral  , Daily, Oral    Plan  - Monitor strict I&Os and daily weights.    - Place on telemetry  - Low sodium diet  - Place on fluid restriction of none currently.   - Cardiology has not been consulted    Volume depleted currently as pt has not been able to tolerate much oral intake. Will reevaluate tomorrow. On  mL/hr currently.      VTE Risk Mitigation (From admission, onward)           Ordered     enoxaparin injection 30 mg  Every 24 hours         05/20/25 1631     IP VTE HIGH RISK PATIENT  Once         05/20/25 1501     Place sequential compression device  Until discontinued          05/20/25 1501                           On 05/20/2025, patient should be placed in hospital observation services under my care in collaboration with Dr. Husam Hurtado.         Dru Schedule, DO  Department of Hospital Medicine  [facility]

## 2025-05-20 NOTE — ASSESSMENT & PLAN NOTE
Currently idiopathic but treating as allergic reaction.  Only involves upper lip on physical exam but pt complains of dysphagia.    Starting on cetrizine 10 mg BID and Pepcid 20 mg daily.  Solumedrol 60 mg q6h.

## 2025-05-20 NOTE — ASSESSMENT & PLAN NOTE
JONAS is likely due to pre-renal azotemia due to dehydration. Baseline creatinine is about 2. Most recent creatinine and eGFR are listed below.  Recent Labs     05/20/25  1246   CREATININE 3.26*   EGFRNORACEVR 14*      Plan  - JONAS is stable  - Avoid nephrotoxins and renally dose meds for GFR listed above  - Monitor urine output, serial BMP, and adjust therapy as needed    Starting on IV fluids as pt is NPO currently.  Pausing diuretic therapy for CHF currently. Will reevaluate tomorrow morning to determine volume status.  Would like to resume xeralto as kidney function improves but pt has recent history of abnormal uterine bleeding.

## 2025-05-20 NOTE — ASSESSMENT & PLAN NOTE
Patient's blood pressure range in the last 24 hours was: BP  Min: 115/75  Max: 130/72.The patient's inpatient anti-hypertensive regimen is listed below:  Current Antihypertensives  amLODIPine tablet 10 mg, Daily, Oral  hydrALAZINE tablet 50 mg, 2 times daily, Oral  isosorbide mononitrate 24 hr tablet 30 mg, Daily, Oral  metoprolol tartrate (LOPRESSOR) tablet 50 mg, 2 times daily, Oral  , Daily, Oral    Plan  - BP is controlled, no changes needed to their regimen      Resuming home meds.

## 2025-05-20 NOTE — FIRST PROVIDER EVALUATION
Emergency Department TeleTriage Encounter Note      CHIEF COMPLAINT    Chief Complaint   Patient presents with    Shortness of Breath     Presents to ED for complaints of shortness of breath.  Patient was taking her medication and states that they went down the wrong way.       VITAL SIGNS   Initial Vitals [05/20/25 1148]   BP Pulse Resp Temp SpO2   129/70 61 (!) 22 97.9 °F (36.6 °C) 96 %      MAP       --            ALLERGIES    Review of patient's allergies indicates:   Allergen Reactions    Ace inhibitors Edema    Losartan Swelling       PROVIDER TRIAGE NOTE  79-year-old female brought to the emergency room today by her daughter.  They report the patient has had some progressive shortness of breath, coughing up a lot of clear mucus, and some difficulty swallowing over the past week.    Denies any fever or chills or chest pain.      ORDERS  Labs Reviewed   CBC W/ AUTO DIFFERENTIAL    Narrative:     The following orders were created for panel order CBC auto differential.  Procedure                               Abnormality         Status                     ---------                               -----------         ------                     CBC with Differential[[id number]]                                                        Please view results for these tests on the individual orders.   COMPREHENSIVE METABOLIC PANEL   TROPONIN I   NT-PRO NATRIURETIC PEPTIDE   CBC WITH DIFFERENTIAL       ED Orders (720h ago, onward)      Start Ordered     Status Ordering Provider    05/20/25 1235 05/20/25 1235  Vital signs  Every 30 min         Acknowledged NATTY HAILE    05/20/25 1235 05/20/25 1235  Cardiac Monitoring - Adult  Continuous        Comments: Notify Physician If:    Ordered NATTY HAILE    05/20/25 1235 05/20/25 1235  Pulse Oximetry Continuous  Continuous         Acknowledged NATTY HAILE    05/20/25 1235 05/20/25 1235  Insert peripheral IV  Once         Acknowledged NATTY HAILE    05/20/25  1235 05/20/25 1235  CBC auto differential  STAT         Acknowledged NATTY HAILE    05/20/25 1235 05/20/25 1235  Comprehensive metabolic panel  STAT         Acknowledged NATTY HAILE    05/20/25 1235 05/20/25 1235  Troponin I  STAT         Acknowledged NATTY HAILE    05/20/25 1235 05/20/25 1235  NT-Pro Natriuretic Peptide  STAT         Acknowledged NATTY HAILE    05/20/25 1235 05/20/25 1235  EKG 12-lead  Once         Completed by JOSEFINA LUCIO on 5/20/2025 at 12:35 PM NATTY HAILE    05/20/25 1235 05/20/25 1235  X-Ray Chest AP Portable  1 time imaging         Acknowledged NATTY HAILE    05/20/25 1235 05/20/25 1235  CBC with Differential  PROCEDURE ONCE         Ordered NATTY HAILE              Virtual Visit Note: The provider triage portion of this emergency department evaluation and documentation was performed via [employer], a HIPAA-compliant telemedicine application, in concert with a tele-presenter in the room. A face to face patient evaluation with one of my colleagues will occur once the patient is placed in an emergency department room.      DISCLAIMER: This note was prepared with [employer] voice recognition transcription software. Garbled syntax, mangled pronouns, and other bizarre constructions may be attributed to that software system.

## 2025-05-21 LAB
ALBUMIN SERPL BCP-MCNC: 3.2 G/DL (ref 3.4–4.8)
ALBUMIN/GLOB SERPL: 0.9 {RATIO}
ALP SERPL-CCNC: 75 U/L (ref 40–150)
ALT SERPL W P-5'-P-CCNC: 9 U/L
ANION GAP SERPL CALCULATED.3IONS-SCNC: 18 MMOL/L (ref 7–16)
ANISOCYTOSIS BLD QL SMEAR: ABNORMAL
AST SERPL W P-5'-P-CCNC: 21 U/L (ref 11–45)
BASOPHILS # BLD AUTO: 0 K/UL (ref 0–0.2)
BASOPHILS NFR BLD AUTO: 0 % (ref 0–1)
BILIRUB SERPL-MCNC: 2.5 MG/DL
BUN SERPL-MCNC: 54 MG/DL (ref 10–20)
BUN/CREAT SERPL: 18 (ref 6–20)
CALCIUM SERPL-MCNC: 8.8 MG/DL (ref 8.4–10.2)
CHLORIDE SERPL-SCNC: 101 MMOL/L (ref 98–107)
CO2 SERPL-SCNC: 18 MMOL/L (ref 23–31)
CREAT SERPL-MCNC: 2.99 MG/DL (ref 0.55–1.02)
CRENATED CELLS: ABNORMAL
DIFFERENTIAL METHOD BLD: ABNORMAL
EGFR (NO RACE VARIABLE) (RUSH/TITUS): 16 ML/MIN/1.73M2
EOSINOPHIL # BLD AUTO: 0.01 K/UL (ref 0–0.5)
EOSINOPHIL NFR BLD AUTO: 0.2 % (ref 1–4)
ERYTHROCYTE [DISTWIDTH] IN BLOOD BY AUTOMATED COUNT: 23.5 % (ref 11.5–14.5)
GLOBULIN SER-MCNC: 3.6 G/DL (ref 2–4)
GLUCOSE SERPL-MCNC: 222 MG/DL (ref 82–115)
GLUCOSE SERPL-MCNC: 398 MG/DL (ref 70–105)
GLUCOSE SERPL-MCNC: 433 MG/DL (ref 70–105)
GLUCOSE SERPL-MCNC: 464 MG/DL (ref 70–105)
HCT VFR BLD AUTO: 29.2 % (ref 38–47)
HGB BLD-MCNC: 9.1 G/DL (ref 12–16)
IMM GRANULOCYTES # BLD AUTO: 0.02 K/UL (ref 0–0.04)
IMM GRANULOCYTES NFR BLD: 0.5 % (ref 0–0.4)
LYMPHOCYTES # BLD AUTO: 0.97 K/UL (ref 1–4.8)
LYMPHOCYTES NFR BLD AUTO: 23 % (ref 27–41)
MCH RBC QN AUTO: 24.3 PG (ref 27–31)
MCHC RBC AUTO-ENTMCNC: 31.2 G/DL (ref 32–36)
MCV RBC AUTO: 78.1 FL (ref 80–96)
MONOCYTES # BLD AUTO: 0.07 K/UL (ref 0–0.8)
MONOCYTES NFR BLD AUTO: 1.7 % (ref 2–6)
MPC BLD CALC-MCNC: 10 FL (ref 9.4–12.4)
NEUTROPHILS # BLD AUTO: 3.14 K/UL (ref 1.8–7.7)
NEUTROPHILS NFR BLD AUTO: 74.6 % (ref 53–65)
NRBC # BLD AUTO: 0.02 X10E3/UL
NRBC, AUTO (.00): 0.5 %
OHS QRS DURATION: 158 MS
OHS QTC CALCULATION: 466 MS
PLATELET # BLD AUTO: 182 K/UL (ref 150–400)
PLATELET MORPHOLOGY: ABNORMAL
POLYCHROMASIA BLD QL SMEAR: ABNORMAL
POTASSIUM SERPL-SCNC: 4 MMOL/L (ref 3.5–5.1)
PROT SERPL-MCNC: 6.8 G/DL (ref 5.8–7.6)
RBC # BLD AUTO: 3.74 M/UL (ref 4.2–5.4)
SODIUM SERPL-SCNC: 133 MMOL/L (ref 136–145)
TARGETS BLD QL SMEAR: ABNORMAL
WBC # BLD AUTO: 4.21 K/UL (ref 4.5–11)

## 2025-05-21 PROCEDURE — 85025 COMPLETE CBC W/AUTO DIFF WBC: CPT

## 2025-05-21 PROCEDURE — 99900035 HC TECH TIME PER 15 MIN (STAT)

## 2025-05-21 PROCEDURE — 92610 EVALUATE SWALLOWING FUNCTION: CPT

## 2025-05-21 PROCEDURE — 99232 SBSQ HOSP IP/OBS MODERATE 35: CPT | Mod: GC,,, | Performed by: FAMILY MEDICINE

## 2025-05-21 PROCEDURE — 63600175 PHARM REV CODE 636 W HCPCS

## 2025-05-21 PROCEDURE — 99233 SBSQ HOSP IP/OBS HIGH 50: CPT | Mod: ,,, | Performed by: INTERNAL MEDICINE

## 2025-05-21 PROCEDURE — 11000001 HC ACUTE MED/SURG PRIVATE ROOM

## 2025-05-21 PROCEDURE — 82962 GLUCOSE BLOOD TEST: CPT

## 2025-05-21 PROCEDURE — G0378 HOSPITAL OBSERVATION PER HR: HCPCS

## 2025-05-21 PROCEDURE — 96372 THER/PROPH/DIAG INJ SC/IM: CPT

## 2025-05-21 PROCEDURE — 36415 COLL VENOUS BLD VENIPUNCTURE: CPT

## 2025-05-21 PROCEDURE — 25000003 PHARM REV CODE 250

## 2025-05-21 PROCEDURE — 80053 COMPREHEN METABOLIC PANEL: CPT

## 2025-05-21 PROCEDURE — 25000003 PHARM REV CODE 250: Performed by: INTERNAL MEDICINE

## 2025-05-21 RX ORDER — IBUPROFEN 200 MG
16 TABLET ORAL
Status: DISCONTINUED | OUTPATIENT
Start: 2025-05-21 | End: 2025-05-22 | Stop reason: HOSPADM

## 2025-05-21 RX ORDER — FAMOTIDINE 20 MG/1
20 TABLET, FILM COATED ORAL EVERY OTHER DAY
Status: DISCONTINUED | OUTPATIENT
Start: 2025-05-23 | End: 2025-05-22 | Stop reason: HOSPADM

## 2025-05-21 RX ORDER — CETIRIZINE HYDROCHLORIDE 5 MG/1
5 TABLET ORAL DAILY
Status: DISCONTINUED | OUTPATIENT
Start: 2025-05-22 | End: 2025-05-22 | Stop reason: HOSPADM

## 2025-05-21 RX ORDER — GLUCAGON 1 MG
1 KIT INJECTION
Status: DISCONTINUED | OUTPATIENT
Start: 2025-05-21 | End: 2025-05-22 | Stop reason: HOSPADM

## 2025-05-21 RX ORDER — FUROSEMIDE 40 MG/1
80 TABLET ORAL DAILY
Status: DISCONTINUED | OUTPATIENT
Start: 2025-05-22 | End: 2025-05-22 | Stop reason: HOSPADM

## 2025-05-21 RX ORDER — IBUPROFEN 200 MG
24 TABLET ORAL
Status: DISCONTINUED | OUTPATIENT
Start: 2025-05-21 | End: 2025-05-22 | Stop reason: HOSPADM

## 2025-05-21 RX ORDER — INSULIN ASPART 100 [IU]/ML
0-5 INJECTION, SOLUTION INTRAVENOUS; SUBCUTANEOUS
Status: DISCONTINUED | OUTPATIENT
Start: 2025-05-21 | End: 2025-05-22 | Stop reason: HOSPADM

## 2025-05-21 RX ORDER — INSULIN GLARGINE 100 [IU]/ML
5 INJECTION, SOLUTION SUBCUTANEOUS NIGHTLY
Status: DISCONTINUED | OUTPATIENT
Start: 2025-05-21 | End: 2025-05-22 | Stop reason: HOSPADM

## 2025-05-21 RX ORDER — PREDNISONE 20 MG/1
20 TABLET ORAL DAILY
Status: DISCONTINUED | OUTPATIENT
Start: 2025-05-22 | End: 2025-05-21

## 2025-05-21 RX ADMIN — INSULIN ASPART 3 UNITS: 100 INJECTION, SOLUTION INTRAVENOUS; SUBCUTANEOUS at 09:05

## 2025-05-21 RX ADMIN — INSULIN GLARGINE 5 UNITS: 100 INJECTION, SOLUTION SUBCUTANEOUS at 09:05

## 2025-05-21 RX ADMIN — PRAVASTATIN SODIUM 20 MG: 10 TABLET ORAL at 08:05

## 2025-05-21 RX ADMIN — METHYLPREDNISOLONE SODIUM SUCCINATE 60 MG: 40 INJECTION, POWDER, FOR SOLUTION INTRAMUSCULAR; INTRAVENOUS at 05:05

## 2025-05-21 RX ADMIN — DAPAGLIFLOZIN 10 MG: 10 TABLET, FILM COATED ORAL at 08:05

## 2025-05-21 RX ADMIN — METHYLPREDNISOLONE SODIUM SUCCINATE 40 MG: 40 INJECTION, POWDER, FOR SOLUTION INTRAMUSCULAR; INTRAVENOUS at 12:05

## 2025-05-21 RX ADMIN — AMLODIPINE BESYLATE 10 MG: 10 TABLET ORAL at 08:05

## 2025-05-21 RX ADMIN — PANTOPRAZOLE SODIUM 40 MG: 40 TABLET, DELAYED RELEASE ORAL at 08:05

## 2025-05-21 RX ADMIN — SODIUM CHLORIDE: 9 INJECTION, SOLUTION INTRAVENOUS at 02:05

## 2025-05-21 RX ADMIN — INSULIN ASPART 5 UNITS: 100 INJECTION, SOLUTION INTRAVENOUS; SUBCUTANEOUS at 04:05

## 2025-05-21 RX ADMIN — METOPROLOL TARTRATE 50 MG: 25 TABLET, FILM COATED ORAL at 09:05

## 2025-05-21 RX ADMIN — INSULIN ASPART 5 UNITS: 100 INJECTION, SOLUTION INTRAVENOUS; SUBCUTANEOUS at 12:05

## 2025-05-21 RX ADMIN — FAMOTIDINE 20 MG: 20 TABLET, FILM COATED ORAL at 08:05

## 2025-05-21 RX ADMIN — CETIRIZINE HYDROCHLORIDE 10 MG: 10 TABLET, FILM COATED ORAL at 08:05

## 2025-05-21 RX ADMIN — ASPIRIN 81 MG: 81 TABLET, COATED ORAL at 08:05

## 2025-05-21 RX ADMIN — ISOSORBIDE MONONITRATE 30 MG: 30 TABLET, EXTENDED RELEASE ORAL at 08:05

## 2025-05-21 RX ADMIN — ENOXAPARIN SODIUM 30 MG: 30 INJECTION SUBCUTANEOUS at 04:05

## 2025-05-21 RX ADMIN — METHYLPREDNISOLONE SODIUM SUCCINATE 60 MG: 40 INJECTION, POWDER, FOR SOLUTION INTRAMUSCULAR; INTRAVENOUS at 12:05

## 2025-05-21 RX ADMIN — METOPROLOL TARTRATE 50 MG: 25 TABLET, FILM COATED ORAL at 08:05

## 2025-05-21 NOTE — ASSESSMENT & PLAN NOTE
Acute dysphagia, tolerates small sips but intolerant to solid food.  GI consulted.  Swallow eval ordered.  Treating angioedema.    5/21  Pt tolerating oral intake.

## 2025-05-21 NOTE — PROGRESS NOTES
"[facility] Medicine  Progress Note    Patient Name: Pam Ortega  MRN: [id number]  Patient Class: OP- Observation   Admission Date: 5/20/2025  Length of Stay: 0 days  Attending Physician: Husam Hurtado Jr., MD  Primary Care Provider: Janice Velez FNP        Subjective     Principal Problem:Angioedema        HPI:  78 yo female presented to the ED with 3 day history dysphagia, nausea, vomiting, and lip swelling. Pt has had difficulty holding anything down, throwing up most of her meds over the last several days. These symptoms started Saturday shortly after starting clindamycin for left leg cellulitis which came about after pts lower leg swelling. Pts nausea and vomiting has gotten better since but pt can only keep down small sips currently. Pt has history of angioedema with ACE/ARBs some years ago.  Pt denies chest pain, fever, chills.    CMP: Creatinine 3.26 (baseline a couple months ago was about 2), bilirubin 2.8   CBC: Hemoglobin 9.3  CXR: "mild CHF" per radiologist  Troponin: 636 to 713  BnP: 8,599    Pt to be admitted to the care of Dr. Hurtado.  Will start on solumedrol, H1, H2 for angioedema.  Consulting GI for dysphagia.  Swallow eval ordered.      PMHx: CVA, DVT, combined CHF, Afib, HTN, DM II  Last ECHO(4/4/2025): 35% EF with diastolic dysfunction, severe tricuspid regurg, moderate to severe mitral and pulmonic regurg, 39 mmHg pulmonic artery pressure  Last EGD(4/22/2025): Unremarkable aside from mucosal erosion in fundus. No strictures or dilations.  Previous troponins measured in 600s. Likely her baseline.  Chronically elevated bilirubin.    Overview/Hospital Course:  No notes on file    Interval History: No acute events overnight. Pt doing much better. Angioedema on lip has improved and her swallowing has improved as well. Will slow down on solumedrol and convert to po prednisone by tomorrow. Pt will likely need several days of oral " prednisone. Slowed down fluids before discontinuing. Pt likely at or around euvolemic. Pt tolerating oral intake.    Review of Systems   Constitutional:  Negative for chills and fever.   Respiratory:  Negative for shortness of breath.    Cardiovascular:  Negative for chest pain and palpitations.   Gastrointestinal:  Negative for abdominal pain, nausea and vomiting.     Objective:     Vital Signs (Most Recent):  Temp: 97.3 °F (36.3 °C) (05/21/25 1127)  Pulse: 67 (05/21/25 1127)  Resp: 18 (05/21/25 1127)  BP: 101/63 (05/21/25 1127)  SpO2: 95 % (05/21/25 1127) Vital Signs (24h Range):  Temp:  [97.3 °F (36.3 °C)-98.1 °F (36.7 °C)] 97.3 °F (36.3 °C)  Pulse:  [58-76] 67  Resp:  [18-22] 18  SpO2:  [93 %-99 %] 95 %  BP: (101-133)/(63-81) 101/63     Weight: 89.4 kg (197 lb 1.5 oz)  Body mass index is 28.28 kg/m².  No intake or output data in the 24 hours ending 05/21/25 1236      Physical Exam  Vitals and nursing note reviewed.   Constitutional:       General: She is not in acute distress.     Appearance: Normal appearance. She is not ill-appearing.   HENT:      Head: Normocephalic and atraumatic.   Cardiovascular:      Rate and Rhythm: Normal rate and regular rhythm.      Pulses: Normal pulses.      Heart sounds: Normal heart sounds. No murmur heard.     No friction rub. No gallop.   Pulmonary:      Effort: Pulmonary effort is normal. No respiratory distress.      Breath sounds: Normal breath sounds.   Abdominal:      General: Bowel sounds are normal.      Tenderness: There is no abdominal tenderness.   Musculoskeletal:      Right lower leg: No edema.      Left lower leg: No edema.   Skin:     General: Skin is warm and dry.   Neurological:      General: No focal deficit present.      Mental Status: She is alert and oriented to person, place, and time.   Psychiatric:         Mood and Affect: Mood normal.         Behavior: Behavior normal.               Significant Labs: All pertinent labs within the past 24 hours have been  reviewed.  Recent Lab Results  (Last 5 results in the past 24 hours)        05/21/25  1202   05/21/25  0531   05/20/25  1738   05/20/25  1356   05/20/25  1355        Albumin/Globulin Ratio   0.9             Albumin   3.2             ALP   75             ALT   9             Anion Gap   18             Aniso   2+             AST   21             Baso #   0.00             Basophil %   0.0             BILIRUBIN TOTAL   2.5             BUN   54             BUN/CREAT RATIO   18             Calcium   8.8             Chloride   101             CO2   18             SARS COV-2 MOLECULAR         Negative       Creatinine   2.99             Crenated RBC's   1+             Differential Method   Scan Smear             eGFR   16  Comment: Estimated GFR calculated using the CKD-EPI creatinine (2021) equation.             Eos #   0.01             Eos %   0.2             Globulin, Total   3.6             Glucose   222             Hematocrit   29.2             Hemoglobin   9.1             Immature Grans (Abs)   0.02             Immature Granulocytes   0.5             Lymph #   0.97             Lymph %   23.0             MCH   24.3             MCHC   31.2             MCV   78.1             Mono #   0.07             Mono %   1.7             MPV   10.0             Neutrophils, Abs   3.14             Neutrophils Relative   74.6             nRBC   0.5             NUCLEATED RBC ABSOLUTE   0.02             PLATELET MORPHOLOGY   Few Large Platelets             Platelet Count   182             POC Glucose 398     153           Poly   Few             Potassium   4.0             PROTEIN TOTAL   6.8             RBC   3.74             RDW   23.5             Sodium   133             Target Cells   Few             Troponin I High Sensitivity       713.1  Comment: Critical Result called and verified by verbal readback to: Hillary on 05/20/2025 at 14:57. Reported by [id number].         WBC   4.21                                    Significant Imaging: I  "have reviewed all pertinent imaging results/findings within the past 24 hours.      Assessment & Plan  Angioedema  Currently idiopathic but treating as allergic reaction.  Only involves upper lip on physical exam but pt complains of dysphagia.    Starting on cetrizine 10 mg BID and Pepcid 20 mg daily.  Solumedrol 60 mg q6h.    5/21  Reducing solu-medrol to 40 mg q6h before transitioning to po prednisone.  Dysphagia  Acute dysphagia, tolerates small sips but intolerant to solid food.  GI consulted.  Swallow eval ordered.  Treating angioedema.    5/21  Pt tolerating oral intake.  Acute kidney injury superimposed on stage 4 chronic kidney disease  JONAS is likely due to pre-renal azotemia due to dehydration. Baseline creatinine is about 2. Most recent creatinine and eGFR are listed below.  Recent Labs     05/20/25  1246 05/21/25  0531   CREATININE 3.26* 2.99*   EGFRNORACEVR 14* 16*      Plan  - JONAS is stable  - Avoid nephrotoxins and renally dose meds for GFR listed above  - Monitor urine output, serial BMP, and adjust therapy as needed    Starting on IV fluids as pt is NPO currently.  Pausing diuretic therapy for CHF currently. Will reevaluate tomorrow morning to determine volume status.  Would like to resume xeralto as kidney function improves but pt has recent history of abnormal uterine bleeding.    5/21  Creatinine improved to 2.99; hopefully continues to improve. May resume diuretic therapy tomorrow.   Diabetes mellitus without complication  Patient's FSGs are controlled on current medication regimen.  Last A1c reviewed-   Lab Results   Component Value Date    HGBA1C 6.1 05/12/2025     Most recent fingerstick glucose reviewed- No results for input(s): "POCTGLUCOSE" in the last 24 hours.  Current correctional scale  Will start sliding scale when NPO lifted.  Maintain anti-hyperglycemic dose as follows-   Antihyperglycemics (From admission, onward)      Start     Stop Route Frequency Ordered    05/21/25 2100  insulin " "glargine U-100 (Lantus) injection 5 Units         -- SubQ Nightly 05/21/25 0717    05/21/25 0900  dapagliflozin propanediol (Farxiga) tablet 10 mg        Question Answer Comment   Does this patient have a diagnosis of heart failure? Yes    Does this patient have type 1 diabetes or diabetic ketoacidosis? No    Does this patient have symptomatic hypotension? No    Is the patient NPO or pending major surgery in next 3 days or less? No        -- Oral Daily 05/20/25 1621    05/21/25 0816  insulin aspart U-100 injection 0-5 Units         -- SubQ Before meals & nightly PRN 05/21/25 0716          Hold Oral hypoglycemics while patient is in the hospital.    Continuing pt's SGLT2 tomorrow. Will likely need to add on insulin as steroid treatment continues.    5/21  Added on 5 units lantus for tonight. Have on small sliding scale as well now that no longer NPO. Slowing down steroids as well.  Hypertension  Patient's blood pressure range in the last 24 hours was: BP  Min: 101/63  Max: 133/81.The patient's inpatient anti-hypertensive regimen is listed below:  Current Antihypertensives  amLODIPine tablet 10 mg, Daily, Oral  isosorbide mononitrate 24 hr tablet 30 mg, Daily, Oral  metoprolol tartrate (LOPRESSOR) tablet 50 mg, 2 times daily, Oral  , Daily, Oral    Plan  - BP is controlled, no changes needed to their regimen      Resuming home meds.    5/21  BP doing well with home med resolution. No Lasix on board yet.  Chronic combined systolic and diastolic congestive heart failure  Patient has Combined Systolic and Diastolic heart failure that is Chronic. On presentation their CHF was well compensated. Most recent BNP and echo results are listed below.  No results for input(s): "BNP" in the last 72 hours.  Latest ECHO  Results for orders placed during the hospital encounter of 04/04/25    Echo Saline Bubble? No    Interpretation Summary    Left Ventricle: The left ventricle is mildly dilated. Increased ventricular mass. Normal " wall thickness. There is eccentric hypertrophy. Septal motion is consistent with pacing. There is moderately reduced systolic function with a visually estimated ejection fraction of 30 - 40%. Quantitated ejection fraction is 35%. There is diastolic dysfunction.    Right Ventricle: The right ventricle has mild enlargement. Systolic function is moderately reduced. Pacemaker lead present in the ventricle.    Left Atrium: Severely dilated    Right Atrium: Right atrium is moderately dilated.    Mitral Valve: There is at least moderate regurgitation with an eccentrically posterolateral directed jet. MR jet is only well-visualized on parasternal long axis views, remaining views w/ supoptimal assessment of MR as MR jet is not adequately captured.    Tricuspid Valve: There is severe regurgitation.    Pulmonic Valve: There is moderate to severe regurgitation.    Pulmonary Artery: The estimated pulmonary artery systolic pressure is 39 mmHg.    IVC/SVC: Intermediate venous pressure at 8 mmHg.    Pericardium: Right pleural effusion.    Compared to prior ECHO from 11/204 (which was also personally reviewed): LV size and LVEF are unchanged. LV remains mildy dilated with a moderately reduced LVEF. Improvement in RA and RV size noted on current study (previously severely dilated). MR is better visualized on previous study and at least moderate MR previously- appears largely unchanged however, there are technical limitations with MR assessment on current study as detailed above.    Current Heart Failure Medications  dapagliflozin propanediol (Farxiga) tablet 10 mg, Daily, Oral  , Daily, Oral    Plan  - Monitor strict I&Os and daily weights.    - Place on telemetry  - Low sodium diet  - Place on fluid restriction of none currently.   - Cardiology has not been consulted    Volume depleted currently as pt has not been able to tolerate much oral intake. Will reevaluate tomorrow. On  mL/hr currently.    5/21  No signs of volume  overload.  VTE Risk Mitigation (From admission, onward)           Ordered     enoxaparin injection 30 mg  Every 24 hours         05/20/25 1631     IP VTE HIGH RISK PATIENT  Once         05/20/25 1501     Place sequential compression device  Until discontinued         05/20/25 1501                    Discharge Planning   HERBERT: 5/28/2025     Code Status: Full Code   Medical Readiness for Discharge Date:   Discharge Plan A: Home Health, Home with family                        [facility] Schedule, DO  Department of Hospital Medicine   [facility] - [address] [place] [address]

## 2025-05-21 NOTE — ASSESSMENT & PLAN NOTE
Patient's blood pressure range in the last 24 hours was: BP  Min: 101/63  Max: 133/81.The patient's inpatient anti-hypertensive regimen is listed below:  Current Antihypertensives  amLODIPine tablet 10 mg, Daily, Oral  isosorbide mononitrate 24 hr tablet 30 mg, Daily, Oral  metoprolol tartrate (LOPRESSOR) tablet 50 mg, 2 times daily, Oral  , Daily, Oral    Plan  - BP is controlled, no changes needed to their regimen      Resuming home meds.    5/21  BP doing well with home med resolution. No Lasix on board yet.

## 2025-05-21 NOTE — PROGRESS NOTES
Patient identified at risk by screening criteria with MST2. Patient endorsed a 2 - 13 lb weight loss and poor appetite. Patient is 89.4 kg (197 lb) with a BMI of 28.28 and is considered to be of normal weight per geriatric standards. Chart review revels no significant unintentional weight loss. Patient reports bilateral leg and abdominal swelling with recent weight fluctuations due to fluid retention per MD. It is noteworthy that she previously lost 30 lbs of fluid within 2-3 weeks following medication changes. Decreased appetite over the past few days attributable to dysphagia, N/V, lip swelling which is expected to improve with continued treatment. Patient with no evident fat and muscle depletion. Per ASPEN guidelines, patient does not meet criteria for malnutrition at this time.

## 2025-05-21 NOTE — PT/OT/SLP EVAL
Speech Language Pathology Evaluation  Bedside Swallow    Patient Name:  Pam Ortega   MRN:  [id number]  Admitting Diagnosis: Angioedema    Recommendations:                 General Recommendations:  Follow-up not indicated  Diet recommendations:  Regular, Thin   Aspiration Precautions: 1 bite/sip at a time, Alternating bites/sips, Meds crushed in puree, Remain upright 30 minutes post meal, Small bites/sips, and Standard aspiration precautions   General Precautions: Standard    Communication strategies:  none    Assessment:     Pam Ortega is a 80 y.o. female with an SLP diagnosis of Dysphagia.  She presents with passing her BEDSIDE SWALLOW EVALUATION with all consistencies.    History:     Past Medical History:   Diagnosis Date    Abdominal bloating 04/22/2025    Anemia of chronic disease     Atrial fibrillation     Benign hypertensive CKD, stage 1-4 or unspecified chronic kidney disease     Bronchitis 12/22/2023    Carotid artery occlusion     Cellulitis 12/09/2024    CKD (chronic kidney disease)     Constipation 01/24/2025    Coronary atherosclerosis     Cough 12/22/2023    Cough 12/22/2023    COVID-19 ruled out 03/20/2024    Diabetes mellitus, type 2     DJD (degenerative joint disease)     History of CVA (cerebrovascular accident)     HTN (hypertension)     Hyperlipidemia     Nausea and vomiting 04/22/2025    Nonischemic dilated cardiomyopathy     Obesity     Osteopenia     Other ascites 04/09/2025    Paroxysmal atrial fibrillation     Presence of cardiac pacemaker     PVD (peripheral vascular disease)     Skin ulcer of female breast 12/30/2024    Upper respiratory tract infection 12/22/2023    Vaginal bleeding 04/26/2025    Vitamin D deficiency        Past Surgical History:   Procedure Laterality Date    BREAST BIOPSY      COLONOSCOPY  12/04/2018    repeat in 5 years    EMBOLECTOMY OR THROMBECTOMY, ARTERY, AORTOILIAC, FEMORAL, OR POPLITEAL Right 11/17/2024    Procedure: EMBOLECTOMY OR THROMBECTOMY,  ARTERY, AORTOILIAC, FEMORAL, OR POPLITEAL;  Surgeon: Jordana Guillermo MD;  Location: [facility];  Service: Vascular;  Laterality: Right;    pace maker      VAGINAL DELIVERY      x 3       Social History: Patient lives at home alone, and one of her daughters stays with her.    Prior Intubation HX:  n/a    Modified Barium Swallow:  n/a    Chest X-Rays: see chart    Prior diet: Patient eats regular consistency foods at home.    Occupation/hobbies/homemaking: not stated.    Subjective     Patient sitting up in bedside chair awake and alert. Patient agreeable to BEDSIDE SWALLOW EVALUATION.  Patient goals: to eat and drink without difficulty     Pain/Comfort:  Pain Rating 1: 0/10 (Pt with no complaints during eval.)    Respiratory Status: Room air    Objective:     Oral Musculature Evaluation  Oral Musculature: WFL  Dentition: present and adequate  Secretion Management: adequate  Mucosal Quality: adequate  Oral Labial Strength and Mobility: WFL  Lingual Strength and Mobility: WFL    Bedside Swallow Eval:   Consistencies Assessed:  Thin liquids No difficulties noted.  Puree No difficulties noted.   Soft solids No difficulties noted. No overt s/s of aspiration noted with any trials of any consistencies tested.      Oral Phase:   WFL    Pharyngeal Phase:   no overt clinical signs/symptoms of aspiration  no overt clinical signs/symptoms of pharyngeal dysphagia    Compensatory Strategies  None    Treatment: Rec continue with a regular consistency diet with thin liquids as tolerated. Rec meds be crushed and placed in pureed.     Goals:   Multidisciplinary Problems       SLP Goals       Not on file                    Plan:     Patient to be seen:      Plan of Care expires:     Plan of Care reviewed with:      SLP Follow-Up:  No       Discharge recommendations:      Barriers to Discharge:  None    Time Tracking:     SLP Treatment Date:      Speech Start Time:  1035  Speech Stop Time:  1054     Speech Total Time (min):  19  min    Billable Minutes: Eval Swallow and Oral Function 19 05/21/2025

## 2025-05-21 NOTE — SUBJECTIVE & OBJECTIVE
Interval History: No acute events overnight. Pt doing much better. Angioedema on lip has improved and her swallowing has improved as well. Will slow down on solumedrol and convert to po prednisone by tomorrow. Pt will likely need several days of oral prednisone. Slowed down fluids before discontinuing. Pt likely at or around euvolemic. Pt tolerating oral intake.    Review of Systems   Constitutional:  Negative for chills and fever.   Respiratory:  Negative for shortness of breath.    Cardiovascular:  Negative for chest pain and palpitations.   Gastrointestinal:  Negative for abdominal pain, nausea and vomiting.     Objective:     Vital Signs (Most Recent):  Temp: 97.3 °F (36.3 °C) (05/21/25 1127)  Pulse: 67 (05/21/25 1127)  Resp: 18 (05/21/25 1127)  BP: 101/63 (05/21/25 1127)  SpO2: 95 % (05/21/25 1127) Vital Signs (24h Range):  Temp:  [97.3 °F (36.3 °C)-98.1 °F (36.7 °C)] 97.3 °F (36.3 °C)  Pulse:  [58-76] 67  Resp:  [18-22] 18  SpO2:  [93 %-99 %] 95 %  BP: (101-133)/(63-81) 101/63     Weight: 89.4 kg (197 lb 1.5 oz)  Body mass index is 28.28 kg/m².  No intake or output data in the 24 hours ending 05/21/25 1236      Physical Exam  Vitals and nursing note reviewed.   Constitutional:       General: She is not in acute distress.     Appearance: Normal appearance. She is not ill-appearing.   HENT:      Head: Normocephalic and atraumatic.   Cardiovascular:      Rate and Rhythm: Normal rate and regular rhythm.      Pulses: Normal pulses.      Heart sounds: Normal heart sounds. No murmur heard.     No friction rub. No gallop.   Pulmonary:      Effort: Pulmonary effort is normal. No respiratory distress.      Breath sounds: Normal breath sounds.   Abdominal:      General: Bowel sounds are normal.      Tenderness: There is no abdominal tenderness.   Musculoskeletal:      Right lower leg: No edema.      Left lower leg: No edema.   Skin:     General: Skin is warm and dry.   Neurological:      General: No focal deficit  present.      Mental Status: She is alert and oriented to person, place, and time.   Psychiatric:         Mood and Affect: Mood normal.         Behavior: Behavior normal.               Significant Labs: All pertinent labs within the past 24 hours have been reviewed.  Recent Lab Results  (Last 5 results in the past 24 hours)        05/21/25  1202   05/21/25  0531   05/20/25  1738   05/20/25  1356   05/20/25  1355        Albumin/Globulin Ratio   0.9             Albumin   3.2             ALP   75             ALT   9             Anion Gap   18             Aniso   2+             AST   21             Baso #   0.00             Basophil %   0.0             BILIRUBIN TOTAL   2.5             BUN   54             BUN/CREAT RATIO   18             Calcium   8.8             Chloride   101             CO2   18             SARS COV-2 MOLECULAR         Negative       Creatinine   2.99             Crenated RBC's   1+             Differential Method   Scan Smear             eGFR   16  Comment: Estimated GFR calculated using the CKD-EPI creatinine (2021) equation.             Eos #   0.01             Eos %   0.2             Globulin, Total   3.6             Glucose   222             Hematocrit   29.2             Hemoglobin   9.1             Immature Grans (Abs)   0.02             Immature Granulocytes   0.5             Lymph #   0.97             Lymph %   23.0             MCH   24.3             MCHC   31.2             MCV   78.1             Mono #   0.07             Mono %   1.7             MPV   10.0             Neutrophils, Abs   3.14             Neutrophils Relative   74.6             nRBC   0.5             NUCLEATED RBC ABSOLUTE   0.02             PLATELET MORPHOLOGY   Few Large Platelets             Platelet Count   182             POC Glucose 398     153           Poly   Few             Potassium   4.0             PROTEIN TOTAL   6.8             RBC   3.74             RDW   23.5             Sodium   133             Target Cells    Few             Troponin I High Sensitivity       713.1  Comment: Critical Result called and verified by verbal readback to: Hillary on 05/20/2025 at 14:57. Reported by [id number].         WBC   4.21                                    Significant Imaging: I have reviewed all pertinent imaging results/findings within the past 24 hours.

## 2025-05-21 NOTE — PLAN OF CARE
Problem: Adult Inpatient Plan of Care  Goal: Plan of Care Review  Outcome: Progressing  Goal: Patient-Specific Goal (Individualized)  Outcome: Progressing  Goal: Absence of Hospital-Acquired Illness or Injury  Outcome: Progressing  Goal: Optimal Comfort and Wellbeing  Outcome: Progressing     Problem: Wound  Goal: Skin Health and Integrity  Outcome: Progressing

## 2025-05-21 NOTE — PROGRESS NOTES
Pharmacist Renal Dose Adjustment Note    Pam Ortega is a 80 y.o. female being treated with the medication zyrtec    Patient Data:    Vital Signs (Most Recent):  Temp: 97.3 °F (36.3 °C) (05/21/25 1127)  Pulse: 67 (05/21/25 1127)  Resp: 18 (05/21/25 1127)  BP: 101/63 (05/21/25 1127)  SpO2: 95 % (05/21/25 1127) Vital Signs (72h Range):  Temp:  [97.3 °F (36.3 °C)-98.1 °F (36.7 °C)]   Pulse:  [58-76]   Resp:  [17-22]   BP: (101-133)/(63-81)   SpO2:  [93 %-99 %]      Recent Labs   Lab 05/20/25  1246 05/21/25  0531   CREATININE 3.26* 2.99*     Serum creatinine: 2.99 mg/dL (H) 05/21/25 0531  Estimated creatinine clearance: 18.8 mL/min (A)    Medication:zyrtec dose: 10 frequency bid will be changed to medication:zyrtec dose:5 frequency:daily    Pharmacist's Name: Kelly Bolaños  Pharmacist's Extension: [phone]

## 2025-05-21 NOTE — ASSESSMENT & PLAN NOTE
"Patient has Combined Systolic and Diastolic heart failure that is Chronic. On presentation their CHF was well compensated. Most recent BNP and echo results are listed below.  No results for input(s): "BNP" in the last 72 hours.  Latest ECHO  Results for orders placed during the hospital encounter of 04/04/25    Echo Saline Bubble? No    Interpretation Summary    Left Ventricle: The left ventricle is mildly dilated. Increased ventricular mass. Normal wall thickness. There is eccentric hypertrophy. Septal motion is consistent with pacing. There is moderately reduced systolic function with a visually estimated ejection fraction of 30 - 40%. Quantitated ejection fraction is 35%. There is diastolic dysfunction.    Right Ventricle: The right ventricle has mild enlargement. Systolic function is moderately reduced. Pacemaker lead present in the ventricle.    Left Atrium: Severely dilated    Right Atrium: Right atrium is moderately dilated.    Mitral Valve: There is at least moderate regurgitation with an eccentrically posterolateral directed jet. MR jet is only well-visualized on parasternal long axis views, remaining views w/ supoptimal assessment of MR as MR jet is not adequately captured.    Tricuspid Valve: There is severe regurgitation.    Pulmonic Valve: There is moderate to severe regurgitation.    Pulmonary Artery: The estimated pulmonary artery systolic pressure is 39 mmHg.    IVC/SVC: Intermediate venous pressure at 8 mmHg.    Pericardium: Right pleural effusion.    Compared to prior ECHO from 11/204 (which was also personally reviewed): LV size and LVEF are unchanged. LV remains mildy dilated with a moderately reduced LVEF. Improvement in RA and RV size noted on current study (previously severely dilated). MR is better visualized on previous study and at least moderate MR previously- appears largely unchanged however, there are technical limitations with MR assessment on current study as detailed " above.    Current Heart Failure Medications  dapagliflozin propanediol (Farxiga) tablet 10 mg, Daily, Oral  , Daily, Oral    Plan  - Monitor strict I&Os and daily weights.    - Place on telemetry  - Low sodium diet  - Place on fluid restriction of none currently.   - Cardiology has not been consulted    Volume depleted currently as pt has not been able to tolerate much oral intake. Will reevaluate tomorrow. On  mL/hr currently.    5/21  No signs of volume overload.

## 2025-05-21 NOTE — CONSULTS
Gastroenterology Consult Note    Chief Complaint: esophageal dysphagia    Consulted by:   Dr. Hurtado     HPI:  Pam Ortega is a 79 y.o. AAF with MMP including CKD, DM-II, biventricular heart failure, GERD that presents with N/V, dysphagia, and dyspnea found to have angioedema. Patient had an EGD with Dr. Sawant last month after presenting with ascites and suspicion for liver disease; workup revealed cardiac ascites related to biventricular heart failure. In the interim, patient reports developing dysphagia to pills, but she is still able to eat and drink; no prior dilation. Over the last 2-3 days, she has noticed swelling of her lips, but not to the same severity as when she had angioedema with lisinopril in the past. Has had antibiotic recently, but no other new medications. This is her second episode of angioedema.     Past Medical History:   Diagnosis Date    Abdominal bloating 04/22/2025    Anemia of chronic disease     Atrial fibrillation     Benign hypertensive CKD, stage 1-4 or unspecified chronic kidney disease     Bronchitis 12/22/2023    Carotid artery occlusion     Cellulitis 12/09/2024    CKD (chronic kidney disease)     Constipation 01/24/2025    Coronary atherosclerosis     Cough 12/22/2023    Cough 12/22/2023    COVID-19 ruled out 03/20/2024    Diabetes mellitus, type 2     DJD (degenerative joint disease)     History of CVA (cerebrovascular accident)     HTN (hypertension)     Hyperlipidemia     Nausea and vomiting 04/22/2025    Nonischemic dilated cardiomyopathy     Obesity     Osteopenia     Other ascites 04/09/2025    Paroxysmal atrial fibrillation     Presence of cardiac pacemaker     PVD (peripheral vascular disease)     Skin ulcer of female breast 12/30/2024    Upper respiratory tract infection 12/22/2023    Vaginal bleeding 04/26/2025    Vitamin D deficiency      Past Surgical History:   Procedure Laterality Date    BREAST BIOPSY      COLONOSCOPY  12/04/2018    repeat in 5 years     "EMBOLECTOMY OR THROMBECTOMY, ARTERY, AORTOILIAC, FEMORAL, OR POPLITEAL Right 11/17/2024    Procedure: EMBOLECTOMY OR THROMBECTOMY, ARTERY, AORTOILIAC, FEMORAL, OR POPLITEAL;  Surgeon: Jordana Guillermo MD;  Location: [facility], [place];  Service: Vascular;  Laterality: Right;    pace maker      VAGINAL DELIVERY      x 3     Family History   Problem Relation Name Age of Onset    Hypertension Mother      Hypertension Father      Hypertension Sister      Stroke Sister      Cancer Brother      Stroke Brother      Hypertension Brother      Alzheimer's disease Maternal Grandmother      Breast cancer Maternal Grandmother      Hypertension Maternal Grandmother      Hyperlipidemia Daughter      Breast cancer Daughter      Diabetes Daughter      Hypertension Daughter      Diabetes Daughter      Hypertension Daughter      Hypertension Daughter      Clotting disorder Daughter         OBJECTIVE:  /68   Pulse 61   Temp 97.4 °F (36.3 °C) (Oral)   Resp 19   Ht 5' 10" (1.778 m)   Wt 89.4 kg (197 lb 1.5 oz)   SpO2 96%   Breastfeeding No   BMI 28.28 kg/m²   GEN: non-toxic appearing, cooperative, NAD  HEENT: NCAT, OP benign, MMM; lips slightly swollen ?   NECK: Supple, no LAD  CV: normal rate, regular rhythm  RESP: CTA bilaterally, unlabored  ABD: NABS, ND, NT, soft, no guarding  EXT: No clubbing, cyanosis, or edema.  SKIN: Warm and dry  NEURO: AAO x4. Afocal.    LABS:  I personally reviewed CMP, CBC, INR    IMAGING:    I reviewed CT CAP - sequelae of Rt heart failure     ASSESSMENT:    79 y.o. AAF with MMP including CKD, DM-II, biventricular heart failure, GERD that presents with N/V, dysphagia, and dyspnea found to have angioedema.    PLAN:    Pharyngoesophageal Dysphagia, Angioedema   - main complaint to me is pills, new complaint  - recent EGD last month with Dr. Sawant - no dysphagia at that time  - could be related to underlying angioedema if there is accompanying laryngeal/pharyngeal edema   - angioedema can also involve " the GI tract, but this is usually in the small intestine and manifests with abdominal pain in my experience   - given that patient can tolerate liquids/dysphagia diet, I don't think EGD is urgent, especially given recent EGD last month; also with Trp elevation, and patient reports being scheduled for MPS with Dr. Loo on Friday ([facility])   - recommend evaluation and treatment of angioedema per primary team; consider evaluation by Dr. Nirav Harrison at [facility] after discharge for workup given second occurrence of angioedema   - agree with PPI for prophylaxis given high dose steroids  - if swallow study/esophagram performed, may benefit from adding barium tablet as well    - recommend follow up with [facility] Coe after discharge; if dysphagia persists or worsens, may benefit from repeat EGD with empiric dilatation; alternatively, symptoms may improve with resolution of angioedema   - Please notify me if there is a clinical change or if symptoms worsen/unable to tolerate diet       Thank you for the consult and including me in the care of this patient. Please call me with questions.     Kike Gramajo MD  Gastroenterology

## 2025-05-21 NOTE — PLAN OF CARE
Problem: Adult Inpatient Plan of Care  Goal: Absence of Hospital-Acquired Illness or Injury  Outcome: Progressing  Goal: Optimal Comfort and Wellbeing  Outcome: Progressing  Goal: Readiness for Transition of Care  Outcome: Progressing     Problem: Acute Kidney Injury/Impairment  Goal: Fluid and Electrolyte Balance  Outcome: Progressing     Problem: Wound  Goal: Optimal Coping  Outcome: Progressing  Goal: Optimal Pain Control and Function  Outcome: Progressing  Goal: Skin Health and Integrity  Outcome: Progressing  Goal: Optimal Wound Healing  Outcome: Progressing

## 2025-05-21 NOTE — PLAN OF CARE
[facility]  Initial Discharge Assessment       Primary Care Provider: Janice Velez FNP    Admission Diagnosis: Shortness of breath [R06.02]  Other dysphagia [R13.19]  NSTEMI (non-ST elevated myocardial infarction) [I21.4]  Chest pain [R07.9]    Admission Date: 5/20/2025  Expected Discharge Date:     Transition of Care Barriers: (P) None    Payor: MEDICARE / Plan: MEDICARE PART A & B / Product Type: Government /     Extended Emergency Contact Information  Primary Emergency Contact: Madhavi Ortega  Mobile Phone: [phone]  Relation: Daughter  Preferred language: English   needed? No  Secondary Emergency Contact: JAYESH SENIOR  Mobile Phone: [phone]  Relation: Grandchild  Preferred language: English   needed? No    Discharge Plan A: (P) Home Health, Home with family  Discharge Plan B: (P) Home, Home with family, Home Health, Long-term acute care facility (LTAC), Rehab, Skilled Nursing Facility      [facility]  [address] [place] [id number]  Phone: [phone] Fax: [phone]      Initial Assessment (most recent)       Adult Discharge Assessment - 05/21/25 0935          Discharge Assessment    Assessment Type Discharge Planning Assessment     Source of Information patient     People in Home child(supriya), adult     Name(s) of People in Home Madhavi Ortega, daughter, [phone]     Do you expect to return to your current living situation? Yes     Do you have help at home or someone to help you manage your care at home? Yes     Who are your caregiver(s) and their phone number(s)? Madhavi Ortega, daughter, [phone]     Prior to hospitilization cognitive status: Unable to Assess     Current cognitive status: Alert/Oriented     Walking or Climbing Stairs Difficulty yes     Walking or Climbing Stairs ambulation difficulty, requires equipment;transferring difficulty, requires equipment;stair climbing difficulty,  requires equipment     Mobility Management quad cane     Dressing/Bathing Difficulty no     Home Accessibility wheelchair accessible     Home Layout Able to live on 1st floor     Equipment Currently Used at Home cane, quad;rollator     Readmission within 30 days? Yes     Patient currently being followed by outpatient case management? No     Do you currently have service(s) that help you manage your care at home? No     Do you take prescription medications? Yes     Do you have prescription coverage? Yes     Do you have any problems affording any of your prescribed medications? No     Is the patient taking medications as prescribed? yes     Who is going to help you get home at discharge? Madhavi Ortega, daughter, [phone]     How do you get to doctors appointments? family or friend will provide (P)      Are you on dialysis? No (P)      Discharge Plan A Home Health;Home with family (P)      Discharge Plan B Home;Home with family;Home Health;Long-term acute care facility (LTAC);Rehab;Skilled Nursing Facility (P)      DME Needed Upon Discharge  none (P)      Discharge Plan discussed with: Patient (P)      Transition of Care Barriers None (P)         Physical Activity    On average, how many days per week do you engage in moderate to strenuous exercise (like a brisk walk)? 3 days (P)      On average, how many minutes do you engage in exercise at this level? 30 min (P)         Financial Resource Strain    How hard is it for you to pay for the very basics like food, housing, medical care, and heating? Not very hard (P)         Housing Stability    In the last 12 months, was there a time when you were not able to pay the mortgage or rent on time? No (P)      At any time in the past 12 months, were you homeless or living in a shelter (including now)? No (P)         Transportation Needs    In the past 12 months, has lack of transportation kept you from medical appointments or from getting medications? No (P)      In the past  12 months, has lack of transportation kept you from meetings, work, or from getting things needed for daily living? No (P)         Food Insecurity    Within the past 12 months, you worried that your food would run out before you got the money to buy more. Never true (P)      Within the past 12 months, the food you bought just didn't last and you didn't have money to get more. Never true (P)         Stress    Do you feel stress - tense, restless, nervous, or anxious, or unable to sleep at night because your mind is troubled all the time - these days? Not at all (P)         Social Isolation    How often do you feel lonely or isolated from those around you?  Never (P)         Alcohol Use    Q1: How often do you have a drink containing alcohol? 2-4 times a month (P)      Q2: How many drinks containing alcohol do you have on a typical day when you are drinking? 1 or 2 (P)      Q3: How often do you have six or more drinks on one occasion? Never (P)         Paracosm    In the past 12 months has the electric, gas, oil, or water company threatened to shut off services in your home? No (P)         Health Literacy    How often do you need to have someone help you when you read instructions, pamphlets, or other written material from your doctor or pharmacy? Rarely (P)                    CM spoke with pt at bedside. Pt states that she lives at home with Madhavi Ortega, daughter, [phone] and plans on returning when medically ready for discharge. Pt current with [facility] health . Pt choice obtained. Pt has required DME. IM obtained. OPCM referral placed. CM following for anticipated discharge needs.     Met with pt to review discharge recommendation of home health and is agreeable to plan    Patient/family provided list of facilities in-network with patient's payor plan. Providers that are owned, operated, or affiliated with [facility] are included on the list.     Notified that referral sent to below listed  facilities from in-network list based on proximity to home/family support:   [facility]- faxed  2.  3.  4.  5. (can send more than 5)    Patient/family instructed to identify preference.    Preferred Facility: (if more than 1, listed in order of descending preference)  1.  OR  Patient has declined to select a preferred provider and elects placement with the first accepting in network provider that is available to provide services as ordered by the physician       If an additional preferred facility not listed above is identified, additional referral to be sent. If above facilities unable to accept, will send additional referrals to in-network providers.

## 2025-05-21 NOTE — ASSESSMENT & PLAN NOTE
Currently idiopathic but treating as allergic reaction.  Only involves upper lip on physical exam but pt complains of dysphagia.    Starting on cetrizine 10 mg BID and Pepcid 20 mg daily.  Solumedrol 60 mg q6h.    5/21  Reducing solu-medrol to 40 mg q6h before transitioning to po prednisone.

## 2025-05-21 NOTE — PROGRESS NOTES
Gastroenterology Consult Note    Chief Complaint: esophageal dysphagia    Consulted by:   Dr. Hurtado     HPI:  Pam Ortega is a 80 y.o. AAF with MMP including CKD, DM-II, biventricular heart failure, GERD that presents with N/V, dysphagia, and dyspnea found to have angioedema. Patient had an EGD with Dr. Sawant last month after presenting with ascites and suspicion for liver disease; workup revealed cardiac ascites related to biventricular heart failure. In the interim, patient reports developing dysphagia to pills, but she is still able to eat and drink; no prior dilation. Over the last 2-3 days, she has noticed swelling of her lips, but not to the same severity as when she had angioedema with lisinopril in the past. Has had antibiotic recently, but no other new medications. This is her second episode of angioedema.     INTERVAL  - angioedema and swallowing improving today after steroids  - tolerating dysphagia diet on my exam  - feels much better     Past Medical History:   Diagnosis Date    Abdominal bloating 04/22/2025    Anemia of chronic disease     Atrial fibrillation     Benign hypertensive CKD, stage 1-4 or unspecified chronic kidney disease     Bronchitis 12/22/2023    Carotid artery occlusion     Cellulitis 12/09/2024    CKD (chronic kidney disease)     Constipation 01/24/2025    Coronary atherosclerosis     Cough 12/22/2023    Cough 12/22/2023    COVID-19 ruled out 03/20/2024    Diabetes mellitus, type 2     DJD (degenerative joint disease)     History of CVA (cerebrovascular accident)     HTN (hypertension)     Hyperlipidemia     Nausea and vomiting 04/22/2025    Nonischemic dilated cardiomyopathy     Obesity     Osteopenia     Other ascites 04/09/2025    Paroxysmal atrial fibrillation     Presence of cardiac pacemaker     PVD (peripheral vascular disease)     Skin ulcer of female breast 12/30/2024    Upper respiratory tract infection 12/22/2023    Vaginal bleeding 04/26/2025    Vitamin D  "deficiency      Past Surgical History:   Procedure Laterality Date    BREAST BIOPSY      COLONOSCOPY  12/04/2018    repeat in 5 years    EMBOLECTOMY OR THROMBECTOMY, ARTERY, AORTOILIAC, FEMORAL, OR POPLITEAL Right 11/17/2024    Procedure: EMBOLECTOMY OR THROMBECTOMY, ARTERY, AORTOILIAC, FEMORAL, OR POPLITEAL;  Surgeon: Jordana Guillermo MD;  Location: [facility], [place];  Service: Vascular;  Laterality: Right;    pace maker      VAGINAL DELIVERY      x 3     Family History   Problem Relation Name Age of Onset    Hypertension Mother      Hypertension Father      Hypertension Sister      Stroke Sister      Cancer Brother      Stroke Brother      Hypertension Brother      Alzheimer's disease Maternal Grandmother      Breast cancer Maternal Grandmother      Hypertension Maternal Grandmother      Hyperlipidemia Daughter      Breast cancer Daughter      Diabetes Daughter      Hypertension Daughter      Diabetes Daughter      Hypertension Daughter      Hypertension Daughter      Clotting disorder Daughter         OBJECTIVE:  /65   Pulse 62   Temp 97.4 °F (36.3 °C) (Oral)   Resp 18   Ht 5' 10" (1.778 m)   Wt 89.4 kg (197 lb 1.5 oz)   SpO2 96%   Breastfeeding No   BMI 28.28 kg/m²   GEN: non-toxic appearing, cooperative, NAD  HEENT: NCAT, OP benign, MMM; lips slightly swollen    NECK: Supple, no LAD  CV: normal rate, regular rhythm  RESP: CTA bilaterally, unlabored  ABD: NABS, ND, NT, soft, no guarding  EXT: No clubbing, cyanosis, or edema.  SKIN: Warm and dry  NEURO: AAO x4. Afocal.    LABS:  I personally reviewed CMP, CBC, INR    IMAGING:    I reviewed CT CAP - sequelae of Rt heart failure     ASSESSMENT:    79 y.o. AAF with MMP including CKD, DM-II, biventricular heart failure, GERD that presents with N/V, dysphagia, and dyspnea found to have angioedema.    PLAN:    Pharyngoesophageal Dysphagia, Angioedema   - recent EGD last month with Dr. Sawant - no dysphagia at that time  - could be related to underlying " angioedema if there is accompanying laryngeal/pharyngeal edema   - consider evaluation by Dr. Nirav Harrison at [facility] after discharge for workup given second occurrence of angioedema   - agree with PPI for prophylaxis given high dose steroids  - better today with steroids - tolerating oral diet  - recommend follow up with [facility] Coe after discharge; if dysphagia persists or worsens, may benefit from repeat EGD with empiric dilatation; alternatively, symptoms may resolve with resolution of angioedema       Thank you for the consult and including me in the care of this patient. I will sign off. Please call me with questions.     Kike Gramajo MD  Gastroenterology

## 2025-05-21 NOTE — PROGRESS NOTES
Pharmacist Renal Dose Adjustment Note    Pam Ortega is a 80 y.o. female being treated with the medication pepcid    Patient Data:    Vital Signs (Most Recent):  Temp: 97.3 °F (36.3 °C) (05/21/25 1127)  Pulse: 67 (05/21/25 1127)  Resp: 18 (05/21/25 1127)  BP: 101/63 (05/21/25 1127)  SpO2: 95 % (05/21/25 1127) Vital Signs (72h Range):  Temp:  [97.3 °F (36.3 °C)-98.1 °F (36.7 °C)]   Pulse:  [58-76]   Resp:  [17-22]   BP: (101-133)/(63-81)   SpO2:  [93 %-99 %]      Recent Labs   Lab 05/20/25  1246 05/21/25  0531   CREATININE 3.26* 2.99*     Serum creatinine: 2.99 mg/dL (H) 05/21/25 0531  Estimated creatinine clearance: 18.8 mL/min (A)    Medication:pepcid dose: 20 frequency daily will be changed to medication:pepcid dose:20 frequency:every other day     Pharmacist's Name: Kelly Bolaños  Pharmacist's Extension: 3030

## 2025-05-21 NOTE — ASSESSMENT & PLAN NOTE
JONAS is likely due to pre-renal azotemia due to dehydration. Baseline creatinine is about 2. Most recent creatinine and eGFR are listed below.  Recent Labs     05/20/25  1246 05/21/25  0531   CREATININE 3.26* 2.99*   EGFRNORACEVR 14* 16*      Plan  - JONAS is stable  - Avoid nephrotoxins and renally dose meds for GFR listed above  - Monitor urine output, serial BMP, and adjust therapy as needed    Starting on IV fluids as pt is NPO currently.  Pausing diuretic therapy for CHF currently. Will reevaluate tomorrow morning to determine volume status.  Would like to resume xeralto as kidney function improves but pt has recent history of abnormal uterine bleeding.    5/21  Creatinine improved to 2.99; hopefully continues to improve. May resume diuretic therapy tomorrow.

## 2025-05-21 NOTE — ASSESSMENT & PLAN NOTE
"Patient's FSGs are controlled on current medication regimen.  Last A1c reviewed-   Lab Results   Component Value Date    HGBA1C 6.1 05/12/2025     Most recent fingerstick glucose reviewed- No results for input(s): "POCTGLUCOSE" in the last 24 hours.  Current correctional scale  Will start sliding scale when NPO lifted.  Maintain anti-hyperglycemic dose as follows-   Antihyperglycemics (From admission, onward)      Start     Stop Route Frequency Ordered    05/21/25 2100  insulin glargine U-100 (Lantus) injection 5 Units         -- SubQ Nightly 05/21/25 0717    05/21/25 0900  dapagliflozin propanediol (Farxiga) tablet 10 mg        Question Answer Comment   Does this patient have a diagnosis of heart failure? Yes    Does this patient have type 1 diabetes or diabetic ketoacidosis? No    Does this patient have symptomatic hypotension? No    Is the patient NPO or pending major surgery in next 3 days or less? No        -- Oral Daily 05/20/25 1621    05/21/25 0816  insulin aspart U-100 injection 0-5 Units         -- SubQ Before meals & nightly PRN 05/21/25 0716          Hold Oral hypoglycemics while patient is in the hospital.    Continuing pt's SGLT2 tomorrow. Will likely need to add on insulin as steroid treatment continues.    5/21  Added on 5 units lantus for tonight. Have on small sliding scale as well now that no longer NPO. Slowing down steroids as well.  "

## 2025-05-22 ENCOUNTER — TELEPHONE (OUTPATIENT)
Dept: FAMILY MEDICINE | Facility: CLINIC | Age: 80
End: 2025-05-22
Payer: MEDICARE

## 2025-05-22 ENCOUNTER — PATIENT MESSAGE (OUTPATIENT)
Dept: GASTROENTEROLOGY | Facility: CLINIC | Age: 80
End: 2025-05-22
Payer: MEDICARE

## 2025-05-22 VITALS
WEIGHT: 197.06 LBS | DIASTOLIC BLOOD PRESSURE: 73 MMHG | TEMPERATURE: 98 F | OXYGEN SATURATION: 96 % | HEIGHT: 70 IN | RESPIRATION RATE: 18 BRPM | BODY MASS INDEX: 28.21 KG/M2 | SYSTOLIC BLOOD PRESSURE: 120 MMHG | HEART RATE: 60 BPM

## 2025-05-22 LAB
ALBUMIN SERPL BCP-MCNC: 3.2 G/DL (ref 3.4–4.8)
ALBUMIN/GLOB SERPL: 0.9 {RATIO}
ALP SERPL-CCNC: 79 U/L (ref 40–150)
ALT SERPL W P-5'-P-CCNC: 7 U/L
ANION GAP SERPL CALCULATED.3IONS-SCNC: 17 MMOL/L (ref 7–16)
ANISOCYTOSIS BLD QL SMEAR: ABNORMAL
AST SERPL W P-5'-P-CCNC: 32 U/L (ref 11–45)
BASOPHILS # BLD AUTO: 0 K/UL (ref 0–0.2)
BASOPHILS NFR BLD AUTO: 0 % (ref 0–1)
BILIRUB SERPL-MCNC: 2 MG/DL
BUN SERPL-MCNC: 58 MG/DL (ref 10–20)
BUN/CREAT SERPL: 17 (ref 6–20)
CALCIUM SERPL-MCNC: 8.7 MG/DL (ref 8.4–10.2)
CHLORIDE SERPL-SCNC: 99 MMOL/L (ref 98–107)
CO2 SERPL-SCNC: 18 MMOL/L (ref 23–31)
CREAT SERPL-MCNC: 3.44 MG/DL (ref 0.55–1.02)
CRENATED CELLS: ABNORMAL
DIFFERENTIAL METHOD BLD: ABNORMAL
EGFR (NO RACE VARIABLE) (RUSH/TITUS): 13 ML/MIN/1.73M2
EOSINOPHIL # BLD AUTO: 0 K/UL (ref 0–0.5)
EOSINOPHIL NFR BLD AUTO: 0 % (ref 1–4)
ERYTHROCYTE [DISTWIDTH] IN BLOOD BY AUTOMATED COUNT: 23.5 % (ref 11.5–14.5)
GLOBULIN SER-MCNC: 3.7 G/DL (ref 2–4)
GLUCOSE SERPL-MCNC: 241 MG/DL (ref 70–105)
GLUCOSE SERPL-MCNC: 244 MG/DL (ref 82–115)
GLUCOSE SERPL-MCNC: 281 MG/DL (ref 70–105)
GLUCOSE SERPL-MCNC: 323 MG/DL (ref 70–105)
HCT VFR BLD AUTO: 28.2 % (ref 38–47)
HGB BLD-MCNC: 8.6 G/DL (ref 12–16)
HYPOCHROMIA BLD QL SMEAR: ABNORMAL
IMM GRANULOCYTES # BLD AUTO: 0.02 K/UL (ref 0–0.04)
IMM GRANULOCYTES NFR BLD: 0.3 % (ref 0–0.4)
LYMPHOCYTES # BLD AUTO: 0.86 K/UL (ref 1–4.8)
LYMPHOCYTES NFR BLD AUTO: 14.3 % (ref 27–41)
MCH RBC QN AUTO: 23.6 PG (ref 27–31)
MCHC RBC AUTO-ENTMCNC: 30.5 G/DL (ref 32–36)
MCV RBC AUTO: 77.3 FL (ref 80–96)
MICROCYTES BLD QL SMEAR: ABNORMAL
MONOCYTES # BLD AUTO: 0.47 K/UL (ref 0–0.8)
MONOCYTES NFR BLD AUTO: 7.8 % (ref 2–6)
MPC BLD CALC-MCNC: 9.9 FL (ref 9.4–12.4)
NEUTROPHILS # BLD AUTO: 4.67 K/UL (ref 1.8–7.7)
NEUTROPHILS NFR BLD AUTO: 77.6 % (ref 53–65)
NRBC # BLD AUTO: 0.04 X10E3/UL
NRBC, AUTO (.00): 0.7 %
OVALOCYTES BLD QL SMEAR: ABNORMAL
PLATELET # BLD AUTO: 185 K/UL (ref 150–400)
PLATELET MORPHOLOGY: ABNORMAL
POLYCHROMASIA BLD QL SMEAR: ABNORMAL
POTASSIUM SERPL-SCNC: 3.7 MMOL/L (ref 3.5–5.1)
PROT SERPL-MCNC: 6.9 G/DL (ref 5.8–7.6)
RBC # BLD AUTO: 3.65 M/UL (ref 4.2–5.4)
SODIUM SERPL-SCNC: 130 MMOL/L (ref 136–145)
WBC # BLD AUTO: 6.02 K/UL (ref 4.5–11)

## 2025-05-22 PROCEDURE — 63600175 PHARM REV CODE 636 W HCPCS

## 2025-05-22 PROCEDURE — 80053 COMPREHEN METABOLIC PANEL: CPT

## 2025-05-22 PROCEDURE — 82962 GLUCOSE BLOOD TEST: CPT

## 2025-05-22 PROCEDURE — 25000003 PHARM REV CODE 250: Performed by: INTERNAL MEDICINE

## 2025-05-22 PROCEDURE — 99239 HOSP IP/OBS DSCHRG MGMT >30: CPT | Mod: GC,,, | Performed by: FAMILY MEDICINE

## 2025-05-22 PROCEDURE — 25000003 PHARM REV CODE 250: Performed by: FAMILY MEDICINE

## 2025-05-22 PROCEDURE — 25000003 PHARM REV CODE 250

## 2025-05-22 PROCEDURE — 36415 COLL VENOUS BLD VENIPUNCTURE: CPT

## 2025-05-22 PROCEDURE — 85025 COMPLETE CBC W/AUTO DIFF WBC: CPT

## 2025-05-22 RX ORDER — PREDNISONE 10 MG/1
TABLET ORAL
Qty: 27 TABLET | Refills: 0 | Status: SHIPPED | OUTPATIENT
Start: 2025-05-23 | End: 2025-06-10

## 2025-05-22 RX ORDER — INSULIN ASPART 100 [IU]/ML
0-5 INJECTION, SOLUTION INTRAVENOUS; SUBCUTANEOUS
Qty: 20 ML | Refills: 0 | Status: SHIPPED | OUTPATIENT
Start: 2025-05-22 | End: 2026-05-22

## 2025-05-22 RX ORDER — DOCUSATE SODIUM 100 MG/1
100 CAPSULE, LIQUID FILLED ORAL DAILY
Status: DISCONTINUED | OUTPATIENT
Start: 2025-05-22 | End: 2025-05-22 | Stop reason: HOSPADM

## 2025-05-22 RX ADMIN — ENOXAPARIN SODIUM 30 MG: 30 INJECTION SUBCUTANEOUS at 04:05

## 2025-05-22 RX ADMIN — METOPROLOL TARTRATE 50 MG: 25 TABLET, FILM COATED ORAL at 09:05

## 2025-05-22 RX ADMIN — PREDNISONE 30 MG: 20 TABLET ORAL at 09:05

## 2025-05-22 RX ADMIN — INSULIN ASPART 2 UNITS: 100 INJECTION, SOLUTION INTRAVENOUS; SUBCUTANEOUS at 09:05

## 2025-05-22 RX ADMIN — ISOSORBIDE MONONITRATE 30 MG: 30 TABLET, EXTENDED RELEASE ORAL at 09:05

## 2025-05-22 RX ADMIN — PRAVASTATIN SODIUM 20 MG: 10 TABLET ORAL at 09:05

## 2025-05-22 RX ADMIN — DOCUSATE SODIUM 100 MG: 100 CAPSULE, LIQUID FILLED ORAL at 01:05

## 2025-05-22 RX ADMIN — INSULIN ASPART 4 UNITS: 100 INJECTION, SOLUTION INTRAVENOUS; SUBCUTANEOUS at 04:05

## 2025-05-22 RX ADMIN — FUROSEMIDE 80 MG: 40 TABLET ORAL at 09:05

## 2025-05-22 RX ADMIN — PANTOPRAZOLE SODIUM 40 MG: 40 TABLET, DELAYED RELEASE ORAL at 09:05

## 2025-05-22 RX ADMIN — CETIRIZINE HYDROCHLORIDE 5 MG: 5 TABLET ORAL at 09:05

## 2025-05-22 RX ADMIN — MORPHINE SULFATE 1 MG: 2 INJECTION, SOLUTION INTRAMUSCULAR; INTRAVENOUS at 12:05

## 2025-05-22 RX ADMIN — AMLODIPINE BESYLATE 10 MG: 10 TABLET ORAL at 09:05

## 2025-05-22 RX ADMIN — DAPAGLIFLOZIN 10 MG: 10 TABLET, FILM COATED ORAL at 09:05

## 2025-05-22 RX ADMIN — ASPIRIN 81 MG: 81 TABLET, COATED ORAL at 09:05

## 2025-05-22 NOTE — PROGRESS NOTES
[facility] - [address] [place] [address]  Wound Care    Patient Name:  Pam Ortega   MRN:  [id number]  Date: 5/22/2025  Diagnosis: Angioedema    History:     Past Medical History:   Diagnosis Date    Abdominal bloating 04/22/2025    Anemia of chronic disease     Atrial fibrillation     Benign hypertensive CKD, stage 1-4 or unspecified chronic kidney disease     Bronchitis 12/22/2023    Carotid artery occlusion     Cellulitis 12/09/2024    CKD (chronic kidney disease)     Constipation 01/24/2025    Coronary atherosclerosis     Cough 12/22/2023    Cough 12/22/2023    COVID-19 ruled out 03/20/2024    Diabetes mellitus, type 2     DJD (degenerative joint disease)     History of CVA (cerebrovascular accident)     HTN (hypertension)     Hyperlipidemia     Nausea and vomiting 04/22/2025    Nonischemic dilated cardiomyopathy     Obesity     Osteopenia     Other ascites 04/09/2025    Paroxysmal atrial fibrillation     Presence of cardiac pacemaker     PVD (peripheral vascular disease)     Skin ulcer of female breast 12/30/2024    Upper respiratory tract infection 12/22/2023    Vaginal bleeding 04/26/2025    Vitamin D deficiency        Social History[1]    Precautions:     Allergies as of 05/20/2025 - Reviewed 05/20/2025   Allergen Reaction Noted    Ace inhibitors Edema 04/05/2021    Losartan Swelling 04/05/2021       WOC Assessment Details/Treatment     Narrative: Seen patient for initiation of preventative skin care measures    Patient up in chair. Alert. Has Unna boot to LLE.  RLE leg has dark discoloration of leg. Sees Dr Lyn for legs. States sacral and heels are okay. Grand daughter asked about dressing change to LLE. Explained to let nursing staff know so they can consult Dr Lyn office.   Bret score 22.    Consult wound care for any skin issues           05/22/2025         [1]   Social History  Socioeconomic History    Marital status:    Tobacco Use    Smoking status: Never     Passive  exposure: Never    Smokeless tobacco: Never   Substance and Sexual Activity    Alcohol use: Not Currently    Drug use: Never    Sexual activity: Not Currently     Social Drivers of Health     Financial Resource Strain: Low Risk  (5/21/2025)    Overall Financial Resource Strain (CARDIA)     Difficulty of Paying Living Expenses: Not very hard   Food Insecurity: No Food Insecurity (5/21/2025)    Hunger Vital Sign     Worried About Running Out of Food in the Last Year: Never true     Ran Out of Food in the Last Year: Never true   Transportation Needs: No Transportation Needs (5/21/2025)    PRAPARE - Transportation     Lack of Transportation (Medical): No     Lack of Transportation (Non-Medical): No   Physical Activity: Insufficiently Active (5/21/2025)    Exercise Vital Sign     Days of Exercise per Week: 3 days     Minutes of Exercise per Session: 30 min   Stress: No Stress Concern Present (5/21/2025)    [employer] - Occupational Stress Questionnaire     Feeling of Stress : Not at all   Housing Stability: Low Risk  (5/21/2025)    Housing Stability Vital Sign     Unable to Pay for Housing in the Last Year: No     Number of Times Moved in the Last Year: 0     Homeless in the Last Year: No

## 2025-05-22 NOTE — HOSPITAL COURSE
5/21  No acute events overnight. Pt doing much better. Angioedema on lip has improved and her swallowing has improved as well. Will slow down on solumedrol and convert to po prednisone by tomorrow. Pt will likely need several days of oral prednisone. Slowed down fluids before discontinuing. Pt likely at or around euvolemic. Pt tolerating oral intake.     5/22  Pt to be discharged today. Pts angioedema remains subsided. Will taper off oral prednisone next 2 weeks. Sending out with sliding scale insulin for temporary use while on steroids. Pt to resume lasix and continue taking zaroxolyn until edema improves. Referred to Allergist. Pt to follow up with GI in 4 weeks. Will discharge on 2.5 mg BID for Eliquis for Afib. Was taking Xeralto but kidney function has worsened. Pt takes anticoagulation for arterial thrombus prevention as well but given age and kidney status, 5 mg Eliquis risk outweighs benefit.

## 2025-05-22 NOTE — ASSESSMENT & PLAN NOTE
"Patient's FSGs are controlled on current medication regimen.  Last A1c reviewed-   Lab Results   Component Value Date    HGBA1C 6.1 05/12/2025     Most recent fingerstick glucose reviewed- No results for input(s): "POCTGLUCOSE" in the last 24 hours.  Current correctional scale  Will start sliding scale when NPO lifted.  Maintain anti-hyperglycemic dose as follows-   Antihyperglycemics (From admission, onward)      Start     Stop Route Frequency Ordered    05/21/25 2100  insulin glargine U-100 (Lantus) injection 5 Units         -- SubQ Nightly 05/21/25 0717    05/21/25 0900  dapagliflozin propanediol (Farxiga) tablet 10 mg        Question Answer Comment   Does this patient have a diagnosis of heart failure? Yes    Does this patient have type 1 diabetes or diabetic ketoacidosis? No    Does this patient have symptomatic hypotension? No    Is the patient NPO or pending major surgery in next 3 days or less? No        -- Oral Daily 05/20/25 1621    05/21/25 0816  insulin aspart U-100 injection 0-5 Units         -- SubQ Before meals & nightly PRN 05/21/25 0716          Hold Oral hypoglycemics while patient is in the hospital.    Continuing pt's SGLT2 tomorrow. Will likely need to add on insulin as steroid treatment continues.    5/21  Added on 5 units lantus for tonight. Have on small sliding scale as well now that no longer NPO. Slowing down steroids as well.    5/22  Discharging pt today. Will send out with small sliding scale while pt remains on steroids for next 2 weeks.  "

## 2025-05-22 NOTE — PLAN OF CARE
Problem: Adult Inpatient Plan of Care  Goal: Plan of Care Review  Outcome: Progressing  Goal: Absence of Hospital-Acquired Illness or Injury  Outcome: Progressing  Goal: Readiness for Transition of Care  Outcome: Progressing     Problem: Acute Kidney Injury/Impairment  Goal: Fluid and Electrolyte Balance  Outcome: Progressing  Goal: Improved Oral Intake  Outcome: Progressing  Goal: Effective Renal Function  Outcome: Progressing     Problem: Wound  Goal: Absence of Infection Signs and Symptoms  Outcome: Progressing  Goal: Improved Oral Intake  Outcome: Progressing

## 2025-05-22 NOTE — ASSESSMENT & PLAN NOTE
JONAS is likely due to pre-renal azotemia due to dehydration. Baseline creatinine is about 2. Most recent creatinine and eGFR are listed below.  Recent Labs     05/20/25  1246 05/21/25  0531 05/22/25  0415   CREATININE 3.26* 2.99* 3.44*   EGFRNORACEVR 14* 16* 13*      Plan  - JONAS is stable  - Avoid nephrotoxins and renally dose meds for GFR listed above  - Monitor urine output, serial BMP, and adjust therapy as needed    Starting on IV fluids as pt is NPO currently.  Pausing diuretic therapy for CHF currently. Will reevaluate tomorrow morning to determine volume status.  Would like to resume xeralto as kidney function improves but pt has recent history of abnormal uterine bleeding.    5/21  Creatinine improved to 2.99; hopefully continues to improve. May resume diuretic therapy tomorrow.     5/22  Creatinine worsened to 3.44 after discontinuing fluids yesterday. Pt has fair amount of edema so baseline creatinine likely greater than 3 at this point. Already established with nephrologist.

## 2025-05-22 NOTE — ASSESSMENT & PLAN NOTE
Currently idiopathic but treating as allergic reaction.  Only involves upper lip on physical exam but pt complains of dysphagia.    Starting on cetrizine 10 mg BID and Pepcid 20 mg daily.  Solumedrol 60 mg q6h.    5/21  Reducing solu-medrol to 40 mg q6h before transitioning to po prednisone.    5/22  Pt to discharge today. Will taper off prednisone over next 2 weeks. Referred to allergist.

## 2025-05-22 NOTE — ASSESSMENT & PLAN NOTE
Patient's blood pressure range in the last 24 hours was: BP  Min: 99/62  Max: 121/75.The patient's inpatient anti-hypertensive regimen is listed below:  Current Antihypertensives  amLODIPine tablet 10 mg, Daily, Oral  isosorbide mononitrate 24 hr tablet 30 mg, Daily, Oral  metoprolol tartrate (LOPRESSOR) tablet 50 mg, 2 times daily, Oral  , Daily, Oral  furosemide tablet 80 mg, Daily, Oral    Plan  - BP is controlled, no changes needed to their regimen      Resuming home meds.    5/21  BP doing well with home med resolution. No Lasix on board yet.

## 2025-05-22 NOTE — ASSESSMENT & PLAN NOTE
Acute dysphagia, tolerates small sips but intolerant to solid food.  GI consulted.  Swallow eval ordered.  Treating angioedema.    5/21  Pt tolerating oral intake.    5/22  Pt to discharge today. Will follow up with GI NP in 4 weeks.

## 2025-05-22 NOTE — TELEPHONE ENCOUNTER
Copied from CRM #[id number]. Topic: Appointments - Appointment Scheduling  >> May 22, 2025  2:42 PM Katelyn wrote:  Needing a 1wk f/u from [facility] [place] with Pierre

## 2025-05-22 NOTE — DISCHARGE SUMMARY
"[facility] Tele[facility] Hospital Medicine  Discharge Summary      Patient Name: Pam Ortega  MRN: [id number]  JAIDEN: [id number]  Patient Class: IP- Inpatient  Admission Date: 5/20/2025  Hospital Length of Stay: 1 days  Discharge Date and Time: 05/22/2025 5:26 PM  Attending Physician: Husam Hurtado Jr., MD   Discharging Provider: Dru Schedule, DO  Primary Care Provider: Janice Velez FNP    Primary Care Team: Networked reference to record PCT     HPI:   80 yo female presented to the ED with 3 day history dysphagia, nausea, vomiting, and lip swelling. Pt has had difficulty holding anything down, throwing up most of her meds over the last several days. These symptoms started Saturday shortly after starting clindamycin for left leg cellulitis which came about after pts lower leg swelling. Pts nausea and vomiting has gotten better since but pt can only keep down small sips currently. Pt has history of angioedema with ACE/ARBs some years ago.  Pt denies chest pain, fever, chills.    CMP: Creatinine 3.26 (baseline a couple months ago was about 2), bilirubin 2.8   CBC: Hemoglobin 9.3  CXR: "mild CHF" per radiologist  Troponin: 636 to 713  BnP: 8,599    Pt to be admitted to the care of Dr. Hurtado.  Will start on solumedrol, H1, H2 for angioedema.  Consulting GI for dysphagia.  Swallow eval ordered.      PMHx: CVA, DVT, combined CHF, Afib, HTN, DM II  Last ECHO(4/4/2025): 35% EF with diastolic dysfunction, severe tricuspid regurg, moderate to severe mitral and pulmonic regurg, 39 mmHg pulmonic artery pressure  Last EGD(4/22/2025): Unremarkable aside from mucosal erosion in fundus. No strictures or dilations.  Previous troponins measured in 600s. Likely her baseline.  Chronically elevated bilirubin.    * No surgery found *      Hospital Course:   5/21  No acute events overnight. Pt doing much better. Angioedema on lip has improved and her swallowing has improved as well. Will slow " down on solumedrol and convert to po prednisone by tomorrow. Pt will likely need several days of oral prednisone. Slowed down fluids before discontinuing. Pt likely at or around euvolemic. Pt tolerating oral intake.     5/22  Pt to be discharged today. Pts angioedema remains subsided. Will taper off oral prednisone next 2 weeks. Sending out with sliding scale insulin for temporary use while on steroids. Pt to resume lasix and continue taking zaroxolyn until edema improves. Referred to Allergist. Pt to follow up with GI in 4 weeks. Will discharge on 2.5 mg BID for Eliquis for Afib. Was taking Xeralto but kidney function has worsened. Pt takes anticoagulation for arterial thrombus prevention as well but given age and kidney status, 5 mg Eliquis risk outweighs benefit.     Goals of Care Treatment Preferences:  Code Status: Full Code      SDOH Screening:  The patient was screened for utility difficulties, food insecurity, transport difficulties, housing insecurity, and interpersonal safety and there were no concerns identified this admission.     Consults:   Consults (From admission, onward)          Status Ordering Provider     Inpatient consult to Gastroenterology  Once        Provider:  Kike Gramajo MD    Completed SCHEDULE, DARRIN            Assessment & Plan  Angioedema  Currently idiopathic but treating as allergic reaction.  Only involves upper lip on physical exam but pt complains of dysphagia.    Starting on cetrizine 10 mg BID and Pepcid 20 mg daily.  Solumedrol 60 mg q6h.    5/21  Reducing solu-medrol to 40 mg q6h before transitioning to po prednisone.    5/22  Pt to discharge today. Will taper off prednisone over next 2 weeks. Referred to allergist.   Dysphagia  Acute dysphagia, tolerates small sips but intolerant to solid food.  GI consulted.  Swallow eval ordered.  Treating angioedema.    5/21  Pt tolerating oral intake.    5/22  Pt to discharge today. Will follow up with GI NP in 4 weeks.  Acute kidney  "injury superimposed on stage 4 chronic kidney disease  JONAS is likely due to pre-renal azotemia due to dehydration. Baseline creatinine is about 2. Most recent creatinine and eGFR are listed below.  Recent Labs     05/20/25  1246 05/21/25  0531 05/22/25  0415   CREATININE 3.26* 2.99* 3.44*   EGFRNORACEVR 14* 16* 13*      Plan  - JONAS is stable  - Avoid nephrotoxins and renally dose meds for GFR listed above  - Monitor urine output, serial BMP, and adjust therapy as needed    Starting on IV fluids as pt is NPO currently.  Pausing diuretic therapy for CHF currently. Will reevaluate tomorrow morning to determine volume status.  Would like to resume xeralto as kidney function improves but pt has recent history of abnormal uterine bleeding.    5/21  Creatinine improved to 2.99; hopefully continues to improve. May resume diuretic therapy tomorrow.     5/22  Creatinine worsened to 3.44 after discontinuing fluids yesterday. Pt has fair amount of edema so baseline creatinine likely greater than 3 at this point. Already established with nephrologist.  Diabetes mellitus without complication  Patient's FSGs are controlled on current medication regimen.  Last A1c reviewed-   Lab Results   Component Value Date    HGBA1C 6.1 05/12/2025     Most recent fingerstick glucose reviewed- No results for input(s): "POCTGLUCOSE" in the last 24 hours.  Current correctional scale  Will start sliding scale when NPO lifted.  Maintain anti-hyperglycemic dose as follows-   Antihyperglycemics (From admission, onward)      Start     Stop Route Frequency Ordered    05/21/25 2100  insulin glargine U-100 (Lantus) injection 5 Units         -- SubQ Nightly 05/21/25 0717    05/21/25 0900  dapagliflozin propanediol (Farxiga) tablet 10 mg        Question Answer Comment   Does this patient have a diagnosis of heart failure? Yes    Does this patient have type 1 diabetes or diabetic ketoacidosis? No    Does this patient have symptomatic hypotension? No    Is " "the patient NPO or pending major surgery in next 3 days or less? No        -- Oral Daily 05/20/25 1621    05/21/25 0816  insulin aspart U-100 injection 0-5 Units         -- SubQ Before meals & nightly PRN 05/21/25 0716          Hold Oral hypoglycemics while patient is in the hospital.    Continuing pt's SGLT2 tomorrow. Will likely need to add on insulin as steroid treatment continues.    5/21  Added on 5 units lantus for tonight. Have on small sliding scale as well now that no longer NPO. Slowing down steroids as well.    5/22  Discharging pt today. Will send out with small sliding scale while pt remains on steroids for next 2 weeks.  Hypertension  Patient's blood pressure range in the last 24 hours was: BP  Min: 99/62  Max: 121/75.The patient's inpatient anti-hypertensive regimen is listed below:  Current Antihypertensives  amLODIPine tablet 10 mg, Daily, Oral  isosorbide mononitrate 24 hr tablet 30 mg, Daily, Oral  metoprolol tartrate (LOPRESSOR) tablet 50 mg, 2 times daily, Oral  , Daily, Oral  furosemide tablet 80 mg, Daily, Oral    Plan  - BP is controlled, no changes needed to their regimen      Resuming home meds.    5/21  BP doing well with home med resolution. No Lasix on board yet.  Chronic combined systolic and diastolic congestive heart failure  Patient has Combined Systolic and Diastolic heart failure that is Chronic. On presentation their CHF was well compensated. Most recent BNP and echo results are listed below.  No results for input(s): "BNP" in the last 72 hours.  Latest ECHO  Results for orders placed during the hospital encounter of 04/04/25    Echo Saline Bubble? No    Interpretation Summary    Left Ventricle: The left ventricle is mildly dilated. Increased ventricular mass. Normal wall thickness. There is eccentric hypertrophy. Septal motion is consistent with pacing. There is moderately reduced systolic function with a visually estimated ejection fraction of 30 - 40%. Quantitated ejection " fraction is 35%. There is diastolic dysfunction.    Right Ventricle: The right ventricle has mild enlargement. Systolic function is moderately reduced. Pacemaker lead present in the ventricle.    Left Atrium: Severely dilated    Right Atrium: Right atrium is moderately dilated.    Mitral Valve: There is at least moderate regurgitation with an eccentrically posterolateral directed jet. MR jet is only well-visualized on parasternal long axis views, remaining views w/ supoptimal assessment of MR as MR jet is not adequately captured.    Tricuspid Valve: There is severe regurgitation.    Pulmonic Valve: There is moderate to severe regurgitation.    Pulmonary Artery: The estimated pulmonary artery systolic pressure is 39 mmHg.    IVC/SVC: Intermediate venous pressure at 8 mmHg.    Pericardium: Right pleural effusion.    Compared to prior ECHO from 11/204 (which was also personally reviewed): LV size and LVEF are unchanged. LV remains mildy dilated with a moderately reduced LVEF. Improvement in RA and RV size noted on current study (previously severely dilated). MR is better visualized on previous study and at least moderate MR previously- appears largely unchanged however, there are technical limitations with MR assessment on current study as detailed above.    Current Heart Failure Medications  dapagliflozin propanediol (Farxiga) tablet 10 mg, Daily, Oral  , Daily, Oral  furosemide tablet 80 mg, Daily, Oral    Plan  - Monitor strict I&Os and daily weights.    - Place on telemetry  - Low sodium diet  - Place on fluid restriction of none currently.   - Cardiology has not been consulted    Volume depleted currently as pt has not been able to tolerate much oral intake. Will reevaluate tomorrow. On  mL/hr currently.    5/21  No overwhelming signs of volume overload.    5/22  Pitting edema on thighs. Pt to be discharged today. Resume lasix and continue to take Zaroxolyn until edema improves. Pt sees Cinda this month at  [facility].  Final Active Diagnoses:    Diagnosis Date Noted POA    PRINCIPAL PROBLEM:  Angioedema [T78.3XXA] 05/20/2025 Yes    Dysphagia [R13.10] 05/20/2025 Yes    Acute kidney injury superimposed on stage 4 chronic kidney disease [N17.9, N18.4] 04/26/2025 Yes    Diabetes mellitus without complication [E11.9]  Yes    Hypertension [I10]  Yes    Chronic combined systolic and diastolic congestive heart failure [I50.42] 05/20/2025 Yes      Problems Resolved During this Admission:       Discharged Condition: stable    Disposition: Home or Self Care    Follow Up:   Contact information for follow-up providers       Janice Velez FNP. Schedule an appointment as soon as possible for a visit in 1 week.    Specialties: Family Medicine, Emergency Medicine  Contact information:  [address] [facility] [address]  [phone]               [facility] - Emergency Department. Go to .    Specialty: Emergency Medicine  Why: As needed, If symptoms worsen  Contact information:  [address] [facility] [address] [phone]             Carole Meade FNP Follow up on 6/24/2025.    Specialty: Gastroenterology  Why: 10:20 am  Contact information:  [address] [facility] [address]  [phone]                       Contact information for after-discharge care       Home Medical Care       [facility] .    Service: Home Health Services  Contact information:  [address] [facility] [address]  [phone]                                 Patient Instructions:      Ambulatory referral/consult to Outpatient Case Management   Referral Priority: Routine Referral Type: Consultation   Referral Reason: Specialty Services Required   Number of Visits Requested: 1     Ambulatory referral/consult to Allergy   Standing Status: Future   Referral Priority: Routine Referral Type: Allergy Testing   Referral Reason: Specialty Services Required   Requested Specialty:  "Allergy   Number of Visits Requested: 1       Significant Diagnostic Studies: Labs: BMP:   Recent Labs   Lab 05/21/25  0531 05/22/25  0415   * 244*   * 130*   K 4.0 3.7    99   CO2 18* 18*   BUN 54* 58*   CREATININE 2.99* 3.44*   CALCIUM 8.8 8.7   , CMP   Recent Labs   Lab 05/21/25  0531 05/22/25  0415   * 130*   K 4.0 3.7    99   CO2 18* 18*   * 244*   BUN 54* 58*   CREATININE 2.99* 3.44*   CALCIUM 8.8 8.7   PROT 6.8 6.9   ALBUMIN 3.2* 3.2*   BILITOT 2.5* 2.0*   ALKPHOS 75 79   AST 21 32   ALT 9 7   ANIONGAP 18* 17*   , CBC   Recent Labs   Lab 05/21/25  0531 05/22/25  0415   WBC 4.21* 6.02   HGB 9.1* 8.6*   HCT 29.2* 28.2*    185   , INR   Lab Results   Component Value Date    INR 1.70 04/09/2025    INR 2.26 11/17/2024   , Lipid Panel   Lab Results   Component Value Date    CHOL 55 05/12/2025    HDL 18 (L) 05/12/2025    LDLCALC 24 05/12/2025    TRIG 65 05/12/2025    CHOLHDL 3.1 05/12/2025   , Troponin No results for input(s): "TROPONINI" in the last 168 hours., A1C:   Recent Labs   Lab 05/12/25  1526   HGBA1C 6.1   , and All labs within the past 24 hours have been reviewed  Microbiology: Urine Culture  No results found for: "LABURIN"  Radiology: X-Ray: CXR: X-Ray Chest 1 View (CXR): No results found for this visit on 05/20/25. and X-Ray Chest PA and Lateral (CXR): No results found for this visit on 05/20/25. and KUB: X-Ray Abdomen AP 1 View (KUB): No results found for this visit on 05/20/25.  CT scan: CT ABDOMEN PELVIS WITH CONTRAST: No results found for this visit on 05/20/25. and CT ABDOMEN PELVIS WITHOUT CONTRAST: No results found for this visit on 05/20/25.  Cardiac Graphics: ECG:  Echocardiogram: 2D echo with color flow doppler: No results found for this or any previous visit. and Transthoracic echo (TTE) complete (Cupid Only):   Results for orders placed or performed during the hospital encounter of 04/04/25   Echo Saline Bubble? No   Result Value Ref Range    LVOT " stroke volume 34.0 cm3    LVIDd 5.5 3.5 - 6.0 cm    LV Systolic Volume 93 mL    LVIDs 4.5 (A) 2.1 - 4.0 cm    LV ESV A2C 79.21 mL    LV Diastolic Volume 112 mL    LV ESV A4C 123.50 mL    Left Ventricular End Systolic Volume by Teichholz Method 92.51 mL    Left Ventricular End Diastolic Volume by Teichholz Method 112.03 mL    IVS 1.4 (A) 0.6 - 1.1 cm    LVOT diameter 1.9 cm    LVOT area 2.8 cm2    FS 18.2 (A) 28 - 44 %    Left Ventricle Relative Wall Thickness 0.36 cm    PW 1.0 0.6 - 1.1 cm    LV mass 272.4 g    MV Peak E Óscar 1.00 m/s    TDI LATERAL 0.12 m/s    TDI SEPTAL 0.04 m/s    E/E' ratio 13 m/s    TR Max Óscar 2.8 m/s    LV SEPTAL E/E' RATIO 25.0 m/s    LV LATERAL E/E' RATIO 8.3 m/s    LVOT peak óscar 0.7 m/s    Left Ventricular Outflow Tract Mean Velocity 0.45 cm/s    Left Ventricular Outflow Tract Mean Gradient 0.89 mmHg    RV-bueno length 7.7 cm    RV mid diameter 2.09 cm    TAPSE 1.10 cm    LA Vol (MOD) 100 mL    RA area length vol 42.26 mL    RA Area 18.2 cm2    RA Vol 43.79 mL    AV mean gradient 2 mmHg    AV peak gradient 5 mmHg    Ao peak óscar 1.1 m/s    Ao VTI 19.7 cm    LVOT peak VTI 12.0 cm    AV valve area 1.7 cm²    AV Velocity Ratio 0.64     AV index (prosthetic) 0.61     QUETA by Velocity Ratio 1.8 cm²    Mr max óscar 2.75 m/s    Triscuspid Valve Regurgitation Peak Gradient 30 mmHg    PV PEAK VELOCITY 2.39 m/s    PV peak gradient 23 mmHg    Ao root annulus 2.79 cm    IVC diameter 2.81 cm    Mean e' 0.08 m/s    LA area A4C 34.81 cm2    LA area A2C 26.90 cm2    AORTIC VALVE CUSP SEPERATION 1.95 cm    TV resting pulmonary artery pressure 39 mmHg    RV TB RVSP 11 mmHg    Est. RA pres 8 mmHg    Freeman's Biplane MOD Ejection Fraction 35 %    Narrative      Left Ventricle: The left ventricle is mildly dilated. Increased   ventricular mass. Normal wall thickness. There is eccentric hypertrophy.   Septal motion is consistent with pacing. There is moderately reduced   systolic function with a visually estimated  ejection fraction of 35 - 40%.   Quantitated ejection fraction is 38%. There is diastolic dysfunction.    Right Ventricle: The right ventricle has mild enlargement. Systolic   function is moderately reduced. Pacemaker lead present in the ventricle.    Left Atrium: Severely dilated    Right Atrium: Right atrium is moderately dilated.    Mitral Valve: There is at least moderate regurgitation with an   eccentrically posterolateral directed jet. MR jet is only well-visualized   on parasternal long axis views, remaining views w/ supoptimal assessment   of MR as MR jet is not adequately captured.    Tricuspid Valve: There is severe regurgitation.    Pulmonic Valve: There is moderate to severe regurgitation.    Pulmonary Artery: The estimated pulmonary artery systolic pressure is   39 mmHg.    IVC/SVC: Intermediate venous pressure at 8 mmHg.    Pericardium: Right pleural effusion.    Compared to prior ECHO from 11/204 (which was also personally   reviewed): LV size and LVEF are unchanged. LV remains mildy dilated with a   moderately reduced LVEF. Improvement in RA and RV size noted on current   study (previously severely dilated). MR is better visualized on previous   study and at least moderate MR previously- appears largely unchanged   however, there are technical limitations with MR assessment on current   study as detailed above.         Pending Diagnostic Studies:       None           Medications:  Reconciled Home Medications:      Medication List        START taking these medications      ELIQUIS 2.5 mg Tab  Generic drug: apixaban  Take 1 tablet (2.5 mg total) by mouth 2 (two) times daily.     NovoLOG U-100 Insulin aspart 100 unit/mL injection  Generic drug: insulin aspart U-100  Inject 0-5 Units into the skin for High Blood Sugar per sliding scale.  Max sure to leave with Sliding Scale from hospital nurse     predniSONE 10 MG tablet  Commonly known as: DELTASONE  Take 3 tablets (30 mg total) by mouth once daily for  3 days, THEN 2 tablets (20 mg total) once daily for 5 days, THEN 1 tablet (10 mg total) once daily for 5 days, THEN 0.5 tablets (5 mg total) once daily for 5 days.  Start taking on: May 23, 2025            CONTINUE taking these medications      amLODIPine 10 MG tablet  Commonly known as: NORVASC  Take 1 tablet (10 mg total) by mouth once daily.     aspirin 81 MG EC tablet  Commonly known as: ECOTRIN  Take 81 mg by mouth once daily.     cholecalciferol (vitamin D3) 50 mcg (2,000 unit) Cap capsule  Commonly known as: VITAMIN D3  Take 1 capsule by mouth once daily.     colchicine 0.6 mg tablet  Commonly known as: COLCRYS  Take 1 tablet (0.6 mg total) by mouth every other day for gout.     FARXIGA 10 mg tablet  Generic drug: dapagliflozin propanediol  Take 1 tablet (10 mg total) by mouth once daily.     fluticasone-salmeterol 250-50 mcg/dose 250-50 mcg/dose diskus inhaler  Commonly known as: ADVAIR  Inhale 1 puff into the lungs 2 (two) times daily. Controller Inhaler     furosemide 80 MG tablet  Commonly known as: LASIX  Take 80 mg by mouth once daily.     hydrALAZINE 50 MG tablet  Commonly known as: APRESOLINE  Take 1 tablet (50 mg total) by mouth 2 (two) times a day.     isosorbide mononitrate 30 MG 24 hr tablet  Commonly known as: IMDUR  Take 1 tablet (30 mg total) by mouth once daily.     lovastatin 20 MG tablet  Commonly known as: MEVACOR  Take 1 tablet (20 mg total) by mouth every night     metOLazone 5 MG tablet  Commonly known as: ZAROXOLYN  Take 1 tablet (5 mg total) by mouth daily as needed for fluid     metoprolol tartrate 50 MG tablet  Commonly known as: LOPRESSOR  Take 1 tablet (50 mg total) by mouth 2 (two) times a day.     omeprazole 20 MG capsule  Commonly known as: PRILOSEC  Take 1 capsule (20 mg total) by mouth once daily.     OZEMPIC 1 mg/dose (4 mg/3 mL)  Generic drug: semaglutide  Inject 1 mg into the skin every 7 days.     potassium chloride 10 MEQ Cpsr  Commonly known as: MICRO-K  Take 1 capsule  (10 mEq total) by mouth once daily.     VENTOLIN HFA 90 mcg/actuation inhaler  Generic drug: albuterol  Inhale 2 puffs into the lungs every 6 (six) hours as needed for Wheezing. Rescue Inhaler            STOP taking these medications      clindamycin 300 MG capsule  Commonly known as: CLEOCIN     XARELTO 15 mg Tab  Generic drug: rivaroxaban              Indwelling Lines/Drains at time of discharge:   Lines/Drains/Airways       None                   Time spent on the discharge of patient: 60 minutes         Dru Schedule, DO  Department of Hospital Medicine  [facility] Tele[facility]

## 2025-05-22 NOTE — DISCHARGE INSTRUCTIONS
Noted co-morbidities/PMH of HTN, Chronic combined systolic and diastolic heart failure, Chronic Afib, Hx CVA, DM2, and GERD; care management, treatment compliance, medication compliance, and diet reviewed.    Consider evaluation by Dr. Nirav Harrison at [facility] after discharge for workup given second occurrence of angioedema. Recommend follow up with CAROL Coe after discharge.     Hospitalist Discharge orders  *Notify your healthcare provider if you experience any of the following: temperature >100.4  *Notify your healthcare provider if you experience any of the following: redness, tenderness, or any signs or symptoms of infection (pain, swelling, redness, odor or green/yellow drainage around incision site).  *Notify your healthcare provider if you experience any of the following: difficulty breathing or increased cough.  *Notify your physician if you experience any persistent nausea, vomiting, diarrhea or headache.  *Notify your physician if you experience any of the following: severe uncontrolled pain, worsening rash, increased confusion, weakness, dizziness, lightheadedness, or visual disturbances.

## 2025-05-22 NOTE — ASSESSMENT & PLAN NOTE
"Patient has Combined Systolic and Diastolic heart failure that is Chronic. On presentation their CHF was well compensated. Most recent BNP and echo results are listed below.  No results for input(s): "BNP" in the last 72 hours.  Latest ECHO  Results for orders placed during the hospital encounter of 04/04/25    Echo Saline Bubble? No    Interpretation Summary    Left Ventricle: The left ventricle is mildly dilated. Increased ventricular mass. Normal wall thickness. There is eccentric hypertrophy. Septal motion is consistent with pacing. There is moderately reduced systolic function with a visually estimated ejection fraction of 30 - 40%. Quantitated ejection fraction is 35%. There is diastolic dysfunction.    Right Ventricle: The right ventricle has mild enlargement. Systolic function is moderately reduced. Pacemaker lead present in the ventricle.    Left Atrium: Severely dilated    Right Atrium: Right atrium is moderately dilated.    Mitral Valve: There is at least moderate regurgitation with an eccentrically posterolateral directed jet. MR jet is only well-visualized on parasternal long axis views, remaining views w/ supoptimal assessment of MR as MR jet is not adequately captured.    Tricuspid Valve: There is severe regurgitation.    Pulmonic Valve: There is moderate to severe regurgitation.    Pulmonary Artery: The estimated pulmonary artery systolic pressure is 39 mmHg.    IVC/SVC: Intermediate venous pressure at 8 mmHg.    Pericardium: Right pleural effusion.    Compared to prior ECHO from 11/204 (which was also personally reviewed): LV size and LVEF are unchanged. LV remains mildy dilated with a moderately reduced LVEF. Improvement in RA and RV size noted on current study (previously severely dilated). MR is better visualized on previous study and at least moderate MR previously- appears largely unchanged however, there are technical limitations with MR assessment on current study as detailed " above.    Current Heart Failure Medications  dapagliflozin propanediol (Farxiga) tablet 10 mg, Daily, Oral  , Daily, Oral  furosemide tablet 80 mg, Daily, Oral    Plan  - Monitor strict I&Os and daily weights.    - Place on telemetry  - Low sodium diet  - Place on fluid restriction of none currently.   - Cardiology has not been consulted    Volume depleted currently as pt has not been able to tolerate much oral intake. Will reevaluate tomorrow. On  mL/hr currently.    5/21  No overwhelming signs of volume overload.    5/22  Pitting edema on thighs. Pt to be discharged today. Resume lasix and continue to take Zaroxolyn until edema improves. Pt sees Cinda this month at [facility].

## 2025-05-23 ENCOUNTER — PATIENT OUTREACH (OUTPATIENT)
Dept: ADMINISTRATIVE | Facility: CLINIC | Age: 80
End: 2025-05-23
Payer: MEDICARE

## 2025-05-23 NOTE — PROGRESS NOTES
C3 nurse spoke with daughter Madhavi Ortega who is unable to complete the call at this time. Daughter Requested a Callback on Tuseday 5/27/25. Patient does not have a HOSFU. Message routed to PCP's staff assistance needed with scheduling a HOSFU.

## 2025-05-23 NOTE — PLAN OF CARE
[facility] - [address] [place] [address] Telemetry  Discharge Final Note    Primary Care Provider: Janice Velez FNP    Expected Discharge Date: 5/22/2025    Final Discharge Note (most recent)       Final Note - 05/23/25 0834          Final Note    Assessment Type Final Discharge Note (P)      Anticipated Discharge Disposition Home or Self Care (P)      Hospital Resources/Appts/Education Provided Provided patient/caregiver with written discharge plan information (P)         Post-Acute Status    Discharge Delays None known at this time (P)                      Important Message from Medicare  Important Message from Medicare regarding Discharge Appeal Rights: Given to patient/caregiver, Explained to patient/caregiver, Signed/date by patient/caregiver     Date IMM was signed: 05/22/25  Time IMM was signed: 1600     Follow-up providers       Janice Velez FNP   Specialty: Family Medicine, Emergency Medicine    [address] [facility] [address]   Phone: [phone]       Next Steps: Schedule an appointment as soon as possible for a visit in 1 week(s)    [facility] - Emergency Department   Specialty: Emergency Medicine    [address] [facility] [address]   Phone: [phone]       Next Steps: Go to    Instructions: As needed, If symptoms worsen    Carole Meade FNP   Specialty: Gastroenterology    [address] [facility] [address]   Phone: [phone]       Next Steps: Follow up on 6/24/2025    Instructions: 10:20 am              After-discharge care                Home Medical Care       [facility]   Service: Home Health Services    [address] [facility] [address]   Phone: [phone]                           Pt discharged home self care. DC paperwork faxed to [employer] and [employer] notified. No further needs noted.

## 2025-05-24 ENCOUNTER — EXTERNAL HOME HEALTH (OUTPATIENT)
Dept: HOME HEALTH SERVICES | Facility: HOSPITAL | Age: 80
End: 2025-05-24
Payer: MEDICARE

## 2025-05-30 ENCOUNTER — OUTPATIENT CASE MANAGEMENT (OUTPATIENT)
Dept: ADMINISTRATIVE | Facility: OTHER | Age: 80
End: 2025-05-30
Payer: MEDICARE

## 2025-05-31 ENCOUNTER — EXTERNAL CHRONIC CARE MANAGEMENT (OUTPATIENT)
Dept: PRIMARY CARE CLINIC | Facility: CLINIC | Age: 80
End: 2025-05-31
Payer: MEDICARE

## 2025-05-31 PROCEDURE — 99490 CHRNC CARE MGMT STAFF 1ST 20: CPT | Mod: ,,, | Performed by: NURSE PRACTITIONER

## 2025-06-03 ENCOUNTER — HOSPITAL ENCOUNTER (OUTPATIENT)
Facility: HOSPITAL | Age: 80
Discharge: HOME | End: 2025-06-03
Attending: FAMILY MEDICINE
Payer: MEDICARE

## 2025-06-03 ENCOUNTER — OFFICE VISIT (OUTPATIENT)
Dept: VASCULAR SURGERY | Facility: CLINIC | Age: 80
End: 2025-06-03
Payer: MEDICARE

## 2025-06-03 ENCOUNTER — OUTPATIENT CASE MANAGEMENT (OUTPATIENT)
Dept: ADMINISTRATIVE | Facility: OTHER | Age: 80
End: 2025-06-03
Payer: MEDICARE

## 2025-06-03 ENCOUNTER — OFFICE VISIT (OUTPATIENT)
Dept: VASCULAR SURGERY | Facility: CLINIC | Age: 80
End: 2025-06-03
Attending: FAMILY MEDICINE
Payer: MEDICARE

## 2025-06-03 VITALS
SYSTOLIC BLOOD PRESSURE: 123 MMHG | HEART RATE: 87 BPM | RESPIRATION RATE: 20 BRPM | WEIGHT: 197.06 LBS | HEIGHT: 70 IN | BODY MASS INDEX: 28.21 KG/M2 | DIASTOLIC BLOOD PRESSURE: 91 MMHG

## 2025-06-03 VITALS — BODY MASS INDEX: 28.21 KG/M2 | WEIGHT: 197.06 LBS | HEIGHT: 70 IN

## 2025-06-03 DIAGNOSIS — I87.2 VENOUS INSUFFICIENCY: Primary | ICD-10-CM

## 2025-06-03 DIAGNOSIS — M79.605 BILATERAL LEG PAIN: ICD-10-CM

## 2025-06-03 DIAGNOSIS — M79.604 LEG PAIN, BILATERAL: ICD-10-CM

## 2025-06-03 DIAGNOSIS — M79.604 BILATERAL LEG PAIN: ICD-10-CM

## 2025-06-03 DIAGNOSIS — L97.919 STASIS EDEMA WITH ULCER, BILATERAL: ICD-10-CM

## 2025-06-03 DIAGNOSIS — L97.929 STASIS EDEMA WITH ULCER, BILATERAL: ICD-10-CM

## 2025-06-03 DIAGNOSIS — M79.605 LEG PAIN, BILATERAL: ICD-10-CM

## 2025-06-03 DIAGNOSIS — M79.605 BILATERAL LEG PAIN: Primary | ICD-10-CM

## 2025-06-03 DIAGNOSIS — I87.2 VENOUS INSUFFICIENCY: ICD-10-CM

## 2025-06-03 DIAGNOSIS — I87.2 VENOUS INSUFFICIENCY (CHRONIC) (PERIPHERAL): ICD-10-CM

## 2025-06-03 DIAGNOSIS — M79.604 BILATERAL LEG PAIN: Primary | ICD-10-CM

## 2025-06-03 DIAGNOSIS — L81.9 HYPERPIGMENTATION OF SKIN: ICD-10-CM

## 2025-06-03 DIAGNOSIS — I87.313 STASIS EDEMA WITH ULCER, BILATERAL: ICD-10-CM

## 2025-06-03 DIAGNOSIS — S81.802A OPEN WOUND OF LEFT LOWER LEG, INITIAL ENCOUNTER: ICD-10-CM

## 2025-06-03 DIAGNOSIS — I87.2 VENOUS INSUFFICIENCY (CHRONIC) (PERIPHERAL): Primary | ICD-10-CM

## 2025-06-03 PROCEDURE — 99214 OFFICE O/P EST MOD 30 MIN: CPT | Mod: S$PBB,,, | Performed by: NURSE PRACTITIONER

## 2025-06-03 PROCEDURE — 99999 PR PBB SHADOW E&M-EST. PATIENT-LVL V: CPT | Mod: PBBFAC,,, | Performed by: NURSE PRACTITIONER

## 2025-06-03 PROCEDURE — 99215 OFFICE O/P EST HI 40 MIN: CPT | Mod: PBBFAC,25 | Performed by: FAMILY MEDICINE

## 2025-06-03 PROCEDURE — 99215 OFFICE O/P EST HI 40 MIN: CPT | Mod: PBBFAC,25 | Performed by: NURSE PRACTITIONER

## 2025-06-03 PROCEDURE — 93970 EXTREMITY STUDY: CPT | Mod: TC

## 2025-06-03 PROCEDURE — 99999 PR PBB SHADOW E&M-EST. PATIENT-LVL V: CPT | Mod: PBBFAC,,, | Performed by: FAMILY MEDICINE

## 2025-06-03 PROCEDURE — 99214 OFFICE O/P EST MOD 30 MIN: CPT | Mod: S$PBB,,, | Performed by: FAMILY MEDICINE

## 2025-06-03 RX ORDER — SODIUM CHLORIDE 9 MG/ML
INJECTION, SOLUTION INTRAVENOUS CONTINUOUS
OUTPATIENT
Start: 2025-08-01

## 2025-06-03 RX ORDER — SODIUM CHLORIDE 9 MG/ML
INJECTION, SOLUTION INTRAVENOUS CONTINUOUS
OUTPATIENT
Start: 2025-07-25

## 2025-06-03 RX ORDER — SPIRONOLACTONE 25 MG/1
25 TABLET ORAL
COMMUNITY
Start: 2024-08-23

## 2025-06-03 RX ORDER — GLIMEPIRIDE 4 MG/1
4 TABLET ORAL
COMMUNITY
Start: 2024-08-23

## 2025-06-03 RX ORDER — HYDROCODONE BITARTRATE AND ACETAMINOPHEN 7.5; 325 MG/1; MG/1
1 TABLET ORAL
COMMUNITY
Start: 2024-11-19

## 2025-06-03 RX ORDER — SODIUM CHLORIDE 9 MG/ML
INJECTION, SOLUTION INTRAVENOUS CONTINUOUS
OUTPATIENT
Start: 2025-08-08

## 2025-06-20 LAB
LEFT EYE DM RETINOPATHY: NEGATIVE
RIGHT EYE DM RETINOPATHY: NEGATIVE

## 2025-06-23 ENCOUNTER — DOCUMENT SCAN (OUTPATIENT)
Dept: HOME HEALTH SERVICES | Facility: HOSPITAL | Age: 80
End: 2025-06-23
Payer: MEDICARE

## 2025-06-23 ENCOUNTER — TELEPHONE (OUTPATIENT)
Dept: FAMILY MEDICINE | Facility: CLINIC | Age: 80
End: 2025-06-23
Payer: MEDICARE

## 2025-06-23 NOTE — TELEPHONE ENCOUNTER
----- Message from CAROL Delgado sent at 6/20/2025  3:43 PM CDT -----  Physical therapy? And for what diagnosis and update weight??    Pam Ortega (Patient)     Subject  Pam Ortega (Patient)     Topic  General Inquiry - Patient Advice    Communication  Who Call: [facility]        Caller is requesting assistance/information from provider's office.            Preferred Method of Contact: Phone Call    Patient's Preferred Phone Number on File: [phone]    Best Call Back Number, if different:[phone]    Additional Information: wants to add P/T and update weight for plan

## 2025-06-30 ENCOUNTER — EXTERNAL CHRONIC CARE MANAGEMENT (OUTPATIENT)
Dept: PRIMARY CARE CLINIC | Facility: CLINIC | Age: 80
End: 2025-06-30
Payer: MEDICARE

## 2025-06-30 PROCEDURE — 99490 CHRNC CARE MGMT STAFF 1ST 20: CPT | Mod: ,,, | Performed by: NURSE PRACTITIONER

## 2025-06-30 PROCEDURE — 99439 CHRNC CARE MGMT STAF EA ADDL: CPT | Mod: ,,, | Performed by: NURSE PRACTITIONER

## 2025-07-02 ENCOUNTER — HOSPITAL ENCOUNTER (EMERGENCY)
Facility: HOSPITAL | Age: 80
Discharge: ANOTHER HOSPITAL | End: 2025-07-02
Payer: MEDICARE

## 2025-07-02 VITALS
HEIGHT: 70 IN | OXYGEN SATURATION: 95 % | WEIGHT: 216 LBS | BODY MASS INDEX: 30.92 KG/M2 | HEART RATE: 60 BPM | RESPIRATION RATE: 18 BRPM | SYSTOLIC BLOOD PRESSURE: 117 MMHG | TEMPERATURE: 98 F | DIASTOLIC BLOOD PRESSURE: 76 MMHG

## 2025-07-02 DIAGNOSIS — R79.89 ELEVATED TROPONIN: ICD-10-CM

## 2025-07-02 DIAGNOSIS — E86.0 DEHYDRATION: ICD-10-CM

## 2025-07-02 DIAGNOSIS — R42 DIZZINESS: ICD-10-CM

## 2025-07-02 DIAGNOSIS — E11.65 HYPERGLYCEMIA DUE TO DIABETES MELLITUS: ICD-10-CM

## 2025-07-02 DIAGNOSIS — I21.4 NSTEMI (NON-ST ELEVATED MYOCARDIAL INFARCTION): Primary | ICD-10-CM

## 2025-07-02 LAB
ACETONE SERPL QL SCN: NEGATIVE
ALBUMIN SERPL BCP-MCNC: 3.3 G/DL (ref 3.4–4.8)
ALBUMIN/GLOB SERPL: 0.8 {RATIO}
ALP SERPL-CCNC: 114 U/L (ref 40–150)
ALT SERPL W P-5'-P-CCNC: 10 U/L
ANION GAP SERPL CALCULATED.3IONS-SCNC: 23 MMOL/L (ref 7–16)
ANISOCYTOSIS BLD QL SMEAR: NORMAL
AST SERPL W P-5'-P-CCNC: 41 U/L (ref 11–45)
BASOPHILS # BLD AUTO: 0.04 K/UL (ref 0–0.2)
BASOPHILS NFR BLD AUTO: 0.6 % (ref 0–1)
BILIRUB SERPL-MCNC: 2 MG/DL
BILIRUB UR QL STRIP: NEGATIVE
BUN SERPL-MCNC: 74 MG/DL (ref 10–20)
BUN/CREAT SERPL: 19 (ref 6–20)
CALCIUM SERPL-MCNC: 9.7 MG/DL (ref 8.4–10.2)
CHLORIDE SERPL-SCNC: 74 MMOL/L (ref 98–107)
CLARITY UR: CLEAR
CO2 SERPL-SCNC: 35 MMOL/L (ref 23–31)
COLOR UR: YELLOW
CREAT SERPL-MCNC: 3.89 MG/DL (ref 0.55–1.02)
DIFFERENTIAL METHOD BLD: ABNORMAL
EGFR (NO RACE VARIABLE) (RUSH/TITUS): 11 ML/MIN/1.73M2
EOSINOPHIL # BLD AUTO: 0.02 K/UL (ref 0–0.5)
EOSINOPHIL NFR BLD AUTO: 0.3 % (ref 1–4)
ERYTHROCYTE [DISTWIDTH] IN BLOOD BY AUTOMATED COUNT: 22.5 % (ref 11.5–14.5)
GLOBULIN SER-MCNC: 3.9 G/DL (ref 2–4)
GLUCOSE SERPL-MCNC: 359 MG/DL (ref 70–105)
GLUCOSE SERPL-MCNC: 363 MG/DL (ref 70–105)
GLUCOSE SERPL-MCNC: 449 MG/DL (ref 82–115)
GLUCOSE UR STRIP-MCNC: >=1000 MG/DL
HCT VFR BLD AUTO: 41.5 % (ref 38–47)
HGB BLD-MCNC: 12.9 G/DL (ref 12–16)
HYPOCHROMIA BLD QL SMEAR: NORMAL
KETONES UR STRIP-SCNC: NEGATIVE MG/DL
LEUKOCYTE ESTERASE UR QL STRIP: NEGATIVE
LYMPHOCYTES # BLD AUTO: 1.74 K/UL (ref 1–4.8)
LYMPHOCYTES NFR BLD AUTO: 26.4 % (ref 27–41)
MCH RBC QN AUTO: 22.7 PG (ref 27–31)
MCHC RBC AUTO-ENTMCNC: 31.1 G/DL (ref 32–36)
MCV RBC AUTO: 72.9 FL (ref 80–96)
MICROCYTES BLD QL SMEAR: NORMAL
MONOCYTES # BLD AUTO: 0.71 K/UL (ref 0–0.8)
MONOCYTES NFR BLD AUTO: 10.8 % (ref 2–6)
MPC BLD CALC-MCNC: 10.2 FL (ref 9.4–12.4)
NEUTROPHILS # BLD AUTO: 4.07 K/UL (ref 1.8–7.7)
NEUTROPHILS NFR BLD AUTO: 61.9 % (ref 53–65)
NITRITE UR QL STRIP: NEGATIVE
PH UR STRIP: 7 PH UNITS
PLATELET # BLD AUTO: 190 K/UL (ref 150–400)
PLATELET MORPHOLOGY: NORMAL
POTASSIUM SERPL-SCNC: 3.7 MMOL/L (ref 3.5–5.1)
PROT SERPL-MCNC: 7.2 G/DL (ref 5.8–7.6)
PROT UR QL STRIP: NEGATIVE
RBC # BLD AUTO: 5.69 M/UL (ref 4.2–5.4)
RBC # UR STRIP: NEGATIVE /UL
SODIUM SERPL-SCNC: 128 MMOL/L (ref 136–145)
SP GR UR STRIP: 1.01
TROPONIN I SERPL HS-MCNC: 5364.4 NG/L
TROPONIN I SERPL HS-MCNC: 5408.9 NG/L
UROBILINOGEN UR STRIP-ACNC: 0.2 MG/DL
WBC # BLD AUTO: 6.58 K/UL (ref 4.5–11)

## 2025-07-02 PROCEDURE — 82962 GLUCOSE BLOOD TEST: CPT

## 2025-07-02 PROCEDURE — 80053 COMPREHEN METABOLIC PANEL: CPT

## 2025-07-02 PROCEDURE — 96374 THER/PROPH/DIAG INJ IV PUSH: CPT

## 2025-07-02 PROCEDURE — 25000003 PHARM REV CODE 250

## 2025-07-02 PROCEDURE — 85025 COMPLETE CBC W/AUTO DIFF WBC: CPT

## 2025-07-02 PROCEDURE — 93010 ELECTROCARDIOGRAM REPORT: CPT | Performed by: FAMILY MEDICINE

## 2025-07-02 PROCEDURE — 63600175 PHARM REV CODE 636 W HCPCS

## 2025-07-02 PROCEDURE — 36415 COLL VENOUS BLD VENIPUNCTURE: CPT

## 2025-07-02 PROCEDURE — 93005 ELECTROCARDIOGRAM TRACING: CPT

## 2025-07-02 PROCEDURE — 84484 ASSAY OF TROPONIN QUANT: CPT

## 2025-07-02 PROCEDURE — 82009 KETONE BODYS QUAL: CPT

## 2025-07-02 PROCEDURE — 99285 EMERGENCY DEPT VISIT HI MDM: CPT | Mod: 25

## 2025-07-02 PROCEDURE — 96361 HYDRATE IV INFUSION ADD-ON: CPT

## 2025-07-02 PROCEDURE — 99285 EMERGENCY DEPT VISIT HI MDM: CPT | Mod: GF,EDII,,

## 2025-07-02 PROCEDURE — 81003 URINALYSIS AUTO W/O SCOPE: CPT

## 2025-07-02 RX ORDER — NAPROXEN SODIUM 220 MG/1
324 TABLET, FILM COATED ORAL
Status: COMPLETED | OUTPATIENT
Start: 2025-07-02 | End: 2025-07-02

## 2025-07-02 RX ADMIN — SODIUM CHLORIDE 250 ML: 9 INJECTION, SOLUTION INTRAVENOUS at 03:07

## 2025-07-02 RX ADMIN — HUMAN INSULIN 5 UNITS: 100 INJECTION, SOLUTION SUBCUTANEOUS at 03:07

## 2025-07-02 RX ADMIN — ASPIRIN 81 MG CHEWABLE TABLET 324 MG: 81 TABLET CHEWABLE at 03:07

## 2025-07-02 NOTE — ED NOTES
Report called to Radha ESPINAL at [facility]. Pt will be transferred via EMS to room 278. Pt and daughters aware and accepting. VSS for pt. MITRA. Notified EMS of transport. Awaiting arrival.

## 2025-07-02 NOTE — ED PROVIDER NOTES
"Encounter Date: 7/2/2025       History     Chief Complaint   Patient presents with    Hyperglycemia     80-year-old female patient brought to the ED by EMS complaining of blood sugar 540. She received IV and approximately 500 ml NS from EMS PTA. Her POC glucose upon arrival during triage 363.  Per her daughter, patient was at hair salon when she became dizzy and had a witnessed fall with no injuries. Denies LOC or hitting head. Takes Eliquis for afib.  She received physical therapy at home today this am.  Daughter states she was taken off her Ozempic approximately 2 months ago has not had any other diabetic medication since.  Prior to that, she was on glipizide.  She had a negative right and left heart catheterization on 6/11/25 by Dr. Loo and is scheduled to have a her AICD replaced next week on Tuesday 07/08/2025 by Dr. Aggarwal.  Patient denies chest pain or shortness of breath at present or at time of syncopal episode.  Denies head, neck, or back pain.  She is alert to name and birth date but not place or year. Per her daughter, patient has had some "mental changes" for quite a while now since her heart failure has worsened. She follows all commands with moving all extremities equally.  She is able to answer questions appropriately concerning present situation.  She has been taking all medications as prescribed.  PMH includes heart failure with chronic combined systolic and diastolic heart failure with EF 35%,  chronic renal insufficiency, stage IV,  permanent atrial fibrillation, skin ulcer of female breast, CVA, venous insufficiency with venous stasis ulcer left lower extremity, non-ischemic cardiomyopathy ,primary hypertension, diabetes type 2, CAD, hyperlipidemia, and vitamin-D deficiency.          The history is provided by the patient, a relative and the EMS personnel.     Review of patient's allergies indicates:   Allergen Reactions    Ace inhibitors Edema    Losartan Swelling     Past Medical History: "   Diagnosis Date    Abdominal bloating 04/22/2025    Anemia of chronic disease     Atrial fibrillation     Benign hypertensive CKD, stage 1-4 or unspecified chronic kidney disease     Bronchitis 12/22/2023    Carotid artery occlusion     Cellulitis 12/09/2024    CKD (chronic kidney disease)     Constipation 01/24/2025    Coronary atherosclerosis     Cough 12/22/2023    Cough 12/22/2023    COVID-19 ruled out 03/20/2024    Diabetes mellitus, type 2     DJD (degenerative joint disease)     History of CVA (cerebrovascular accident)     HTN (hypertension)     Hyperlipidemia     Nausea and vomiting 04/22/2025    Nonischemic dilated cardiomyopathy     Obesity     Osteopenia     Other ascites 04/09/2025    Paroxysmal atrial fibrillation     Presence of cardiac pacemaker     PVD (peripheral vascular disease)     Skin ulcer of female breast 12/30/2024    Upper respiratory tract infection 12/22/2023    Vaginal bleeding 04/26/2025    Vitamin D deficiency      Past Surgical History:   Procedure Laterality Date    BREAST BIOPSY      COLONOSCOPY  12/04/2018    repeat in 5 years    EMBOLECTOMY OR THROMBECTOMY, ARTERY, AORTOILIAC, FEMORAL, OR POPLITEAL Right 11/17/2024    Procedure: EMBOLECTOMY OR THROMBECTOMY, ARTERY, AORTOILIAC, FEMORAL, OR POPLITEAL;  Surgeon: Jordana Guillermo MD;  Location: [facility];  Service: Vascular;  Laterality: Right;    pace maker      VAGINAL DELIVERY      x 3     Family History   Problem Relation Name Age of Onset    Hypertension Mother      Hypertension Father      Hypertension Sister      Stroke Sister      Cancer Brother      Stroke Brother      Hypertension Brother      Alzheimer's disease Maternal Grandmother      Breast cancer Maternal Grandmother      Hypertension Maternal Grandmother      Hyperlipidemia Daughter      Breast cancer Daughter      Diabetes Daughter      Hypertension Daughter      Diabetes Daughter      Hypertension Daughter      Hypertension Daughter      Clotting disorder  "Daughter       Social History[1]  Review of Systems   Unable to perform ROS: Mental status change   Skin:  Negative for wound.       Physical Exam     Initial Vitals   BP Pulse Resp Temp SpO2   07/02/25 1250 07/02/25 1242 07/02/25 1242 07/02/25 1242 07/02/25 1242   129/69 61 18 97.5 °F (36.4 °C) 98 %      MAP       --                Physical Exam    Nursing note and vitals reviewed.  Constitutional: She appears well-developed and well-nourished. No distress.   HENT:   Head: Normocephalic. Mouth/Throat: No oropharyngeal exudate.   Dry tongue   Eyes: Conjunctivae and EOM are normal. Pupils are equal, round, and reactive to light. No scleral icterus.   Neck:   Normal range of motion.  Cardiovascular:  Regular rhythm, normal heart sounds and intact distal pulses.           V-paced   Pulmonary/Chest: Breath sounds normal. No respiratory distress. She has no wheezes. She has no rhonchi.   Abdominal: Abdomen is soft. Bowel sounds are normal. She exhibits no distension. There is no abdominal tenderness.   Musculoskeletal:         General: Edema present. Normal range of motion.      Cervical back: Normal range of motion.      Comments: Left lower extremity edema with venous stasis ulcer     Lymphadenopathy:     She has no cervical adenopathy.   Neurological: She is alert. She has normal strength.   Alert and oriented to name and birth date but not place or year; per family this baseline with history of some "mental changes" over the last several months with her worsening heart failure; follows all commands appropriately; moving all extremities equally   Skin: Skin is warm and dry. Capillary refill takes less than 2 seconds.   Psychiatric: She has a normal mood and affect.         Medical Screening Exam   See Full Note    ED Course   Procedures  Labs Reviewed   COMPREHENSIVE METABOLIC PANEL - Abnormal       Result Value    Sodium 128 (*)     Potassium 3.7      Chloride 74 (*)     CO2 35 (*)     Anion Gap 23 (*)     Glucose " 449 (*)     BUN 74 (*)     Creatinine 3.89 (*)     BUN/Creatinine Ratio 19      Calcium 9.7      Total Protein 7.2      Albumin 3.3 (*)     Globulin 3.9      A/G Ratio 0.8      Bilirubin, Total 2.0 (*)     Alk Phos 114      ALT 10      AST 41      eGFR 11 (*)    URINALYSIS, REFLEX TO URINE CULTURE - Abnormal    Color, UA Yellow      Clarity, UA Clear      pH, UA 7.0      Leukocytes, UA Negative      Nitrites, UA Negative      Protein, UA Negative      Glucose, UA >=1000 (*)     Ketones, UA Negative      Urobilinogen, UA 0.2      Bilirubin, UA Negative      Blood, UA Negative      Specific Gravity, UA 1.015     TROPONIN I - Abnormal    Troponin I High Sensitivity 5,408.9 (*)    CBC WITH DIFFERENTIAL - Abnormal    WBC 6.58      RBC 5.69 (*)     Hemoglobin 12.9      Hematocrit 41.5      MCV 72.9 (*)     MCH 22.7 (*)     MCHC 31.1 (*)     RDW 22.5 (*)     Platelet Count 190      MPV 10.2      Neutrophils % 61.9      Lymphocytes % 26.4 (*)     Neutrophils, Abs 4.07      Lymphocytes, Absolute 1.74      Diff Type Scan Smear      Monocytes % 10.8 (*)     Eosinophils % 0.3 (*)     Basophils % 0.6      Monocytes, Absolute 0.71      Eosinophils, Absolute 0.02      Basophils, Absolute 0.04     TROPONIN I - Abnormal    Troponin I High Sensitivity 5,364.4 (*)    POCT GLUCOSE MONITORING CONTINUOUS - Abnormal    POC Glucose 363 (*)    POCT GLUCOSE MONITORING CONTINUOUS - Abnormal    POC Glucose 359 (*)    CBC W/ AUTO DIFFERENTIAL    Narrative:     The following orders were created for panel order CBC auto differential.  Procedure                               Abnormality         Status                     ---------                               -----------         ------                     CBC with Differential[[id number]]       Abnormal            Final result                 Please view results for these tests on the individual orders.   ACETONE    Acetone Negative     CBC MORPHOLOGY    Platelet Morphology Normal       Anisocytosis 3+      Microcytosis 2+      Hypochromic 1+     POCT GLUCOSE MONITORING CONTINUOUS          Imaging Results              X-Ray Chest AP Portable (Final result)  Result time 07/02/25 14:03:26      Final result by Oliver Roe Jr., MD (07/02/25 14:03:26)                   Impression:      Cardiomegaly. Pacemaker in place. Aortic atherosclerosis.      Electronically signed by: Oliver Roe MD  Date:    07/02/2025  Time:    14:03               Narrative:    EXAMINATION:  XR CHEST AP PORTABLE    CLINICAL HISTORY:  Dizziness and giddiness    TECHNIQUE:  Single frontal view of the chest was performed.    COMPARISON:  Chest x-ray of May 20, 2025 and April 8, 2025.    FINDINGS:  A pacemaker is noted in place on the left with single lead to the right heart. There is cardiomegaly in a right ventricular predominant pattern. Atherosclerotic calcification is noted in the aorta.  An intrapulmonary mass or infiltrate is not seen. There is no pneumothorax or pleural effusion.                                       CT Head Without Contrast (Final result)  Result time 07/02/25 14:00:24      Final result by Manpreet Del Real MD (07/02/25 14:00:24)                   Impression:      No CT evidence of acute intracranial abnormality. If the patient has an acute, focal neurological deficit, MRI of the brain may be indicated.    Mild generalized cerebral volume loss and scattered encephalomalacia, most notable in the left temporal lobe.      Electronically signed by: Manpreet Del Real MD  Date:    07/02/2025  Time:    14:00               Narrative:    EXAMINATION:  CT HEAD WITHOUT CONTRAST    CLINICAL HISTORY:  dizziness, vomiting, HX CVA;    TECHNIQUE:  Low dose axial images were obtained through the head.  Coronal and sagittal reformations were also performed. Contrast was not administered.    COMPARISON:  None.    FINDINGS:  Mild generalized cerebral volume loss and mild chronic ischemic changes with small  scattered areas of encephalomalacia.  Large area of encephalomalacia in the left temporal lobe.    No evidence of acute territorial infarct, hemorrhage, mass effect, or midline shift.    Slight ex vacuo dilation of the left lateral ventricle when compared to the right side.  No hydrocephalus.    No displaced calvarial fracture.    Visualized paranasal sinuses and mastoid air cells are essentially clear.                                       Medications   aspirin chewable tablet 324 mg (324 mg Oral Given 7/2/25 1549)   insulin regular injection 5 Units 0.05 mL (5 Units Intravenous Given 7/2/25 1549)   sodium chloride 0.9% bolus 250 mL 250 mL (0 mLs Intravenous Stopped 7/2/25 1654)     Medical Decision Making  MDM    Patient presents for emergent evaluation of acute syncopal episode and hyperglycemia that poses a threat to life and/or bodily function.    In the ED patient found to have acute hyperglycemia, elevated troponin, dehydration.  I ordered labs and personally reviewed them.  Labs significant for CBC- WBC 6.58, H&H 12.9/415, platelets 190, CMP-sodium 128, potassium 2.7, chloride 74, CO2 35, anion gap 23, BUN/creatinine 74/3.89, BUN/creatinine ratio 19, GFR 11, glucose 449  Serum acetone negative, Urinalysis- negative nitrites, negative leukocytes, negative blood, negative protein, glucose>= 1000, Troponin -5408.9, Troponin #2 5364.4.    I ordered X-rays and personally reviewed them and reviewed the radiologist interpretation.  Xray significant for Chest x-ray portable-A pacemaker is noted in place on the left with single lead to the right heart. There is cardiomegaly in a right ventricular predominant pattern. Atherosclerotic calcification is noted in the aorta.  An intrapulmonary mass or infiltrate is not seen. There is no pneumothorax or pleural effusion. Impression: Cardiomegaly. Pacemaker in place. Aortic atherosclerosis.   CT head without contrast-No CT evidence of acute intracranial abnormality. If the  "patient has an acute, focal neurological deficit, MRI of the brain may be indicated. Mild generalized cerebral volume loss and scattered encephalomalacia, most notable in the left temporal lobe.   I ordered EKG and personally reviewed it.  EKG significant for EKG #1- V paced rate of 61 beats per minute no STEMI, EKG #2-V paced rate of 60 beats per minute no STEMI.       Transfer MDM  I discussed the patient presentation and findings with the consultant for [facility] telemedicine Dr. Geraldine yang  (speciality).    Patient was managed in the ED with Aspirin, Insulin IV, and NS bolus    The response to treatment was stabilized for transfer.    Patient accepted by Dr. Pittman to [facility] telemetry for further care.    Amount and/or Complexity of Data Reviewed  Independent Historian:      Details: 80-year-old female patient brought to the ED by EMS complaining of blood sugar 540. She received IV and approximately 500 ml NS from EMS PTA. Her POC glucose upon arrival during triage 363.  Per her daughter, patient was at hair salon when she became dizzy and had a witnessed fall with no injuries. Denies LOC or hitting head. Takes Eliquis for afib.  She received physical therapy at home today this am.  Daughter states she was taken off her Ozempic approximately 2 months ago has not had any other diabetic medication since.  Prior to that, she was on glipizide.  She had a negative right and left heart catheterization on 6/11/25 by Dr. Loo and is scheduled to have a her AICD replaced next week on Tuesday 07/08/2025 by Dr. Aggarwal.  Patient denies chest pain or shortness of breath at present or at time of syncopal episode.  Denies head, neck, or back pain.  She is alert to name and birth date but not place or year. Per her daughter, patient has had some "mental changes" for quite a while now since her heart failure has worsened. She follows all commands with moving all extremities equally.  She is able to answer questions appropriately " concerning present situation.  She has been taking all medications as prescribed.  PMH includes heart failure with chronic combined systolic and diastolic heart failure with EF 35%,  chronic renal insufficiency, stage IV,  permanent atrial fibrillation, skin ulcer of female breast, CVA, venous insufficiency with venous stasis ulcer left lower extremity, non-ischemic cardiomyopathy ,primary hypertension, diabetes type 2, CAD, hyperlipidemia, and vitamin-D deficiency.  Labs: ordered.     Details: CBC- WBC 6.58, H&H 12.9/415, platelets 190  CMP-sodium 128, potassium 2.7, chloride 74, CO2 35, anion gap 23, BUN/creatinine 74/3.89, BUN/creatinine ratio 19, GFR 11, glucose 449  Serum acetone negative  Urinalysis- negative nitrites, negative leukocytes, negative blood, negative protein, glucose>= 1000  Troponin - 5408.9, Troponin #2 5364.4  Radiology: ordered.     Details: CT head without contrast-No CT evidence of acute intracranial abnormality. If the patient has an acute, focal neurological deficit, MRI of the brain may be indicated. Mild generalized cerebral volume loss and scattered encephalomalacia, most notable in the left temporal lobe.    Chest x-ray portable-A pacemaker is noted in place on the left with single lead to the right heart. There is cardiomegaly in a right ventricular predominant pattern. Atherosclerotic calcification is noted in the aorta.  An intrapulmonary mass or infiltrate is not seen. There is no pneumothorax or pleural effusion. Impression: Cardiomegaly. Pacemaker in place. Aortic atherosclerosis.       ECG/medicine tests:      Details: EKG #1- V paced rate of 61 beats per minute no STEMI  EKG #2-V paced rate of 60 beats per minute no STEMI    Risk  OTC drugs.               ED Course as of 07/02/25 1831 Wed Jul 02, 2025   1396 Spoke with Dr. Chandler for [facility] telemedicine and given all pertinent information. He recommends Aspirin 324 mg,  ml over and hour, and Insulin 5 mg IV now and  patient needs to be transferred for further management. [KT]   4483 Accepted by Dr. Pittman at [facility] to telemetry with awaiting bed assignment. Patient and daughter aware and agree with POC. [KT]      ED Course User Index  [KT] Sravanthi Whaley NP                           Clinical Impression:   Final diagnoses:  [R42] Dizziness  [I21.4] NSTEMI (non-ST elevated myocardial infarction) (Primary)  [E11.65] Hyperglycemia due to diabetes mellitus  [R79.89] Elevated troponin  [E86.0] Dehydration        ED Disposition Condition    Transfer to Another Facility Stable                    [1]   Social History  Tobacco Use    Smoking status: Never     Passive exposure: Never    Smokeless tobacco: Never   Substance Use Topics    Alcohol use: Not Currently    Drug use: Never        Sravanthi Whaley NP  07/02/25 6563

## 2025-07-02 NOTE — ED NOTES
Provider on phone with [facility] provider discussing possible transfer. Pt and daughter aware.

## 2025-07-05 ENCOUNTER — DOCUMENT SCAN (OUTPATIENT)
Dept: HOME HEALTH SERVICES | Facility: HOSPITAL | Age: 80
End: 2025-07-05
Payer: MEDICARE

## 2025-07-08 LAB
OHS QRS DURATION: 188 MS
OHS QRS DURATION: 210 MS
OHS QTC CALCULATION: 507 MS
OHS QTC CALCULATION: 514 MS

## 2025-07-09 ENCOUNTER — EXTERNAL HOSPITAL ADMISSION (OUTPATIENT)
Dept: ADMINISTRATIVE | Facility: CLINIC | Age: 80
End: 2025-07-09
Payer: MEDICARE

## 2025-07-09 ENCOUNTER — PATIENT OUTREACH (OUTPATIENT)
Dept: ADMINISTRATIVE | Facility: CLINIC | Age: 80
End: 2025-07-09
Payer: MEDICARE

## 2025-07-09 NOTE — PROGRESS NOTES
C3 nurse spoke with daughter Queta Ledezma who is unable to complete the call at this time. Daughter requested a callback. Patient does not have a HOSFU. Message routed to PCP's staff assistance needed with scheduling a HOSFU.

## 2025-07-09 NOTE — PROGRESS NOTES
C3 nurse spoke with Pam Ortega  for a TCC post hospital discharge follow up call. The patient has a scheduled [facility] appointment with Janice Velez FNP  on 7/10/25 @ 130.

## 2025-07-10 ENCOUNTER — PATIENT OUTREACH (OUTPATIENT)
Facility: HOSPITAL | Age: 80
End: 2025-07-10
Payer: MEDICARE

## 2025-07-10 ENCOUNTER — OFFICE VISIT (OUTPATIENT)
Dept: FAMILY MEDICINE | Facility: CLINIC | Age: 80
End: 2025-07-10
Payer: MEDICARE

## 2025-07-10 VITALS
BODY MASS INDEX: 30.92 KG/M2 | TEMPERATURE: 98 F | DIASTOLIC BLOOD PRESSURE: 72 MMHG | WEIGHT: 216 LBS | HEART RATE: 71 BPM | OXYGEN SATURATION: 100 % | HEIGHT: 70 IN | SYSTOLIC BLOOD PRESSURE: 128 MMHG

## 2025-07-10 DIAGNOSIS — E78.5 HYPERLIPIDEMIA, UNSPECIFIED HYPERLIPIDEMIA TYPE: ICD-10-CM

## 2025-07-10 DIAGNOSIS — E11.69 TYPE 2 DIABETES MELLITUS WITH OTHER SPECIFIED COMPLICATION, WITHOUT LONG-TERM CURRENT USE OF INSULIN: Primary | ICD-10-CM

## 2025-07-10 DIAGNOSIS — N18.5 CHRONIC KIDNEY DISEASE (CKD), STAGE V: ICD-10-CM

## 2025-07-10 DIAGNOSIS — Z86.73 HISTORY OF CVA (CEREBROVASCULAR ACCIDENT): ICD-10-CM

## 2025-07-10 DIAGNOSIS — I50.9 CONGESTIVE HEART FAILURE, UNSPECIFIED HF CHRONICITY, UNSPECIFIED HEART FAILURE TYPE: ICD-10-CM

## 2025-07-10 DIAGNOSIS — I10 ESSENTIAL HYPERTENSION: ICD-10-CM

## 2025-07-10 RX ORDER — INSULIN GLARGINE 100 [IU]/ML
7 INJECTION, SOLUTION SUBCUTANEOUS DAILY
COMMUNITY
Start: 2025-07-04 | End: 2025-08-03

## 2025-07-10 NOTE — LETTER
AUTHORIZATION FOR RELEASE OF   CONFIDENTIAL INFORMATION    Dear Dr. Trivedi,    We are seeing Pam Ortega, date of birth 1945, in the clinic at [facility]. Janice Velze FNP is the patient's PCP. Pam Ortega has an outstanding lab/procedure at the time we reviewed her chart. In order to help keep her health information updated, she has authorized us to request the following medical record(s):        (  )  MAMMOGRAM                                      (  )  COLONOSCOPY      (  )  PAP SMEAR                                          (  )  OUTSIDE LAB RESULTS     (  )  DEXA SCAN                                          ( x )  EYE EXAM            (  )  FOOT EXAM                                          (  )  ENTIRE RECORD     (  )  OUTSIDE IMMUNIZATIONS                 (  )  _______________         Please fax records to Haylie Pete LPN Care Coordinator at [phone].      If you have any questions, please contact Haylie at [phone].           Patient Name: Pam Ortega  : 1945  Patient Phone #: [phone]                Pam Ortega  MRN: [id number]  : 1945  Age: 79 y.o.  Sex: female         Patient/Legal Guardian Signature  This signature was collected at 2024    CG       _______________________________   Printed Name/Relationship to Patient      Consent for Examination and Treatment: I hereby authorize the providers and employees of [facility] ([facility]) to provide medical treatment/services which includes, but is not limited to, performing and administering tests and diagnostic procedures that are deemed necessary, including, but not limited to, imaging examinations, blood tests and other laboratory procedures as may be required by the hospital, clinic, or may be ordered by my physician(s) or persons working under the general and/or special instructions of my physician(s).      I understand and agree that this consent  covers all authorized persons, including but not limited to physicians, residents, nurse practitioners, physicians' assistants, specialists, consultants, student nurses, and independently contracted physicians, who are called upon by the physician in charge, to carry out the diagnostic procedures and medical or surgical treatment.     I hereby authorize [facility] to retain or dispose of any specimens or tissue, should there be such remaining from any test or procedure.     I hereby authorize and give consent for [facility] providers and employees to take photographs, images or videotapes of such diagnostic, surgical or treatment procedures of Patient as may be required by [facility] or as may be ordered by a physician. I further acknowledge and agree that [facility] may use cameras or other devices for patient monitoring.     I am aware that the practice of medicine is not an exact science, and I acknowledge that no guarantees have been made to me as to the outcome of any tests, procedures or treatment.     Authorization for Release of Information: I understand that my insurance company and/or their agents may need information necessary to make determinations about payment/reimbursement. I hereby provide authorization to release to all insurance companies, their successors, assignees, other parties with whom they may have contracted, or others acting on their behalf, that are involved with payment for any hospital and/or clinic charges incurred by the patient, any information that they request and deem necessary for payment/reimbursement, and/or quality review.  I further authorize the release of my health information to physicians or other health care practitioners on staff who are involved in my health care now and in the future, and to other health care providers, entities, or institutions for the purpose of my continued care and treatment, including referrals.     REGISTRATION AUTHORIZATION  Form No. 09419 (Rev. 3/25/2024)     Page 1 of 3                       Medicare Patient's Certification and Authorization to Release Information and Payment Request:  I certify that the information given by me in applying for payment under Title XVIII of the Social Security Act is correct. I authorize any forrest of medical or other information about me to release to the Social SecurityAdministration, or its intermediaries or carriers, any information needed for this or a related Medicare claim. I request that payment of authorized benefits be made on my behalf.     Assignment of Insurance Benefits:   I hereby authorize any and all insurance companies, health plans, defined   benefit plans, health insurers or any entity that is or may be responsible for payment of my medical expenses to pay all hospital and medical benefits now due, and to become due and payable to me under any hospital benefits, sick benefits, injury benefits or any other benefit for services rendered to me, including Major Medical Benefits, direct to [facility] and all independently contracted physicians. I assign any and all rights that I may have against any and all insurance companies, health plans, defined benefit plans, health insurers or any entity that is or may be responsible for payment of my medical expenses, including, but not limited to any right to appeal a denial of a claim, any right to bring any action, lawsuit, administrative proceeding, or other cause of action on my behalf. I specifically assign my right to pursue litigation against any and all insurance companies, health plans, defined benefit plans, health insurers or any entity that is or may be responsible for payment of my medical expenses based upon a refusal to pay charges.            E. Valuables: It is understood and agreed that [facility] is not liable for the damage to or loss of any money, jewelry,   documents, dentures, eye glasses, hearing aids, prosthetics, or other property of value.     F. Computer  Equipment: I understand and agree that should I choose to use computer equipment owned by [facility] or if I choose to access the Internet via [facility]s network, I do so at my own risk. [facility] is not responsible for any damage to my computer equipment or to any damages of any type that might arise from my loss of equipment or data.     G. Acceptance of Financial Responsibility:  I agree that in consideration of the services and   supplies that have been   or will be furnished to the patient, I am hereby obligated to pay all charges made for or on the account of the patient according to the standard rates (in effect at the time the services and supplies are delivered) established by [facility], including its Patient Financial Assistance Policy to the extent it is applicable. I understand that I am responsible for all charges, or portions thereof, not covered by insurance or other sources. Patient refunds will be distributed only after balances at all [facility] facilities are paid.     H. Communication Authorization:  I hereby authorize [facility] and its representatives, along with any billing service   or  who may work on their behalf, to contact me on   my cell phone and/or home phone using pre- recorded messages, artificial voice messages, automatic telephone dialing devices or other computer assisted technology, or by electronic      mail, text messaging, or by any other form of electronic communication. This includes, but is not limited to, appointment reminders, yearly physical exam reminders, preventive care reminders, patient campaigns, welcome calls, and calls about account balances on my account or any account on which I am listed as a guarantor. I understand I have the right to opt out of these communications at any time.      Relationship  Between  Facility and  Provider:      I understand that some, but not all, providers furnishing services to the patient are not employees or agents of [facility].  The patient is under the care and supervision of his/her attending physician, and it is the responsibility of the facility and its nursing staff to carry out the instructions of such physicians. It is the responsibility of the patient's physician/designee to obtain the patient's informed consent, when required, for medical or surgical treatment, special diagnostic or therapeutic procedures, or hospital services rendered for the patient under the special instructions of the physician/designee.           REGISTRATION AUTHORIZATION  Form No. 71684 (Rev. 3/25/2024)    Page 2 of 3                       Immunizations: [facility] shares immunization information with state sponsored health departments to help you and your doctor keep track of your immunization records. By signing, you consent to have this information shared with the health department in your state:                                [place] - LINKS ([place] Immunization Network for Kids Statewide)                                [place] - MIIX ([place] Immunization Information eXchange)                                [place] - ImmPRINT (Immunization Patient Registry with Integrated Technology)     TERM: This authorization is valid for this and subsequent care/treatment I receive at [facility] and will remain valid unless/until revoked in writing by me.     [facility]: As used in this document, [facility] means all [facility] owned and managed facilities, including, but not limited to, all health centers, surgery centers, clinics, urgent care centers, and hospitals.         [facility] complies with applicable Federal civil rights laws and does not discriminate on the basis of race, color, national origin, age, disability, or sex.  ATENCIÓN: si habla español, tiene a moon disposición servicios gratuitos de asistencia lingüística. Angelica mac [phone].  RAZ Ý: N?u b?n nói Ti?ng Vi?t, có các d?ch v? h? tr? ngôn ng? mi?n phí dành cho  b?n. G?i s? [phone].        REGISTRATION AUTHORIZATION  Form No. 95930 (Rev. 3/25/2024)   Page 3 of 3

## 2025-07-10 NOTE — PROGRESS NOTES
Population Health Chart Review & Patient Outreach Details    Updates Requested / Reviewed:  [x]  Care Team Updated    Health Maintenance Topics Addressed and Outreach Outcomes / Actions Taken:  Diabetic Eye Exam [x] SHI sent to [facility].

## 2025-07-15 ENCOUNTER — OFFICE VISIT (OUTPATIENT)
Dept: DIABETES SERVICES | Facility: CLINIC | Age: 80
End: 2025-07-15
Payer: MEDICARE

## 2025-07-15 VITALS
HEART RATE: 56 BPM | OXYGEN SATURATION: 94 % | SYSTOLIC BLOOD PRESSURE: 130 MMHG | RESPIRATION RATE: 18 BRPM | DIASTOLIC BLOOD PRESSURE: 70 MMHG | HEIGHT: 70 IN | WEIGHT: 178.81 LBS | BODY MASS INDEX: 25.6 KG/M2

## 2025-07-15 DIAGNOSIS — E78.5 HYPERLIPIDEMIA, UNSPECIFIED HYPERLIPIDEMIA TYPE: ICD-10-CM

## 2025-07-15 DIAGNOSIS — I42.9 CARDIOMYOPATHY, UNSPECIFIED TYPE: ICD-10-CM

## 2025-07-15 DIAGNOSIS — E11.65 TYPE 2 DIABETES MELLITUS WITH HYPERGLYCEMIA, WITHOUT LONG-TERM CURRENT USE OF INSULIN: Primary | ICD-10-CM

## 2025-07-15 DIAGNOSIS — N18.5 CHRONIC KIDNEY DISEASE (CKD), STAGE V: ICD-10-CM

## 2025-07-15 DIAGNOSIS — I10 HYPERTENSION, UNSPECIFIED TYPE: ICD-10-CM

## 2025-07-15 DIAGNOSIS — I48.20 CHRONIC A-FIB: ICD-10-CM

## 2025-07-15 DIAGNOSIS — E11.69 TYPE 2 DIABETES MELLITUS WITH OTHER SPECIFIED COMPLICATION, WITHOUT LONG-TERM CURRENT USE OF INSULIN: ICD-10-CM

## 2025-07-15 DIAGNOSIS — I25.10 ATHEROSCLEROSIS OF CORONARY ARTERY, UNSPECIFIED VESSEL OR LESION TYPE, UNSPECIFIED WHETHER ANGINA PRESENT, UNSPECIFIED WHETHER NATIVE OR TRANSPLANTED HEART: ICD-10-CM

## 2025-07-15 DIAGNOSIS — Z86.73 HISTORY OF CVA (CEREBROVASCULAR ACCIDENT): ICD-10-CM

## 2025-07-15 LAB — GLUCOSE SERPL-MCNC: 91 MG/DL (ref 70–110)

## 2025-07-15 PROCEDURE — 99999 PR PBB SHADOW E&M-EST. PATIENT-LVL V: CPT | Mod: PBBFAC,,, | Performed by: NURSE PRACTITIONER

## 2025-07-15 PROCEDURE — 99215 OFFICE O/P EST HI 40 MIN: CPT | Mod: PBBFAC | Performed by: NURSE PRACTITIONER

## 2025-07-15 PROCEDURE — 99999PBSHW POCT GLUCOSE, HAND-HELD DEVICE: Mod: PBBFAC,,,

## 2025-07-15 PROCEDURE — 82962 GLUCOSE BLOOD TEST: CPT | Mod: PBBFAC | Performed by: NURSE PRACTITIONER

## 2025-07-15 PROCEDURE — 99204 OFFICE O/P NEW MOD 45 MIN: CPT | Mod: S$PBB,,, | Performed by: NURSE PRACTITIONER

## 2025-07-15 NOTE — PROGRESS NOTES
Subjective:         Patient ID: Pam Ortega is a 80 y.o. female.  Patient's current PCP is Janice Velez FNP.     Chief Complaint: No chief complaint on file.    HPI  Pam Ortega is a 80 y.o. Black or  female presenting for a new consult for diabetes. Patient has been diagnosed with type 2 diabetes for < 5 years.  Received diabetes education: yes during hospitalization     CURRENT DM MEDICATIONS:   Diabetes Medications              LANTUS SOLOSTAR U-100 INSULIN 100 unit/mL (3 mL) InPn pen Inject 7 Units into the skin once daily.              Past failed treatment include:     Blood glucose testing is performed regularly. Patient is testing 2 times per day.  Meter:   Preferred lab:    Any episodes of hypoglycemia? no     Complications related to diabetes: nephropathy, cardiovascular disease, and peripheral vascular disease    Her blood sugar in the clinic today was:   Lab Results   Component Value Date    POCGLU 91 07/15/2025       Pam Ortega presents today for follow up visit to discuss diabetes management.   Her fasting glucose in the AM is     She was on ozempic and farxiga- she was not taking regularly prior to her recent hospitalization. This was discontinued while inpt and she was started on long acting insulin. Given her cardiac and renal issues, she is limited on her medication options. Seems likely that ozempic played a part in her recent dehydration status.   She was recently on steroids as well for angioedema secondary to ACE/ARB.    We will continue on long acting insulin at this time. Instructed family on diet, carb counting, and goal glucose throughout the day.   We will check in in about a week for further readings to make adjustments as needed.     She recently had an AICD placed- remains in sling.     Current diet: does not follow diabetic diet   Activity Level: sedentary     Lab Results   Component Value Date    HGBA1C 9.9 (H) 07/03/2025    HGBA1C  "6.1 05/12/2025    HGBA1C 6.2 08/23/2024       STANDARDS OF CARE  Diabetes Management Status    Statin: Taking  ACE/ARB: Not taking    Screening or Prevention Patient's value Goal Complete/Controlled?   HgA1C Testing and Control   Lab Results   Component Value Date    HGBA1C 9.9 (H) 07/03/2025      Annually/Less than 8% No   Lipid profile : 07/03/2025 Annually Yes   LDL control Lab Results   Component Value Date    LDLCALC 24 05/12/2025    Annually/Less than 100 mg/dl  Yes   Nephropathy screening Lab Results   Component Value Date    LABMICR 94.5 (H) 05/12/2025     Lab Results   Component Value Date    PROTEINUA Negative 07/02/2025     No results found for: "UTPCR"   Annually Yes   Blood pressure BP Readings from Last 1 Encounters:   07/15/25 130/70    Less than 140/90 Yes   Dilated retinal exam : 06/14/2024 Annually No   Foot exam   Most Recent Foot Exam Date: Not Found Annually No          Labs reviewed and are noted below.    Lab Results   Component Value Date    WBC 6.58 07/02/2025    HGB 12.9 07/02/2025    HCT 41.5 07/02/2025     07/02/2025    CHOL 55 05/12/2025    TRIG 65 05/12/2025    HDL 18 (L) 05/12/2025    LDLCALC 24 05/12/2025    ALT 10 07/02/2025    AST 41 07/02/2025     (L) 07/02/2025    K 3.7 07/02/2025    CL 74 (LL) 07/02/2025    ANIONGAP 23 (H) 07/02/2025    CREATININE 3.89 (H) 07/02/2025    ESTGFRAFRICA 33 (L) 10/06/2021    EGFRNONAA 31 (L) 06/14/2022    BUN 74 (H) 07/02/2025    CO2 35 (H) 07/02/2025    TSH 3.227 05/12/2025    INR 1.70 04/09/2025     (H) 07/02/2025    MICROALBUR 6.9 (H) 05/12/2025     Lab Results   Component Value Date    TSH 3.227 05/12/2025    IRON 26 (L) 04/09/2025    TIBC 398 04/09/2025    FERRITIN 46 04/09/2025    STMMKTTA46 465 01/22/2025    CALCIUM 9.7 07/02/2025    PHOS 3.9 01/21/2025     No results found for: "CPEPTIDE"  No results found for: "GLUTAMICACID"  Glucose   Date Value Ref Range Status   07/02/2025 449 (H) 82 - 115 mg/dL Final     Anion Gap "   Date Value Ref Range Status   07/02/2025 23 (H) 7 - 16 mmol/L Final     eGFR    Date Value Ref Range Status   10/06/2021 33 (L) >=60 mL/min/1.73m² Final     eGFR   Date Value Ref Range Status   06/14/2022 31 (L) >=60 mL/min/1.73m² Final       The following portions of the patient's history were reviewed and updated as appropriate: allergies, current medications, past family history, past medical history, past social history, past surgical history, and problem list.    Review of patient's allergies indicates:   Allergen Reactions    Ace inhibitors Edema    Losartan Swelling     Social History[1]  Past Medical History:   Diagnosis Date    Abdominal bloating 04/22/2025    Anemia of chronic disease     Atrial fibrillation     Benign hypertensive CKD, stage 1-4 or unspecified chronic kidney disease     Bronchitis 12/22/2023    Carotid artery occlusion     Cellulitis 12/09/2024    CKD (chronic kidney disease)     Constipation 01/24/2025    Coronary atherosclerosis     Cough 12/22/2023    Cough 12/22/2023    COVID-19 ruled out 03/20/2024    Diabetes mellitus, type 2     DJD (degenerative joint disease)     History of CVA (cerebrovascular accident)     HTN (hypertension)     Hyperlipidemia     Nausea and vomiting 04/22/2025    Nonischemic dilated cardiomyopathy     Obesity     Osteopenia     Other ascites 04/09/2025    Paroxysmal atrial fibrillation     Presence of cardiac pacemaker     PVD (peripheral vascular disease)     Skin ulcer of female breast 12/30/2024    Upper respiratory tract infection 12/22/2023    Vaginal bleeding 04/26/2025    Vitamin D deficiency        REVIEW OF SYSTEMS:  Eyes No history of DR.  Cardiovascular: History of cardiomyopathy, HTN, HLD   GI: Denies nausea,vomiting,constipation,or diarrhea.  : Denies dysuria.  SKIN: Denies rashes and lesions.  Neuro: No neuropathy.  PSYCH: No tobacco use.  ENDO: See HPI.        Objective:      Vitals:    07/15/25 0835   BP: 130/70   Pulse: (!)  56   Resp: 18     RESPIRATORY: No respiratory distress  NEUROLOGIC: Cranial nerves II-XII grossly intact.   PSYCHIATRIC: Alert & oriented x3. Normal mood and affect.  FOOT EXAMINATION: pulses present with slight edema noted   Assessment:       1. Type 2 diabetes mellitus with hyperglycemia, without long-term current use of insulin    2. Type 2 diabetes mellitus with other specified complication, without long-term current use of insulin    3. History of CVA (cerebrovascular accident)    4. Hyperlipidemia, unspecified hyperlipidemia type    5. Hypertension, unspecified type    6. Chronic a-fib    7. Cardiomyopathy, unspecified type    8. Atherosclerosis of coronary artery, unspecified vessel or lesion type, unspecified whether angina present, unspecified whether native or transplanted heart    9. Chronic kidney disease (CKD), stage V        Plan:   Type 2 diabetes mellitus with hyperglycemia, without long-term current use of insulin  -     POCT Glucose, Hand-Held Device  - BG log given today  - declined diabetes education- references added to AVS   - discussed CGM  - will continue on long acting insulin d/t cardiac and renal issues  - add to insulin list and check in next week- adjust as needed for glycemic control     Type 2 diabetes mellitus with other specified complication, without long-term current use of insulin  -     Ambulatory referral/consult to Diabetic Advanced Practice Providers (Medical Management)    History of CVA (cerebrovascular accident)  - noted     Hyperlipidemia, unspecified hyperlipidemia type  - noted     Hypertension, unspecified type  - noted  - stable during visit     Chronic a-fib  - noted     Cardiomyopathy, unspecified type  - noted     Atherosclerosis of coronary artery, unspecified vessel or lesion type, unspecified whether angina present, unspecified whether native or transplanted heart  - noted     Chronic kidney disease (CKD), stage V  - noted  - follows with Dr Dutta       - Follow  "up: 3 months      Portions of this note may have been created with voice recognition software. Occasional "wrong-word" or "sound-a-like" substitutions may have occurred due to the inherent limitations of voice recognition software. Please, read the note carefully and recognize, using context, where substitutions have occurred.         Magaile Mendiola FNP/VANITA  [facility]          [1]   Social History  Socioeconomic History    Marital status:    Tobacco Use    Smoking status: Never     Passive exposure: Never    Smokeless tobacco: Never   Substance and Sexual Activity    Alcohol use: Not Currently    Drug use: Never    Sexual activity: Not Currently     Social Drivers of Health     Financial Resource Strain: Not At Risk (7/3/2025)    Received from [facility]    BOC Financial Resource Needs     Food/Housing/Utility/Transport/Financial Concerns : Unrecognized value   Food Insecurity: Not At Risk (7/3/2025)    Received from [facility]    BOC Food Insecurity     Food/Housing/Utility/Transport/Financial Concerns : Unrecognized value   Transportation Needs: Not At Risk (7/3/2025)    Received from [facility]    BOC Transportation Needs     Food/Housing/Utility/Transport/Financial Concerns : Unrecognized value   Physical Activity: Insufficiently Active (5/21/2025)    Exercise Vital Sign     Days of Exercise per Week: 3 days     Minutes of Exercise per Session: 30 min   Stress: No Stress Concern Present (5/21/2025)    [employer] [place] [employer] - Occupational Stress Questionnaire     Feeling of Stress : Not at all   Housing Stability: Not At Risk (7/3/2025)    Received from [facility]    BOC Housing Stability Source     Food/Housing/Utility/Transport/Financial Concerns : Unrecognized value     "

## 2025-07-15 NOTE — PATIENT INSTRUCTIONS
-- Medications adjusted for today's visit include:    Continue your lantus 7 units     -- Limit carbs to no more than 30-45 grams with each meal. Never eat carbs by themselves, always add protein. Make snacks low carb or non-carb only.    -- Continue checking blood sugar 3 times daily: Fasting blood sugar and vary your next 2 readings: Before lunch, before supper, 2 hours after any meal, or bedtime.   -Blood sugar goals should be a fasting blood sugar between , and no blood sugars throughout the day over 180 is good, less than 160 better, less than 140 perfect.    --Carry glucose tablets/soft peppermints/regular juice or Coke product with you at all times to treat a low blood sugar episode (less than 70). If your blood sugar is between 50-70, Chew 4 tablets or drink 1/2 cup of juice or regular Coke product. If your blood sugar is below 50, double the treatment. Re-check blood sugar in 15 minutes. If still low, repeat this. Always call the clinic to give an update for any low blood sugar episodes.    --Exercise as tolerated: Goal 30 minutes/day, 5 days/week. Start slowly and increase as tolerated.    --Follow-up for your next visit in 3 months     --Please either drop off, fax, or MyChart your readings to me as needed.

## 2025-07-17 ENCOUNTER — OFFICE VISIT (OUTPATIENT)
Dept: VASCULAR SURGERY | Facility: CLINIC | Age: 80
End: 2025-07-17
Payer: MEDICARE

## 2025-07-17 ENCOUNTER — PATIENT OUTREACH (OUTPATIENT)
Facility: HOSPITAL | Age: 80
End: 2025-07-17
Payer: MEDICARE

## 2025-07-17 VITALS
SYSTOLIC BLOOD PRESSURE: 135 MMHG | HEART RATE: 72 BPM | BODY MASS INDEX: 25.03 KG/M2 | DIASTOLIC BLOOD PRESSURE: 72 MMHG | HEIGHT: 70 IN | RESPIRATION RATE: 20 BRPM | WEIGHT: 174.81 LBS

## 2025-07-17 DIAGNOSIS — L81.9 HYPERPIGMENTATION OF SKIN: ICD-10-CM

## 2025-07-17 DIAGNOSIS — M79.605 BILATERAL LEG PAIN: ICD-10-CM

## 2025-07-17 DIAGNOSIS — I87.2 VENOUS INSUFFICIENCY: Primary | ICD-10-CM

## 2025-07-17 DIAGNOSIS — M79.604 BILATERAL LEG PAIN: ICD-10-CM

## 2025-07-17 DIAGNOSIS — R60.0 EDEMA, LOWER EXTREMITY: ICD-10-CM

## 2025-07-17 PROCEDURE — 99214 OFFICE O/P EST MOD 30 MIN: CPT | Mod: S$PBB,,, | Performed by: FAMILY MEDICINE

## 2025-07-17 PROCEDURE — 99214 OFFICE O/P EST MOD 30 MIN: CPT | Mod: PBBFAC | Performed by: FAMILY MEDICINE

## 2025-07-17 PROCEDURE — 99999 PR PBB SHADOW E&M-EST. PATIENT-LVL IV: CPT | Mod: PBBFAC,,, | Performed by: FAMILY MEDICINE

## 2025-07-17 NOTE — H&P (VIEW-ONLY)
[facility] CLINIC NOTE    Patient ID: Pam Ortega is a 80 y.o. female.    I. HISTORY     Chief Complaint:   Chief Complaint   Patient presents with    Pre-op Exam     Room #2 Pre-OP      History of Present Illness    CHIEF COMPLAINT:  Patient presents today for pre-operative evaluation    RECENT HOSPITALIZATIONS:  She was hospitalized from July 2nd to July 5th for dehydration after experiencing near-syncope upon standing, resulting in a fall while going to the [place]. She also reported episodes of vomiting. She was hydrated and symptoms improved with no infections noted during hospitalization. She underwent pacemaker replacement on July 8th, with device upgraded from a one-lead to a two-lead pacemaker with defibrillator functionality.    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 She denies current symptoms related to her cardiovascular history at this time.    LEG WOUNDS:  Her leg wounds have healed according to home health assessment. She currently uses Unna boots, with home health recommending potential transition to regular socks. She continues to receive home health services with no active open wounds reported at this time.    MEDICATIONS:  She transitioned from Xarelto to Eliquis. Multiple medications were discontinued, including Amlodipine, Farxiga, Furosemide, Hydralazine, Norco, Isosorbide mononitrate, Novolog, Ozempic, and Potassium. She was started on Lantus SoloStar for glucose management and started on Spironolactone, replacing Furosemide. She was prescribed Ventolin inhaler for use as needed.      ROS:  General: -fever, -chills, -fatigue, -weight gain, -weight loss  Eyes: -vision changes, -redness, -discharge  ENT: -ear pain, -nasal congestion, -sore throat  Cardiovascular: -chest pain, -palpitations, -lower extremity edema  Respiratory: -cough, -shortness of breath  Gastrointestinal: -abdominal pain, -nausea,  +vomiting, -diarrhea, -constipation, -blood in stool  Genitourinary: -dysuria, -hematuria, -frequency  Musculoskeletal: -joint pain, -muscle pain  Skin: -rash, -lesion, +dry skin  Neurological: -headache, +dizziness, -numbness, -tingling  Psychiatric: -anxiety, -depression, -sleep difficulty         Clinical Summary:  The patient initially presented on 01/17/2024 by referral from [facility] for evaluation of bilateral lower extremity leg swelling, hyperpigmentation and pain.  Symptoms are progressive, worsening and began greater than 1 years ago.  Location is bilateral lower extremities below the knees. Symptoms are worse at the end of the day.  History of venous interventions includes none.  Unsure of family history of venous disease.  The patient also recently had arterial duplex with ABIs on 01/11/2024 which were abnormal and her PCP referred to vascular surgery.  Patient also denies history of DVT or cellulitis.    The patient underwent a bilateral superficial venous reflux study, 01/17/2024, and the results were discussed with the patient.  This study shows dilation and axial reflux of the bilateral great and left small saphenous veins.  Pulsatile flow also noted bilaterally which can be consistent with CHF.    Doppler study bilateral lower extremities on 01/11/2024 shows no evidence of DVT bilaterally.    Venous Disease Medical Necessity Documentation Initial Visit Date:  01/17/2024 Return Check Date:   04/17/2024   Have you ever had a rupture or bleed from a varicose vein in your leg(s)?              [x] Yes  [] No   [x] Yes   [] No   Have you ever been diagnosed with phlebitis, cellulitis, or inflammation in the area of the varicose veins of  your leg(s)?  [] Yes  [x] No    [x] Yes   [] No   Do you have darkened or inflamed skin on your legs?   [x] Yes   [] No   [x] Yes   [] No   Do you have leg swelling?     [x] Yes   [] No   [x] Yes   [] No   Do you have leg pain?   [x] Yes   [] No   [] Yes   [x]  No   If yes, describe the type of pain?    [x]   Stabbing []  Radiating [x]  Aching   [x]  Tightness [x]  Throbbing               [x]  Burning [x]  Cramping              Do you have leg discomfort?   [] Yes   [x] No   [] Yes   [x] No   If yes, describe the type of discomfort?    []  Heaviness []  Fullness   []  Restlessness [] Tired/Fatigued [] Itching              Have you ever worn compression hose?   [] Yes   [x] No   [x] Yes   [] No   If yes, how long?      3 MONTHS     Do you elevate your legs while sitting?   [x] Yes   [] No   [x] Yes   [] No   Does venous disease (varicose veins, ulcers, skin changes, leg pain/swelling) interfere with your daily life?  If yes, check activities you are limited or unable to do.    []  Shower  [x]   Walk  []  Exercise  [] Play with children/grandchildren  [x]  Shop [] Work [x] Stand for any period of time [x] Sleep                               [] Sitting for an extended period of time.           [x] Yes   [] No   [] Yes   [x] No   Do you exercise/have you tried to exercise (i.e.  Walk our participate in a regular exercise routine)?  [] Yes  [x] No   [x] Yes   [] No   BMI 29.6   29.50          Past Medical History:   Diagnosis Date    Abdominal bloating 04/22/2025    Anemia of chronic disease     Atrial fibrillation     Benign hypertensive CKD, stage 1-4 or unspecified chronic kidney disease     Bronchitis 12/22/2023    Carotid artery occlusion     Cellulitis 12/09/2024    CKD (chronic kidney disease)     Constipation 01/24/2025    Coronary atherosclerosis     Cough 12/22/2023    Cough 12/22/2023    COVID-19 ruled out 03/20/2024    Diabetes mellitus, type 2     Diabetic eye exam 06/20/2025    Dr. Husam Trivedi - [facility]    DJD (degenerative joint disease)     History of CVA (cerebrovascular accident)     HTN (hypertension)     Hyperlipidemia     Nausea and vomiting 04/22/2025    Nonischemic dilated cardiomyopathy     Obesity     Osteopenia     Other ascites  04/09/2025    Paroxysmal atrial fibrillation     Presence of cardiac pacemaker     PVD (peripheral vascular disease)     Skin ulcer of female breast 12/30/2024    Upper respiratory tract infection 12/22/2023    Vaginal bleeding 04/26/2025    Vitamin D deficiency         Past Surgical History:   Procedure Laterality Date    BREAST BIOPSY      COLONOSCOPY  12/04/2018    repeat in 5 years    EMBOLECTOMY OR THROMBECTOMY, ARTERY, AORTOILIAC, FEMORAL, OR POPLITEAL Right 11/17/2024    Procedure: EMBOLECTOMY OR THROMBECTOMY, ARTERY, AORTOILIAC, FEMORAL, OR POPLITEAL;  Surgeon: Jordana Guillermo MD;  Location: [facility];  Service: Vascular;  Laterality: Right;    pace maker      VAGINAL DELIVERY      x 3       Social History     Tobacco Use   Smoking Status Never    Passive exposure: Never   Smokeless Tobacco Never         Current Outpatient Medications:     albuterol (VENTOLIN HFA) 90 mcg/actuation inhaler, Inhale 2 puffs into the lungs every 6 (six) hours as needed for Wheezing. Rescue Inhaler, Disp: 18 g, Rfl: 5    apixaban (ELIQUIS) 2.5 mg Tab, Take 1 tablet (2.5 mg total) by mouth 2 (two) times daily., Disp: 60 tablet, Rfl: 1    aspirin (ECOTRIN) 81 MG EC tablet, Take 81 mg by mouth once daily., Disp: , Rfl:     cholecalciferol, vitamin D3, (VITAMIN D3) 50 mcg (2,000 unit) Cap capsule, Take 1 capsule by mouth once daily., Disp: , Rfl:     colchicine (COLCRYS) 0.6 mg tablet, Take 1 tablet (0.6 mg total) by mouth every other day for gout., Disp: 45 tablet, Rfl: 0    LANTUS SOLOSTAR U-100 INSULIN 100 unit/mL (3 mL) InPn pen, Inject 7 Units into the skin once daily., Disp: , Rfl:     lovastatin (MEVACOR) 20 MG tablet, Take 1 tablet (20 mg total) by mouth every night, Disp: 90 tablet, Rfl: 3    metoprolol tartrate (LOPRESSOR) 50 MG tablet, Take 1 tablet (50 mg total) by mouth 2 (two) times a day., Disp: 180 tablet, Rfl: 3    omeprazole (PRILOSEC) 20 MG capsule, Take 1 capsule (20 mg total) by mouth once daily., Disp: 90  capsule, Rfl: 3    spironolactone (ALDACTONE) 25 MG tablet, Take 25 mg by mouth., Disp: , Rfl:   No current facility-administered medications for this visit.    II. PHYSICAL EXAM     Physical Exam  Constitutional:       General: She is awake. She is not in acute distress.     Appearance: Normal appearance. She is overweight. She is not ill-appearing or toxic-appearing.   HENT:      Head: Normocephalic and atraumatic.   Eyes:      Extraocular Movements: Extraocular movements intact.      Conjunctiva/sclera: Conjunctivae normal.      Pupils: Pupils are equal, round, and reactive to light.   Neck:      Vascular: No carotid bruit or JVD.   Cardiovascular:      Rate and Rhythm: Normal rate and regular rhythm.      Pulses:           Dorsalis pedis pulses are detected w/ Doppler on the right side and detected w/ Doppler on the left side.        Posterior tibial pulses are detected w/ Doppler on the right side and detected w/ Doppler on the left side.      Heart sounds: No murmur heard.  Pulmonary:      Effort: Pulmonary effort is normal. No respiratory distress.      Breath sounds: No stridor. No wheezing, rhonchi or rales.   Musculoskeletal:         General: No swelling, tenderness or deformity.      Right lower le+ Edema present.      Left lower le+ Edema present.      Comments: Hyperpigmentation and brawny edema noted bilaterally.    Skin breakdown with stasis ulceration of the left lower extremity.  See pictures for details.  No signs of active cellulitis or infection today.   Feet:      Comments: Triphasic hand-held dopplerable pulses of the bilateral dorsalis pedis and posterior tibial arteries.  Skin:     General: Skin is warm.      Capillary Refill: Capillary refill takes less than 2 seconds.      Coloration: Skin is not ashen.      Findings: No bruising, erythema, lesion, rash or wound.   Neurological:      Mental Status: She is alert and oriented to person, place, and time.      Motor: No weakness.    Psychiatric:         Speech: Speech normal.         Behavior: Behavior normal. Behavior is cooperative.         Reticular/Spider veins noted:  RLE: none  LLE: none    Varicose veins noted:  RLE: none  LLE:  posterior calf and lateral calf    CEAP Classification  Clinical Signs: Class 6 - Leg ulceration, skin changes as defined above  Etiologic Classification: Primary  Anatomic distribution: Superficial  Pathophysiologic dysfunction: Reflux               Venous Clinical Severity Score  Pain:2=Daily, moderate activity limitation, occasional analgesics  Varicose Veins: 1=Few, scattered. Branch varicose veins  Venous Edema: 2=Afternoon edema, above ankle  Pigmentation: 1=Diffuse, but limited in area and old (brown)  Inflammation: 0=None  Induration: 0=None  Number of Active Ulcers: 2=2  Active Ulceration, Duration: 1=<3 months  Active Ulcer Size: 1=<2 cm diameter  Compressive Therapy: 3=Full compliance, stockings + elevation  Total Score: 13       III. ASSESSMENT & PLAN (MEDICAL DECISION MAKING)     1. Venous insufficiency    2. Bilateral leg pain    3. Hyperpigmentation of skin    4. Edema, lower extremity          Assessment & Plan    ATRIAL FIBRILLATION:  - Noted switch from Xarelto to Eliquis for a-fib, continued Eliquis.    VENOUS INSUFFICIENCY:  - Ordered three radiofrequency ablation procedures to address venous insufficiency causing skin issues and wounds.  - Explained radiofrequency ablation procedure, including IV sedation, catheter insertion, and closure of problematic veins.  - the patient is scheduled for  - Patient to walk for 10-15 minutes once an hour after the procedure.    LEG WOUNDS AND SKIN CONDITIONS:  - Leg wounds may transition from Unna boot to regular socks with skin conditioning.  - Started Eucerin cream for skin conditioning on legs, to be used instead of Unna boot.  - Ordered removal of Unna boot by nursing staff for leg assessment.                Thomas Lyn, DO    This note was  generated with the assistance of ambient listening technology. Verbal consent was obtained by the patient and accompanying visitor(s) for the recording of patient appointment to facilitate this note. I attest to having reviewed and edited the generated note for accuracy, though some syntax or spelling errors may persist. Please contact the author of this note for any clarification.

## 2025-07-17 NOTE — PROGRESS NOTES
VEIN CENTER CLINIC NOTE    Patient ID: Pam Ortega is a 80 y.o. female.    I. HISTORY     Chief Complaint:   Chief Complaint   Patient presents with    Pre-op Exam     Room #2 Pre-OP      History of Present Illness    CHIEF COMPLAINT:  Patient presents today for pre-operative evaluation    RECENT HOSPITALIZATIONS:  She was hospitalized from July 2nd to July 5th for dehydration after experiencing near-syncope upon standing, resulting in a fall while going to the [employer]. She also reported episodes of vomiting. She was hydrated and symptoms improved with no infections noted during hospitalization. She underwent pacemaker replacement on July 8th, with device upgraded from a one-lead to a two-lead pacemaker with defibrillator functionality.    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 She denies current symptoms related to her cardiovascular history at this time.    LEG WOUNDS:  Her leg wounds have healed according to home health assessment. She currently uses Unna boots, with home health recommending potential transition to regular socks. She continues to receive home health services with no active open wounds reported at this time.    MEDICATIONS:  She transitioned from Xarelto to Eliquis. Multiple medications were discontinued, including Amlodipine, Farxiga, Furosemide, Hydralazine, Norco, Isosorbide mononitrate, Novolog, Ozempic, and Potassium. She was started on Lantus SoloStar for glucose management and started on Spironolactone, replacing Furosemide. She was prescribed Ventolin inhaler for use as needed.      ROS:  General: -fever, -chills, -fatigue, -weight gain, -weight loss  Eyes: -vision changes, -redness, -discharge  ENT: -ear pain, -nasal congestion, -sore throat  Cardiovascular: -chest pain, -palpitations, -lower extremity edema  Respiratory: -cough, -shortness of breath  Gastrointestinal: -abdominal pain, -nausea,  +vomiting, -diarrhea, -constipation, -blood in stool  Genitourinary: -dysuria, -hematuria, -frequency  Musculoskeletal: -joint pain, -muscle pain  Skin: -rash, -lesion, +dry skin  Neurological: -headache, +dizziness, -numbness, -tingling  Psychiatric: -anxiety, -depression, -sleep difficulty         Clinical Summary:  The patient initially presented on 01/17/2024 by referral from [facility] Vele[facility] for evaluation of bilateral lower extremity leg swelling, hyperpigmentation and pain.  Symptoms are progressive, worsening and began greater than 1 years ago.  Location is bilateral lower extremities below the knees. Symptoms are worse at the end of the day.  History of venous interventions includes none.  Unsure of family history of venous disease.  The patient also recently had arterial duplex with ABIs on 01/11/2024 which were abnormal and her PCP referred to vascular surgery.  Patient also denies history of DVT or cellulitis.    The patient underwent a bilateral superficial venous reflux study, 01/17/2024, and the results were discussed with the patient.  This study shows dilation and axial reflux of the bilateral great and left small saphenous veins.  Pulsatile flow also noted bilaterally which can be consistent with CHF.    Doppler study bilateral lower extremities on 01/11/2024 shows no evidence of DVT bilaterally.    Venous Disease Medical Necessity Documentation Initial Visit Date:  01/17/2024 Return Check Date:   04/17/2024   Have you ever had a rupture or bleed from a varicose vein in your leg(s)?              [x] Yes  [] No   [x] Yes   [] No   Have you ever been diagnosed with phlebitis, cellulitis, or inflammation in the area of the varicose veins of  your leg(s)?  [] Yes  [x] No    [x] Yes   [] No   Do you have darkened or inflamed skin on your legs?   [x] Yes   [] No   [x] Yes   [] No   Do you have leg swelling?     [x] Yes   [] No   [x] Yes   [] No   Do you have leg pain?   [x] Yes   [] No   [] Yes   [x]  No   If yes, describe the type of pain?    [x]   Stabbing []  Radiating [x]  Aching   [x]  Tightness [x]  Throbbing               [x]  Burning [x]  Cramping              Do you have leg discomfort?   [] Yes   [x] No   [] Yes   [x] No   If yes, describe the type of discomfort?    []  Heaviness []  Fullness   []  Restlessness [] Tired/Fatigued [] Itching              Have you ever worn compression hose?   [] Yes   [x] No   [x] Yes   [] No   If yes, how long?      3 MONTHS     Do you elevate your legs while sitting?   [x] Yes   [] No   [x] Yes   [] No   Does venous disease (varicose veins, ulcers, skin changes, leg pain/swelling) interfere with your daily life?  If yes, check activities you are limited or unable to do.    []  Shower  [x]   Walk  []  Exercise  [] Play with children/grandchildren  [x]  Shop [] Work [x] Stand for any period of time [x] Sleep                               [] Sitting for an extended period of time.           [x] Yes   [] No   [] Yes   [x] No   Do you exercise/have you tried to exercise (i.e.  Walk our participate in a regular exercise routine)?  [] Yes  [x] No   [x] Yes   [] No   BMI 29.6   29.50          Past Medical History:   Diagnosis Date    Abdominal bloating 04/22/2025    Anemia of chronic disease     Atrial fibrillation     Benign hypertensive CKD, stage 1-4 or unspecified chronic kidney disease     Bronchitis 12/22/2023    Carotid artery occlusion     Cellulitis 12/09/2024    CKD (chronic kidney disease)     Constipation 01/24/2025    Coronary atherosclerosis     Cough 12/22/2023    Cough 12/22/2023    COVID-19 ruled out 03/20/2024    Diabetes mellitus, type 2     Diabetic eye exam 06/20/2025    Dr. Husam Trivedi - [facility]    DJD (degenerative joint disease)     History of CVA (cerebrovascular accident)     HTN (hypertension)     Hyperlipidemia     Nausea and vomiting 04/22/2025    Nonischemic dilated cardiomyopathy     Obesity     Osteopenia     Other ascites  04/09/2025    Paroxysmal atrial fibrillation     Presence of cardiac pacemaker     PVD (peripheral vascular disease)     Skin ulcer of female breast 12/30/2024    Upper respiratory tract infection 12/22/2023    Vaginal bleeding 04/26/2025    Vitamin D deficiency         Past Surgical History:   Procedure Laterality Date    BREAST BIOPSY      COLONOSCOPY  12/04/2018    repeat in 5 years    EMBOLECTOMY OR THROMBECTOMY, ARTERY, AORTOILIAC, FEMORAL, OR POPLITEAL Right 11/17/2024    Procedure: EMBOLECTOMY OR THROMBECTOMY, ARTERY, AORTOILIAC, FEMORAL, OR POPLITEAL;  Surgeon: Jordana Guillermo MD;  Location: [facility];  Service: Vascular;  Laterality: Right;    pace maker      VAGINAL DELIVERY      x 3       Social History     Tobacco Use   Smoking Status Never    Passive exposure: Never   Smokeless Tobacco Never         Current Outpatient Medications:     albuterol (VENTOLIN HFA) 90 mcg/actuation inhaler, Inhale 2 puffs into the lungs every 6 (six) hours as needed for Wheezing. Rescue Inhaler, Disp: 18 g, Rfl: 5    apixaban (ELIQUIS) 2.5 mg Tab, Take 1 tablet (2.5 mg total) by mouth 2 (two) times daily., Disp: 60 tablet, Rfl: 1    aspirin (ECOTRIN) 81 MG EC tablet, Take 81 mg by mouth once daily., Disp: , Rfl:     cholecalciferol, vitamin D3, (VITAMIN D3) 50 mcg (2,000 unit) Cap capsule, Take 1 capsule by mouth once daily., Disp: , Rfl:     colchicine (COLCRYS) 0.6 mg tablet, Take 1 tablet (0.6 mg total) by mouth every other day for gout., Disp: 45 tablet, Rfl: 0    LANTUS SOLOSTAR U-100 INSULIN 100 unit/mL (3 mL) InPn pen, Inject 7 Units into the skin once daily., Disp: , Rfl:     lovastatin (MEVACOR) 20 MG tablet, Take 1 tablet (20 mg total) by mouth every night, Disp: 90 tablet, Rfl: 3    metoprolol tartrate (LOPRESSOR) 50 MG tablet, Take 1 tablet (50 mg total) by mouth 2 (two) times a day., Disp: 180 tablet, Rfl: 3    omeprazole (PRILOSEC) 20 MG capsule, Take 1 capsule (20 mg total) by mouth once daily., Disp: 90  capsule, Rfl: 3    spironolactone (ALDACTONE) 25 MG tablet, Take 25 mg by mouth., Disp: , Rfl:   No current facility-administered medications for this visit.    II. PHYSICAL EXAM     Physical Exam  Constitutional:       General: She is awake. She is not in acute distress.     Appearance: Normal appearance. She is overweight. She is not ill-appearing or toxic-appearing.   HENT:      Head: Normocephalic and atraumatic.   Eyes:      Extraocular Movements: Extraocular movements intact.      Conjunctiva/sclera: Conjunctivae normal.      Pupils: Pupils are equal, round, and reactive to light.   Neck:      Vascular: No carotid bruit or JVD.   Cardiovascular:      Rate and Rhythm: Normal rate and regular rhythm.      Pulses:           Dorsalis pedis pulses are detected w/ Doppler on the right side and detected w/ Doppler on the left side.        Posterior tibial pulses are detected w/ Doppler on the right side and detected w/ Doppler on the left side.      Heart sounds: No murmur heard.  Pulmonary:      Effort: Pulmonary effort is normal. No respiratory distress.      Breath sounds: No stridor. No wheezing, rhonchi or rales.   Musculoskeletal:         General: No swelling, tenderness or deformity.      Right lower le+ Edema present.      Left lower le+ Edema present.      Comments: Hyperpigmentation and brawny edema noted bilaterally.    Skin breakdown with stasis ulceration of the left lower extremity.  See pictures for details.  No signs of active cellulitis or infection today.   Feet:      Comments: Triphasic hand-held dopplerable pulses of the bilateral dorsalis pedis and posterior tibial arteries.  Skin:     General: Skin is warm.      Capillary Refill: Capillary refill takes less than 2 seconds.      Coloration: Skin is not ashen.      Findings: No bruising, erythema, lesion, rash or wound.   Neurological:      Mental Status: She is alert and oriented to person, place, and time.      Motor: No weakness.    Psychiatric:         Speech: Speech normal.         Behavior: Behavior normal. Behavior is cooperative.         Reticular/Spider veins noted:  RLE: none  LLE: none    Varicose veins noted:  RLE: none  LLE:  posterior calf and lateral calf    CEAP Classification  Clinical Signs: Class 6 - Leg ulceration, skin changes as defined above  Etiologic Classification: Primary  Anatomic distribution: Superficial  Pathophysiologic dysfunction: Reflux               Venous Clinical Severity Score  Pain:2=Daily, moderate activity limitation, occasional analgesics  Varicose Veins: 1=Few, scattered. Branch varicose veins  Venous Edema: 2=Afternoon edema, above ankle  Pigmentation: 1=Diffuse, but limited in area and old (brown)  Inflammation: 0=None  Induration: 0=None  Number of Active Ulcers: 2=2  Active Ulceration, Duration: 1=<3 months  Active Ulcer Size: 1=<2 cm diameter  Compressive Therapy: 3=Full compliance, stockings + elevation  Total Score: 13       III. ASSESSMENT & PLAN (MEDICAL DECISION MAKING)     1. Venous insufficiency    2. Bilateral leg pain    3. Hyperpigmentation of skin    4. Edema, lower extremity          Assessment & Plan    ATRIAL FIBRILLATION:  - Noted switch from Xarelto to Eliquis for a-fib, continued Eliquis.    VENOUS INSUFFICIENCY:  - Ordered three radiofrequency ablation procedures to address venous insufficiency causing skin issues and wounds.  - Explained radiofrequency ablation procedure, including IV sedation, catheter insertion, and closure of problematic veins.  - the patient is scheduled for  - Patient to walk for 10-15 minutes once an hour after the procedure.    LEG WOUNDS AND SKIN CONDITIONS:  - Leg wounds may transition from Unna boot to regular socks with skin conditioning.  - Started Eucerin cream for skin conditioning on legs, to be used instead of Unna boot.  - Ordered removal of Unna boot by nursing staff for leg assessment.                Thomas Lyn, DO    This note was  generated with the assistance of ambient listening technology. Verbal consent was obtained by the patient and accompanying visitor(s) for the recording of patient appointment to facilitate this note. I attest to having reviewed and edited the generated note for accuracy, though some syntax or spelling errors may persist. Please contact the author of this note for any clarification.

## 2025-07-17 NOTE — PROGRESS NOTES
Population Health Chart Review & Patient Outreach Details    Health Maintenance Topics Addressed and Outreach Outcomes / Actions Taken:  Diabetic Eye Exam [x] HM Updated with June 2025 Eye Exam (Dr. Trivedi). History Updated.

## 2025-07-21 ENCOUNTER — TELEPHONE (OUTPATIENT)
Dept: FAMILY MEDICINE | Facility: CLINIC | Age: 80
End: 2025-07-21
Payer: MEDICARE

## 2025-07-21 ENCOUNTER — PATIENT MESSAGE (OUTPATIENT)
Dept: FAMILY MEDICINE | Facility: CLINIC | Age: 80
End: 2025-07-21
Payer: MEDICARE

## 2025-07-21 NOTE — TELEPHONE ENCOUNTER
Cardiology and Nephrology need to decide what to do with fluid pill. Daughter states the hospital  Stopped lasix due to dehydration. Mother has gained 4 pounds since yesterday. Advised to go to ER  and contact the specialists. Daughter states she will see if her sister can contact the cardiologist while in town.

## 2025-07-21 NOTE — TELEPHONE ENCOUNTER
Ty will you call her please. I am unclear as to why the lasix was discontinued while she was in the hospital. This question really needs to go to cardiology and nephrology.

## 2025-07-24 ENCOUNTER — PATIENT MESSAGE (OUTPATIENT)
Dept: DIABETES SERVICES | Facility: CLINIC | Age: 80
End: 2025-07-24
Payer: MEDICARE

## 2025-07-24 RX ORDER — SPIRONOLACTONE 25 MG/1
25 TABLET ORAL DAILY
Qty: 90 TABLET | Refills: 3 | OUTPATIENT
Start: 2025-07-24

## 2025-07-25 ENCOUNTER — ANESTHESIA (OUTPATIENT)
Dept: PAIN MEDICINE | Facility: HOSPITAL | Age: 80
End: 2025-07-25
Payer: MEDICARE

## 2025-07-25 ENCOUNTER — HOSPITAL ENCOUNTER (OUTPATIENT)
Facility: HOSPITAL | Age: 80
Discharge: HOME | End: 2025-07-25
Attending: FAMILY MEDICINE | Admitting: FAMILY MEDICINE
Payer: MEDICARE

## 2025-07-25 ENCOUNTER — ANESTHESIA EVENT (OUTPATIENT)
Dept: PAIN MEDICINE | Facility: HOSPITAL | Age: 80
End: 2025-07-25
Payer: MEDICARE

## 2025-07-25 VITALS
SYSTOLIC BLOOD PRESSURE: 153 MMHG | OXYGEN SATURATION: 91 % | WEIGHT: 177 LBS | BODY MASS INDEX: 25.34 KG/M2 | TEMPERATURE: 98 F | HEART RATE: 115 BPM | RESPIRATION RATE: 15 BRPM | DIASTOLIC BLOOD PRESSURE: 74 MMHG | HEIGHT: 70 IN

## 2025-07-25 DIAGNOSIS — I87.2 VENOUS INSUFFICIENCY: Primary | ICD-10-CM

## 2025-07-25 LAB — GLUCOSE SERPL-MCNC: 90 MG/DL (ref 70–105)

## 2025-07-25 PROCEDURE — 37000008 HC ANESTHESIA 1ST 15 MINUTES: Performed by: FAMILY MEDICINE

## 2025-07-25 PROCEDURE — C1894 INTRO/SHEATH, NON-LASER: HCPCS | Performed by: FAMILY MEDICINE

## 2025-07-25 PROCEDURE — 37000009 HC ANESTHESIA EA ADD 15 MINS: Performed by: FAMILY MEDICINE

## 2025-07-25 PROCEDURE — 36475 ENDOVENOUS RF 1ST VEIN: CPT | Mod: LT,,, | Performed by: FAMILY MEDICINE

## 2025-07-25 PROCEDURE — 36475 ENDOVENOUS RF 1ST VEIN: CPT | Performed by: FAMILY MEDICINE

## 2025-07-25 PROCEDURE — 25000003 PHARM REV CODE 250: Performed by: FAMILY MEDICINE

## 2025-07-25 PROCEDURE — 27201423 OPTIME MED/SURG SUP & DEVICES STERILE SUPPLY: Performed by: FAMILY MEDICINE

## 2025-07-25 PROCEDURE — 63600175 PHARM REV CODE 636 W HCPCS: Performed by: NURSE ANESTHETIST, CERTIFIED REGISTERED

## 2025-07-25 PROCEDURE — 82962 GLUCOSE BLOOD TEST: CPT

## 2025-07-25 PROCEDURE — 63600175 PHARM REV CODE 636 W HCPCS: Performed by: FAMILY MEDICINE

## 2025-07-25 PROCEDURE — 27000284 HC CANNULA NASAL: Performed by: NURSE ANESTHETIST, CERTIFIED REGISTERED

## 2025-07-25 RX ORDER — MUPIROCIN 20 MG/G
OINTMENT TOPICAL CODE/TRAUMA/SEDATION MEDICATION
Status: DISCONTINUED | OUTPATIENT
Start: 2025-07-25 | End: 2025-07-25 | Stop reason: HOSPADM

## 2025-07-25 RX ORDER — PROPOFOL 10 MG/ML
VIAL (ML) INTRAVENOUS
Status: DISCONTINUED | OUTPATIENT
Start: 2025-07-25 | End: 2025-07-25

## 2025-07-25 RX ORDER — SODIUM CHLORIDE 9 MG/ML
INJECTION, SOLUTION INTRAVENOUS CONTINUOUS
Status: DISCONTINUED | OUTPATIENT
Start: 2025-07-25 | End: 2025-07-25 | Stop reason: HOSPADM

## 2025-07-25 RX ORDER — LIDOCAINE HYDROCHLORIDE 20 MG/ML
INJECTION INTRAVENOUS
Status: DISCONTINUED | OUTPATIENT
Start: 2025-07-25 | End: 2025-07-25

## 2025-07-25 RX ORDER — LIDOCAINE HYDROCHLORIDE 10 MG/ML
INJECTION, SOLUTION INFILTRATION; PERINEURAL CODE/TRAUMA/SEDATION MEDICATION
Status: DISCONTINUED | OUTPATIENT
Start: 2025-07-25 | End: 2025-07-25 | Stop reason: HOSPADM

## 2025-07-25 RX ADMIN — PROPOFOL 50 MG: 10 INJECTION, EMULSION INTRAVENOUS at 10:07

## 2025-07-25 RX ADMIN — SODIUM CHLORIDE: 9 INJECTION, SOLUTION INTRAVENOUS at 10:07

## 2025-07-25 RX ADMIN — LIDOCAINE HYDROCHLORIDE 50 MG: 20 INJECTION, SOLUTION INTRAVENOUS at 09:07

## 2025-07-25 RX ADMIN — PROPOFOL 50 MG: 10 INJECTION, EMULSION INTRAVENOUS at 09:07

## 2025-07-25 NOTE — PLAN OF CARE
1050 Discharge instructions given to the patient and her daughter.  1055 ambulated in the hallway with her cane and assistance x1.  To the bathroom.  1100 Left per w/c through the ambulatory entrance. Left with her daughter and grandaughter ,

## 2025-07-25 NOTE — DISCHARGE INSTRUCTIONS
Vein Procedure Discharge Instructions    Today:  Someone must stay with you tonight.  No alcohol for at least 8 hours after your procedure.  No driving for 24 hours after your procedure if you receive sedation/anesthesia.    For the first 48 hours (2 days) following your procedure:  Walk at minimal 15 minutes on every hour (while awake).  The more active you are, the better.  When resting, elevate leg.  Keep leg propped up (recliner, couch, etc.) while sitting.  Take pain medication, if prescribed.  Take Ibuprofen, or Acetaminophen, for the next 2-3 days with an over-the-counter anti-acid (Prilosec, Protonix, Nexium, etc.)  Apply ice packs, change position, or try deep breathing.  If you have bleeding, apply pressure with a clean rag or gauze for 10 minutes (If it still bleeds after 10 minutes, call your doctor's office or go to the Emergency Room.  Call your doctor if you have fever of 101 F or higher, redness or swelling at the site of surgery, or foul-smelling discharge at the site of surgery.  If you have nausea (upset stomach)  Apply a cold washcloth or towel to your forehead or neck  Eat bland, unseasoned foods  Smell essential oils like peppermint or tom, or rubbing alcohol  Do deep breathing, relax, or try to sleep    After your 48 hours (2 days) Post Op period complete:  Remove dressings, throw away all except the ACE wrap, keep the ACE wrap.  Shower using warm soapy water.  Use separate wash cloth on the treated leg.  No baths for 2 weeks.  Compression up to groin must be worn daily for 2 weeks.  You may use the ACE wrap for entire leg, or compression hose to knees and ACE wrap to groin.  (If you had your small saphenous vein ablated, then compression only to knee)  If your vein was ablated with Varithena (foam), compression must be worn day and night.  Walk for at least 10 minutes daily for the next month.    Activity:  If you have a normal ultrasound one week after your ablation:  You may resume  walking activities immediately.  You may resume jogging 2 weeks after your procedure.  You may resume swimming 2 weeks after your procedure.  If you had microphlebectomies, you must make sure all areas are completely healed before swimming.  You may resume lower body weight lifting 2 weeks after your procedure.  You may resume upper body weight lifting while sitting down 2 days after your procedure.  You may resume sexual activities 2 weeks after your procedure.  You may fly commercially 2 weeks after your procedure.  You may scuba dive 1 month after your procedure.    Pilots - You may not fly for 1 month after your procedure.  You must have a normal 1 month post op ultrasound before returning to flight status.      Please call our office at [phone] with any questions or concerns.  You may call the following number for after hour and weekend concerns: [phone] (Dr. Lyn).

## 2025-07-25 NOTE — INTERVAL H&P NOTE
The patient has been examined and the H&P has been reviewed:    I concur with the findings and no changes have occurred since H&P was written.    Surgery  risks, benefits and alternative options discussed and understood by patient/family for a left great saphenous vein radiofrequency ablation.          There are no hospital problems to display for this patient.

## 2025-07-25 NOTE — ANESTHESIA PREPROCEDURE EVALUATION
07/25/2025  Pam Ortega is a 80 y.o., female.      Pre-op Assessment    I have reviewed the Patient Summary Reports.     I have reviewed the Nursing Notes. I have reviewed the NPO Status.   I have reviewed the Medications.     Review of Systems  Anesthesia Hx:  No problems with previous Anesthesia                Cardiovascular:     Hypertension   CAD       CHF                                   Pulmonary:      Shortness of breath  Sleep Apnea                Renal/:  Chronic Renal Disease                Hepatic/GI:   PUD, Hiatal Hernia,                 Musculoskeletal:  Arthritis               Neurological:    Neuromuscular Disease,                                   Endocrine:  Diabetes, type 2             Past Medical History:   Diagnosis Date    Abdominal bloating 04/22/2025    Anemia of chronic disease     Atrial fibrillation     Benign hypertensive CKD, stage 1-4 or unspecified chronic kidney disease     Bronchitis 12/22/2023    Carotid artery occlusion     Cellulitis 12/09/2024    CKD (chronic kidney disease)     Constipation 01/24/2025    Coronary atherosclerosis     Cough 12/22/2023    Cough 12/22/2023    COVID-19 ruled out 03/20/2024    Diabetes mellitus, type 2     Diabetic eye exam 06/20/2025    Dr. Husam Trivedi - [facility]    DJD (degenerative joint disease)     History of CVA (cerebrovascular accident)     HTN (hypertension)     Hyperlipidemia     Nausea and vomiting 04/22/2025    Nonischemic dilated cardiomyopathy     Obesity     Osteopenia     Other ascites 04/09/2025    Paroxysmal atrial fibrillation     Presence of cardiac pacemaker     PVD (peripheral vascular disease)     Skin ulcer of female breast 12/30/2024    Upper respiratory tract infection 12/22/2023    Vaginal bleeding 04/26/2025    Vitamin D deficiency        Past Surgical History:   Procedure Laterality Date     BREAST BIOPSY      COLONOSCOPY  12/04/2018    repeat in 5 years    EMBOLECTOMY OR THROMBECTOMY, ARTERY, AORTOILIAC, FEMORAL, OR POPLITEAL Right 11/17/2024    Procedure: EMBOLECTOMY OR THROMBECTOMY, ARTERY, AORTOILIAC, FEMORAL, OR POPLITEAL;  Surgeon: Jordana Guillermo MD;  Location: [facility];  Service: Vascular;  Laterality: Right;    pace maker      VAGINAL DELIVERY      x 3       Family History   Problem Relation Name Age of Onset    Hypertension Mother      Hypertension Father      Hypertension Sister      Stroke Sister      Cancer Brother      Stroke Brother      Hypertension Brother      Alzheimer's disease Maternal Grandmother      Breast cancer Maternal Grandmother      Hypertension Maternal Grandmother      Hyperlipidemia Daughter      Breast cancer Daughter      Diabetes Daughter      Hypertension Daughter      Diabetes Daughter      Hypertension Daughter      Hypertension Daughter      Clotting disorder Daughter           Chemistry        Component Value Date/Time     (L) 07/02/2025 1323    K 3.7 07/02/2025 1323    CL 74 (LL) 07/02/2025 1323    CO2 35 (H) 07/02/2025 1323    BUN 74 (H) 07/02/2025 1323    CREATININE 3.89 (H) 07/02/2025 1323     (H) 07/02/2025 1323        Component Value Date/Time    CALCIUM 9.7 07/02/2025 1323    ALKPHOS 114 07/02/2025 1323    AST 41 07/02/2025 1323    ALT 10 07/02/2025 1323    BILITOT 2.0 (H) 07/02/2025 1323    ESTGFRAFRICA 33 (L) 10/06/2021 1022    EGFRNONAA 31 (L) 06/14/2022 0910        Lab Results   Component Value Date    WBC 6.58 07/02/2025    HGB 12.9 07/02/2025    HCT 41.5 07/02/2025     07/02/2025     Results for orders placed or performed during the hospital encounter of 07/02/25   EKG 12-lead    Collection Time: 07/02/25  4:06 PM   Result Value Ref Range    QRS Duration 210 ms    OHS QTC Calculation 514 ms    Narrative    Test Reason : R79.89,    Vent. Rate :  60 BPM     Atrial Rate :  58 BPM     P-R Int :    ms          QRS Dur : 210  ms      QT Int : 514 ms       P-R-T Axes :    -18 208 degrees    QTcB Int : 514 ms    Ventricular-paced rhythm  Abnormal ECG  When compared with ECG of 02-Jul-2025 12:44,  No significant change was found  Confirmed by Ganesh Cardoso ([id number]) on 7/8/2025 7:39:35 PM    Referred By: AAAREFERRAL SELF           Confirmed By: Ganesh Cardoso     Results for orders placed during the hospital encounter of 04/04/25    Echo Saline Bubble? No    Interpretation Summary    Left Ventricle: The left ventricle is mildly dilated. Increased ventricular mass. Normal wall thickness. There is eccentric hypertrophy. Septal motion is consistent with pacing. There is moderately reduced systolic function with a visually estimated ejection fraction of 30 - 40%. Quantitated ejection fraction is 35%. There is diastolic dysfunction.    Right Ventricle: The right ventricle has mild enlargement. Systolic function is moderately reduced. Pacemaker lead present in the ventricle.    Left Atrium: Severely dilated    Right Atrium: Right atrium is moderately dilated.    Mitral Valve: There is at least moderate regurgitation with an eccentrically posterolateral directed jet. MR jet is only well-visualized on parasternal long axis views, remaining views w/ supoptimal assessment of MR as MR jet is not adequately captured.    Tricuspid Valve: There is severe regurgitation.    Pulmonic Valve: There is moderate to severe regurgitation.    Pulmonary Artery: The estimated pulmonary artery systolic pressure is 39 mmHg.    IVC/SVC: Intermediate venous pressure at 8 mmHg.    Pericardium: Right pleural effusion.    Compared to prior ECHO from 11/204 (which was also personally reviewed): LV size and LVEF are unchanged. LV remains mildy dilated with a moderately reduced LVEF. Improvement in RA and RV size noted on current study (previously severely dilated). MR is better visualized on previous study and at least moderate MR previously- appears largely unchanged  however, there are technical limitations with MR assessment on current study as detailed above.         Current Medications[1]    Review of patient's allergies indicates:   Allergen Reactions    Ace inhibitors Edema    Losartan Swelling        Physical Exam  General: Well nourished, Cooperative, Alert and Oriented    Airway:  Mallampati: II   Mouth Opening: Normal  TM Distance: Normal  Tongue: Normal  Neck ROM: Normal ROM    Dental:  Intact        Anesthesia Plan  Type of Anesthesia, risks & benefits discussed:    Anesthesia Type: MAC  Intra-op Monitoring Plan: Standard ASA Monitors  Post Op Pain Control Plan: multimodal analgesia  Induction:  IV  Informed Consent: Informed consent signed with the Patient and all parties understand the risks and agree with anesthesia plan.  All questions answered. Patient consented to blood products? Yes  ASA Score: 3  Day of Surgery Review of History & Physical: H&P Update referred to the surgeon/provider.    Ready For Surgery From Anesthesia Perspective.     .           [1]   Current Facility-Administered Medications   Medication Dose Route Frequency Provider Last Rate Last Admin    0.9% NaCl infusion   Intravenous Continuous Thomas Lyn DO        LIDOcaine-EPINEPHrine 1%-1:100,000 30 mL, LIDOcaine HCL 10 mg/ml (1%) 20 mL, sodium bicarbonate 10 mL in 0.9% NaCl 500 mL solution   MISCELLANEOUS Once Thomas Lyn DO

## 2025-07-25 NOTE — ANESTHESIA POSTPROCEDURE EVALUATION
Anesthesia Post Evaluation    Patient: Pam Ortega    Procedure(s) Performed: Procedure(s) (LRB):  Left GSV RF Ablation (Left)    Final Anesthesia Type: general      Patient location during evaluation: GI PACU  Patient participation: Yes- Able to Participate  Level of consciousness: awake and alert  Post-procedure vital signs: reviewed and stable  Pain management: adequate  Airway patency: patent    PONV status at discharge: No PONV  Anesthetic complications: no      Cardiovascular status: blood pressure returned to baseline and hemodynamically stable  Respiratory status: spontaneous ventilation  Hydration status: euvolemic  Follow-up not needed.              Vitals Value Taken Time   /84 07/25/25 11:39   Temp 98 07/25/25 14:51   Pulse 78 07/25/25 11:39   Resp 12 07/25/25 11:02   SpO2 76 % 07/25/25 11:02   Vitals shown include unfiled device data.      Event Time   Out of Recovery 10:47:00         Pain/Ochoa Score: Ochoa Score: 10 (7/25/2025 10:30 AM)

## 2025-07-25 NOTE — OP NOTE
Date: 7/25/25  Surgeon: Dr Danie Lyn DO    Procedure:  Endovenous radio frequency ablation of the Left Great saphenous vein(s) of the lower extremity.    Preprocedure and postprocedure diagnosis: Symptomatic varicose veins and venous insufficiency of the Left leg and other complications to include leg pain, leg edema and chronic skin changes.  The patient has previously failed conservative therapy consider a C4 chronic venous disease with venous hypertension.    Anesthesia: Local infiltration with monitored anesthesia care.  Estimated blood loss: 3 cc.  Specimens removed: none.   Complications: none.  Implants or grafts: none.    Findings:  A total of 9 cycles of radiofrequency ablation was used to treat 60 cm of vein over 3 minutes.  Maximum diameter of the vein treated is 5.7 mm with a maximum reflux time of 1.6 seconds.    Procedure detail:  After patient identification, surgical site verification, and marked symptomatic Left leg, the patient was taken to the procedure room and placed in the supine position.  Monitored anesthesia care was induced.  The patient was prepped and draped in the normal sterile fashion leaving the Left leg exposed.  A time-out performed identifying the patient and the correct surgical site.  Tumescent anesthesia was then infiltrated at the site of expected cannulation of the Left great saphenous vein.  The Left great saphenous vein was then cannulated using the modified Seldinger technique to place a 6 [place] sheath.  I then advanced a 7x60 cm radiofrequency ablation catheter through the sheath up the saphenous vein to approximately 2 cm distal to the epigastric vein.  Tumescence anesthesia was then infiltrated around the vein to be treated.  The patient was placed in the head-down position.  Radiofrequency ablation of the Left great saphenous vein was then performed using a total of 9 cycles of radiofrequency ablation to treat 60 cm of vein over 3 minutes.  After this was done the  catheter and sheath were removed and noted to be intact.  Completion ultrasound revealed a widely patent femoral vein, sapheno femoral junction and an ablated great saphenous vein.  The patient's leg was then cleaned and dried.  Sterile dressings and wraps were applied.  The patient was transported to the recovery room in stable condition.

## 2025-07-25 NOTE — TRANSFER OF CARE
"Anesthesia Transfer of Care Note    Patient: Pam Ortega    Procedure(s) Performed: Procedure(s) (LRB):  Left GSV RF Ablation (Left)    Patient location: Other: vein pacu    Anesthesia Type: MAC    Transport from OR: Transported from OR on room air with adequate spontaneous ventilation    Post pain: adequate analgesia    Post assessment: no apparent anesthetic complications and tolerated procedure well    Post vital signs: stable    Level of consciousness: responds to stimulation and awake    Nausea/Vomiting: no nausea/vomiting    Complications: none    Transfer of care protocol was followed    Last vitals: Visit Vitals  /62 (BP Location: Right arm, Patient Position: Lying)   Pulse 70   Temp 36.7 °C (98 °F) (Skin)   Resp 16   Ht 5' 10" (1.778 m)   Wt 80.3 kg (177 lb)   SpO2 100%   Breastfeeding No   BMI 25.40 kg/m²     "

## 2025-07-28 ENCOUNTER — HOSPITAL ENCOUNTER (OUTPATIENT)
Facility: HOSPITAL | Age: 80
Discharge: HOME | End: 2025-07-28
Attending: FAMILY MEDICINE
Payer: MEDICARE

## 2025-07-28 ENCOUNTER — OFFICE VISIT (OUTPATIENT)
Dept: VASCULAR SURGERY | Facility: CLINIC | Age: 80
End: 2025-07-28
Payer: MEDICARE

## 2025-07-28 VITALS
SYSTOLIC BLOOD PRESSURE: 143 MMHG | DIASTOLIC BLOOD PRESSURE: 76 MMHG | HEIGHT: 70 IN | HEART RATE: 71 BPM | WEIGHT: 177 LBS | BODY MASS INDEX: 25.34 KG/M2

## 2025-07-28 DIAGNOSIS — M79.604 BILATERAL LEG PAIN: ICD-10-CM

## 2025-07-28 DIAGNOSIS — L81.9 HYPERPIGMENTATION OF SKIN: ICD-10-CM

## 2025-07-28 DIAGNOSIS — I87.2 VENOUS INSUFFICIENCY: Primary | ICD-10-CM

## 2025-07-28 DIAGNOSIS — R60.0 EDEMA, LOWER EXTREMITY: ICD-10-CM

## 2025-07-28 DIAGNOSIS — M79.605 BILATERAL LEG PAIN: ICD-10-CM

## 2025-07-28 PROCEDURE — 99214 OFFICE O/P EST MOD 30 MIN: CPT | Mod: PBBFAC,25 | Performed by: FAMILY MEDICINE

## 2025-07-28 PROCEDURE — 99214 OFFICE O/P EST MOD 30 MIN: CPT | Mod: S$PBB,,, | Performed by: FAMILY MEDICINE

## 2025-07-28 PROCEDURE — 99999 PR PBB SHADOW E&M-EST. PATIENT-LVL IV: CPT | Mod: PBBFAC,,, | Performed by: FAMILY MEDICINE

## 2025-07-28 PROCEDURE — 93971 EXTREMITY STUDY: CPT | Mod: 26,LT,, | Performed by: FAMILY MEDICINE

## 2025-07-28 PROCEDURE — 93971 EXTREMITY STUDY: CPT | Mod: TC,LT

## 2025-07-28 NOTE — H&P (VIEW-ONLY)
VEIN CENTER CLINIC NOTE    Patient ID: Pam Ortega is a 80 y.o. female.    I. HISTORY     Chief Complaint:   Chief Complaint   Patient presents with    Post-op Evaluation     Pt here for her post-op visit. States no current issues or concerns at this time.       History of Present Illness    CHIEF COMPLAINT:  Patient presents today for follow up after vein procedure.    VEIN PROCEDURES:  She reports her leg feels lighter after recent vein procedure and stitches have been removed. US confirms veins closed as intended with no blood clots present. She is scheduled for small saphenous vein procedure this Friday on the posterior calf and understands she will need to be positioned prone to access the vein in the posterior leg region.      ROS:  General: -fever, -chills, -fatigue, -weight gain, -weight loss  Eyes: -vision changes, -redness, -discharge  ENT: -ear pain, -nasal congestion, -sore throat  Cardiovascular: -chest pain, -palpitations, -lower extremity edema  Respiratory: -cough, -shortness of breath  Gastrointestinal: -abdominal pain, -nausea, -vomiting, -diarrhea, -constipation, -blood in stool  Genitourinary: -dysuria, -hematuria, -frequency  Musculoskeletal: -joint pain, -muscle pain  Skin: -rash, -lesion  Neurological: -headache, -dizziness, -numbness, -tingling  Psychiatric: -anxiety, -depression, -sleep difficulty         Clinical Summary:  The patient initially presented on 01/17/2024 by referral from [facility] for evaluation of bilateral lower extremity leg swelling, hyperpigmentation and pain.  Symptoms are progressive, worsening and began greater than 1 years ago.  Location is bilateral lower extremities below the knees. Symptoms are worse at the end of the day.  History of venous interventions includes none.  Unsure of family history of venous disease.  The patient also recently had arterial duplex with ABIs on 01/11/2024 which were abnormal and her PCP referred to vascular surgery.   Patient also denies history of DVT or cellulitis.    The patient underwent a bilateral superficial venous reflux study, 01/17/2024, and the results were discussed with the patient.  This study shows dilation and axial reflux of the bilateral great and left small saphenous veins.  Pulsatile flow also noted bilaterally which can be consistent with CHF.    Doppler study bilateral lower extremities on 01/11/2024 shows no evidence of DVT bilaterally.    Venous Disease Medical Necessity Documentation Initial Visit Date:  01/17/2024 Return Check Date:   04/17/2024   Have you ever had a rupture or bleed from a varicose vein in your leg(s)?              [x] Yes  [] No   [x] Yes   [] No   Have you ever been diagnosed with phlebitis, cellulitis, or inflammation in the area of the varicose veins of  your leg(s)?  [] Yes  [x] No    [x] Yes   [] No   Do you have darkened or inflamed skin on your legs?   [x] Yes   [] No   [x] Yes   [] No   Do you have leg swelling?     [x] Yes   [] No   [x] Yes   [] No   Do you have leg pain?   [x] Yes   [] No   [] Yes   [x] No   If yes, describe the type of pain?    [x]   Stabbing []  Radiating [x]  Aching   [x]  Tightness [x]  Throbbing               [x]  Burning [x]  Cramping              Do you have leg discomfort?   [] Yes   [x] No   [] Yes   [x] No   If yes, describe the type of discomfort?    []  Heaviness []  Fullness   []  Restlessness [] Tired/Fatigued [] Itching              Have you ever worn compression hose?   [] Yes   [x] No   [x] Yes   [] No   If yes, how long?      3 MONTHS     Do you elevate your legs while sitting?   [x] Yes   [] No   [x] Yes   [] No   Does venous disease (varicose veins, ulcers, skin changes, leg pain/swelling) interfere with your daily life?  If yes, check activities you are limited or unable to do.    []  Shower  [x]   Walk  []  Exercise  [] Play with children/grandchildren  [x]  Shop [] Work [x] Stand for any period of time [x] Sleep                                [] Sitting for an extended period of time.           [x] Yes   [] No   [] Yes   [x] No   Do you exercise/have you tried to exercise (i.e.  Walk our participate in a regular exercise routine)?  [] Yes  [x] No   [x] Yes   [] No   BMI 29.6   29.50          Past Medical History:   Diagnosis Date    Abdominal bloating 04/22/2025    Anemia of chronic disease     Atrial fibrillation     Benign hypertensive CKD, stage 1-4 or unspecified chronic kidney disease     Bronchitis 12/22/2023    Carotid artery occlusion     Cellulitis 12/09/2024    CKD (chronic kidney disease)     Constipation 01/24/2025    Coronary atherosclerosis     Cough 12/22/2023    Cough 12/22/2023    COVID-19 ruled out 03/20/2024    Diabetes mellitus, type 2     Diabetic eye exam 06/20/2025    Dr. Husam Trivedi [facility]    DJD (degenerative joint disease)     History of CVA (cerebrovascular accident)     HTN (hypertension)     Hyperlipidemia     Nausea and vomiting 04/22/2025    Nonischemic dilated cardiomyopathy     Obesity     Osteopenia     Other ascites 04/09/2025    Paroxysmal atrial fibrillation     Presence of cardiac pacemaker     PVD (peripheral vascular disease)     Skin ulcer of female breast 12/30/2024    Upper respiratory tract infection 12/22/2023    Vaginal bleeding 04/26/2025    Vitamin D deficiency         Past Surgical History:   Procedure Laterality Date    BREAST BIOPSY      COLONOSCOPY  12/04/2018    repeat in 5 years    EMBOLECTOMY OR THROMBECTOMY, ARTERY, AORTOILIAC, FEMORAL, OR POPLITEAL Right 11/17/2024    Procedure: EMBOLECTOMY OR THROMBECTOMY, ARTERY, AORTOILIAC, FEMORAL, OR POPLITEAL;  Surgeon: Jordana Guillermo MD;  Location: [facility] OR;  Service: Vascular;  Laterality: Right;    pace maker      RADIOFREQUENCY ABLATION, ENDOVENOUS Left 7/25/2025    Procedure: Left GSV RF Ablation;  Surgeon: Thomas Lyn DO;  Location: [facility];  Service: Peripheral Vascular;  Laterality: Left;    VAGINAL  DELIVERY      x 3       Social History     Tobacco Use   Smoking Status Never    Passive exposure: Never   Smokeless Tobacco Never         Current Outpatient Medications:     albuterol (VENTOLIN HFA) 90 mcg/actuation inhaler, Inhale 2 puffs into the lungs every 6 (six) hours as needed for Wheezing. Rescue Inhaler, Disp: 18 g, Rfl: 5    apixaban (ELIQUIS) 2.5 mg Tab, Take 1 tablet (2.5 mg total) by mouth 2 (two) times daily., Disp: 60 tablet, Rfl: 1    aspirin (ECOTRIN) 81 MG EC tablet, Take 81 mg by mouth once daily., Disp: , Rfl:     cholecalciferol, vitamin D3, (VITAMIN D3) 50 mcg (2,000 unit) Cap capsule, Take 1 capsule by mouth once daily., Disp: , Rfl:     LANTUS SOLOSTAR U-100 INSULIN 100 unit/mL (3 mL) InPn pen, Inject 7 Units into the skin once daily., Disp: , Rfl:     lovastatin (MEVACOR) 20 MG tablet, Take 1 tablet (20 mg total) by mouth every night, Disp: 90 tablet, Rfl: 3    metoprolol tartrate (LOPRESSOR) 50 MG tablet, Take 1 tablet (50 mg total) by mouth 2 (two) times a day., Disp: 180 tablet, Rfl: 3    omeprazole (PRILOSEC) 20 MG capsule, Take 1 capsule (20 mg total) by mouth once daily., Disp: 90 capsule, Rfl: 3    spironolactone (ALDACTONE) 25 MG tablet, Take 1 tablet (25 mg total) by mouth once daily., Disp: 90 tablet, Rfl: 3    spironolactone (ALDACTONE) 25 MG tablet, Take 25 mg by mouth., Disp: , Rfl:     colchicine (COLCRYS) 0.6 mg tablet, Take 1 tablet (0.6 mg total) by mouth every other day for gout., Disp: 45 tablet, Rfl: 0    II. PHYSICAL EXAM     Physical Exam  Constitutional:       General: She is awake. She is not in acute distress.     Appearance: Normal appearance. She is overweight. She is not ill-appearing or toxic-appearing.   HENT:      Head: Normocephalic and atraumatic.   Eyes:      Extraocular Movements: Extraocular movements intact.      Conjunctiva/sclera: Conjunctivae normal.      Pupils: Pupils are equal, round, and reactive to light.   Neck:      Vascular: No carotid bruit  or JVD.   Cardiovascular:      Rate and Rhythm: Normal rate and regular rhythm.      Pulses:           Dorsalis pedis pulses are detected w/ Doppler on the right side and detected w/ Doppler on the left side.        Posterior tibial pulses are detected w/ Doppler on the right side and detected w/ Doppler on the left side.      Heart sounds: No murmur heard.  Pulmonary:      Effort: Pulmonary effort is normal. No respiratory distress.      Breath sounds: No stridor. No wheezing, rhonchi or rales.   Musculoskeletal:         General: No swelling, tenderness or deformity.      Right lower le+ Edema present.      Left lower le+ Edema present.      Comments: Hyperpigmentation and brawny edema noted bilaterally.    Skin breakdown with stasis ulceration of the left lower extremity.  See pictures for details.  No signs of active cellulitis or infection today.   Feet:      Comments: Triphasic hand-held dopplerable pulses of the bilateral dorsalis pedis and posterior tibial arteries.  Skin:     General: Skin is warm.      Capillary Refill: Capillary refill takes less than 2 seconds.      Coloration: Skin is not ashen.      Findings: No bruising, erythema, lesion, rash or wound.   Neurological:      Mental Status: She is alert and oriented to person, place, and time.      Motor: No weakness.   Psychiatric:         Speech: Speech normal.         Behavior: Behavior normal. Behavior is cooperative.         Reticular/Spider veins noted:  RLE: none  LLE: none    Varicose veins noted:  RLE: none  LLE:  posterior calf and lateral calf    CEAP Classification  Clinical Signs: Class 6 - Leg ulceration, skin changes as defined above  Etiologic Classification: Primary  Anatomic distribution: Superficial  Pathophysiologic dysfunction: Reflux               Venous Clinical Severity Score  Pain:2=Daily, moderate activity limitation, occasional analgesics  Varicose Veins: 1=Few, scattered. Branch varicose veins  Venous Edema:  2=Afternoon edema, above ankle  Pigmentation: 1=Diffuse, but limited in area and old (brown)  Inflammation: 0=None  Induration: 0=None  Number of Active Ulcers: 2=2  Active Ulceration, Duration: 1=<3 months  Active Ulcer Size: 1=<2 cm diameter  Compressive Therapy: 3=Full compliance, stockings + elevation  Total Score: 13       III. ASSESSMENT & PLAN (MEDICAL DECISION MAKING)     1. Venous insufficiency    2. Bilateral leg pain    3. Hyperpigmentation of skin    4. Edema, lower extremity          Assessment & Plan    VARICOSE VEINS OF LOWER EXTREMITIES:  - LLE post-procedure feels lighter.  - US confirms successful vein closure without blood clots.  - Patient to continue wearing compression as instructed.    FOLLOW-UP:  - Follow up on Friday for procedure on small saphenous vein in back of LLE.  - Follow up on Monday after procedure for US to assess vein closure and check for blood clots.                  Thomas Lyn, DO    This note was generated with the assistance of ambient listening technology. Verbal consent was obtained by the patient and accompanying visitor(s) for the recording of patient appointment to facilitate this note. I attest to having reviewed and edited the generated note for accuracy, though some syntax or spelling errors may persist. Please contact the author of this note for any clarification.

## 2025-07-28 NOTE — PROGRESS NOTES
VEIN CENTER CLINIC NOTE    Patient ID: Pam Ortega is a 80 y.o. female.    I. HISTORY     Chief Complaint:   Chief Complaint   Patient presents with    Post-op Evaluation     Pt here for her post-op visit. States no current issues or concerns at this time.       History of Present Illness    CHIEF COMPLAINT:  Patient presents today for follow up after vein procedure.    VEIN PROCEDURES:  She reports her leg feels lighter after recent vein procedure and stitches have been removed. US confirms veins closed as intended with no blood clots present. She is scheduled for small saphenous vein procedure this Friday on the posterior calf and understands she will need to be positioned prone to access the vein in the posterior leg region.      ROS:  General: -fever, -chills, -fatigue, -weight gain, -weight loss  Eyes: -vision changes, -redness, -discharge  ENT: -ear pain, -nasal congestion, -sore throat  Cardiovascular: -chest pain, -palpitations, -lower extremity edema  Respiratory: -cough, -shortness of breath  Gastrointestinal: -abdominal pain, -nausea, -vomiting, -diarrhea, -constipation, -blood in stool  Genitourinary: -dysuria, -hematuria, -frequency  Musculoskeletal: -joint pain, -muscle pain  Skin: -rash, -lesion  Neurological: -headache, -dizziness, -numbness, -tingling  Psychiatric: -anxiety, -depression, -sleep difficulty         Clinical Summary:  The patient initially presented on 01/17/2024 by referral from [facility] for evaluation of bilateral lower extremity leg swelling, hyperpigmentation and pain.  Symptoms are progressive, worsening and began greater than 1 years ago.  Location is bilateral lower extremities below the knees. Symptoms are worse at the end of the day.  History of venous interventions includes none.  Unsure of family history of venous disease.  The patient also recently had arterial duplex with ABIs on 01/11/2024 which were abnormal and her PCP referred to vascular surgery.   Patient also denies history of DVT or cellulitis.    The patient underwent a bilateral superficial venous reflux study, 01/17/2024, and the results were discussed with the patient.  This study shows dilation and axial reflux of the bilateral great and left small saphenous veins.  Pulsatile flow also noted bilaterally which can be consistent with CHF.    Doppler study bilateral lower extremities on 01/11/2024 shows no evidence of DVT bilaterally.    Venous Disease Medical Necessity Documentation Initial Visit Date:  01/17/2024 Return Check Date:   04/17/2024   Have you ever had a rupture or bleed from a varicose vein in your leg(s)?              [x] Yes  [] No   [x] Yes   [] No   Have you ever been diagnosed with phlebitis, cellulitis, or inflammation in the area of the varicose veins of  your leg(s)?  [] Yes  [x] No    [x] Yes   [] No   Do you have darkened or inflamed skin on your legs?   [x] Yes   [] No   [x] Yes   [] No   Do you have leg swelling?     [x] Yes   [] No   [x] Yes   [] No   Do you have leg pain?   [x] Yes   [] No   [] Yes   [x] No   If yes, describe the type of pain?    [x]   Stabbing []  Radiating [x]  Aching   [x]  Tightness [x]  Throbbing               [x]  Burning [x]  Cramping              Do you have leg discomfort?   [] Yes   [x] No   [] Yes   [x] No   If yes, describe the type of discomfort?    []  Heaviness []  Fullness   []  Restlessness [] Tired/Fatigued [] Itching              Have you ever worn compression hose?   [] Yes   [x] No   [x] Yes   [] No   If yes, how long?      3 MONTHS     Do you elevate your legs while sitting?   [x] Yes   [] No   [x] Yes   [] No   Does venous disease (varicose veins, ulcers, skin changes, leg pain/swelling) interfere with your daily life?  If yes, check activities you are limited or unable to do.    []  Shower  [x]   Walk  []  Exercise  [] Play with children/grandchildren  [x]  Shop [] Work [x] Stand for any period of time [x] Sleep                                [] Sitting for an extended period of time.           [x] Yes   [] No   [] Yes   [x] No   Do you exercise/have you tried to exercise (i.e.  Walk our participate in a regular exercise routine)?  [] Yes  [x] No   [x] Yes   [] No   BMI 29.6   29.50          Past Medical History:   Diagnosis Date    Abdominal bloating 04/22/2025    Anemia of chronic disease     Atrial fibrillation     Benign hypertensive CKD, stage 1-4 or unspecified chronic kidney disease     Bronchitis 12/22/2023    Carotid artery occlusion     Cellulitis 12/09/2024    CKD (chronic kidney disease)     Constipation 01/24/2025    Coronary atherosclerosis     Cough 12/22/2023    Cough 12/22/2023    COVID-19 ruled out 03/20/2024    Diabetes mellitus, type 2     Diabetic eye exam 06/20/2025    Dr. Husam Trivedi - [facility]    DJD (degenerative joint disease)     History of CVA (cerebrovascular accident)     HTN (hypertension)     Hyperlipidemia     Nausea and vomiting 04/22/2025    Nonischemic dilated cardiomyopathy     Obesity     Osteopenia     Other ascites 04/09/2025    Paroxysmal atrial fibrillation     Presence of cardiac pacemaker     PVD (peripheral vascular disease)     Skin ulcer of female breast 12/30/2024    Upper respiratory tract infection 12/22/2023    Vaginal bleeding 04/26/2025    Vitamin D deficiency         Past Surgical History:   Procedure Laterality Date    BREAST BIOPSY      COLONOSCOPY  12/04/2018    repeat in 5 years    EMBOLECTOMY OR THROMBECTOMY, ARTERY, AORTOILIAC, FEMORAL, OR POPLITEAL Right 11/17/2024    Procedure: EMBOLECTOMY OR THROMBECTOMY, ARTERY, AORTOILIAC, FEMORAL, OR POPLITEAL;  Surgeon: Jordana Guillermo MD;  Location: [facility] OR;  Service: Vascular;  Laterality: Right;    pace maker      RADIOFREQUENCY ABLATION, ENDOVENOUS Left 7/25/2025    Procedure: Left GSV RF Ablation;  Surgeon: Thomas Lyn DO;  Location: [facility];  Service: Peripheral Vascular;  Laterality: Left;    VAGINAL  DELIVERY      x 3       Social History     Tobacco Use   Smoking Status Never    Passive exposure: Never   Smokeless Tobacco Never         Current Outpatient Medications:     albuterol (VENTOLIN HFA) 90 mcg/actuation inhaler, Inhale 2 puffs into the lungs every 6 (six) hours as needed for Wheezing. Rescue Inhaler, Disp: 18 g, Rfl: 5    apixaban (ELIQUIS) 2.5 mg Tab, Take 1 tablet (2.5 mg total) by mouth 2 (two) times daily., Disp: 60 tablet, Rfl: 1    aspirin (ECOTRIN) 81 MG EC tablet, Take 81 mg by mouth once daily., Disp: , Rfl:     cholecalciferol, vitamin D3, (VITAMIN D3) 50 mcg (2,000 unit) Cap capsule, Take 1 capsule by mouth once daily., Disp: , Rfl:     LANTUS SOLOSTAR U-100 INSULIN 100 unit/mL (3 mL) InPn pen, Inject 7 Units into the skin once daily., Disp: , Rfl:     lovastatin (MEVACOR) 20 MG tablet, Take 1 tablet (20 mg total) by mouth every night, Disp: 90 tablet, Rfl: 3    metoprolol tartrate (LOPRESSOR) 50 MG tablet, Take 1 tablet (50 mg total) by mouth 2 (two) times a day., Disp: 180 tablet, Rfl: 3    omeprazole (PRILOSEC) 20 MG capsule, Take 1 capsule (20 mg total) by mouth once daily., Disp: 90 capsule, Rfl: 3    spironolactone (ALDACTONE) 25 MG tablet, Take 1 tablet (25 mg total) by mouth once daily., Disp: 90 tablet, Rfl: 3    spironolactone (ALDACTONE) 25 MG tablet, Take 25 mg by mouth., Disp: , Rfl:     colchicine (COLCRYS) 0.6 mg tablet, Take 1 tablet (0.6 mg total) by mouth every other day for gout., Disp: 45 tablet, Rfl: 0    II. PHYSICAL EXAM     Physical Exam  Constitutional:       General: She is awake. She is not in acute distress.     Appearance: Normal appearance. She is overweight. She is not ill-appearing or toxic-appearing.   HENT:      Head: Normocephalic and atraumatic.   Eyes:      Extraocular Movements: Extraocular movements intact.      Conjunctiva/sclera: Conjunctivae normal.      Pupils: Pupils are equal, round, and reactive to light.   Neck:      Vascular: No carotid bruit  or JVD.   Cardiovascular:      Rate and Rhythm: Normal rate and regular rhythm.      Pulses:           Dorsalis pedis pulses are detected w/ Doppler on the right side and detected w/ Doppler on the left side.        Posterior tibial pulses are detected w/ Doppler on the right side and detected w/ Doppler on the left side.      Heart sounds: No murmur heard.  Pulmonary:      Effort: Pulmonary effort is normal. No respiratory distress.      Breath sounds: No stridor. No wheezing, rhonchi or rales.   Musculoskeletal:         General: No swelling, tenderness or deformity.      Right lower le+ Edema present.      Left lower le+ Edema present.      Comments: Hyperpigmentation and brawny edema noted bilaterally.    Skin breakdown with stasis ulceration of the left lower extremity.  See pictures for details.  No signs of active cellulitis or infection today.   Feet:      Comments: Triphasic hand-held dopplerable pulses of the bilateral dorsalis pedis and posterior tibial arteries.  Skin:     General: Skin is warm.      Capillary Refill: Capillary refill takes less than 2 seconds.      Coloration: Skin is not ashen.      Findings: No bruising, erythema, lesion, rash or wound.   Neurological:      Mental Status: She is alert and oriented to person, place, and time.      Motor: No weakness.   Psychiatric:         Speech: Speech normal.         Behavior: Behavior normal. Behavior is cooperative.         Reticular/Spider veins noted:  RLE: none  LLE: none    Varicose veins noted:  RLE: none  LLE:  posterior calf and lateral calf    CEAP Classification  Clinical Signs: Class 6 - Leg ulceration, skin changes as defined above  Etiologic Classification: Primary  Anatomic distribution: Superficial  Pathophysiologic dysfunction: Reflux               Venous Clinical Severity Score  Pain:2=Daily, moderate activity limitation, occasional analgesics  Varicose Veins: 1=Few, scattered. Branch varicose veins  Venous Edema:  2=Afternoon edema, above ankle  Pigmentation: 1=Diffuse, but limited in area and old (brown)  Inflammation: 0=None  Induration: 0=None  Number of Active Ulcers: 2=2  Active Ulceration, Duration: 1=<3 months  Active Ulcer Size: 1=<2 cm diameter  Compressive Therapy: 3=Full compliance, stockings + elevation  Total Score: 13       III. ASSESSMENT & PLAN (MEDICAL DECISION MAKING)     1. Venous insufficiency    2. Bilateral leg pain    3. Hyperpigmentation of skin    4. Edema, lower extremity          Assessment & Plan    VARICOSE VEINS OF LOWER EXTREMITIES:  - LLE post-procedure feels lighter.  - US confirms successful vein closure without blood clots.  - Patient to continue wearing compression as instructed.    FOLLOW-UP:  - Follow up on Friday for procedure on small saphenous vein in back of LLE.  - Follow up on Monday after procedure for US to assess vein closure and check for blood clots.                  Thomas Lyn, DO    This note was generated with the assistance of ambient listening technology. Verbal consent was obtained by the patient and accompanying visitor(s) for the recording of patient appointment to facilitate this note. I attest to having reviewed and edited the generated note for accuracy, though some syntax or spelling errors may persist. Please contact the author of this note for any clarification.

## 2025-07-31 ENCOUNTER — EXTERNAL CHRONIC CARE MANAGEMENT (OUTPATIENT)
Dept: FAMILY MEDICINE | Facility: CLINIC | Age: 80
End: 2025-07-31
Payer: MEDICARE

## 2025-07-31 DIAGNOSIS — I87.2 VENOUS INSUFFICIENCY: Primary | ICD-10-CM

## 2025-07-31 PROCEDURE — 99490 CHRNC CARE MGMT STAFF 1ST 20: CPT | Mod: ,,, | Performed by: NURSE PRACTITIONER

## 2025-08-01 ENCOUNTER — HOSPITAL ENCOUNTER (OUTPATIENT)
Facility: HOSPITAL | Age: 80
Discharge: HOME | End: 2025-08-01
Attending: FAMILY MEDICINE | Admitting: FAMILY MEDICINE
Payer: MEDICARE

## 2025-08-01 ENCOUNTER — ANESTHESIA (OUTPATIENT)
Dept: PAIN MEDICINE | Facility: HOSPITAL | Age: 80
End: 2025-08-01
Payer: MEDICARE

## 2025-08-01 ENCOUNTER — ANESTHESIA EVENT (OUTPATIENT)
Dept: PAIN MEDICINE | Facility: HOSPITAL | Age: 80
End: 2025-08-01
Payer: MEDICARE

## 2025-08-01 VITALS
TEMPERATURE: 98 F | BODY MASS INDEX: 26.9 KG/M2 | OXYGEN SATURATION: 100 % | DIASTOLIC BLOOD PRESSURE: 76 MMHG | DIASTOLIC BLOOD PRESSURE: 63 MMHG | HEIGHT: 69 IN | SYSTOLIC BLOOD PRESSURE: 143 MMHG | HEART RATE: 75 BPM | RESPIRATION RATE: 24 BRPM | HEART RATE: 70 BPM | WEIGHT: 181.63 LBS | OXYGEN SATURATION: 100 % | SYSTOLIC BLOOD PRESSURE: 111 MMHG

## 2025-08-01 DIAGNOSIS — I87.2 VENOUS INSUFFICIENCY (CHRONIC) (PERIPHERAL): ICD-10-CM

## 2025-08-01 DIAGNOSIS — I87.2 VENOUS INSUFFICIENCY: Primary | ICD-10-CM

## 2025-08-01 LAB — GLUCOSE SERPL-MCNC: 117 MG/DL (ref 70–105)

## 2025-08-01 PROCEDURE — 27000716 HC OXISENSOR PROBE, ANY SIZE: Performed by: NURSE ANESTHETIST, CERTIFIED REGISTERED

## 2025-08-01 PROCEDURE — 36475 ENDOVENOUS RF 1ST VEIN: CPT | Mod: LT,,, | Performed by: FAMILY MEDICINE

## 2025-08-01 PROCEDURE — 25000003 PHARM REV CODE 250: Performed by: FAMILY MEDICINE

## 2025-08-01 PROCEDURE — 36475 ENDOVENOUS RF 1ST VEIN: CPT | Performed by: FAMILY MEDICINE

## 2025-08-01 PROCEDURE — 63600175 PHARM REV CODE 636 W HCPCS: Performed by: FAMILY MEDICINE

## 2025-08-01 PROCEDURE — 27000284 HC CANNULA NASAL: Performed by: NURSE ANESTHETIST, CERTIFIED REGISTERED

## 2025-08-01 PROCEDURE — 27201423 OPTIME MED/SURG SUP & DEVICES STERILE SUPPLY: Performed by: FAMILY MEDICINE

## 2025-08-01 PROCEDURE — 82962 GLUCOSE BLOOD TEST: CPT

## 2025-08-01 PROCEDURE — 63600175 PHARM REV CODE 636 W HCPCS: Performed by: NURSE ANESTHETIST, CERTIFIED REGISTERED

## 2025-08-01 PROCEDURE — 37000008 HC ANESTHESIA 1ST 15 MINUTES: Performed by: FAMILY MEDICINE

## 2025-08-01 PROCEDURE — 37000009 HC ANESTHESIA EA ADD 15 MINS: Performed by: FAMILY MEDICINE

## 2025-08-01 RX ORDER — LIDOCAINE HYDROCHLORIDE 10 MG/ML
INJECTION, SOLUTION INFILTRATION; PERINEURAL CODE/TRAUMA/SEDATION MEDICATION
Status: DISCONTINUED | OUTPATIENT
Start: 2025-08-01 | End: 2025-08-01 | Stop reason: HOSPADM

## 2025-08-01 RX ORDER — PHENYLEPHRINE HYDROCHLORIDE 10 MG/ML
INJECTION INTRAVENOUS
Status: DISCONTINUED | OUTPATIENT
Start: 2025-08-01 | End: 2025-08-01

## 2025-08-01 RX ORDER — LIDOCAINE HYDROCHLORIDE 20 MG/ML
INJECTION, SOLUTION EPIDURAL; INFILTRATION; INTRACAUDAL; PERINEURAL
Status: DISCONTINUED | OUTPATIENT
Start: 2025-08-01 | End: 2025-08-01

## 2025-08-01 RX ORDER — MUPIROCIN 20 MG/G
OINTMENT TOPICAL CODE/TRAUMA/SEDATION MEDICATION
Status: DISCONTINUED | OUTPATIENT
Start: 2025-08-01 | End: 2025-08-01 | Stop reason: HOSPADM

## 2025-08-01 RX ORDER — SODIUM CHLORIDE 9 MG/ML
INJECTION, SOLUTION INTRAVENOUS CONTINUOUS
Status: DISCONTINUED | OUTPATIENT
Start: 2025-08-01 | End: 2025-08-01 | Stop reason: HOSPADM

## 2025-08-01 RX ORDER — PROPOFOL 10 MG/ML
VIAL (ML) INTRAVENOUS
Status: DISCONTINUED | OUTPATIENT
Start: 2025-08-01 | End: 2025-08-01

## 2025-08-01 RX ADMIN — SODIUM CHLORIDE: 9 INJECTION, SOLUTION INTRAVENOUS at 10:08

## 2025-08-01 RX ADMIN — PROPOFOL 80 MG: 10 INJECTION, EMULSION INTRAVENOUS at 10:08

## 2025-08-01 RX ADMIN — PHENYLEPHRINE HYDROCHLORIDE 100 MCG: 10 INJECTION INTRAVENOUS at 11:08

## 2025-08-01 RX ADMIN — APIXABAN 5 MG: 5 TABLET, FILM COATED ORAL at 08:08

## 2025-08-01 RX ADMIN — LIDOCAINE HYDROCHLORIDE 80 MG: 20 INJECTION, SOLUTION EPIDURAL; INFILTRATION; INTRACAUDAL; PERINEURAL at 10:08

## 2025-08-01 RX ADMIN — PROPOFOL 75 MCG/KG/MIN: 10 INJECTION, EMULSION INTRAVENOUS at 10:08

## 2025-08-01 NOTE — PLAN OF CARE
1150 Discharge instructions given to Marianela along with her ointment.  1200 Ambulated in the hallway with assistance.  1205 Discharged. Left per w/c through the ambulatory entrance. Left with her daughters Marianela and Nidhi.

## 2025-08-01 NOTE — ANESTHESIA PREPROCEDURE EVALUATION
08/01/2025  Pam Ortega is a 80 y.o., female.      Pre-op Assessment    I have reviewed the Patient Summary Reports.     I have reviewed the Nursing Notes. I have reviewed the NPO Status.   I have reviewed the Medications.     Review of Systems  Anesthesia Hx:  No problems with previous Anesthesia                Cardiovascular:     Hypertension   CAD       CHF                Shortness of Breath    Coronary Artery Disease:               Congestive Heart Failure (CHF)                Hypertension     Atrial Fibrillation     Pulmonary:      Shortness of breath  Sleep Apnea     Obstructive Sleep Apnea (RENEE).           Renal/:  Chronic Renal Disease        Kidney Function/Disease             Hepatic/GI:   PUD, Hiatal Hernia,          Peptic Ulcer Disease    Hernia, Hiatal Hernia      Musculoskeletal:  Arthritis        Arthritis          Neurological:    Neuromuscular Disease,           Arthritis                         Neuromuscular Disease   Endocrine:  Diabetes, type 2    Diabetes                        Past Medical History:   Diagnosis Date    Abdominal bloating 04/22/2025    Anemia of chronic disease     Atrial fibrillation     Benign hypertensive CKD, stage 1-4 or unspecified chronic kidney disease     Bronchitis 12/22/2023    Carotid artery occlusion     Cellulitis 12/09/2024    CKD (chronic kidney disease)     Constipation 01/24/2025    Coronary atherosclerosis     Cough 12/22/2023    Cough 12/22/2023    COVID-19 ruled out 03/20/2024    Diabetes mellitus, type 2     Diabetic eye exam 06/20/2025    Dr. Husam Trivedi - Eye Clinic [facility]    DJD (degenerative joint disease)     History of CVA (cerebrovascular accident)     HTN (hypertension)     Hyperlipidemia     Nausea and vomiting 04/22/2025    Nonischemic dilated cardiomyopathy     Obesity     Osteopenia     Other ascites 04/09/2025     Paroxysmal atrial fibrillation     Presence of cardiac pacemaker     PVD (peripheral vascular disease)     Skin ulcer of female breast 12/30/2024    Upper respiratory tract infection 12/22/2023    Vaginal bleeding 04/26/2025    Vitamin D deficiency        Past Surgical History:   Procedure Laterality Date    BREAST BIOPSY      COLONOSCOPY  12/04/2018    repeat in 5 years    EMBOLECTOMY OR THROMBECTOMY, ARTERY, AORTOILIAC, FEMORAL, OR POPLITEAL Right 11/17/2024    Procedure: EMBOLECTOMY OR THROMBECTOMY, ARTERY, AORTOILIAC, FEMORAL, OR POPLITEAL;  Surgeon: Jordana Guillermo MD;  Location: [facility] OR;  Service: Vascular;  Laterality: Right;    pace maker      RADIOFREQUENCY ABLATION, ENDOVENOUS Left 7/25/2025    Procedure: Left GSV RF Ablation;  Surgeon: Thomas Lyn DO;  Location: [facility];  Service: Peripheral Vascular;  Laterality: Left;    VAGINAL DELIVERY      x 3       Family History   Problem Relation Name Age of Onset    Hypertension Mother      Hypertension Father      Hypertension Sister      Stroke Sister      Cancer Brother      Stroke Brother      Hypertension Brother      Alzheimer's disease Maternal Grandmother      Breast cancer Maternal Grandmother      Hypertension Maternal Grandmother      Hyperlipidemia Daughter      Breast cancer Daughter      Diabetes Daughter      Hypertension Daughter      Diabetes Daughter      Hypertension Daughter      Hypertension Daughter      Clotting disorder Daughter           Chemistry        Component Value Date/Time     (L) 07/02/2025 1323    K 3.7 07/02/2025 1323    CL 74 (LL) 07/02/2025 1323    CO2 35 (H) 07/02/2025 1323    BUN 74 (H) 07/02/2025 1323    CREATININE 3.89 (H) 07/02/2025 1323     (H) 07/02/2025 1323        Component Value Date/Time    CALCIUM 9.7 07/02/2025 1323    ALKPHOS 114 07/02/2025 1323    AST 41 07/02/2025 1323    ALT 10 07/02/2025 1323    BILITOT 2.0 (H) 07/02/2025 1323    ESTGFRAFRICA 33 (L) 10/06/2021 1022     EGFRNONAA 31 (L) 06/14/2022 0910        Lab Results   Component Value Date    WBC 6.58 07/02/2025    HGB 12.9 07/02/2025    HCT 41.5 07/02/2025     07/02/2025     Results for orders placed or performed during the hospital encounter of 07/02/25   EKG 12-lead    Collection Time: 07/02/25  4:06 PM   Result Value Ref Range    QRS Duration 210 ms    [facility]HS QTC Calculation 514 ms    Narrative    Test Reason : R79.89,    Vent. Rate :  60 BPM     Atrial Rate :  58 BPM     P-R Int :    ms          QRS Dur : 210 ms      QT Int : 514 ms       P-R-T Axes :    -18 208 degrees    QTcB Int : 514 ms    Ventricular-paced rhythm  Abnormal ECG  When compared with ECG of 02-Jul-2025 12:44,  No significant change was found  Confirmed by Ganesh Cardoso ([id number]) on 7/8/2025 7:39:35 PM    Referred By: AAAREFERRAL SELF           Confirmed By: Ganesh Cardoso     Results for orders placed during the hospital encounter of 04/04/25    Echo Saline Bubble? No    Interpretation Summary    Left Ventricle: The left ventricle is mildly dilated. Increased ventricular mass. Normal wall thickness. There is eccentric hypertrophy. Septal motion is consistent with pacing. There is moderately reduced systolic function with a visually estimated ejection fraction of 30 - 40%. Quantitated ejection fraction is 35%. There is diastolic dysfunction.    Right Ventricle: The right ventricle has mild enlargement. Systolic function is moderately reduced. Pacemaker lead present in the ventricle.    Left Atrium: Severely dilated    Right Atrium: Right atrium is moderately dilated.    Mitral Valve: There is at least moderate regurgitation with an eccentrically posterolateral directed jet. MR jet is only well-visualized on parasternal long axis views, remaining views w/ supoptimal assessment of MR as MR jet is not adequately captured.    Tricuspid Valve: There is severe regurgitation.    Pulmonic Valve: There is moderate to severe regurgitation.    Pulmonary Artery:  The estimated pulmonary artery systolic pressure is 39 mmHg.    IVC/SVC: Intermediate venous pressure at 8 mmHg.    Pericardium: Right pleural effusion.    Compared to prior ECHO from 11/204 (which was also personally reviewed): LV size and LVEF are unchanged. LV remains mildy dilated with a moderately reduced LVEF. Improvement in RA and RV size noted on current study (previously severely dilated). MR is better visualized on previous study and at least moderate MR previously- appears largely unchanged however, there are technical limitations with MR assessment on current study as detailed above.         Current Medications[1]    Review of patient's allergies indicates:   Allergen Reactions    Ace inhibitors Edema    Losartan Swelling        Physical Exam  General: Well nourished, Cooperative, Alert and Oriented    Airway:  Mallampati: II   Mouth Opening: Normal  TM Distance: Normal  Tongue: Normal  Neck ROM: Normal ROM    Dental:  Intact        Anesthesia Plan  Type of Anesthesia, risks & benefits discussed:    Anesthesia Type: MAC  Intra-op Monitoring Plan: Standard ASA Monitors  Post Op Pain Control Plan: multimodal analgesia  Induction:  IV  Informed Consent: Informed consent signed with the Patient and all parties understand the risks and agree with anesthesia plan.  All questions answered.   ASA Score: 3  Day of Surgery Review of History & Physical: H&P Update referred to the surgeon/provider.I have interviewed and examined the patient. I have reviewed the patient's H&P dated: There are no significant changes.     Ready For Surgery From Anesthesia Perspective.     .  Past Medical History:   Diagnosis Date    Abdominal bloating 04/22/2025    Anemia of chronic disease     Atrial fibrillation     Benign hypertensive CKD, stage 1-4 or unspecified chronic kidney disease     Bronchitis 12/22/2023    Carotid artery occlusion     Cellulitis 12/09/2024    CKD (chronic kidney disease)     Constipation 01/24/2025     Coronary atherosclerosis     Cough 12/22/2023    Cough 12/22/2023    COVID-19 ruled out 03/20/2024    Diabetes mellitus, type 2     Diabetic eye exam 06/20/2025    Dr. Husam Trivedi - Eye Clinic [facility]    DJD (degenerative joint disease)     History of CVA (cerebrovascular accident)     HTN (hypertension)     Hyperlipidemia     Nausea and vomiting 04/22/2025    Nonischemic dilated cardiomyopathy     Obesity     Osteopenia     Other ascites 04/09/2025    Paroxysmal atrial fibrillation     Presence of cardiac pacemaker     PVD (peripheral vascular disease)     Skin ulcer of female breast 12/30/2024    Upper respiratory tract infection 12/22/2023    Vaginal bleeding 04/26/2025    Vitamin D deficiency        Past Surgical History:   Procedure Laterality Date    BREAST BIOPSY      COLONOSCOPY  12/04/2018    repeat in 5 years    EMBOLECTOMY OR THROMBECTOMY, ARTERY, AORTOILIAC, FEMORAL, OR POPLITEAL Right 11/17/2024    Procedure: EMBOLECTOMY OR THROMBECTOMY, ARTERY, AORTOILIAC, FEMORAL, OR POPLITEAL;  Surgeon: Jordana Guillermo MD;  Location: [facility] OR;  Service: Vascular;  Laterality: Right;    pace maker      RADIOFREQUENCY ABLATION, ENDOVENOUS Left 7/25/2025    Procedure: Left GSV RF Ablation;  Surgeon: Thomas Lyn DO;  Location: [facility];  Service: Peripheral Vascular;  Laterality: Left;    VAGINAL DELIVERY      x 3       Family History   Problem Relation Name Age of Onset    Hypertension Mother      Hypertension Father      Hypertension Sister      Stroke Sister      Cancer Brother      Stroke Brother      Hypertension Brother      Alzheimer's disease Maternal Grandmother      Breast cancer Maternal Grandmother      Hypertension Maternal Grandmother      Hyperlipidemia Daughter      Breast cancer Daughter      Diabetes Daughter      Hypertension Daughter      Diabetes Daughter      Hypertension Daughter      Hypertension Daughter      Clotting disorder Daughter         Social History      Socioeconomic History    Marital status:    Tobacco Use    Smoking status: Never     Passive exposure: Never    Smokeless tobacco: Never   Substance and Sexual Activity    Alcohol use: Not Currently    Drug use: Never    Sexual activity: Not Currently     Social Drivers of Health     Financial Resource Strain: Not At Risk (7/3/2025)    Received from [facility]    BOC Financial Resource Needs     Food/Housing/Utility/Transport/Financial Concerns : Unrecognized value   Food Insecurity: Not At Risk (7/3/2025)    Received from [facility]    BOC Food Insecurity     Food/Housing/Utility/Transport/Financial Concerns : Unrecognized value   Transportation Needs: Not At Risk (7/3/2025)    Received from [facility]    BOC Transportation Needs     Food/Housing/Utility/Transport/Financial Concerns : Unrecognized value   Physical Activity: Insufficiently Active (5/21/2025)    Exercise Vital Sign     Days of Exercise per Week: 3 days     Minutes of Exercise per Session: 30 min   Stress: No Stress Concern Present (5/21/2025)    [place] [employer] - Occupational Stress Questionnaire     Feeling of Stress : Not at all   Housing Stability: Not At Risk (7/3/2025)    Received from [facility]    BOC Housing Stability Source     Food/Housing/Utility/Transport/Financial Concerns : Unrecognized value       Current Medications[2]    Review of patient's allergies indicates:   Allergen Reactions    Ace inhibitors Edema    Losartan Swelling                 [1]   Current Facility-Administered Medications   Medication Dose Route Frequency Provider Last Rate Last Admin    0.9% NaCl infusion   Intravenous Continuous Thomas Lyn, DO        LIDOcaine-EPINEPHrine 1%-1:100,000 30 mL, LIDOcaine HCL 10 mg/ml (1%) 20 mL, sodium bicarbonate 10 mL in 0.9% NaCl 500 mL solution   MISCELLANEOUS Once  Thomas Lyn DO       [2]   Current Facility-Administered Medications   Medication Dose Route Frequency Provider Last Rate Last Admin    0.9% NaCl infusion   Intravenous Continuous Thomas Lyn DO        LIDOcaine-EPINEPHrine 1%-1:100,000 30 mL, LIDOcaine HCL 10 mg/ml (1%) 20 mL, sodium bicarbonate 10 mL in 0.9% NaCl 500 mL solution   MISCELLANEOUS Once Thomas Lyn DO

## 2025-08-01 NOTE — DISCHARGE SUMMARY
[facility] - Pain Management  Discharge Note  Short Stay    Procedure(s) (LRB):  Left SSV RF Ablation (Left)      OUTCOME: Patient tolerated treatment/procedure well without complication and is now ready for discharge.    DISPOSITION: Home or Self Care    FINAL DIAGNOSIS:  Venous insufficiency    FOLLOWUP: In clinic    DISCHARGE INSTRUCTIONS:  No discharge procedures on file.     TIME SPENT ON DISCHARGE: 5 minutes

## 2025-08-01 NOTE — DISCHARGE INSTRUCTIONS
Vein Procedure Discharge Instructions    Today:  Someone must stay with you tonight.  No alcohol for at least 8 hours after your procedure.  No driving for 24 hours after your procedure if you receive sedation/anesthesia.    For the first 48 hours (2 days) following your procedure:  Walk at minimal 15 minutes on every hour (while awake).  The more active you are, the better.  When resting, elevate leg.  Keep leg propped up (recliner, couch, etc.) while sitting.  Take pain medication, if prescribed.  Take Ibuprofen, or Acetaminophen, for the next 2-3 days with an over-the-counter anti-acid (Prilosec, Protonix, Nexium, etc.)  Apply ice packs, change position, or try deep breathing.  If you have bleeding, apply pressure with a clean rag or gauze for 10 minutes (If it still bleeds after 10 minutes, call your doctor's office or go to the Emergency Room.  Call your doctor if you have fever of 101 F or higher, redness or swelling at the site of surgery, or foul-smelling discharge at the site of surgery.  If you have nausea (upset stomach)  Apply a cold washcloth or towel to your forehead or neck  Eat bland, unseasoned foods  Smell essential oils like peppermint or tom, or rubbing alcohol  Do deep breathing, relax, or try to sleep    After your 48 hours (2 days) Post Op period complete:  Remove dressings, throw away all except the ACE wrap, keep the ACE wrap.  Shower using warm soapy water.  Use separate wash cloth on the treated leg.  No baths for 2 weeks.  Compression up to groin must be worn daily for 2 weeks.  You may use the ACE wrap for entire leg, or compression hose to knees and ACE wrap to groin.  (If you had your small saphenous vein ablated, then compression only to knee)  If your vein was ablated with Varithena (foam), compression must be worn day and night.  Walk for at least 10 minutes daily for the next month.    Activity:  If you have a normal ultrasound one week after your ablation:  You may resume  walking activities immediately.  You may resume jogging 2 weeks after your procedure.  You may resume swimming 2 weeks after your procedure.  If you had microphlebectomies, you must make sure all areas are completely healed before swimming.  You may resume lower body weight lifting 2 weeks after your procedure.  You may resume upper body weight lifting while sitting down 2 days after your procedure.  You may resume sexual activities 2 weeks after your procedure.  You may fly commercially 2 weeks after your procedure.  You may scuba dive 1 month after your procedure.    Pilots - You may not fly for 1 month after your procedure.  You must have a normal 1 month post op ultrasound before returning to flight status.      Please call our office at [phone] with any questions or concerns.  You may call the following number for after hour and weekend concerns: [phone] (Dr. Lyn).

## 2025-08-01 NOTE — TRANSFER OF CARE
"Anesthesia Transfer of Care Note    Patient: Pam Ortega    Procedure(s) Performed: Procedure(s) (LRB):  Left SSV RF Ablation (Left)    Patient location: Other: PAIN TX / VEIN CENTER    Anesthesia Type: MAC    Transport from OR: Transported from OR on 2-3 L/min O2 by NC with adequate spontaneous ventilation    Post pain: adequate analgesia    Post assessment: no apparent anesthetic complications    Post vital signs: stable    Level of consciousness: sedated    Nausea/Vomiting: no nausea/vomiting    Complications: none    Transfer of care protocol was followedComments: Good SV continue, VSS, RTRN      Last vitals: Visit Vitals  BP (!) 147/84 (BP Location: Right arm, Patient Position: Sitting)   Pulse 81   Temp 36.9 °C (98.4 °F) (Oral)   Resp 20   Ht 5' 9" (1.753 m)   Wt 82.4 kg (181 lb 9.6 oz)   SpO2 97%   BMI 26.82 kg/m²     "

## 2025-08-01 NOTE — OP NOTE
Date: 8/1/25  Surgeon: Dr Danie Lyn DO    Procedure:  Endovenous radio frequency ablation of the Left small saphenous vein(s) of the lower extremity.    Preprocedure and postprocedure diagnosis: Symptomatic varicose veins and venous insufficiency of the Left leg and other complications to include leg pain, leg edema and chronic skin changes.  The patient has previously failed conservative therapy consider a C4 chronic venous disease with venous hypertension.    Anesthesia: Local infiltration with monitored anesthesia care.  Estimated blood loss: 3 cc.  Specimens removed: none.   Complications: none.  Implants or grafts: none.    Findings:  A total of 5 cycles of radiofrequency ablation was used to treat 30.5 cm of vein over 1 minutes 40 seconds.  Maximum diameter of the vein treated is 2.6 mm with a maximum reflux time of 1.1 seconds.    Procedure detail:  After patient identification, surgical site verification, and marked symptomatic Left leg, the patient was taken to the procedure room and placed in the prone position.  Monitored anesthesia care was induced.  The patient was prepped and draped in the normal sterile fashion leaving the Left leg exposed.  A time-out performed identifying the patient and the correct surgical site.  Tumescent anesthesia was then infiltrated at the site of expected cannulation of the Left small saphenous vein.  The Left small saphenous vein was then cannulated using the modified Seldinger technique to place a 6 Serbian sheath.  I then advanced a 7x60 cm radiofrequency ablation catheter through the sheath up the saphenous vein to approximately 2 cm distal to the epigastric vein.  Tumescence anesthesia was then infiltrated around the vein to be treated.  The patient was placed in the head-down position.  Radiofrequency ablation of the Left small saphenous vein was then performed using a total of 5 cycles of radiofrequency ablation to treat 30.5 cm of vein over 1 minutes 40 seconds.   After this was done the catheter and sheath were removed and noted to be intact.  Completion ultrasound revealed a widely patent popliteal vein, sapheno popliteal junction and an ablated small saphenous layer and out vein.  The patient's leg was then cleaned and dried.  Sterile dressings and wraps were applied.  The patient was transported to the recovery room in stable condition.

## 2025-08-01 NOTE — ANESTHESIA POSTPROCEDURE EVALUATION
Anesthesia Post Evaluation    Patient: Pam Ortega    Procedure(s) Performed: Procedure(s) (LRB):  Left SSV RF Ablation (Left)    Final Anesthesia Type: MAC      Patient location during evaluation: GI PACU  Patient participation: Yes- Able to Participate  Level of consciousness: awake and alert  Post-procedure vital signs: reviewed and stable  Pain management: adequate  Airway patency: patent    PONV status at discharge: No PONV  Anesthetic complications: no      Cardiovascular status: blood pressure returned to baseline and hemodynamically stable  Respiratory status: spontaneous ventilation  Hydration status: euvolemic  Follow-up not needed.  Comments: Refer to nursing notes for pain/feliciano score upon discharge from recovery.              Vitals Value Taken Time   /79 08/01/25 11:49   Temp 97F 08/01/25 14:57   Pulse 70 08/01/25 11:49   Resp 6 08/01/25 11:49   SpO2 100 % 08/01/25 11:49   Vitals shown include unfiled device data.      Event Time   Out of Recovery 11:45:00         Pain/Feliciano Score: Feliciano Score: 10 (8/1/2025 11:40 AM)

## 2025-08-04 ENCOUNTER — OFFICE VISIT (OUTPATIENT)
Dept: VASCULAR SURGERY | Facility: CLINIC | Age: 80
End: 2025-08-04
Attending: FAMILY MEDICINE
Payer: MEDICARE

## 2025-08-04 ENCOUNTER — HOSPITAL ENCOUNTER (OUTPATIENT)
Facility: HOSPITAL | Age: 80
Discharge: HOME | End: 2025-08-04
Attending: FAMILY MEDICINE
Payer: MEDICARE

## 2025-08-04 VITALS
BODY MASS INDEX: 26.82 KG/M2 | HEIGHT: 69 IN | SYSTOLIC BLOOD PRESSURE: 142 MMHG | DIASTOLIC BLOOD PRESSURE: 78 MMHG | HEART RATE: 72 BPM | RESPIRATION RATE: 15 BRPM

## 2025-08-04 DIAGNOSIS — L81.9 HYPERPIGMENTATION OF SKIN: ICD-10-CM

## 2025-08-04 DIAGNOSIS — M79.604 BILATERAL LEG PAIN: ICD-10-CM

## 2025-08-04 DIAGNOSIS — I87.2 VENOUS INSUFFICIENCY: Primary | ICD-10-CM

## 2025-08-04 DIAGNOSIS — M79.605 BILATERAL LEG PAIN: ICD-10-CM

## 2025-08-04 DIAGNOSIS — I87.2 VENOUS INSUFFICIENCY: ICD-10-CM

## 2025-08-04 DIAGNOSIS — R60.0 EDEMA, LOWER EXTREMITY: ICD-10-CM

## 2025-08-04 PROCEDURE — 99214 OFFICE O/P EST MOD 30 MIN: CPT | Mod: S$PBB,,, | Performed by: FAMILY MEDICINE

## 2025-08-04 PROCEDURE — 93971 EXTREMITY STUDY: CPT | Mod: TC,LT

## 2025-08-04 PROCEDURE — 99214 OFFICE O/P EST MOD 30 MIN: CPT | Mod: PBBFAC,25 | Performed by: FAMILY MEDICINE

## 2025-08-04 PROCEDURE — 99999 PR PBB SHADOW E&M-EST. PATIENT-LVL IV: CPT | Mod: PBBFAC,,, | Performed by: FAMILY MEDICINE

## 2025-08-04 PROCEDURE — 93971 EXTREMITY STUDY: CPT | Mod: 26,LT,, | Performed by: FAMILY MEDICINE

## 2025-08-04 NOTE — PROGRESS NOTES
VEIN CENTER CLINIC NOTE    Patient ID: Pam Ortega is a 80 y.o. female.    I. HISTORY     Chief Complaint:   Chief Complaint   Patient presents with    Follow-up     US LT PA      History of Present Illness    CHIEF COMPLAINT:  Patient presents today for follow up of leg vein treatment.    LEFT LEG POST-PROCEDURE STATUS:  She reports LLE feels lighter post-procedure and denies leg swelling. US showed venous closure without evidence of blood clots, which she notes as satisfactory.      ROS:  General: -fever, -chills, -fatigue, -weight gain, -weight loss  Eyes: -vision changes, -redness, -discharge  ENT: -ear pain, -nasal congestion, -sore throat  Cardiovascular: -chest pain, -palpitations, -lower extremity edema  Respiratory: -cough, -shortness of breath  Gastrointestinal: -abdominal pain, -nausea, -vomiting, -diarrhea, -constipation, -blood in stool  Genitourinary: -dysuria, -hematuria, -frequency  Musculoskeletal: -joint pain, -muscle pain  Skin: -rash, -lesion  Neurological: -headache, -dizziness, -numbness, -tingling  Psychiatric: -anxiety, -depression, -sleep difficulty         Clinical Summary:  The patient initially presented on 01/17/2024 by referral from [facility] for evaluation of bilateral lower extremity leg swelling, hyperpigmentation and pain.  Symptoms are progressive, worsening and began greater than 1 years ago.  Location is bilateral lower extremities below the knees. Symptoms are worse at the end of the day.  History of venous interventions includes none.  Unsure of family history of venous disease.  The patient also recently had arterial duplex with ABIs on 01/11/2024 which were abnormal and her PCP referred to vascular surgery.  Patient also denies history of DVT or cellulitis.    The patient underwent a bilateral superficial venous reflux study, 01/17/2024, and the results were discussed with the patient.  This study shows dilation and axial reflux of the bilateral great and  left small saphenous veins.  Pulsatile flow also noted bilaterally which can be consistent with CHF.    Doppler study bilateral lower extremities on 01/11/2024 shows no evidence of DVT bilaterally.    Venous Disease Medical Necessity Documentation Initial Visit Date:  01/17/2024 Return Check Date:   04/17/2024   Have you ever had a rupture or bleed from a varicose vein in your leg(s)?              [x] Yes  [] No   [x] Yes   [] No   Have you ever been diagnosed with phlebitis, cellulitis, or inflammation in the area of the varicose veins of  your leg(s)?  [] Yes  [x] No    [x] Yes   [] No   Do you have darkened or inflamed skin on your legs?   [x] Yes   [] No   [x] Yes   [] No   Do you have leg swelling?     [x] Yes   [] No   [x] Yes   [] No   Do you have leg pain?   [x] Yes   [] No   [] Yes   [x] No   If yes, describe the type of pain?    [x]   Stabbing []  Radiating [x]  Aching   [x]  Tightness [x]  Throbbing               [x]  Burning [x]  Cramping              Do you have leg discomfort?   [] Yes   [x] No   [] Yes   [x] No   If yes, describe the type of discomfort?    []  Heaviness []  Fullness   []  Restlessness [] Tired/Fatigued [] Itching              Have you ever worn compression hose?   [] Yes   [x] No   [x] Yes   [] No   If yes, how long?      3 MONTHS     Do you elevate your legs while sitting?   [x] Yes   [] No   [x] Yes   [] No   Does venous disease (varicose veins, ulcers, skin changes, leg pain/swelling) interfere with your daily life?  If yes, check activities you are limited or unable to do.    []  Shower  [x]   Walk  []  Exercise  [] Play with children/grandchildren  [x]  Shop [] Work [x] Stand for any period of time [x] Sleep                               [] Sitting for an extended period of time.           [x] Yes   [] No   [] Yes   [x] No   Do you exercise/have you tried to exercise (i.e.  Walk our participate in a regular exercise routine)?  [] Yes  [x] No   [x] Yes   [] No   BMI 29.6   29.50           Past Medical History:   Diagnosis Date    Abdominal bloating 04/22/2025    Anemia of chronic disease     Atrial fibrillation     Benign hypertensive CKD, stage 1-4 or unspecified chronic kidney disease     Bronchitis 12/22/2023    Carotid artery occlusion     Cellulitis 12/09/2024    CKD (chronic kidney disease)     Constipation 01/24/2025    Coronary atherosclerosis     Cough 12/22/2023    Cough 12/22/2023    COVID-19 ruled out 03/20/2024    Diabetes mellitus, type 2     Diabetic eye exam 06/20/2025    Dr. Husam Trivedi [facility]    DJD (degenerative joint disease)     History of CVA (cerebrovascular accident)     HTN (hypertension)     Hyperlipidemia     Nausea and vomiting 04/22/2025    Nonischemic dilated cardiomyopathy     Obesity     Osteopenia     Other ascites 04/09/2025    Paroxysmal atrial fibrillation     Presence of cardiac pacemaker     PVD (peripheral vascular disease)     Skin ulcer of female breast 12/30/2024    Upper respiratory tract infection 12/22/2023    Vaginal bleeding 04/26/2025    Vitamin D deficiency         Past Surgical History:   Procedure Laterality Date    BREAST BIOPSY      COLONOSCOPY  12/04/2018    repeat in 5 years    EMBOLECTOMY OR THROMBECTOMY, ARTERY, AORTOILIAC, FEMORAL, OR POPLITEAL Right 11/17/2024    Procedure: EMBOLECTOMY OR THROMBECTOMY, ARTERY, AORTOILIAC, FEMORAL, OR POPLITEAL;  Surgeon: Jordana Guillermo MD;  Location: [facility];  Service: Vascular;  Laterality: Right;    pace maker      RADIOFREQUENCY ABLATION, ENDOVENOUS Left 7/25/2025    Procedure: Left GSV RF Ablation;  Surgeon: Thomas Lyn DO;  Location: [facility];  Service: Peripheral Vascular;  Laterality: Left;    VAGINAL DELIVERY      x 3       Social History     Tobacco Use   Smoking Status Never    Passive exposure: Never   Smokeless Tobacco Never         Current Outpatient Medications:     albuterol (VENTOLIN HFA) 90 mcg/actuation inhaler, Inhale 2 puffs into the  lungs every 6 (six) hours as needed for Wheezing. Rescue Inhaler, Disp: 18 g, Rfl: 5    apixaban (ELIQUIS) 2.5 mg Tab, Take 1 tablet (2.5 mg total) by mouth 2 (two) times daily., Disp: 60 tablet, Rfl: 1    aspirin (ECOTRIN) 81 MG EC tablet, Take 81 mg by mouth once daily., Disp: , Rfl:     cholecalciferol, vitamin D3, (VITAMIN D3) 50 mcg (2,000 unit) Cap capsule, Take 1 capsule by mouth once daily., Disp: , Rfl:     colchicine (COLCRYS) 0.6 mg tablet, Take 1 tablet (0.6 mg total) by mouth every other day for gout., Disp: 45 tablet, Rfl: 0    lovastatin (MEVACOR) 20 MG tablet, Take 1 tablet (20 mg total) by mouth every night, Disp: 90 tablet, Rfl: 3    metoprolol tartrate (LOPRESSOR) 50 MG tablet, Take 1 tablet (50 mg total) by mouth 2 (two) times a day., Disp: 180 tablet, Rfl: 3    omeprazole (PRILOSEC) 20 MG capsule, Take 1 capsule (20 mg total) by mouth once daily., Disp: 90 capsule, Rfl: 3    spironolactone (ALDACTONE) 25 MG tablet, Take 1 tablet (25 mg total) by mouth once daily., Disp: 90 tablet, Rfl: 3    spironolactone (ALDACTONE) 25 MG tablet, Take 25 mg by mouth., Disp: , Rfl:     II. PHYSICAL EXAM     Physical Exam  Constitutional:       General: She is awake. She is not in acute distress.     Appearance: Normal appearance. She is overweight. She is not ill-appearing or toxic-appearing.   HENT:      Head: Normocephalic and atraumatic.   Eyes:      Extraocular Movements: Extraocular movements intact.      Conjunctiva/sclera: Conjunctivae normal.      Pupils: Pupils are equal, round, and reactive to light.   Neck:      Vascular: No carotid bruit or JVD.   Cardiovascular:      Rate and Rhythm: Normal rate and regular rhythm.      Pulses:           Dorsalis pedis pulses are detected w/ Doppler on the right side and detected w/ Doppler on the left side.        Posterior tibial pulses are detected w/ Doppler on the right side and detected w/ Doppler on the left side.      Heart sounds: No murmur  heard.  Pulmonary:      Effort: Pulmonary effort is normal. No respiratory distress.      Breath sounds: No stridor. No wheezing, rhonchi or rales.   Musculoskeletal:         General: No swelling, tenderness or deformity.      Right lower le+ Edema present.      Left lower le+ Edema present.      Comments: Hyperpigmentation and brawny edema noted bilaterally.    Skin breakdown with stasis ulceration of the left lower extremity.  See pictures for details.  No signs of active cellulitis or infection today.   Feet:      Comments: Triphasic hand-held dopplerable pulses of the bilateral dorsalis pedis and posterior tibial arteries.  Skin:     General: Skin is warm.      Capillary Refill: Capillary refill takes less than 2 seconds.      Coloration: Skin is not ashen.      Findings: No bruising, erythema, lesion, rash or wound.   Neurological:      Mental Status: She is alert and oriented to person, place, and time.      Motor: No weakness.   Psychiatric:         Speech: Speech normal.         Behavior: Behavior normal. Behavior is cooperative.         Reticular/Spider veins noted:  RLE: none  LLE: none    Varicose veins noted:  RLE: none  LLE:  posterior calf and lateral calf    CEAP Classification  Clinical Signs: Class 6 - Leg ulceration, skin changes as defined above  Etiologic Classification: Primary  Anatomic distribution: Superficial  Pathophysiologic dysfunction: Reflux               Venous Clinical Severity Score  Pain:2=Daily, moderate activity limitation, occasional analgesics  Varicose Veins: 1=Few, scattered. Branch varicose veins  Venous Edema: 2=Afternoon edema, above ankle  Pigmentation: 1=Diffuse, but limited in area and old (brown)  Inflammation: 0=None  Induration: 0=None  Number of Active Ulcers: 2=2  Active Ulceration, Duration: 1=<3 months  Active Ulcer Size: 1=<2 cm diameter  Compressive Therapy: 3=Full compliance, stockings + elevation  Total Score: 13       III. ASSESSMENT & PLAN (MEDICAL  DECISION MAKING)     1. Venous insufficiency    2. Bilateral leg pain    3. Hyperpigmentation of skin    4. Edema, lower extremity          Assessment & Plan    VENOUS INSUFFICIENCY:  - LLE treatment outcome shows reduced swelling and improved comfort.  - US results confirm successful vein closure without blood clots.  - Patient is ready to proceed with treatment on right side.    FOLLOW-UP:  - Office will contact patient later this week to schedule right leg treatment.                  Thomas Lyn,     This note was generated with the assistance of ambient listening technology. Verbal consent was obtained by the patient and accompanying visitor(s) for the recording of patient appointment to facilitate this note. I attest to having reviewed and edited the generated note for accuracy, though some syntax or spelling errors may persist. Please contact the author of this note for any clarification.

## 2025-08-05 ENCOUNTER — PATIENT MESSAGE (OUTPATIENT)
Dept: OBSTETRICS AND GYNECOLOGY | Facility: CLINIC | Age: 80
End: 2025-08-05
Payer: MEDICARE

## 2025-08-08 ENCOUNTER — HOSPITAL ENCOUNTER (OUTPATIENT)
Facility: HOSPITAL | Age: 80
Discharge: HOME | End: 2025-08-08
Attending: FAMILY MEDICINE | Admitting: FAMILY MEDICINE
Payer: MEDICARE

## 2025-08-08 ENCOUNTER — ANESTHESIA (OUTPATIENT)
Dept: PAIN MEDICINE | Facility: HOSPITAL | Age: 80
End: 2025-08-08
Payer: MEDICARE

## 2025-08-08 ENCOUNTER — ANESTHESIA EVENT (OUTPATIENT)
Dept: PAIN MEDICINE | Facility: HOSPITAL | Age: 80
End: 2025-08-08
Payer: MEDICARE

## 2025-08-08 VITALS
TEMPERATURE: 98 F | OXYGEN SATURATION: 97 % | SYSTOLIC BLOOD PRESSURE: 155 MMHG | HEART RATE: 71 BPM | RESPIRATION RATE: 29 BRPM | DIASTOLIC BLOOD PRESSURE: 69 MMHG

## 2025-08-08 DIAGNOSIS — I87.2 VENOUS INSUFFICIENCY: Primary | ICD-10-CM

## 2025-08-08 LAB — GLUCOSE SERPL-MCNC: 170 MG/DL (ref 70–105)

## 2025-08-08 PROCEDURE — 37000008 HC ANESTHESIA 1ST 15 MINUTES: Performed by: FAMILY MEDICINE

## 2025-08-08 PROCEDURE — 63600175 PHARM REV CODE 636 W HCPCS: Performed by: FAMILY MEDICINE

## 2025-08-08 PROCEDURE — 27000716 HC OXISENSOR PROBE, ANY SIZE: Performed by: NURSE ANESTHETIST, CERTIFIED REGISTERED

## 2025-08-08 PROCEDURE — 82962 GLUCOSE BLOOD TEST: CPT

## 2025-08-08 PROCEDURE — 63600175 PHARM REV CODE 636 W HCPCS: Performed by: NURSE ANESTHETIST, CERTIFIED REGISTERED

## 2025-08-08 PROCEDURE — 36475 ENDOVENOUS RF 1ST VEIN: CPT | Mod: RT,,, | Performed by: FAMILY MEDICINE

## 2025-08-08 PROCEDURE — 25000003 PHARM REV CODE 250: Performed by: FAMILY MEDICINE

## 2025-08-08 PROCEDURE — 36475 ENDOVENOUS RF 1ST VEIN: CPT | Performed by: FAMILY MEDICINE

## 2025-08-08 PROCEDURE — 37000009 HC ANESTHESIA EA ADD 15 MINS: Performed by: FAMILY MEDICINE

## 2025-08-08 PROCEDURE — C1894 INTRO/SHEATH, NON-LASER: HCPCS | Performed by: FAMILY MEDICINE

## 2025-08-08 PROCEDURE — 27000284 HC CANNULA NASAL: Performed by: NURSE ANESTHETIST, CERTIFIED REGISTERED

## 2025-08-08 RX ORDER — MUPIROCIN 20 MG/G
OINTMENT TOPICAL CODE/TRAUMA/SEDATION MEDICATION
Status: DISCONTINUED | OUTPATIENT
Start: 2025-08-08 | End: 2025-08-08 | Stop reason: HOSPADM

## 2025-08-08 RX ORDER — SODIUM CHLORIDE 9 MG/ML
INJECTION, SOLUTION INTRAVENOUS CONTINUOUS
Status: DISCONTINUED | OUTPATIENT
Start: 2025-08-08 | End: 2025-08-08 | Stop reason: HOSPADM

## 2025-08-08 RX ORDER — LIDOCAINE HYDROCHLORIDE 10 MG/ML
INJECTION, SOLUTION INFILTRATION; PERINEURAL CODE/TRAUMA/SEDATION MEDICATION
Status: DISCONTINUED | OUTPATIENT
Start: 2025-08-08 | End: 2025-08-08 | Stop reason: HOSPADM

## 2025-08-08 RX ORDER — PHENYLEPHRINE HYDROCHLORIDE 10 MG/ML
INJECTION INTRAVENOUS
Status: DISCONTINUED | OUTPATIENT
Start: 2025-08-08 | End: 2025-08-08

## 2025-08-08 RX ORDER — LIDOCAINE HYDROCHLORIDE 20 MG/ML
INJECTION, SOLUTION EPIDURAL; INFILTRATION; INTRACAUDAL; PERINEURAL
Status: DISCONTINUED | OUTPATIENT
Start: 2025-08-08 | End: 2025-08-08

## 2025-08-08 RX ORDER — PROPOFOL 10 MG/ML
VIAL (ML) INTRAVENOUS
Status: DISCONTINUED | OUTPATIENT
Start: 2025-08-08 | End: 2025-08-08

## 2025-08-08 RX ADMIN — PROPOFOL 30 MG: 10 INJECTION, EMULSION INTRAVENOUS at 08:08

## 2025-08-08 RX ADMIN — PROPOFOL 30 MG: 10 INJECTION, EMULSION INTRAVENOUS at 07:08

## 2025-08-08 RX ADMIN — PHENYLEPHRINE HYDROCHLORIDE 50 MCG: 10 INJECTION INTRAVENOUS at 07:08

## 2025-08-08 RX ADMIN — LIDOCAINE HYDROCHLORIDE 30 MG: 20 INJECTION, SOLUTION EPIDURAL; INFILTRATION; INTRACAUDAL; PERINEURAL at 07:08

## 2025-08-08 NOTE — ANESTHESIA PREPROCEDURE EVALUATION
08/08/2025  Pam Ortega is a 80 y.o., female.    Review of patient's allergies indicates:   Allergen Reactions    Ace inhibitors Edema    Losartan Swelling      Past Medical History:   Diagnosis Date    Abdominal bloating 04/22/2025    Anemia of chronic disease     Atrial fibrillation     Benign hypertensive CKD, stage 1-4 or unspecified chronic kidney disease     Bronchitis 12/22/2023    Carotid artery occlusion     Cellulitis 12/09/2024    CKD (chronic kidney disease)     Constipation 01/24/2025    Coronary atherosclerosis     Cough 12/22/2023    Cough 12/22/2023    COVID-19 ruled out 03/20/2024    Diabetes mellitus, type 2     Diabetic eye exam 06/20/2025    Dr. Husam Trivedi - [facility]    DJD (degenerative joint disease)     History of CVA (cerebrovascular accident)     HTN (hypertension)     Hyperlipidemia     Nausea and vomiting 04/22/2025    Nonischemic dilated cardiomyopathy     Obesity     Osteopenia     Other ascites 04/09/2025    Paroxysmal atrial fibrillation     Presence of cardiac pacemaker     PVD (peripheral vascular disease)     Skin ulcer of female breast 12/30/2024    Upper respiratory tract infection 12/22/2023    Vaginal bleeding 04/26/2025    Vitamin D deficiency       Past Surgical History:   Procedure Laterality Date    BREAST BIOPSY      COLONOSCOPY  12/04/2018    repeat in 5 years    EMBOLECTOMY OR THROMBECTOMY, ARTERY, AORTOILIAC, FEMORAL, OR POPLITEAL Right 11/17/2024    Procedure: EMBOLECTOMY OR THROMBECTOMY, ARTERY, AORTOILIAC, FEMORAL, OR POPLITEAL;  Surgeon: Jordana Guillermo MD;  Location: [facility] OR;  Service: Vascular;  Laterality: Right;    pace maker      RADIOFREQUENCY ABLATION, ENDOVENOUS Left 7/25/2025    Procedure: Left GSV RF Ablation;  Surgeon: Thomas Lyn DO;  Location: [facility] PAIN [facility];  Service: Peripheral Vascular;  Laterality:  Left;    RADIOFREQUENCY ABLATION, ENDOVENOUS Left 8/1/2025    Procedure: Left SSV RF Ablation;  Surgeon: Thomas Lyn DO;  Location: [facility];  Service: Peripheral Vascular;  Laterality: Left;    VAGINAL DELIVERY      x 3      Social History[1]   Medications Ordered Prior to Encounter[2]   Medications Ordered Prior to Encounter[3]   Results for orders placed or performed during the hospital encounter of 07/02/25   EKG 12-lead    Collection Time: 07/02/25  4:06 PM   Result Value Ref Range    QRS Duration 210 ms    OHS QTC Calculation 514 ms    Narrative    Test Reason : R79.89,    Vent. Rate :  60 BPM     Atrial Rate :  58 BPM     P-R Int :    ms          QRS Dur : 210 ms      QT Int : 514 ms       P-R-T Axes :    -18 208 degrees    QTcB Int : 514 ms    Ventricular-paced rhythm  Abnormal ECG  When compared with ECG of 02-Jul-2025 12:44,  No significant change was found  Confirmed by Ganesh Cardoso ([id number]) on 7/8/2025 7:39:35 PM    Referred By: AAAREFERRAL SELF           Confirmed By: Ganesh Cardoso      Temp:  [36.8 °C (98.3 °F)] 36.8 °C (98.3 °F)  Pulse:  [70] 70  Resp:  [16] 16  SpO2:  [96 %] 96 %  BP: (150)/(77) 150/77    Chemistry        Component Value Date/Time     (L) 07/02/2025 1323    K 3.7 07/02/2025 1323    CL 74 (LL) 07/02/2025 1323    CO2 35 (H) 07/02/2025 1323    BUN 74 (H) 07/02/2025 1323    CREATININE 3.89 (H) 07/02/2025 1323     (H) 07/02/2025 1323        Component Value Date/Time    CALCIUM 9.7 07/02/2025 1323    ALKPHOS 114 07/02/2025 1323    AST 41 07/02/2025 1323    ALT 10 07/02/2025 1323    BILITOT 2.0 (H) 07/02/2025 1323    ESTGFRAFRICA 33 (L) 10/06/2021 1022    EGFRNONAA 31 (L) 06/14/2022 0910            Results for orders placed during the hospital encounter of 04/04/25    Echo Saline Bubble? No    Interpretation Summary    Left Ventricle: The left ventricle is mildly dilated. Increased ventricular mass. Normal wall thickness. There is eccentric hypertrophy.  Septal motion is consistent with pacing. There is moderately reduced systolic function with a visually estimated ejection fraction of 30 - 40%. Quantitated ejection fraction is 35%. There is diastolic dysfunction.    Right Ventricle: The right ventricle has mild enlargement. Systolic function is moderately reduced. Pacemaker lead present in the ventricle.    Left Atrium: Severely dilated    Right Atrium: Right atrium is moderately dilated.    Mitral Valve: There is at least moderate regurgitation with an eccentrically posterolateral directed jet. MR jet is only well-visualized on parasternal long axis views, remaining views w/ supoptimal assessment of MR as MR jet is not adequately captured.    Tricuspid Valve: There is severe regurgitation.    Pulmonic Valve: There is moderate to severe regurgitation.    Pulmonary Artery: The estimated pulmonary artery systolic pressure is 39 mmHg.    IVC/SVC: Intermediate venous pressure at 8 mmHg.    Pericardium: Right pleural effusion.    Compared to prior ECHO from 11/204 (which was also personally reviewed): LV size and LVEF are unchanged. LV remains mildy dilated with a moderately reduced LVEF. Improvement in RA and RV size noted on current study (previously severely dilated). MR is better visualized on previous study and at least moderate MR previously- appears largely unchanged however, there are technical limitations with MR assessment on current study as detailed above.     No results found for this or any previous visit (from the past 2 weeks).   No results found for this or any previous visit (from the past 2 weeks).          Pre-op Assessment    I have reviewed the Patient Summary Reports.     I have reviewed the Nursing Notes. I have reviewed the NPO Status.   I have reviewed the Medications.     Review of Systems      Physical Exam  General: Well nourished and Cooperative    Airway:  Mallampati: II   Mouth Opening: Normal  TM Distance: Normal        Anesthesia  Plan  Type of Anesthesia, risks & benefits discussed:    Anesthesia Type: MAC, Gen Natural Airway  Intra-op Monitoring Plan: Standard ASA Monitors  Post Op Pain Control Plan: multimodal analgesia  Induction:  IV  Informed Consent: Informed consent signed with the Patient and all parties understand the risks and agree with anesthesia plan.  All questions answered. Patient consented to blood products? Yes  ASA Score: 3  Day of Surgery Review of History & Physical: H&P Update referred to the surgeon/provider.I have interviewed and examined the patient. I have reviewed the patient's H&P dated: There are no significant changes.     Ready For Surgery From Anesthesia Perspective.     .           [1]   Social History  Socioeconomic History    Marital status:    Tobacco Use    Smoking status: Never     Passive exposure: Never    Smokeless tobacco: Never   Substance and Sexual Activity    Alcohol use: Not Currently    Drug use: Never    Sexual activity: Not Currently     Social Drivers of Health     Financial Resource Strain: Not At Risk (7/3/2025)    Received from [facility]    BOC Financial Resource Needs     Food/Housing/Utility/Transport/Financial Concerns : Unrecognized value   Food Insecurity: Not At Risk (7/3/2025)    Received from [facility]    BOC Food Insecurity     Food/Housing/Utility/Transport/Financial Concerns : Unrecognized value   Transportation Needs: Not At Risk (7/3/2025)    Received from [facility]    BOC Transportation Needs     Food/Housing/Utility/Transport/Financial Concerns : Unrecognized value   Physical Activity: Insufficiently Active (5/21/2025)    Exercise Vital Sign     Days of Exercise per Week: 3 days     Minutes of Exercise per Session: 30 min   Stress: No Stress Concern Present (5/21/2025)    [employer] - Occupational Stress Questionnaire     Feeling of Stress : Not  at all   Housing Stability: Not At Risk (7/3/2025)    Received from [facility] Housing Stability Source     Food/Housing/Utility/Transport/Financial Concerns : Unrecognized value   [2]   No current facility-administered medications on file prior to encounter.     Current Outpatient Medications on File Prior to Encounter   Medication Sig Dispense Refill    apixaban (ELIQUIS) 2.5 mg Tab Take 1 tablet (2.5 mg total) by mouth 2 (two) times daily. 60 tablet 1    aspirin (ECOTRIN) 81 MG EC tablet Take 81 mg by mouth once daily.      cholecalciferol, vitamin D3, (VITAMIN D3) 50 mcg (2,000 unit) Cap capsule Take 1 capsule by mouth once daily.      lovastatin (MEVACOR) 20 MG tablet Take 1 tablet (20 mg total) by mouth every night 90 tablet 3    metoprolol tartrate (LOPRESSOR) 50 MG tablet Take 1 tablet (50 mg total) by mouth 2 (two) times a day. 180 tablet 3    omeprazole (PRILOSEC) 20 MG capsule Take 1 capsule (20 mg total) by mouth once daily. 90 capsule 3    spironolactone (ALDACTONE) 25 MG tablet Take 1 tablet (25 mg total) by mouth once daily. 90 tablet 3    spironolactone (ALDACTONE) 25 MG tablet Take 25 mg by mouth.      albuterol (VENTOLIN HFA) 90 mcg/actuation inhaler Inhale 2 puffs into the lungs every 6 (six) hours as needed for Wheezing. Rescue Inhaler 18 g 5    colchicine (COLCRYS) 0.6 mg tablet Take 1 tablet (0.6 mg total) by mouth every other day for gout. 45 tablet 0   [3]   No current facility-administered medications on file prior to encounter.     Current Outpatient Medications on File Prior to Encounter   Medication Sig Dispense Refill    apixaban (ELIQUIS) 2.5 mg Tab Take 1 tablet (2.5 mg total) by mouth 2 (two) times daily. 60 tablet 1    aspirin (ECOTRIN) 81 MG EC tablet Take 81 mg by mouth once daily.      cholecalciferol, vitamin D3, (VITAMIN D3) 50 mcg (2,000 unit) Cap capsule Take 1 capsule by mouth once daily.      lovastatin (MEVACOR) 20 MG tablet Take 1  tablet (20 mg total) by mouth every night 90 tablet 3    metoprolol tartrate (LOPRESSOR) 50 MG tablet Take 1 tablet (50 mg total) by mouth 2 (two) times a day. 180 tablet 3    omeprazole (PRILOSEC) 20 MG capsule Take 1 capsule (20 mg total) by mouth once daily. 90 capsule 3    spironolactone (ALDACTONE) 25 MG tablet Take 1 tablet (25 mg total) by mouth once daily. 90 tablet 3    spironolactone (ALDACTONE) 25 MG tablet Take 25 mg by mouth.      albuterol (VENTOLIN HFA) 90 mcg/actuation inhaler Inhale 2 puffs into the lungs every 6 (six) hours as needed for Wheezing. Rescue Inhaler 18 g 5    colchicine (COLCRYS) 0.6 mg tablet Take 1 tablet (0.6 mg total) by mouth every other day for gout. 45 tablet 0

## 2025-08-08 NOTE — ANESTHESIA POSTPROCEDURE EVALUATION
Anesthesia Post Evaluation    Patient: Pam Ortega    Procedure(s) Performed: Procedure(s) (LRB):  Right GSV RF Ablation (Right)    Final Anesthesia Type: MAC      Patient location during evaluation: PACU  Patient participation: Yes- Able to Participate  Level of consciousness: awake and alert  Post-procedure vital signs: reviewed and stable  Pain management: adequate  Airway patency: patent    PONV status at discharge: No PONV  Anesthetic complications: no      Cardiovascular status: blood pressure returned to baseline and hemodynamically stable  Respiratory status: unassisted  Hydration status: euvolemic  Follow-up not needed.              Vitals Value Taken Time   /78 08/08/25 08:28   Temp 98f 08/08/25 08:37   Pulse 70 08/08/25 08:28   Resp 33 08/08/25 08:28   SpO2 95 % 08/08/25 08:28   Vitals shown include unfiled device data.      No case tracking events are documented in the log.      Pain/Ochoa Score: Ochoa Score: 9 (8/8/2025  8:20 AM)

## 2025-08-08 NOTE — TRANSFER OF CARE
Anesthesia Transfer of Care Note    Patient: Pam Ortega    Procedure(s) Performed: Procedure(s) (LRB):  Right GSV RF Ablation (Right)    Patient location: PACU    Anesthesia Type: MAC    Transport from OR: Transported from OR on 2-3 L/min O2 by NC with adequate spontaneous ventilation    Post pain: adequate analgesia    Post assessment: no apparent anesthetic complications    Post vital signs: stable    Level of consciousness: responds to stimulation and sedated    Nausea/Vomiting: no nausea/vomiting    Complications: none    Transfer of care protocol was followed      Last vitals: Visit Vitals  /70   Pulse 70   Temp 36.7 °C (98 °F) (Oral)   Resp (!) 26   SpO2 98%

## 2025-08-08 NOTE — ANESTHESIA RELEASE NOTE
Anesthesia Release from PACU Note    Patient: Pam Ortega    Procedure(s) Performed: Procedure(s) (LRB):  Right GSV RF Ablation (Right)    Anesthesia type: MAC    Post pain: Adequate analgesia    Post assessment: no apparent anesthetic complications    Last Vitals: Visit Vitals  /75   Pulse 72   Temp 36.7 °C (98 °F) (Oral)   Resp (!) 22   SpO2 95%       Post vital signs: stable    Level of consciousness: awake and alert     Nausea/Vomiting: no nausea/no vomiting    Complications: none    Airway Patency: patent    Respiratory: unassisted    Cardiovascular: stable and blood pressure at baseline    Hydration: euvolemic

## 2025-08-11 ENCOUNTER — OFFICE VISIT (OUTPATIENT)
Dept: VASCULAR SURGERY | Facility: CLINIC | Age: 80
End: 2025-08-11
Payer: MEDICARE

## 2025-08-11 ENCOUNTER — HOSPITAL ENCOUNTER (OUTPATIENT)
Facility: HOSPITAL | Age: 80
Discharge: HOME | End: 2025-08-11
Attending: FAMILY MEDICINE
Payer: MEDICARE

## 2025-08-11 VITALS
HEIGHT: 69 IN | BODY MASS INDEX: 26.81 KG/M2 | SYSTOLIC BLOOD PRESSURE: 177 MMHG | HEART RATE: 63 BPM | WEIGHT: 181 LBS | RESPIRATION RATE: 18 BRPM | DIASTOLIC BLOOD PRESSURE: 61 MMHG

## 2025-08-11 DIAGNOSIS — R60.0 EDEMA, LOWER EXTREMITY: ICD-10-CM

## 2025-08-11 DIAGNOSIS — I87.2 VENOUS INSUFFICIENCY: ICD-10-CM

## 2025-08-11 DIAGNOSIS — M79.605 BILATERAL LEG PAIN: ICD-10-CM

## 2025-08-11 DIAGNOSIS — M79.604 BILATERAL LEG PAIN: ICD-10-CM

## 2025-08-11 DIAGNOSIS — L81.9 HYPERPIGMENTATION OF SKIN: ICD-10-CM

## 2025-08-11 DIAGNOSIS — I87.2 VENOUS INSUFFICIENCY: Primary | ICD-10-CM

## 2025-08-11 PROCEDURE — 93971 EXTREMITY STUDY: CPT | Mod: TC,RT

## 2025-08-11 PROCEDURE — 93971 EXTREMITY STUDY: CPT | Mod: 26,RT,, | Performed by: FAMILY MEDICINE

## 2025-08-11 PROCEDURE — 99214 OFFICE O/P EST MOD 30 MIN: CPT | Mod: PBBFAC,25 | Performed by: FAMILY MEDICINE

## 2025-08-11 PROCEDURE — 99999 PR PBB SHADOW E&M-EST. PATIENT-LVL IV: CPT | Mod: PBBFAC,,, | Performed by: FAMILY MEDICINE

## 2025-08-11 PROCEDURE — 99214 OFFICE O/P EST MOD 30 MIN: CPT | Mod: S$PBB,,, | Performed by: FAMILY MEDICINE

## 2025-08-11 RX ORDER — COLCHICINE 0.6 MG/1
0.6 TABLET ORAL EVERY OTHER DAY
Qty: 45 TABLET | Refills: 0 | Status: SHIPPED | OUTPATIENT
Start: 2025-08-11 | End: 2025-11-09

## 2025-08-18 ENCOUNTER — PATIENT MESSAGE (OUTPATIENT)
Dept: FAMILY MEDICINE | Facility: CLINIC | Age: 80
End: 2025-08-18
Payer: MEDICARE

## 2025-08-19 DIAGNOSIS — E11.69 TYPE 2 DIABETES MELLITUS WITH OTHER SPECIFIED COMPLICATION, WITHOUT LONG-TERM CURRENT USE OF INSULIN: Primary | ICD-10-CM

## 2025-08-19 RX ORDER — INSULIN GLARGINE 100 [IU]/ML
7 INJECTION, SOLUTION SUBCUTANEOUS NIGHTLY
Qty: 9 ML | Refills: 3 | Status: SHIPPED | OUTPATIENT
Start: 2025-08-19

## 2025-08-26 RX ORDER — METOPROLOL TARTRATE 50 MG/1
50 TABLET ORAL 2 TIMES DAILY
Qty: 180 TABLET | Refills: 3 | Status: SHIPPED | OUTPATIENT
Start: 2025-08-26

## 2025-08-29 ENCOUNTER — HOSPITAL ENCOUNTER (OUTPATIENT)
Dept: RADIOLOGY | Facility: HOSPITAL | Age: 80
Discharge: HOME | End: 2025-08-29
Attending: NURSE PRACTITIONER
Payer: MEDICARE

## 2025-09-01 PROBLEM — M79.605 BILATERAL LEG PAIN: Status: RESOLVED | Noted: 2024-10-14 | Resolved: 2025-09-01

## 2025-09-01 PROBLEM — M79.604 BILATERAL LEG PAIN: Status: RESOLVED | Noted: 2024-10-14 | Resolved: 2025-09-01

## (undated) DEVICE — GOWN NONREINF SET-IN SLV 2XL

## (undated) DEVICE — SET EXTENSION CLEARLINK 2INJ

## (undated) DEVICE — PACK SUPERFICIAL VEN PROCEDURE

## (undated) DEVICE — Device

## (undated) DEVICE — GOWN POLY REINF BRTH SLV XL

## (undated) DEVICE — SOL CONTINU-FLO SET 2 LAV

## (undated) DEVICE — SET MICRO INTRO SHEATH 7F 7CM

## (undated) DEVICE — DECANTER FLUID TRNSF WHITE 9IN

## (undated) DEVICE — CATH IV INTROCAN 22G X 1

## (undated) DEVICE — GLOVE SENSICARE PI SURG 6.5

## (undated) DEVICE — SYR PLASTIPAK LL 3ML

## (undated) DEVICE — SUT PROLENE 6-0 C-1 30IN BL

## (undated) DEVICE — BANDAGE COBAN COMP SYSTEM

## (undated) DEVICE — SYR LUER-SLIP STRL 10ML

## (undated) DEVICE — CATH EMBOLECTOMY 4F REGULAR

## (undated) DEVICE — RESERVOIR JACKSON-PRATT 100CC

## (undated) DEVICE — APPLICATOR CHLORAPREP ORN 26ML

## (undated) DEVICE — CATH IV INTROCAN 20G X 1.1

## (undated) DEVICE — BNDG COFLEX FOAM LF2 ST 6X5YD

## (undated) DEVICE — SUT ETHILON 3-0 PS2 18 BLK

## (undated) DEVICE — BNDG COFLEX FOAM LF2 ST 4X5YD

## (undated) DEVICE — KIT IV START

## (undated) DEVICE — SET EXT STD BORE CATH 7.6IN

## (undated) DEVICE — BANDAGE MATRIX HK LOOP 6IN 5YD

## (undated) DEVICE — SUT PROLENE 6-0 BV-1 30IN

## (undated) DEVICE — COVER SNAP KAP 26IN

## (undated) DEVICE — SYR 10CC LUER LOCK

## (undated) DEVICE — BOWL STERILE LARGE 32OZ

## (undated) DEVICE — DRAPE THREE-QTR REINF 53X77IN

## (undated) DEVICE — NDL ECLIPSE SAF REG 25GX1.5IN

## (undated) DEVICE — BANDAGE MATRIX HK LOOP 4IN 5YD

## (undated) DEVICE — PROBE DOPPLER VTI DISP 8MHZ

## (undated) DEVICE — GLOVE SENSICARE PI SURG 7.5

## (undated) DEVICE — PACK UNIV PROCEDURE

## (undated) DEVICE — SUT 3-0 VICRYL / SH (J416)

## (undated) DEVICE — CATH EMBOLECTOMY 3F REGULAR

## (undated) DEVICE — SYR 30CC LUER LOCK

## (undated) DEVICE — DRAIN FLAT HUBLESS FULL 10MM

## (undated) DEVICE — BAG RECTANGLE RBBRBND 30X36IN

## (undated) DEVICE — BANDAGE GAUZE COT STRL 4.5X4.1

## (undated) DEVICE — SUT PROLENE 5-0 36IN C-1

## (undated) DEVICE — SUT VICRYL 2-0 36 CT-1

## (undated) DEVICE — GLOVE SURG BIOGEL LATEX SZ 7.5

## (undated) DEVICE — GLOVE SENSICARE PI GRN 6.5